# Patient Record
Sex: MALE | Race: WHITE | NOT HISPANIC OR LATINO | Employment: OTHER | URBAN - METROPOLITAN AREA
[De-identification: names, ages, dates, MRNs, and addresses within clinical notes are randomized per-mention and may not be internally consistent; named-entity substitution may affect disease eponyms.]

---

## 2017-02-08 ENCOUNTER — ALLSCRIPTS OFFICE VISIT (OUTPATIENT)
Dept: OTHER | Facility: OTHER | Age: 62
End: 2017-02-08

## 2017-03-24 ENCOUNTER — GENERIC CONVERSION - ENCOUNTER (OUTPATIENT)
Dept: OTHER | Facility: OTHER | Age: 62
End: 2017-03-24

## 2017-03-25 LAB — INTERPRETATION (HISTORICAL): NORMAL

## 2017-03-26 ENCOUNTER — GENERIC CONVERSION - ENCOUNTER (OUTPATIENT)
Dept: OTHER | Facility: OTHER | Age: 62
End: 2017-03-26

## 2017-03-31 ENCOUNTER — ALLSCRIPTS OFFICE VISIT (OUTPATIENT)
Dept: OTHER | Facility: OTHER | Age: 62
End: 2017-03-31

## 2017-06-02 ENCOUNTER — TRANSCRIBE ORDERS (OUTPATIENT)
Dept: ADMINISTRATIVE | Facility: HOSPITAL | Age: 62
End: 2017-06-02

## 2017-06-02 ENCOUNTER — ALLSCRIPTS OFFICE VISIT (OUTPATIENT)
Dept: OTHER | Facility: OTHER | Age: 62
End: 2017-06-02

## 2017-06-02 DIAGNOSIS — R94.31 NONSPECIFIC ABNORMAL ELECTROCARDIOGRAM (ECG) (EKG): Primary | ICD-10-CM

## 2017-06-02 DIAGNOSIS — R07.9 CHEST PAIN: ICD-10-CM

## 2017-06-02 DIAGNOSIS — R94.31 ABNORMAL ELECTROCARDIOGRAM: ICD-10-CM

## 2017-06-20 ENCOUNTER — HOSPITAL ENCOUNTER (OUTPATIENT)
Dept: NON INVASIVE DIAGNOSTICS | Facility: HOSPITAL | Age: 62
Discharge: HOME | End: 2017-06-20
Attending: INTERNAL MEDICINE
Payer: COMMERCIAL

## 2017-06-20 DIAGNOSIS — R07.9 CHEST PAIN: ICD-10-CM

## 2017-06-20 DIAGNOSIS — R94.31 ABNORMAL ELECTROCARDIOGRAM: ICD-10-CM

## 2017-06-20 PROCEDURE — 93306 TTE W/DOPPLER COMPLETE: CPT

## 2017-06-20 PROCEDURE — 93017 CV STRESS TEST TRACING ONLY: CPT

## 2017-06-21 LAB
MAX DIASTOLIC BP: 80 MMHG
MAX HEART RATE: 150 BPM
MAX PREDICTED HEART RATE: 159 BPM
MAX. SYSTOLIC BP: 198 MMHG
PROTOCOL NAME: NORMAL
TIME IN EXERCISE PHASE: 406 S

## 2017-07-10 ENCOUNTER — ALLSCRIPTS OFFICE VISIT (OUTPATIENT)
Dept: OTHER | Facility: OTHER | Age: 62
End: 2017-07-10

## 2017-07-10 LAB — S PYO AG THROAT QL: NEGATIVE

## 2017-07-19 ENCOUNTER — ALLSCRIPTS OFFICE VISIT (OUTPATIENT)
Dept: OTHER | Facility: OTHER | Age: 62
End: 2017-07-19

## 2017-07-19 DIAGNOSIS — J20.9 ACUTE BRONCHITIS: ICD-10-CM

## 2017-07-25 ENCOUNTER — ALLSCRIPTS OFFICE VISIT (OUTPATIENT)
Dept: OTHER | Facility: OTHER | Age: 62
End: 2017-07-25

## 2017-12-05 LAB
A/G RATIO (HISTORICAL): 1.7 (ref 1.2–2.2)
ALBUMIN SERPL BCP-MCNC: 4.4 G/DL (ref 3.6–4.8)
ALP SERPL-CCNC: 60 IU/L (ref 39–117)
ALT SERPL W P-5'-P-CCNC: 57 IU/L (ref 0–44)
AST SERPL W P-5'-P-CCNC: 46 IU/L (ref 0–40)
BILIRUB SERPL-MCNC: 0.6 MG/DL (ref 0–1.2)
BUN SERPL-MCNC: 18 MG/DL (ref 8–27)
BUN/CREA RATIO (HISTORICAL): 20 (ref 10–24)
CALCIUM SERPL-MCNC: 9.7 MG/DL (ref 8.6–10.2)
CHLORIDE SERPL-SCNC: 99 MMOL/L (ref 96–106)
CO2 SERPL-SCNC: 26 MMOL/L (ref 18–29)
CREAT SERPL-MCNC: 0.91 MG/DL (ref 0.76–1.27)
EGFR AFRICAN AMERICAN (HISTORICAL): 105 ML/MIN/1.73
EGFR-AMERICAN CALC (HISTORICAL): 91 ML/MIN/1.73
GLUCOSE SERPL-MCNC: 137 MG/DL (ref 65–99)
HBA1C MFR BLD HPLC: 7.1 % (ref 4.8–5.6)
POTASSIUM SERPL-SCNC: 4.5 MMOL/L (ref 3.5–5.2)
SODIUM SERPL-SCNC: 138 MMOL/L (ref 134–144)
TOT. GLOBULIN, SERUM (HISTORICAL): 2.6 G/DL (ref 1.5–4.5)
TOTAL PROTEIN (HISTORICAL): 7 G/DL (ref 6–8.5)

## 2017-12-06 ENCOUNTER — GENERIC CONVERSION - ENCOUNTER (OUTPATIENT)
Dept: OTHER | Facility: OTHER | Age: 62
End: 2017-12-06

## 2017-12-13 ENCOUNTER — ALLSCRIPTS OFFICE VISIT (OUTPATIENT)
Dept: OTHER | Facility: OTHER | Age: 62
End: 2017-12-13

## 2017-12-15 NOTE — PROGRESS NOTES
Assessment  1  Hypertension (401 9) (I10)   2  Hyperlipidemia (272 4) (E78 5)   3  Diabetes mellitus with diabetic neuropathy (250 60,357 2) (E11 40)   4  Influenza vaccine needed (V04 81) (Z23)    Plan  Diabetes mellitus with diabetic neuropathy    · MetFORMIN HCl - 500 MG Oral Tablet; Take 1 tablet daily   · (1) HEMOGLOBIN A1C; Status:Active; Requested for:38Zph9946;    · (1) MICROALBUMIN CREATININE RATIO, RANDOM URINE; Status:Active; Requestedfor:98Ctj3829;    · *VB - Foot Exam; Status:Complete;   Done: 71QFU6961 08:26AM   · Begin a limited exercise program ; Status:Complete;   Done: 74MVI8221   · We recommend that you bring your body mass index down to 26 ; Status:Complete;  Done: 63WJC4341   · Follow-up visit in 3 months Evaluation and Treatment  Follow-up  Status: Complete -Scheduling  Done: 85HOJ8588 08:48AM  Hyperlipidemia    · Atorvastatin Calcium 20 MG Oral Tablet; Take 1 tablet daily   · (1) COMPREHENSIVE METABOLIC PANEL; Status:Active; Requested for:71Gez2336;    · (1) LIPID PANEL FASTING W DIRECT LDL REFLEX; Status:Active; Requestedfor:72Cpm1411;   Hypertension    · Lisinopril-Hydrochlorothiazide 20-12 5 MG Oral Tablet; One daily  Influenza vaccine needed    · Fluzone Quadrivalent 0 5 ML Intramuscular Suspension    Discussion/Summary    Diabetes - A1c is up to 7 1; weight loss advised  Hypertension - not quite at goal, will work on getting back on track with diet  Hyperlipidemia - stable on Lipitor  Chief Complaint  BP and Diabetes check  Review labs [facility]   Patient is here today for follow up of chronic conditions described in HPI  History of Present Illness  He has been feeling well  He is not watching his diet  The patient states his hyperlipidemia has been under good control since the last visit  Symptoms: Associated symptoms include no memory loss  Medications: He denies medication side effects  The patient presents for follow-up of essential hypertension   The patient states he has been doing well with his blood pressure control since the last visit  Symptoms: Associated symptoms include no headache  Medications: the patient is adherent with his medication regimen  -- He denies medication side effects  Review of Systems   Constitutional: not feeling tired  Respiratory: no shortness of breath  Active Problems  1  Abnormal EKG (794 31) (R94 31)   2  Adenocarcinoma of prostate (185) (C61)   3  Arthropathy of multiple sites (716 99) (M12 9)   4  BMI 40 0-44 9, adult (V85 41) (Z68 41)   5  Chest pain (786 50) (R07 9)   6  Chest tightness (786 59) (R07 89)   7  Diabetes mellitus with diabetic neuropathy (250 60,357 2) (E11 40)   8  Diverticulitis of colon (562 11) (K57 32)   9  Diverticulosis of sigmoid colon (562 10) (K57 30)   10  Encounter for screening for malignant neoplasm of colon (V76 51) (Z12 11)   11  Esophageal reflux (530 81) (K21 9)   12  Fatty liver (571 8) (K76 0)   13  Generalized osteoarthritis of multiple sites (715 09) (M15 9)   14  Hernia (553 9) (K46 9)   15  Hyperlipidemia (272 4) (E78 5)   16  Hypertension (401 9) (I10)   17  Iliotibial band syndrome of left side (728 89) (M76 32)   18  Lumbago (724 2) (M54 5)   19  Lumbar radiculopathy (724 4) (M54 16)   20  Onychomycosis (110 1) (B35 1)   21  Sore throat (462) (J02 9)   22  Well adult on routine health check (V70 0) (Z00 00)    Past Medical History  1  Cough (786 2) (R05)   2  History of Dysplasia of toenail (703 8) (Q84 6)   3  History of Hearing problem (V41 2) (H91 90)   4  History of acute bronchitis (V12 69) (Z87 09)   5  History of acute bronchitis (V12 69) (Z87 09)   6  History of acute pharyngitis (V12 69) (Z87 09)   7  History of arthritis (V13 4) (Z87 39)   8  History of diabetes mellitus (V12 29) (Z86 39)   9  History of high cholesterol (V12 29) (Z86 39)   10  History of malignant neoplasm of prostate (V10 46) (Z85 46)   11  Hypertension (401 9) (I10)   12   Influenza vaccine needed (V04 81) (Z23)   13  History of Lateral epicondylitis, unspecified laterality (726 32) (M77 10)   14  History of Paronychia of toe (681 11) (L03 039)   15  History of Screening for prostate cancer (V76 44) (Z12 5)   16  History of Tinea pedis of both feet (110 4) (B35 3)   17  History of Trochanteric bursitis (726 5) (M70 60)    Surgical History  1  History of Complete Colonoscopy   2  History of Prostate Surgery   3  History of Surgery Prostate Prostatotomy    Family History  Mother    1  Family history of arthritis (V17 7) (Z82 61)  Father    2  Family history of Coronary artery disease   3  Family history of arthritis (V17 7) (Z82 61)   4  Family history of hypertension (V17 49) (Z82 49)  Family History    5  Denied: Family history of mental disorder    The family history was reviewed and updated today  Social History   · Drinks beer (V49 89) (Z78 9)   · Former smoker (V15 82) (Z45 656)   · Lack of exercise (V69 0) (Z72 3)   ·    · Pets/Animals: Cat   · Sleeps 8 - 10 hours a day    Current Meds   1  Aspirin Childrens 81 MG Oral Tablet Chewable; take one tablet daily with food; Therapy: 25QSI0685 to (Last Rx:02Jun2017)  Requested for: 02Jun2017 Ordered   2  Atorvastatin Calcium 20 MG Oral Tablet; Take 1 tablet daily; Therapy: 35NWW2458 to (Evaluate:67Cxe5206)  Requested for: 38Yqp1736; Last Rx:38Meu0173 Ordered   3  Estevan Contour Monitor w/Device Kit; Therapy: (Recorded:45Unp3228) to Recorded   4  [employer]; TEST twice a day as directed  Requested for: 11LGA9981; Last Rx:41Nqj5413 Ordered   5  Estevan Microlet Lancets Miscellaneous; TEST twice a day as directed  Requested for: 44KGO0583; Last Rx:91Hch1833 Ordered   6  Fish Oil 1000 MG Oral Capsule; 4000 mg daily; Therapy: 11ADG5901 to (Last Rx:02Jun2017)  Requested for: 02Jun2017 Ordered   7  Lisinopril-Hydrochlorothiazide 20-12 5 MG Oral Tablet; One daily;  Therapy: 91GLG8338 to (Evaluate:50Jen3120)  Requested for: 35MNT6022; Last Rx: 12MTJ6236 Ordered   8  MetFORMIN HCl - 500 MG Oral Tablet; Take 1 tablet daily; Therapy: 69ZLO2202 to (Last Rx:16Nov2017)  Requested for: 21XWW1384 Ordered   9  RABEprazole Sodium 20 MG Oral Tablet Delayed Release; TAKE 1 TABLET EVERY OTHER  DAY; Therapy: (Recorded:48Hbw8110) to Recorded    Allergies  1  Penicillins    Vitals  Vital Signs    Recorded: 23Gep6592 08:31AM Recorded: 45Tjj6739 08:15AM   Temperature  97 1 F   Heart Rate  72   Respiration  16   Systolic 598 007   Diastolic 82 90   Height  5 ft 11 in   Weight  288 lb    BMI Calculated  40 17   BSA Calculated  2 46     Physical Exam   Constitutional  General appearance: No acute distress, well appearing and well nourished  Ears, Nose, Mouth, and Throat  External inspection of ears and nose: Normal    Otoscopic examination: Tympanic membrance translucent with normal light reflex  Canals patent without erythema  Oropharynx: Normal with no erythema, edema, exudate or lesions  Pulmonary  Auscultation of lungs: Clear to auscultation, equal breath sounds bilaterally, no wheezes, no rales, no rhonci  no rales or crackles were heard bilaterally  no rhonchi  no friction rub  no wheezing  Cardiovascular  Auscultation of heart: Normal rate and rhythm, normal S1 and S2, without murmurs  Socks and shoes removed, Right Foot Findings: normal foot, no swelling, no erythema  The right toes were normal-- and-- had full range of motion  The sensory exam showed normal vibratory sensation at the level of the toes on the right  Normal tactile sensation with monofilament testing throughout the right foot  Socks and shoes removed, Left Foot Findings: normal foot, no swelling, no erythema  The left toes were normal-- and-- had full range of motion  The sensory exam showed normal vibratory sensation at the level of the toes on the left  Normal tactile sensation with monofilament testing throughout the left foot  Pulses:  2+ in the dorsalis pedis on the right  Pulses:  2+ in the dorsalis pedis on the left        Results/Data  *VB - Foot Exam [id number] 08:26AM Sushma Rader     Test Name Result Flag Reference   FOOT EXAM [id number]         Provider Comments    reminded to get eye exam      Future Appointments    Date/Time Provider Specialty Site   03/15/2018 09:00 AM Sushma Rader, [facility] Last Guide[address]     Signatures   Electronically signed by : Marilu Van DO; Dec 13 2017  2:29PM EST                       (Author)

## 2018-01-10 NOTE — RESULT NOTES
Message   Appointment 6/29/16   Please print and put in my folder   Dr Kecia Ponce     Verified Results  (1) HEMOGLOBIN A1C 16BNL4451 10:12AM Dinah Pearson     Test Name Result Flag Reference   Hemoglobin A1c 6 7 % H 4 8-5 6   Pre-diabetes: 5 7 - 6 4           Diabetes: >6 4           Glycemic control for adults with diabetes: <7 0

## 2018-01-10 NOTE — RESULT NOTES
Message   Appointment 3/31/17   Please print and put in my folder  Dr Eduarda Marie      Verified Results  (1) COMPREHENSIVE METABOLIC PANEL 40HMM7718 63:02VV Jory Rand     Test Name Result Flag Reference   Glucose, Serum 148 mg/dL H 65-99   BUN 18 mg/dL  8-27   Creatinine, Serum 0 98 mg/dL  0 76-1 27   eGFR If NonAfricn Am 83 mL/min/1 73  >59   eGFR If Africn Am 96 mL/min/1 73  >59   BUN/Creatinine Ratio 18  10-22   Sodium, Serum 139 mmol/L  134-144   Potassium, Serum 4 1 mmol/L  3 5-5 2   Chloride, Serum 99 mmol/L     Carbon Dioxide, Total 24 mmol/L  18-29   Calcium, Serum 9 3 mg/dL  8 6-10 2   Protein, Total, Serum 6 5 g/dL  6 0-8 5   Albumin, Serum 4 3 g/dL  3 6-4 8   Globulin, Total 2 2 g/dL  1 5-4 5   A/G Ratio 2 0  1 2-2 2   **Please note reference interval change**   Bilirubin, Total 0 8 mg/dL  0 0-1 2   Alkaline Phosphatase, S 64 IU/L     AST (SGOT) 62 IU/L H 0-40   ALT (SGPT) 75 IU/L H 0-44     (1) LIPID PANEL FASTING W DIRECT LDL REFLEX 03SVU9260 08:56AM Jory Austell     Test Name Result Flag Reference   Cholesterol, Total 193 mg/dL  100-199   Triglycerides 158 mg/dL H 0-149   HDL Cholesterol 52 mg/dL  >39   LDL Cholesterol Calc 109 mg/dL H 0-99     (1) HEMOGLOBIN A1C 81DTA7601 08:56AM Jory Austell     Test Name Result Flag Reference   Hemoglobin A1c 6 9 % H 4 8-5 6   Pre-diabetes: 5 7 - 6 4           Diabetes: >6 4           Glycemic control for adults with diabetes: <7 0     (1) MICROALBUMIN CREATININE RATIO, RANDOM URINE 24Mar2017 08:56AM Jory Austell     Test Name Result Flag Reference   Creatinine, Urine 193 9 mg/dL  Not Estab  Microalbumin, Urine 27 1 ug/mL  Not Estab  Microalb/Creat Ratio 14 0 mg/g creat  0 0-30 0     [facility]) Cardiovascular Risk Assessment 24Mar2017 08:56AM Jory Austell     Test Name Result Flag Reference   Interpretation Note     Supplement report is available  PDF Image

## 2018-01-10 NOTE — RESULT NOTES
Message   Appointment 12/30/16   Please print and put in my folder     Dr Ally Nicholson      Verified Results  (1) COMPREHENSIVE METABOLIC PANEL 32CZL5417 71:50BY Rossi Chavez     Test Name Result Flag Reference   Glucose, Serum 149 mg/dL H 65-99   BUN 19 mg/dL  8-27   Creatinine, Serum 0 91 mg/dL  0 76-1 27   eGFR If NonAfricn Am 91 mL/min/1 73  >59   Alkaline Phosphatase, S 62 IU/L     AST (SGOT) 43 IU/L H 0-40   ALT (SGPT) 64 IU/L H 0-44   Calcium, Serum 9 7 mg/dL  8 6-10 2   Protein, Total, Serum 6 8 g/dL  6 0-8 5   Albumin, Serum 4 5 g/dL  3 6-4 8   Globulin, Total 2 3 g/dL  1 5-4 5   A/G Ratio 2 0  1 1-2 5   Bilirubin, Total 0 4 mg/dL  0 0-1 2   eGFR If Africn Am 106 mL/min/1 73  >59   BUN/Creatinine Ratio 21  10-22   Sodium, Serum 142 mmol/L  134-144   **Please note reference interval change**   Potassium, Serum 4 6 mmol/L  3 5-5 2   Chloride, Serum 101 mmol/L     **Please note reference interval change**   Carbon Dioxide, Total 25 mmol/L  18-29     (1) HEMOGLOBIN A1C 90FVA5322 09:30AM Rossi Chavez     Test Name Result Flag Reference   Hemoglobin A1c 6 6 % H 4 8-5 6   Pre-diabetes: 5 7 - 6 4           Diabetes: >6 4           Glycemic control for adults with diabetes: <7 0

## 2018-01-12 VITALS
HEIGHT: 71 IN | WEIGHT: 279.31 LBS | OXYGEN SATURATION: 98 % | DIASTOLIC BLOOD PRESSURE: 90 MMHG | HEART RATE: 90 BPM | BODY MASS INDEX: 39.1 KG/M2 | SYSTOLIC BLOOD PRESSURE: 148 MMHG

## 2018-01-12 NOTE — RESULT NOTES
Message   Appointment 6/29/16   Please print and put in my folder   Dr Dianne Monzon     Verified Results  (1) PSA (SCREEN) (Dx V76 44 Screen for Prostate Cancer) 85CGE7203 10:12AM [facility]     Test Name Result Flag Reference   Prostate Specific Ag, Serum <0 1 ng/mL  0 0-4 0   Roche ECLIA methodology  According to the American Urological Association, Serum PSA should  decrease and remain at undetectable levels after radical  prostatectomy  The AUA defines biochemical recurrence as an initial  PSA value 0 2 ng/mL or greater followed by a subsequent confirmatory  PSA value 0 2 ng/mL or greater  Values obtained with different assay methods or kits cannot be used  interchangeably  Results cannot be interpreted as absolute evidence  of the presence or absence of malignant disease  (1) COMPREHENSIVE METABOLIC PANEL [id number] 63:09VB [facility]     Test Name Result Flag Reference   Glucose, Serum 128 mg/dL H 65-99   BUN 19 mg/dL  8-27   Creatinine, Serum 0 93 mg/dL  0 76-1 27   eGFR If NonAfricn Am 89 mL/min/1 73  >59   eGFR If Africn Am 103 mL/min/1 73  >59   BUN/Creatinine Ratio 20  10-22   Sodium, Serum 141 mmol/L  134-144   Potassium, Serum 4 5 mmol/L  3 5-5 2   Chloride, Serum 99 mmol/L     Carbon Dioxide, Total 22 mmol/L  18-29   Calcium, Serum 9 6 mg/dL  8 6-10 2   Protein, Total, Serum 6 9 g/dL  6 0-8 5   Albumin, Serum 4 6 g/dL  3 6-4 8   Globulin, Total 2 3 g/dL  1 5-4 5   A/G Ratio 2 0  1 1-2 5   Bilirubin, Total 0 7 mg/dL  0 0-1 2   Alkaline Phosphatase, S 63 IU/L     AST (SGOT) 47 IU/L H 0-40   ALT (SGPT) 57 IU/L H 0-44     (1) LIPID PANEL FASTING W DIRECT LDL REFLEX [id number] 10:12AM [facility]     Test Name Result Flag Reference   Cholesterol, Total 188 mg/dL  100-199   Triglycerides 122 mg/dL  0-149   HDL Cholesterol 54 mg/dL  >39   According to ATP-III Guidelines, HDL-C >59 mg/dL is considered a  negative risk factor for CHD     LDL Cholesterol Calc 110 mg/dL H 0-99     (1) MICROALBUMIN CREATININE RATIO, RANDOM URINE 65NVF2051 10:12AM Leonard Andrea     Test Name Result Flag Reference   Creatinine, Urine 164 7 mg/dL  Not Estab  Microalbumin, Urine 17 4 ug/mL  Not Estab     Microalb/Creat Ratio 10 6 mg/g creat  0 0-30 0

## 2018-01-12 NOTE — RESULT NOTES
Message   Appointment 9/30/16    Please print and put in my folder  Dr Edelmira Szymanski      Verified Results  (1) COMPREHENSIVE METABOLIC PANEL [id number] 76:96DD Shira Jazmín     Test Name Result Flag Reference   Calcium, Serum 9 3 mg/dL  8 6-10 2   Protein, Total, Serum 6 5 g/dL  6 0-8 5   Albumin, Serum 4 4 g/dL  3 6-4 8   Globulin, Total 2 1 g/dL  1 5-4 5   A/G Ratio 2 1  1 1-2 5   Bilirubin, Total 0 5 mg/dL  0 0-1 2   Alkaline Phosphatase, S 65 IU/L     AST (SGOT) 42 IU/L H 0-40   ALT (SGPT) 64 IU/L H 0-44   Carbon Dioxide, Total 20 mmol/L  18-29   Chloride, Serum 102 mmol/L     Potassium, Serum 4 5 mmol/L  3 5-5 2   Sodium, Serum 141 mmol/L  134-144   BUN/Creatinine Ratio 22  10-22   eGFR If Africn Am 111 mL/min/1 73  >59   eGFR If NonAfricn Am 96 mL/min/1 73  >59   Creatinine, Serum 0 83 mg/dL  0 76-1 27   BUN 18 mg/dL  8-27   Glucose, Serum 124 mg/dL H 65-99     (1) LIPID PANEL FASTING W DIRECT LDL REFLEX [id number] 11:47AM Shira Jazmín     Test Name Result Flag Reference   Cholesterol, Total 172 mg/dL  100-199   Triglycerides 99 mg/dL  0-149   HDL Cholesterol 57 mg/dL  >39   According to ATP-III Guidelines, HDL-C >59 mg/dL is considered a  negative risk factor for CHD  LDL Cholesterol Calc 95 mg/dL  0-99     (1) HEMOGLOBIN A1C [id number] 11:47AM [facility]     Test Name Result Flag Reference   Hemoglobin A1c 7 0 % H 4 8-5 6   Pre-diabetes: 5 7 - 6 4           Diabetes: >6 4           Glycemic control for adults with diabetes: <7 0     [facility]) Cardiovascular Risk Assessment [id number] 11:47AM Shira Jazmín     Test Name Result Flag Reference   Interpretation Note     Supplement report is available  PDF Image

## 2018-01-13 VITALS
WEIGHT: 278 LBS | RESPIRATION RATE: 16 BRPM | HEIGHT: 71 IN | BODY MASS INDEX: 38.92 KG/M2 | DIASTOLIC BLOOD PRESSURE: 86 MMHG | SYSTOLIC BLOOD PRESSURE: 130 MMHG | TEMPERATURE: 98.2 F | HEART RATE: 82 BPM

## 2018-01-13 VITALS
DIASTOLIC BLOOD PRESSURE: 90 MMHG | SYSTOLIC BLOOD PRESSURE: 140 MMHG | HEIGHT: 71 IN | BODY MASS INDEX: 40.6 KG/M2 | WEIGHT: 290 LBS | TEMPERATURE: 98 F | RESPIRATION RATE: 20 BRPM | HEART RATE: 78 BPM

## 2018-01-14 VITALS
DIASTOLIC BLOOD PRESSURE: 80 MMHG | HEIGHT: 71 IN | BODY MASS INDEX: 38.92 KG/M2 | WEIGHT: 278 LBS | HEART RATE: 87 BPM | OXYGEN SATURATION: 97 % | SYSTOLIC BLOOD PRESSURE: 135 MMHG

## 2018-01-14 VITALS — BODY MASS INDEX: 40.32 KG/M2 | RESPIRATION RATE: 17 BRPM | HEIGHT: 71 IN | WEIGHT: 288 LBS

## 2018-01-14 VITALS
DIASTOLIC BLOOD PRESSURE: 84 MMHG | SYSTOLIC BLOOD PRESSURE: 132 MMHG | HEIGHT: 71 IN | HEART RATE: 78 BPM | TEMPERATURE: 97.4 F | WEIGHT: 278 LBS | RESPIRATION RATE: 18 BRPM | BODY MASS INDEX: 38.92 KG/M2

## 2018-01-23 VITALS
TEMPERATURE: 97.1 F | WEIGHT: 288 LBS | BODY MASS INDEX: 40.32 KG/M2 | HEART RATE: 72 BPM | SYSTOLIC BLOOD PRESSURE: 144 MMHG | DIASTOLIC BLOOD PRESSURE: 82 MMHG | HEIGHT: 71 IN | RESPIRATION RATE: 16 BRPM

## 2018-01-23 NOTE — RESULT NOTES
Discussion/Summary   Appointment 12/13/17   Please print and put in my folder     Dr Umair Ibanez      Verified Results  (1) COMPREHENSIVE METABOLIC PANEL [id number] 50:33PD [facility]     Test Name Result Flag Reference   Glucose, Serum 137 mg/dL H 65-99   BUN 18 mg/dL  8-27   Creatinine, Serum 0 91 mg/dL  0 76-1 27   BUN/Creatinine Ratio 20  10-24   Sodium, Serum 138 mmol/L  134-144   Potassium, Serum 4 5 mmol/L  3 5-5 2   Chloride, Serum 99 mmol/L     Carbon Dioxide, Total 26 mmol/L  18-29   Calcium, Serum 9 7 mg/dL  8 6-10 2   Protein, Total, Serum 7 0 g/dL  6 0-8 5   Albumin, Serum 4 4 g/dL  3 6-4 8   Globulin, Total 2 6 g/dL  1 5-4 5   A/G Ratio 1 7  1 2-2 2   Bilirubin, Total 0 6 mg/dL  0 0-1 2   Alkaline Phosphatase, S 60 IU/L     AST (SGOT) 46 IU/L H 0-40   ALT (SGPT) 57 IU/L H 0-44   eGFR If NonAfricn Am 91 mL/min/1 73  >59   eGFR If Africn Am 105 mL/min/1 73  >59     (1) HEMOGLOBIN A1C [id number] 10:27AM [facility]     Test Name Result Flag Reference   Hemoglobin A1c 7 1 % H 4 8-5 6   Pre-diabetes: 5 7 - 6 4           Diabetes: >6 4           Glycemic control for adults with diabetes: <7 0

## 2018-01-24 PROBLEM — E11.40 DIABETES MELLITUS WITH DIABETIC NEUROPATHY (HCC): Status: ACTIVE | Noted: 2017-03-31

## 2018-01-24 PROBLEM — R94.31 ABNORMAL EKG: Status: ACTIVE | Noted: 2017-06-02

## 2018-01-24 RX ORDER — CHLORAL HYDRATE 500 MG
2000 CAPSULE ORAL 2 TIMES DAILY
COMMUNITY
Start: 2017-06-02 | End: 2021-10-21 | Stop reason: ALTCHOICE

## 2018-01-24 RX ORDER — ATORVASTATIN CALCIUM 20 MG/1
1 TABLET, FILM COATED ORAL DAILY
COMMUNITY
Start: 2012-01-30 | End: 2018-02-22 | Stop reason: SDUPTHER

## 2018-01-24 RX ORDER — ASPIRIN 81 MG/1
1 TABLET, CHEWABLE ORAL
COMMUNITY
Start: 2017-06-02 | End: 2022-03-16 | Stop reason: HOSPADM

## 2018-01-24 RX ORDER — RABEPRAZOLE SODIUM 20 MG/1
TABLET, DELAYED RELEASE ORAL EVERY OTHER DAY
Refills: 0 | COMMUNITY
Start: 2018-01-19 | End: 2021-04-14 | Stop reason: ALTCHOICE

## 2018-01-24 RX ORDER — LISINOPRIL AND HYDROCHLOROTHIAZIDE 20; 12.5 MG/1; MG/1
TABLET ORAL DAILY
COMMUNITY
Start: 2017-07-25 | End: 2018-02-22 | Stop reason: SDUPTHER

## 2018-01-26 ENCOUNTER — OFFICE VISIT (OUTPATIENT)
Dept: FAMILY MEDICINE CLINIC | Facility: CLINIC | Age: 63
End: 2018-01-26
Payer: COMMERCIAL

## 2018-01-26 VITALS
SYSTOLIC BLOOD PRESSURE: 150 MMHG | BODY MASS INDEX: 38.33 KG/M2 | WEIGHT: 283 LBS | HEIGHT: 72 IN | DIASTOLIC BLOOD PRESSURE: 68 MMHG

## 2018-01-26 DIAGNOSIS — M25.552 HIP PAIN, BILATERAL: Primary | ICD-10-CM

## 2018-01-26 DIAGNOSIS — M25.551 HIP PAIN, BILATERAL: Primary | ICD-10-CM

## 2018-01-26 PROCEDURE — 99213 OFFICE O/P EST LOW 20 MIN: CPT | Performed by: FAMILY MEDICINE

## 2018-01-26 NOTE — PROGRESS NOTES
Assessment/Plan:    No problem-specific Assessment & Plan notes found for this encounter  Diagnoses and all orders for this visit:    Hip pain, bilateral  Comments:  worsening hip pain, will get x-rays, labs and start physical therapy  Orders:  -     XR hip/pelv 2-3 vws right if performed; Future  -     XR hip/pelv 2-3 vws left if performed; Future  -     Ambulatory referral to Physical Therapy; Future  -     RF Screen w/ Reflex to Titer; Future  -     Uric acid; Future  -     Sedimentation rate, automated; Future  -     Uric acid  -     Sedimentation rate, automated          Patient Instructions   Increase exercise as tolerated      Return if symptoms worsen or fail to improve  Subjective:      Patient ID: Lis Anne is a 58 y o  male  Chief Complaint   Patient presents with    Personal Problem     refused visitals just wants to talk to doctor       He has been having progressive pain in his hips  The pain is inside hips  He is cautious on the way he steps  He has a hard time getting on his underwear  He used to be able to work hard all day and now he is done by noon           The following portions of the patient's history were reviewed and updated as appropriate: allergies, current medications, past family history, past medical history, past social history, past surgical history and problem list     Review of Systems   Musculoskeletal:        Morning stiffness   Psychiatric/Behavioral:        Denies depression         Current Outpatient Prescriptions   Medication Sig Dispense Refill    aspirin 81 mg chewable tablet Chew 1 tablet      atorvastatin (LIPITOR) 20 mg tablet Take 1 tablet by mouth daily      ESTEVAN MICROLET LANCETS lancets Estevan Microlet Lancets Miscellaneous  TEST twice a day as directed   Quantity: 100;  Refills: 0      Sunni Burton DO ; Active      glucose blood (ESTEVAN CONTOUR TEST) test strip by In Vitro route 2 (two) times a day      lisinopril-hydrochlorothiazide (PRINZIDE,ZESTORETIC) 20-12 5 MG per tablet Take by mouth daily      metFORMIN (GLUCOPHAGE) 500 mg tablet Take 1 tablet by mouth daily      Omega-3 Fatty Acids (FISH OIL) 1,000 mg Take by mouth      RABEprazole (ACIPHEX) 20 MG tablet   0     No current facility-administered medications for this visit  Objective:    /68   Ht 6' (1 829 m)   Wt 128 kg (283 lb)   BMI 38 38 kg/m²        Physical Exam   Constitutional: He appears well-developed and well-nourished  HENT:   Head: Normocephalic and atraumatic  Right Ear: External ear normal    Left Ear: External ear normal    Mouth/Throat: Oropharynx is clear and moist    Cardiovascular: Normal rate, regular rhythm and normal heart sounds  No murmur heard  Pulmonary/Chest: Effort normal and breath sounds normal  No respiratory distress  He has no wheezes  He has no rales  Musculoskeletal: He exhibits no edema or tenderness  Decreased hip adduction bilaterally   Nursing note and vitals reviewed               Jun Renee DO

## 2018-01-30 ENCOUNTER — TRANSCRIBE ORDERS (OUTPATIENT)
Dept: ADMINISTRATIVE | Facility: HOSPITAL | Age: 63
End: 2018-01-30

## 2018-01-30 ENCOUNTER — HOSPITAL ENCOUNTER (OUTPATIENT)
Dept: RADIOLOGY | Facility: HOSPITAL | Age: 63
Discharge: HOME | End: 2018-01-30
Payer: COMMERCIAL

## 2018-01-30 DIAGNOSIS — M25.551 HIP PAIN, BILATERAL: ICD-10-CM

## 2018-01-30 DIAGNOSIS — M25.552 HIP PAIN, BILATERAL: ICD-10-CM

## 2018-01-30 PROCEDURE — 73523 X-RAY EXAM HIPS BI 5/> VIEWS: CPT

## 2018-01-31 LAB
ERYTHROCYTE [SEDIMENTATION RATE] IN BLOOD BY WESTERGREN METHOD: 2 MM/HR (ref 0–30)
RHEUMATOID FACT SERPL-ACNC: <10 IU/ML (ref 0–13.9)
URATE SERPL-MCNC: 5.8 MG/DL (ref 3.7–8.6)

## 2018-02-22 ENCOUNTER — APPOINTMENT (OUTPATIENT)
Dept: RADIOLOGY | Facility: CLINIC | Age: 63
End: 2018-02-22
Payer: COMMERCIAL

## 2018-02-22 ENCOUNTER — OFFICE VISIT (OUTPATIENT)
Dept: CARDIOLOGY CLINIC | Facility: CLINIC | Age: 63
End: 2018-02-22
Payer: COMMERCIAL

## 2018-02-22 VITALS
DIASTOLIC BLOOD PRESSURE: 76 MMHG | WEIGHT: 280 LBS | HEART RATE: 76 BPM | BODY MASS INDEX: 37.93 KG/M2 | SYSTOLIC BLOOD PRESSURE: 126 MMHG | HEIGHT: 72 IN

## 2018-02-22 VITALS
WEIGHT: 290 LBS | BODY MASS INDEX: 39.28 KG/M2 | HEART RATE: 86 BPM | HEIGHT: 72 IN | OXYGEN SATURATION: 97 % | SYSTOLIC BLOOD PRESSURE: 140 MMHG | DIASTOLIC BLOOD PRESSURE: 88 MMHG

## 2018-02-22 DIAGNOSIS — K76.0 FATTY LIVER: ICD-10-CM

## 2018-02-22 DIAGNOSIS — E11.40 TYPE 2 DIABETES MELLITUS WITH DIABETIC NEUROPATHY, WITHOUT LONG-TERM CURRENT USE OF INSULIN (HCC): Primary | ICD-10-CM

## 2018-02-22 DIAGNOSIS — E11.40 TYPE 2 DIABETES MELLITUS WITH DIABETIC NEUROPATHY, WITHOUT LONG-TERM CURRENT USE OF INSULIN (HCC): ICD-10-CM

## 2018-02-22 DIAGNOSIS — I10 ESSENTIAL HYPERTENSION: ICD-10-CM

## 2018-02-22 DIAGNOSIS — M25.511 BILATERAL SHOULDER PAIN, UNSPECIFIED CHRONICITY: Primary | ICD-10-CM

## 2018-02-22 DIAGNOSIS — R07.89 CHEST TIGHTNESS: Primary | ICD-10-CM

## 2018-02-22 DIAGNOSIS — M25.512 BILATERAL SHOULDER PAIN, UNSPECIFIED CHRONICITY: Primary | ICD-10-CM

## 2018-02-22 DIAGNOSIS — E78.2 MIXED HYPERLIPIDEMIA: ICD-10-CM

## 2018-02-22 DIAGNOSIS — M75.50 SUBACROMIAL BURSITIS: ICD-10-CM

## 2018-02-22 PROCEDURE — 73030 X-RAY EXAM OF SHOULDER: CPT

## 2018-02-22 PROCEDURE — 20610 DRAIN/INJ JOINT/BURSA W/O US: CPT | Performed by: ORTHOPAEDIC SURGERY

## 2018-02-22 PROCEDURE — 93000 ELECTROCARDIOGRAM COMPLETE: CPT | Performed by: INTERNAL MEDICINE

## 2018-02-22 PROCEDURE — 99203 OFFICE O/P NEW LOW 30 MIN: CPT | Performed by: ORTHOPAEDIC SURGERY

## 2018-02-22 PROCEDURE — 99214 OFFICE O/P EST MOD 30 MIN: CPT | Performed by: INTERNAL MEDICINE

## 2018-02-22 RX ORDER — DEXAMETHASONE SODIUM PHOSPHATE 100 MG/10ML
40 INJECTION INTRAMUSCULAR; INTRAVENOUS
Status: COMPLETED | OUTPATIENT
Start: 2018-02-22 | End: 2018-02-22

## 2018-02-22 RX ORDER — LISINOPRIL AND HYDROCHLOROTHIAZIDE 20; 12.5 MG/1; MG/1
1 TABLET ORAL DAILY
Qty: 90 TABLET | Refills: 0 | Status: SHIPPED | OUTPATIENT
Start: 2018-02-22 | End: 2018-07-02 | Stop reason: SDUPTHER

## 2018-02-22 RX ORDER — LIDOCAINE HYDROCHLORIDE 10 MG/ML
4 INJECTION, SOLUTION INFILTRATION; PERINEURAL
Status: COMPLETED | OUTPATIENT
Start: 2018-02-22 | End: 2018-02-22

## 2018-02-22 RX ORDER — ATORVASTATIN CALCIUM 20 MG/1
20 TABLET, FILM COATED ORAL DAILY
Qty: 90 TABLET | Refills: 1 | Status: SHIPPED | OUTPATIENT
Start: 2018-02-22 | End: 2018-08-27 | Stop reason: SDUPTHER

## 2018-02-22 RX ORDER — LISINOPRIL AND HYDROCHLOROTHIAZIDE 20; 12.5 MG/1; MG/1
1 TABLET ORAL DAILY
Qty: 90 TABLET | Refills: 3 | Status: SHIPPED | OUTPATIENT
Start: 2018-02-22 | End: 2018-02-22 | Stop reason: SDUPTHER

## 2018-02-22 RX ADMIN — DEXAMETHASONE SODIUM PHOSPHATE 40 MG: 100 INJECTION INTRAMUSCULAR; INTRAVENOUS at 09:03

## 2018-02-22 RX ADMIN — LIDOCAINE HYDROCHLORIDE 4 ML: 10 INJECTION, SOLUTION INFILTRATION; PERINEURAL at 09:03

## 2018-02-22 NOTE — PROGRESS NOTES
Assessment/Plan:  1  Bilateral shoulder pain, unspecified chronicity  XR shoulder 2+ vw right    XR shoulder 2+ vw left     Tamy Roque has bilateral shoulder pain consistent with subacromial bursitis  The right is more significant than the left and he is starting to become decrease in range of motion in the right shoulder concerning for frozen shoulder  I would like for him to begin physical therapy  We also gave him a cortisone injection the right shoulder today which she tolerated well  I advised him to follow up in the office in 6 weeks for repeat evaluation  Hopefully he can combine his hip and shoulder physical therapy into 1 session  Subjective:   Kwan Shook is a 58 y o  male who presents for evaluation for bilateral shoulder pain  He has had progressive discomfort in his shoulders over the past few months  He denies any injury or trauma  The right shoulder is much worse than the left  He describes the pain as a constant aching throbbing sensation throughout the right shoulder it worsens with any lifting attempt or overhead movement  He did see an orthopedic surgeon 4 years ago where he had a cortisone shot and an MRI which did not show significant abnormality  He denies any recent evaluation for shoulder  He also has similar pain in the left shoulder but is not quite as severe  It does not limit his range of motion but does cause some occasional discomfort  He has tried taking anti-inflammatories over-the-counter and this only helps slightly  He is a diabetic and is on metformin daily  Review of Systems   Constitutional: Negative for chills, fever and unexpected weight change  HENT: Negative for hearing loss, nosebleeds and sore throat  Eyes: Negative for pain, redness and visual disturbance  Respiratory: Negative for cough, shortness of breath and wheezing  Cardiovascular: Negative for chest pain, palpitations and leg swelling     Gastrointestinal: Negative for abdominal pain, nausea and vomiting  Endocrine: Negative for polyphagia and polyuria  Genitourinary: Negative for dysuria and hematuria  Musculoskeletal:        See HPI   Skin: Negative for rash and wound  Neurological: Negative for dizziness, numbness and headaches  Psychiatric/Behavioral: Negative for decreased concentration and suicidal ideas  The patient is not nervous/anxious  Past Medical History:   Diagnosis Date    Arthritis     Chest tightness     Diabetes ([address] )     Diverticulosis of sigmoid colon     Esophageal reflux     Fatty liver     Hearing problem     High cholesterol     History of chest pain     Hyperlipemia     Hypertension     Lumbago     Malignant neoplasm of prostate (HCC)     Prostate CA (HCC)     Tinea pedis of both feet     Trochanteric bursitis        Past Surgical History:   Procedure Laterality Date    COLONOSCOPY  2016    PROSTATECTOMY N/A 2011       Family History   Problem Relation Age of Onset    Arthritis Mother     Parkinsonism Mother     Heart disease Father     Arthritis Father     Coronary artery disease Father     Hypertension Father     Parkinsonism Father     Mental illness Neg Hx        Social History     Occupational History    Not on file       Social History Main Topics    Smoking status: Former Smoker    Smokeless tobacco: Never Used    Alcohol use Yes      Comment: Weekends    Drug use: No    Sexual activity: Not on file         Current Outpatient Prescriptions:     aspirin 81 mg chewable tablet, Chew 1 tablet, Disp: , Rfl:     atorvastatin (LIPITOR) 20 mg tablet, Take 1 tablet by mouth daily, Disp: , Rfl:     ESTEVAN MICROLET LANCETS lancets, Estevan Microlet Lancets Miscellaneous TEST twice a day as directed  Quantity: 100;  Refills: 0   Sunni Burton DO ; Active, Disp: , Rfl:     glucose blood (ESTEVAN CONTOUR TEST) test strip, by In Vitro route 2 (two) times a day, Disp: , Rfl:     lisinopril-hydrochlorothiazide ([place]) 20-12 5 MG per tablet, Take by mouth daily, Disp: , Rfl:     metFORMIN (GLUCOPHAGE) 500 mg tablet, Take 1 tablet by mouth daily, Disp: , Rfl:     Omega-3 Fatty Acids (FISH OIL) 1,000 mg, Take by mouth, Disp: , Rfl:     RABEprazole (ACIPHEX) 20 MG tablet, , Disp: , Rfl: 0    Allergies   Allergen Reactions    Penicillins        Objective:  Vitals:    02/22/18 0819   BP: 126/76   Pulse: 76       Right Shoulder Exam     Tenderness   The patient is experiencing tenderness in the biceps tendon (Supraspinatus)  Range of Motion   Active Abduction:  100 abnormal   Passive Abduction:  160 normal   Extension: normal   Forward Flexion:  170 abnormal   External Rotation: normal   Internal Rotation 0 degrees:  Sacrum abnormal     Muscle Strength   The patient has normal right shoulder strength  Tests   Hawkin's test: positive  Impingement: positive    Other   Sensation: normal  Pulse: present      Left Shoulder Exam     Tenderness   The patient is experiencing no tenderness  Range of Motion   The patient has normal left shoulder ROM  Muscle Strength   The patient has normal left shoulder strength  Tests   Hawkin's test: positive    Other   Sensation: normal  Pulse: present             Physical Exam   Constitutional: He is oriented to person, place, and time  He appears well-developed  HENT:   Head: Normocephalic and atraumatic  Eyes: Conjunctivae are normal    Neck: Neck supple  Cardiovascular: Intact distal pulses  Pulmonary/Chest: Effort normal    Abdominal: Soft  Neurological: He is alert and oriented to person, place, and time  Skin: Skin is warm and dry  Psychiatric: He has a normal mood and affect  His behavior is normal    Vitals reviewed  I have personally reviewed pertinent films in PACS and my interpretation is as follows: Three-view x-rays of bilateral shoulders demonstrate no evidence of fracture or other abnormality      Large joint arthrocentesis  Date/Time: 2/22/2018 9:03 AM  Consent given by: patient  Site marked: site marked  Timeout: Immediately prior to procedure a time out was called to verify the correct patient, procedure, equipment, support staff and site/side marked as required   Supporting Documentation  Indications: pain   Procedure Details  Location: shoulder - R subacromial bursa  Preparation: Patient was prepped and draped in the usual sterile fashion  Needle size: 22 G  Ultrasound guidance: no  Approach: anterolateral  Medications administered: 4 mL lidocaine 1 %; 40 mg dexamethasone 100 mg/10 mL    Patient tolerance: patient tolerated the procedure well with no immediate complications  Dressing:  Sterile dressing applied

## 2018-02-22 NOTE — PROGRESS NOTES
Cardiology Follow Up  Maicol Magallanes  1955  [id number]  [facility] Steffen Romano  [address] [address] [address]    1  Chest tightness  POCT ECG    lisinopril-hydrochlorothiazide (PRINZIDE,ZESTORETIC) 20-12 5 MG per tablet    DISCONTINUED: lisinopril-hydrochlorothiazide (PRINZIDE,ZESTORETIC) 20-12 5 MG per tablet   2  Essential hypertension  lisinopril-hydrochlorothiazide (PRINZIDE,ZESTORETIC) 20-12 5 MG per tablet    DISCONTINUED: lisinopril-hydrochlorothiazide (PRINZIDE,ZESTORETIC) 20-12 5 MG per tablet      Discussion/Plan:  Chest pain- Has improved  No re-occurrence  Has not taken his blood pressure medication in the pas three days    Htn- tighter control  Restart lisinopril/hctz    Dm2- a1c 7 1    SAMPSON- discussed 20 pound wt loss  Omega 3 supplementation    Dm2/hld- atorvastatin + omega supplementation      Interval History:  79-year-old gentleman with a history of diabetes mellitus, non alcoholic steatohepatitis, obesity, hypertension presents for follow-up  He states his chest tightness has improved any feels it may have been secondary to anxiety  He reports that blood pressure has been better controlled  He still is having trouble losing weight  He is compliant with his medications  History blood sugars have been well controlled  He denies having dizziness or lightheadedness  He denies having any falls  He denies having bleeding or bruising      Patient Active Problem List   Diagnosis    Abnormal EKG    Adenocarcinoma of prostate ([address] )    Arthropathy of multiple sites    Type 2 diabetes mellitus with diabetic neuropathy, without long-term current use of insulin (HCC)    Diverticulosis of sigmoid colon    Esophageal reflux    Fatty liver    Abdominal hernia    Mixed hyperlipidemia    Hypertension    Lumbar radiculopathy     Past Medical History:   Diagnosis Date    Arthritis     Chest tightness     Diabetes [facility] [address] [address]     Diverticulosis of sigmoid colon     Esophageal reflux     Fatty liver     Hearing problem     High cholesterol     History of chest pain     Hyperlipemia     Hypertension     Lumbago     Malignant neoplasm of prostate (HCC)     Prostate CA (HCC)     Tinea pedis of both feet     Trochanteric bursitis      Social History     Social History    Marital status: /Civil Union     Spouse name: N/A    Number of children: N/A    Years of education: N/A     Occupational History    Not on file  Social History Main Topics    Smoking status: Former Smoker    Smokeless tobacco: Never Used    Alcohol use Yes      Comment: Weekends    Drug use: No    Sexual activity: Not on file     Other Topics Concern    Not on file     Social History Narrative    Lack of exercise  Pets/ animals: cat      Sleeps 8-10 hours /day      Family History   Problem Relation Age of Onset    Arthritis Mother     Parkinsonism Mother     Heart disease Father     Arthritis Father     Coronary artery disease Father     Hypertension Father     Parkinsonism Father     Mental illness Neg Hx      Past Surgical History:   Procedure Laterality Date    COLONOSCOPY  2016    PROSTATECTOMY N/A 2011       Current Outpatient Prescriptions:     aspirin 81 mg chewable tablet, Chew 1 tablet, Disp: , Rfl:     atorvastatin (LIPITOR) 20 mg tablet, Take 1 tablet (20 mg total) by mouth daily, Disp: 90 tablet, Rfl: 1    NEYDA MICROLET LANCETS lancets, Braclet Microlet Lancets Miscellaneous TEST twice a day as directed  Quantity: 100;  Refills: 0   Sunni Burton DO ; Active, Disp: , Rfl:     glucose blood (NEYDA CONTOUR TEST) test strip, by In Vitro route 2 (two) times a day, Disp: , Rfl:     lisinopril-hydrochlorothiazide (PRINZIDE,ZESTORETIC) 20-12 5 MG per tablet, Take 1 tablet by mouth daily, Disp: 90 tablet, Rfl: 0    metFORMIN (GLUCOPHAGE) 500 mg tablet, Take 1 tablet (500 mg total) by mouth daily, Disp: 90 tablet, Rfl: 1    Omega-3 Fatty Acids (FISH OIL) 1,000 mg, Take by mouth, Disp: , Rfl:     RABEprazole (ACIPHEX) 20 MG tablet, , Disp: , Rfl: 0  No current facility-administered medications for this visit  Allergies   Allergen Reactions    Penicillins        Review of Systems:  Review of Systems   Constitutional: Negative  HENT: Negative  Eyes: Negative  Respiratory: Negative  Cardiovascular: Negative  Gastrointestinal: Negative  Endocrine: Negative  Genitourinary: Negative  Musculoskeletal: Negative  Skin: Negative  Allergic/Immunologic: Negative  Neurological: Negative  Hematological: Negative  Psychiatric/Behavioral: Negative  Vitals:    02/22/18 1307   BP: 140/88   BP Location: Right arm   Patient Position: Sitting   Cuff Size: Large   Pulse: 86   SpO2: 97%   Weight: 132 kg (290 lb)   Height: 6' (1 829 m)     Physical Exam:  Physical Exam   Constitutional: He is oriented to person, place, and time  He appears well-developed and well-nourished  No distress  HENT:   Head: Normocephalic and atraumatic  Right Ear: External ear normal    Left Ear: External ear normal    Eyes: Conjunctivae are normal  Pupils are equal, round, and reactive to light  Right eye exhibits no discharge  Left eye exhibits no discharge  No scleral icterus  Neck: Normal range of motion  Neck supple  No JVD present  No tracheal deviation present  No thyromegaly present  Cardiovascular: Normal rate and regular rhythm  Exam reveals no gallop and no friction rub  No murmur heard  Pulmonary/Chest: Effort normal and breath sounds normal  No stridor  No respiratory distress  He has no wheezes  He has no rales  He exhibits no tenderness  Abdominal: Soft  Bowel sounds are normal  He exhibits no distension and no mass  There is no tenderness  There is no rebound and no guarding  Musculoskeletal: Normal range of motion  He exhibits no edema, tenderness or deformity  Neurological: He is alert and oriented to person, place, and time  He has normal reflexes  No cranial nerve deficit  He exhibits normal muscle tone  Coordination normal    Skin: Skin is warm and dry  No rash noted  He is not diaphoretic  No erythema  No pallor  Psychiatric: He has a normal mood and affect  His behavior is normal  Judgment and thought content normal    Nursing note and vitals reviewed  Labs:   Cholesterol 193 triglycerides 158 HDL 52   Imaging & Testing   I have personally reviewed pertinent reports  EKG: Personally reviewed  Normal sinus rhythm nonspecific T-wave changes    Cardiac testing:   Results for orders placed during the hospital encounter of 17   Echo complete with contrast if indicated    Narrative Ed 39  [address] [facility] [address]  [phone]    Transthoracic Echocardiogram  2D, M-mode, Doppler, and Color Doppler    Study date:  2017    Patient: Juan Rothman  MR number: [id number]  Account number: [de-identified]  : 1955  Age: 64 years  Gender: Male  Status: Routine  Location: Echo lab  Height: 61 in  Weight: 278 5 lb  BP: 141/ 77 mmHg    Indications: Chest pain    Diagnoses: R94 31 - Abnormal electrocardiogram [ECG] [EKG]    Sonographer:  DARIUS Junior  Primary Physician:  Aram Wong DO  Referring Physician:  Kwabena Garcia MD  Group:  Rossy Wallis  Interpreting Physician:  Geneva Llamas DO    SUMMARY    LEFT VENTRICLE:  Systolic function was normal by visual assessment  Ejection fraction was estimated in the range of 55 % to 60 %  There were no regional wall motion abnormalities  There was mild concentric hypertrophy  Doppler parameters were consistent with abnormal left ventricular relaxation (grade 1 diastolic dysfunction)  MITRAL VALVE:  There was mild regurgitation  AORTIC VALVE:  There was no evidence for stenosis  There was no regurgitation      TRICUSPID VALVE:  There was mild regurgitation  Pulmonary artery systolic pressure was within the normal range  HISTORY: PRIOR HISTORY: Hyperlipidemia, Diabetes, Lumbar radiculopathy    PROCEDURE: The procedure was performed in the echo lab  This was a routine study  The transthoracic approach was used  The study included complete 2D imaging, M-mode, complete spectral Doppler, and color Doppler  The heart rate was 80 bpm,  at the start of the study  Images were obtained from the parasternal, apical, subcostal, and suprasternal notch acoustic windows  Image quality was adequate  LEFT VENTRICLE: Size was normal  Systolic function was normal by visual assessment  Ejection fraction was estimated in the range of 55 % to 60 %  There were no regional wall motion abnormalities  There was mild concentric hypertrophy  DOPPLER: Doppler parameters were consistent with abnormal left ventricular relaxation (grade 1 diastolic dysfunction)  RIGHT VENTRICLE: The size was normal  Systolic function was normal  DOPPLER: Systolic pressure was within the normal range  LEFT ATRIUM: The atrium was mildly dilated  RIGHT ATRIUM: The atrium was mildly dilated  MITRAL VALVE: Valve structure was normal  There was normal leaflet separation  No echocardiographic evidence for prolapse  DOPPLER: The transmitral velocity was within the normal range  There was no evidence for stenosis  There was mild  regurgitation  AORTIC VALVE: The valve was trileaflet  Leaflets exhibited normal thickness, normal cuspal separation, and sclerosis  DOPPLER: Transaortic velocity was within the normal range  There was no evidence for stenosis  There was no  regurgitation  TRICUSPID VALVE: The valve structure was normal  There was normal leaflet separation  DOPPLER: The transtricuspid velocity was within the normal range  There was mild regurgitation  Pulmonary artery systolic pressure was within the normal  range  Estimated peak PA pressure was 27 mmHg      PULMONIC VALVE: Leaflets exhibited normal thickness, no calcification, and normal cuspal separation  DOPPLER: The transpulmonic velocity was within the normal range  There was trace regurgitation  PERICARDIUM: There was no thickening  There was no pericardial effusion  AORTA: The root exhibited upper limit of normal size  PULMONARY ARTERY: The size was normal  The morphology appeared normal     SYSTEM MEASUREMENT TABLES    2D mode  AoR Diam 2D: 3 7 cm  LA Diam (2D): 4 5 cm  LA/Ao (2D): 1 22  FS (2D Teich): 25 3 %  IVSd (2D): 1 51 cm  LVDEV: 68 3 cm³  LVESV: 33 9 cm³  LVIDd(2D): 3 96 cm  LVISd (2D): 2 96 cm  LVOT Area 2D: 3 46 cm squared  LVPWd (2D): 1 69 cm  SV (Teich): 34 4 cm³    Apical four chamber  LVEF A4C: 56 %    Unspecified Scan Mode  MICHAELA Cont Eq (Peak Chris): 2 64 cm squared  LVOT Diam : 2 1 cm  LVOT Vmax: 1220 mm/s  LVOT Vmax; Mean: 1220 mm/s  Peak Grad ; Mean: 6 mm[Hg]  MV Peak A Chris: 982 mm/s  MV Peak E Hcris  Mean: 785 mm/s  MVA (PHT): 3 19 cm squared  PHT: 69 ms  Max P mm[Hg]  V Max: 2170 mm/s  Vmax: 2090 mm/s  RA Area: 21 cm squared  RA Volume: 60 7 cm³  TAPSE: 2 3 cm    [facility]    Prepared and electronically signed by    Edgar Pierre DO  Signed 2017 08:20:02       No results found for this or any previous visit  Srikanth Tariq  Please call with any questions or suggestions    A description of the counseling:   Goals and Barriers:  Patient's ability to self care:  Medication side effect reviewed with patient in detail and all their questions answered  "This note has been constructed using a voice recognition system  Therefore there may be syntax, spelling, and/or grammatical errors   Please call if you have any questions  "

## 2018-02-22 NOTE — TELEPHONE ENCOUNTER
PT NEEDS REFILLS OF HIS ATORVASTATIN 20MG AND HIS METFORMIN 500MG, PLEASE CALL IN TO [facility]

## 2018-02-22 NOTE — PATIENT INSTRUCTIONS
Adhesive Capsulitis   WHAT YOU NEED TO KNOW:   What is adhesive capsulitis? Adhesive capsulitis happens when tissues in your shoulder tighten and swell  The condition is often called frozen shoulder because the swollen tissues cause pain and decrease your shoulder movement  What causes adhesive capsulitis? The cause of your adhesive capsulitis may not be known  The condition may be related to a previous injury or surgery  You may be more likely to develop adhesive capsulitis if:  · You are aged 36 or older  · You are female  · You are not able to do physical activity  · You have medical conditions, such as diabetes, thyroid disease, or heart or lung disease  What are the signs and symptoms of adhesive capsulitis? Adhesive capsulitis may last from several months to years before it gets better on its own  You can have adhesive capsulitis in one or both shoulders  The condition has 3 stages:  · Stage 1: This is called the freezing or painful stage and may last 2 to 6 months  You may have increasing pain and stiffness  You may have pain with movement and at rest  The pain is often worse at night  · Stage 2: This is called the adhesive stage and may last 4 to 6 months  You may have less pain  You may still have pain when you move your arm to reach  Your shoulder may still be stiff, and you may not be able to move your shoulder much  · Stage 3: This is the recovery stage and may last from 1 month to more than 3 years  Your shoulder movement may slowly start to get better  You may also begin to have less pain  How is adhesive capsulitis diagnosed? Your healthcare provider will do an exam  He will check your neck and shoulder  He will check how your shoulder moves and how strong it is  He may move your arm in different positions while you stand or lie down  You may also need the following:  · Plain x-ray: This test takes pictures of the bones and tissues inside your shoulder   An x-ray may show if your shoulder pain and stiffness is from another medical problem  · A joint x-ray  is a picture of the bones and tissues in your joints  You may be given contrast liquid to help the pictures show up better  Tell a healthcare provider if you have ever had an allergic reaction to contrast liquid  · Magnetic resonance imaging: This test is called an MRI  During the MRI, pictures are taken of the bones and tissues inside your shoulder  An MRI may show if your shoulder joint capsule has narrowed  You will need to lie still during this test  Never enter the MRI room with any metal objects  This can cause serious injury  How is adhesive capsulitis treated? The goal of treatment is to help you regain as much shoulder movement as possible  Treatment will depend on what stage you are in  Ask your healthcare provider about these and other treatments for adhesive capsulitis:  · Medicines:      ¨ Pain medicine: You may be given medicine to take away or decrease pain  Do not wait until the pain is severe before you take your medicine  ¨ NSAIDs:  This group of medicine includes aspirin and ibuprofen  It helps decrease pain  This medicine can be bought without a doctor's order  Always read the medicine label and follow the directions  NSAIDs can cause stomach bleeding or kidney problems if they are not taken correctly  ¨ Steroids: This medicine helps decrease pain and swelling  Healthcare providers may give this medicine as a shot into your shoulder  · Physical therapy:  A physical therapist teaches you exercises to help improve movement and strength, and to decrease pain  · Manipulation under anesthesia:  You are given medicine that makes you sleep  Your shoulder is then gently moved by your healthcare provider  Manipulation releases tightness in your shoulder and improves movement  This procedure may be done if other treatments do not help      · Surgery:  Tissues in your shoulder may be cut to release the tightness  During surgery, swollen or damaged tissue may also be removed  Surgery may be needed if other treatments do not help  How can I help manage my adhesive capsulitis? · Stretches: Your healthcare provider may suggest stretches to help improve your symptoms  Ask your healthcare provider or your physical therapist which stretches are best and how to do them  The following stretches may be suggested:    ¨ Doorway stretch:   a doorway with your painful arm bent at the elbow  Place your hand on the door frame and turn your body away from the door frame  Hold this position for 30 seconds  Relax and repeat  ¨ Forward stretch:  Lie on your back with your legs straight out  Use your healthy arm to push your painful arm up over your head until you feel a gentle stretch  Hold this position for 15 seconds  Slowly lower your arm to the starting position  Relax and repeat  ¨ Crossover stretch:  Use your healthy arm to gently pull your painful arm across your chest just below your chin  Pull until you feel a gentle stretch  Hold this position for 30 seconds  Relax and repeat  · Ice or heat: Ice or heat on your shoulder may help decrease your pain and improve shoulder movement  Apply ice to help ease pain after stretching  Put heat on your shoulder to help relax your muscles  Use ice or heat as directed  When should I contact my healthcare provider? Contact your healthcare provider if:  · You have worse pain and stiffness in your shoulder  · You have questions or concerns about your condition or care  When should I seek immediate care? Seek care immediately if:  · You have new or more trouble moving your arm  CARE AGREEMENT:   You have the right to help plan your care  Learn about your health condition and how it may be treated  Discuss treatment options with your caregivers to decide what care you want to receive  You always have the right to refuse treatment   The above information is an  only  It is not intended as medical advice for individual conditions or treatments  Talk to your doctor, nurse or pharmacist before following any medical regimen to see if it is safe and effective for you  © 2017 [employer] Information is for End User's use only and may not be sold, redistributed or otherwise used for commercial purposes  All illustrations and images included in CareNotes® are the copyrighted property of [employer]  or [employer]

## 2018-03-21 LAB
ALBUMIN SERPL-MCNC: 4.4 G/DL (ref 3.6–4.8)
ALBUMIN/CREAT UR: 15.9 MG/G CREAT (ref 0–30)
ALBUMIN/GLOB SERPL: 1.9 {RATIO} (ref 1.2–2.2)
ALP SERPL-CCNC: 64 IU/L (ref 39–117)
ALT SERPL-CCNC: 60 IU/L (ref 0–44)
AST SERPL-CCNC: 46 IU/L (ref 0–40)
BILIRUB SERPL-MCNC: 0.5 MG/DL (ref 0–1.2)
BUN SERPL-MCNC: 19 MG/DL (ref 8–27)
BUN/CREAT SERPL: 19 (ref 10–24)
CALCIUM SERPL-MCNC: 9.5 MG/DL (ref 8.6–10.2)
CHLORIDE SERPL-SCNC: 100 MMOL/L (ref 96–106)
CHOLEST SERPL-MCNC: 186 MG/DL (ref 100–199)
CO2 SERPL-SCNC: 27 MMOL/L (ref 18–29)
CREAT SERPL-MCNC: 1.02 MG/DL (ref 0.76–1.27)
CREAT UR-MCNC: 266.5 MG/DL
GLOBULIN SER-MCNC: 2.3 G/DL (ref 1.5–4.5)
GLUCOSE SERPL-MCNC: 154 MG/DL (ref 65–99)
HBA1C MFR BLD: 7.7 % (ref 4.8–5.6)
HDLC SERPL-MCNC: 54 MG/DL
LDLC SERPL CALC-MCNC: 98 MG/DL (ref 0–99)
MICROALBUMIN UR-MCNC: 42.5 UG/ML
MICRODELETION SYND BLD/T FISH: NORMAL
POTASSIUM SERPL-SCNC: 4.4 MMOL/L (ref 3.5–5.2)
PROT SERPL-MCNC: 6.7 G/DL (ref 6–8.5)
SL AMB EGFR AFRICAN AMERICAN: 91 ML/MIN/1.73
SL AMB EGFR NON AFRICAN AMERICAN: 78 ML/MIN/1.73
SODIUM SERPL-SCNC: 142 MMOL/L (ref 134–144)
TRIGL SERPL-MCNC: 168 MG/DL (ref 0–149)

## 2018-03-25 NOTE — ASSESSMENT & PLAN NOTE
Improved AST and ALT levels   AST is 46 and ALT is 60  Will continue to work on improving glycemic control

## 2018-03-27 ENCOUNTER — OFFICE VISIT (OUTPATIENT)
Dept: FAMILY MEDICINE CLINIC | Facility: CLINIC | Age: 63
End: 2018-03-27
Payer: COMMERCIAL

## 2018-03-27 VITALS
DIASTOLIC BLOOD PRESSURE: 84 MMHG | BODY MASS INDEX: 37.93 KG/M2 | SYSTOLIC BLOOD PRESSURE: 126 MMHG | WEIGHT: 280 LBS | TEMPERATURE: 97.2 F | RESPIRATION RATE: 16 BRPM | HEART RATE: 84 BPM | HEIGHT: 72 IN

## 2018-03-27 DIAGNOSIS — E11.40 TYPE 2 DIABETES MELLITUS WITH DIABETIC NEUROPATHY, WITHOUT LONG-TERM CURRENT USE OF INSULIN (HCC): Primary | ICD-10-CM

## 2018-03-27 DIAGNOSIS — Z11.59 NEED FOR HEPATITIS C SCREENING TEST: ICD-10-CM

## 2018-03-27 DIAGNOSIS — I10 ESSENTIAL HYPERTENSION: ICD-10-CM

## 2018-03-27 DIAGNOSIS — K76.0 FATTY LIVER: ICD-10-CM

## 2018-03-27 DIAGNOSIS — E78.2 MIXED HYPERLIPIDEMIA: ICD-10-CM

## 2018-03-27 PROCEDURE — 99214 OFFICE O/P EST MOD 30 MIN: CPT | Performed by: FAMILY MEDICINE

## 2018-03-27 PROCEDURE — 3725F SCREEN DEPRESSION PERFORMED: CPT | Performed by: FAMILY MEDICINE

## 2018-03-27 NOTE — PATIENT INSTRUCTIONS
Recent Results (from the past 336 hour(s))   Comprehensive metabolic panel    Collection Time: 03/20/18  7:45 AM   Result Value Ref Range    SL AMB GLUCOSE 154 (H) 65 - 99 mg/dL    BUN 19 8 - 27 mg/dL    Creatinine, Serum 1 02 0 76 - 1 27 mg/dL    eGFR Non African American 78 >59 mL/min/1 73    SL AMB EGFR AFRICAN AMERICAN 91 >59 mL/min/1 73    SL AMB BUN/CREATININE RATIO 19 10 - 24    SL AMB SODIUM 142 134 - 144 mmol/L    SL AMB POTASSIUM 4 4 3 5 - 5 2 mmol/L    SL AMB CHLORIDE 100 96 - 106 mmol/L    SL AMB CARBON DIOXIDE 27 18 - 29 mmol/L    CALCIUM 9 5 8 6 - 10 2 mg/dL    SL AMB PROTEIN, TOTAL 6 7 6 0 - 8 5 g/dL    Serum Albumin 4 4 3 6 - 4 8 g/dL    Globulin, Total 2 3 1 5 - 4 5 g/dL    SL AMB ALBUMIN/GLOBULIN RATIO 1 9 1 2 - 2 2    SL AMB BILIRUBIN, TOTAL 0 5 0 0 - 1 2 mg/dL    Alk Phos Isoenzymes 64 39 - 117 IU/L    SL AMB AST 46 (H) 0 - 40 IU/L    SL AMB ALT 60 (H) 0 - 44 IU/L   Lipid panel    Collection Time: 03/20/18  7:45 AM   Result Value Ref Range    Cholesterol, Total 186 100 - 199 mg/dL    Triglycerides 168 (H) 0 - 149 mg/dL    SL AMB HDL CHOLESTEROL 54 >39 mg/dL    SL AMB LDL-CHOLESTEROL 98 0 - 99 mg/dL   Microalbumin / creatinine urine ratio    Collection Time: 03/20/18  7:45 AM   Result Value Ref Range    Creatinine, Urine 266 5 Not Estab  mg/dL    Microalbum  ,U,Random 42 5 Not Estab  ug/mL    Microalb/Creat Ratio 15 9 0 0 - 30 0 mg/g creat   Hemoglobin A1c    Collection Time: 03/20/18  7:45 AM   Result Value Ref Range    Hemoglobin A1C 7 7 (H) 4 8 - 5 6 %   Cardiovascular Report    Collection Time: 03/20/18  7:45 AM   Result Value Ref Range    SL AMB INTERPRETATION Note

## 2018-03-27 NOTE — PROGRESS NOTES
Assessment/Plan:    Problem List Items Addressed This Visit     Type 2 diabetes mellitus with diabetic neuropathy, without long-term current use of insulin ([facility] [address] ) - Primary     Not controlled, A1c is up from 7 1 to 7 7  Metformin increased from once to twice a day         Relevant Medications    metFORMIN (GLUCOPHAGE) 500 mg tablet    Other Relevant Orders    HEMOGLOBIN A1C W/ EAG ESTIMATION    Fatty liver     Improved AST and ALT levels   AST is 46 and ALT is 60  Will continue to work on improving glycemic control         Mixed hyperlipidemia     Well controlled  Continue atorvastatin 20 mg a day         Relevant Orders    Comprehensive metabolic panel    Essential hypertension     Well controlled           Other Visit Diagnoses     Need for hepatitis C screening test        Relevant Orders    Hepatitis C antibody          Patient Instructions     Recent Results (from the past 336 hour(s))   Comprehensive metabolic panel    Collection Time: 03/20/18  7:45 AM   Result Value Ref Range    SL AMB GLUCOSE 154 (H) 65 - 99 mg/dL    BUN 19 8 - 27 mg/dL    Creatinine, Serum 1 02 0 76 - 1 27 mg/dL    eGFR Non African American 78 >59 mL/min/1 73    SL AMB EGFR AFRICAN AMERICAN 91 >59 mL/min/1 73    SL AMB BUN/CREATININE RATIO 19 10 - 24    SL AMB SODIUM 142 134 - 144 mmol/L    SL AMB POTASSIUM 4 4 3 5 - 5 2 mmol/L    SL AMB CHLORIDE 100 96 - 106 mmol/L    SL AMB CARBON DIOXIDE 27 18 - 29 mmol/L    CALCIUM 9 5 8 6 - 10 2 mg/dL    SL AMB PROTEIN, TOTAL 6 7 6 0 - 8 5 g/dL    Serum Albumin 4 4 3 6 - 4 8 g/dL    Globulin, Total 2 3 1 5 - 4 5 g/dL    SL AMB ALBUMIN/GLOBULIN RATIO 1 9 1 2 - 2 2    SL AMB BILIRUBIN, TOTAL 0 5 0 0 - 1 2 mg/dL    Alk Phos Isoenzymes 64 39 - 117 IU/L    SL AMB AST 46 (H) 0 - 40 IU/L    SL AMB ALT 60 (H) 0 - 44 IU/L   Lipid panel    Collection Time: 03/20/18  7:45 AM   Result Value Ref Range    Cholesterol, Total 186 100 - 199 mg/dL    Triglycerides 168 (H) 0 - 149 mg/dL    SL AMB HDL CHOLESTEROL 54 >39 mg/dL    SL AMB LDL-CHOLESTEROL 98 0 - 99 mg/dL   Microalbumin / creatinine urine ratio    Collection Time: 03/20/18  7:45 AM   Result Value Ref Range    Creatinine, Urine 266 5 Not Estab  mg/dL    Microalbum  ,U,Random 42 5 Not Estab  ug/mL    Microalb/Creat Ratio 15 9 0 0 - 30 0 mg/g creat   Hemoglobin A1c    Collection Time: 03/20/18  7:45 AM   Result Value Ref Range    Hemoglobin A1C 7 7 (H) 4 8 - 5 6 %   Cardiovascular Report    Collection Time: 03/20/18  7:45 AM   Result Value Ref Range    SL AMB INTERPRETATION Note            Return in about 3 months (around 6/27/2018) for Next scheduled follow up  Subjective:      Patient ID: Jaron Shell is a 58 y o  male  Chief Complaint   Patient presents with    Follow-up     review new labs --lj       Hypertension   This is a chronic problem  The current episode started more than 1 year ago  The problem is unchanged  The problem is controlled  Pertinent negatives include no blurred vision, chest pain, headaches or shortness of breath  The current treatment provides moderate improvement  Compliance problems include exercise  Hyperlipidemia   Pertinent negatives include no chest pain or shortness of breath  Diabetes   He presents for his follow-up diabetic visit  He has type 2 diabetes mellitus  His disease course has been worsening  Pertinent negatives for hypoglycemia include no headaches or mood changes  Pertinent negatives for diabetes include no blurred vision, no chest pain and no foot ulcerations  There are no hypoglycemic complications  Symptoms are stable  There are no diabetic complications  Current diabetic treatment includes oral agent (monotherapy)  He is compliant with treatment all of the time  His weight is increasing steadily  He is following a generally healthy diet  He rarely (He feels like he has been stuck inside all winter) participates in exercise         The following portions of the patient's history were reviewed and updated as appropriate:  past social history    Review of Systems   Eyes: Negative for blurred vision  Respiratory: Negative for shortness of breath  Cardiovascular: Negative for chest pain  Neurological: Negative for headaches  Current Outpatient Prescriptions   Medication Sig Dispense Refill    aspirin 81 mg chewable tablet Chew 1 tablet      atorvastatin (LIPITOR) 20 mg tablet Take 1 tablet (20 mg total) by mouth daily 90 tablet 1    ESTEVAN MICROLET LANCETS lancets Estevan Microlet Lancets Miscellaneous  TEST twice a day as directed   Quantity: 100;  Refills: 0      Sunni Burton DO ; Active      glucose blood (ESTEVAN CONTOUR TEST) test strip by In Vitro route 2 (two) times a day      lisinopril-hydrochlorothiazide (PRINZIDE,ZESTORETIC) 20-12 5 MG per tablet Take 1 tablet by mouth daily 90 tablet 0    metFORMIN (GLUCOPHAGE) 500 mg tablet Take 1 tablet (500 mg total) by mouth 2 (two) times a day with meals 180 tablet 1    Omega-3 Fatty Acids (FISH OIL) 1,000 mg Take by mouth      RABEprazole (ACIPHEX) 20 MG tablet   0     No current facility-administered medications for this visit  Objective:    /84   Pulse 84   Temp (!) 97 2 °F (36 2 °C)   Resp 16   Ht 6' (1 829 m)   Wt 127 kg (280 lb)   BMI 37 97 kg/m²        Physical Exam   Constitutional: He appears well-developed and well-nourished  HENT:   Head: Normocephalic and atraumatic  Right Ear: External ear normal    Left Ear: External ear normal    Mouth/Throat: Oropharynx is clear and moist    Cardiovascular: Normal rate, regular rhythm and normal heart sounds  No murmur heard  Pulmonary/Chest: Effort normal and breath sounds normal  No respiratory distress  He has no wheezes  He has no rales  Musculoskeletal: He exhibits no edema or tenderness  Nursing note and vitals reviewed               De Peterson DO

## 2018-06-20 LAB
ALBUMIN SERPL-MCNC: 4.7 G/DL (ref 3.6–4.8)
ALBUMIN/GLOB SERPL: 2.4 {RATIO} (ref 1.2–2.2)
ALP SERPL-CCNC: 62 IU/L (ref 39–117)
ALT SERPL-CCNC: 56 IU/L (ref 0–44)
AMBIG ABBREV DEFAULT: NORMAL
AST SERPL-CCNC: 43 IU/L (ref 0–40)
BILIRUB SERPL-MCNC: 0.7 MG/DL (ref 0–1.2)
BUN SERPL-MCNC: 22 MG/DL (ref 8–27)
BUN/CREAT SERPL: 22 (ref 10–24)
CALCIUM SERPL-MCNC: 9.5 MG/DL (ref 8.6–10.2)
CHLORIDE SERPL-SCNC: 100 MMOL/L (ref 96–106)
CO2 SERPL-SCNC: 23 MMOL/L (ref 20–29)
CREAT SERPL-MCNC: 1 MG/DL (ref 0.76–1.27)
EST. AVERAGE GLUCOSE BLD GHB EST-MCNC: 160 MG/DL
GLOBULIN SER-MCNC: 2 G/DL (ref 1.5–4.5)
GLUCOSE SERPL-MCNC: 145 MG/DL (ref 65–99)
HBA1C MFR BLD: 7.2 % (ref 4.8–5.6)
HCV AB S/CO SERPL IA: <0.1 S/CO RATIO (ref 0–0.9)
POTASSIUM SERPL-SCNC: 4.2 MMOL/L (ref 3.5–5.2)
PROT SERPL-MCNC: 6.7 G/DL (ref 6–8.5)
SL AMB EGFR AFRICAN AMERICAN: 93 ML/MIN/1.73
SL AMB EGFR NON AFRICAN AMERICAN: 80 ML/MIN/1.73
SODIUM SERPL-SCNC: 140 MMOL/L (ref 134–144)

## 2018-06-27 ENCOUNTER — OFFICE VISIT (OUTPATIENT)
Dept: FAMILY MEDICINE CLINIC | Facility: CLINIC | Age: 63
End: 2018-06-27
Payer: COMMERCIAL

## 2018-06-27 VITALS
SYSTOLIC BLOOD PRESSURE: 134 MMHG | DIASTOLIC BLOOD PRESSURE: 80 MMHG | BODY MASS INDEX: 37.57 KG/M2 | TEMPERATURE: 97.8 F | RESPIRATION RATE: 18 BRPM | WEIGHT: 277 LBS | HEART RATE: 84 BPM

## 2018-06-27 DIAGNOSIS — E11.40 TYPE 2 DIABETES MELLITUS WITH DIABETIC NEUROPATHY, WITHOUT LONG-TERM CURRENT USE OF INSULIN (HCC): ICD-10-CM

## 2018-06-27 DIAGNOSIS — M54.50 CHRONIC BILATERAL LOW BACK PAIN WITHOUT SCIATICA: ICD-10-CM

## 2018-06-27 DIAGNOSIS — G89.29 CHRONIC BILATERAL LOW BACK PAIN WITHOUT SCIATICA: ICD-10-CM

## 2018-06-27 DIAGNOSIS — E78.2 MIXED HYPERLIPIDEMIA: ICD-10-CM

## 2018-06-27 DIAGNOSIS — K76.0 FATTY LIVER: ICD-10-CM

## 2018-06-27 DIAGNOSIS — I10 ESSENTIAL HYPERTENSION: Primary | ICD-10-CM

## 2018-06-27 PROCEDURE — 3075F SYST BP GE 130 - 139MM HG: CPT | Performed by: FAMILY MEDICINE

## 2018-06-27 PROCEDURE — 99214 OFFICE O/P EST MOD 30 MIN: CPT | Performed by: FAMILY MEDICINE

## 2018-06-27 PROCEDURE — 3079F DIAST BP 80-89 MM HG: CPT | Performed by: FAMILY MEDICINE

## 2018-06-27 NOTE — ASSESSMENT & PLAN NOTE
Lab Results   Component Value Date    HGBA1C 7 2 (H) 06/19/2018       No results for input(s): POCGLU in the last 72 hours      Blood Sugar Average: Last 72 hrs:    Improved, A1c is down to 7 2

## 2018-06-27 NOTE — PATIENT INSTRUCTIONS
Recent Results (from the past 672 hour(s))   Comprehensive metabolic panel    Collection Time: 06/19/18  9:42 AM   Result Value Ref Range    SL AMB GLUCOSE 145 (H) 65 - 99 mg/dL    BUN 22 8 - 27 mg/dL    Creatinine, Serum 1 00 0 76 - 1 27 mg/dL    eGFR Non African American 80 >59 mL/min/1 73    SL AMB EGFR AFRICAN AMERICAN 93 >59 mL/min/1 73    SL AMB BUN/CREATININE RATIO 22 10 - 24    SL AMB SODIUM 140 134 - 144 mmol/L    SL AMB POTASSIUM 4 2 3 5 - 5 2 mmol/L    SL AMB CHLORIDE 100 96 - 106 mmol/L    SL AMB CARBON DIOXIDE 23 20 - 29 mmol/L    CALCIUM 9 5 8 6 - 10 2 mg/dL    SL AMB PROTEIN, TOTAL 6 7 6 0 - 8 5 g/dL    Serum Albumin 4 7 3 6 - 4 8 g/dL    Globulin, Total 2 0 1 5 - 4 5 g/dL    SL AMB ALBUMIN/GLOBULIN RATIO 2 4 (H) 1 2 - 2 2    SL AMB BILIRUBIN, TOTAL 0 7 0 0 - 1 2 mg/dL    Alk Phos Isoenzymes 62 39 - 117 IU/L    SL AMB AST 43 (H) 0 - 40 IU/L    SL AMB ALT 56 (H) 0 - 44 IU/L   Hemoglobin A1C    Collection Time: 06/19/18  9:42 AM   Result Value Ref Range    Hemoglobin A1C 7 2 (H) 4 8 - 5 6 %    Estimated Average Glucose 160 mg/dL   Hepatitis C antibody    Collection Time: 06/19/18  9:42 AM   Result Value Ref Range    Hep C Virus Ab <0 1 0 0 - 0 9 s/co ratio   Ambig Abbrev Default    Collection Time: 06/19/18  9:42 AM   Result Value Ref Range    AMBIG ABBREV DEFAULT Comment

## 2018-06-27 NOTE — PROGRESS NOTES
Assessment/Plan:    Problem List Items Addressed This Visit     Type 2 diabetes mellitus with diabetic neuropathy, without long-term current use of insulin ([facility] [address] )     Lab Results   Component Value Date    HGBA1C 7 2 (H) 06/19/2018       No results for input(s): POCGLU in the last 72 hours      Blood Sugar Average: Last 72 hrs:    Improved, A1c is down to 7 2            Relevant Orders    Hemoglobin A1C    Comprehensive metabolic panel    Fatty liver     Liver enzymes still slightly elevated, but they have improved         Mixed hyperlipidemia     Stable continue atorvastatin 20 mg daily         Relevant Orders    Comprehensive metabolic panel    Lipid Panel with Direct LDL reflex    Essential hypertension - Primary     Well controlled continue lisinopril-hydrochlorothiazide         Relevant Orders    Comprehensive metabolic panel    CBC    Chronic bilateral low back pain without sciatica     Worsening pain         Relevant Orders    Ambulatory referral to Physical Therapy          Patient Instructions     Recent Results (from the past 672 hour(s))   Comprehensive metabolic panel    Collection Time: 06/19/18  9:42 AM   Result Value Ref Range    SL AMB GLUCOSE 145 (H) 65 - 99 mg/dL    BUN 22 8 - 27 mg/dL    Creatinine, Serum 1 00 0 76 - 1 27 mg/dL    eGFR Non African American 80 >59 mL/min/1 73    SL AMB EGFR AFRICAN AMERICAN 93 >59 mL/min/1 73    SL AMB BUN/CREATININE RATIO 22 10 - 24    SL AMB SODIUM 140 134 - 144 mmol/L    SL AMB POTASSIUM 4 2 3 5 - 5 2 mmol/L    SL AMB CHLORIDE 100 96 - 106 mmol/L    SL AMB CARBON DIOXIDE 23 20 - 29 mmol/L    CALCIUM 9 5 8 6 - 10 2 mg/dL    SL AMB PROTEIN, TOTAL 6 7 6 0 - 8 5 g/dL    Serum Albumin 4 7 3 6 - 4 8 g/dL    Globulin, Total 2 0 1 5 - 4 5 g/dL    SL AMB ALBUMIN/GLOBULIN RATIO 2 4 (H) 1 2 - 2 2    SL AMB BILIRUBIN, TOTAL 0 7 0 0 - 1 2 mg/dL    Alk Phos Isoenzymes 62 39 - 117 IU/L    SL AMB AST 43 (H) 0 - 40 IU/L    SL AMB ALT 56 (H) 0 - 44 IU/L   Hemoglobin A1C Collection Time: 06/19/18  9:42 AM   Result Value Ref Range    Hemoglobin A1C 7 2 (H) 4 8 - 5 6 %    Estimated Average Glucose 160 mg/dL   Hepatitis C antibody    Collection Time: 06/19/18  9:42 AM   Result Value Ref Range    Hep C Virus Ab <0 1 0 0 - 0 9 s/co ratio   Nahomy Sultana Default    Collection Time: 06/19/18  9:42 AM   Result Value Ref Range    NAHOMY SULTANA DEFAULT Comment            Return in about 3 months (around 9/27/2018) for Next scheduled follow up  Subjective:      Patient ID: Varsha Santillan is a 58 y o  male  Chief Complaint   Patient presents with    Follow-up      3 month follow up on DM and blood work done on 6/11/18  af/rma        He is watching his diet more  He is trying to get his sugar down  He is getting low back pain for over a year and it usually gets better when he is doing more exercise  This year the pain is not getting better  He is not getting any numbness or tingling  He denies any radiation  He takes Aleve at night  The lead helps moderate pain  Hyperlipidemia-he has been taking his atorvastatin every day  He is tolerating well  He does not have any weird muscle aches that he does not understand  Hypertension   This is a chronic problem  The current episode started more than 1 year ago  The problem is controlled  Pertinent negatives include no chest pain  Risk factors for coronary artery disease include dyslipidemia and diabetes mellitus  Past treatments include ACE inhibitors and diuretics  The current treatment provides moderate improvement  There are no compliance problems  Hyperlipidemia   This is a chronic problem  The current episode started more than 1 year ago  The problem is controlled  Recent lipid tests were reviewed and are normal  Pertinent negatives include no chest pain or myalgias  Current antihyperlipidemic treatment includes statins  The current treatment provides moderate improvement of lipids  There are no compliance problems  The following portions of the patient's history were reviewed and updated as appropriate:  past social history    Review of Systems   Constitutional: Negative for fever  Respiratory: Negative  Cardiovascular: Negative for chest pain  Musculoskeletal: Negative for myalgias  Current Outpatient Prescriptions   Medication Sig Dispense Refill    aspirin 81 mg chewable tablet Chew 1 tablet      atorvastatin (LIPITOR) 20 mg tablet Take 1 tablet (20 mg total) by mouth daily 90 tablet 1    ESTEVAN MICROLET LANCETS lancets Estevan Microlet Lancets Miscellaneous  TEST twice a day as directed   Quantity: 100;  Refills: 0      Sunni Burton DO ; Active      glucose blood (ESTEVAN CONTOUR TEST) test strip by In Vitro route 2 (two) times a day      lisinopril-hydrochlorothiazide (PRINZIDE,ZESTORETIC) 20-12 5 MG per tablet Take 1 tablet by mouth daily 90 tablet 0    metFORMIN (GLUCOPHAGE) 500 mg tablet Take 1 tablet (500 mg total) by mouth 2 (two) times a day with meals 180 tablet 1    MILK THISTLE PO Take by mouth daily      Omega-3 Fatty Acids (FISH OIL) 1,000 mg Take 2,000 mg by mouth 2 (two) times a day        RABEprazole (ACIPHEX) 20 MG tablet every other day    0    VITAMIN E PO Take by mouth 2 (two) times a day       No current facility-administered medications for this visit  Objective:    /80   Pulse 84   Temp 97 8 °F (36 6 °C)   Resp 18   Wt 126 kg (277 lb)   BMI 37 57 kg/m²        Physical Exam   Constitutional: He appears well-developed and well-nourished  HENT:   Head: Normocephalic and atraumatic  Right Ear: External ear normal    Left Ear: External ear normal    Mouth/Throat: Oropharynx is clear and moist    Cardiovascular: Normal rate, regular rhythm and normal heart sounds  No murmur heard  Pulses:       Dorsalis pedis pulses are 2+ on the right side, and 2+ on the left side  Posterior tibial pulses are 2+ on the right side, and 2+ on the left side  Pulmonary/Chest: Effort normal and breath sounds normal  No respiratory distress  He has no wheezes  He has no rales  Musculoskeletal: He exhibits tenderness (right lumbar paraspinal muscle)  He exhibits no edema  Feet:   Right Foot:   Skin Integrity: Negative for ulcer, skin breakdown, erythema, warmth, callus or dry skin  Left Foot:   Skin Integrity: Negative for ulcer, skin breakdown, erythema, warmth, callus or dry skin  Nursing note and vitals reviewed  Patient's shoes and socks removed  Right Foot/Ankle   Right Foot Inspection  Skin Exam: skin normal skin not intact, no dry skin, no warmth, no callus, no erythema, no maceration, no abnormal color, no pre-ulcer, no ulcer and no callus                          Toe Exam: ROM and strength within normal limits  Sensory       Monofilament testing: intact  Vascular  Capillary refills: < 3 seconds  The right DP pulse is 2+  The right PT pulse is 2+  Left Foot/Ankle  Left Foot Inspection  Skin Exam: skin normalskin not intact, no dry skin, no warmth, no erythema, no maceration, normal color, no pre-ulcer, no ulcer and no callus                         Toe Exam: ROM and strength within normal limits                   Sensory       Monofilament: intact  Vascular  Capillary refills: < 3 seconds  The left DP pulse is 2+  The left PT pulse is 2+     Assign Risk Category:  No deformity present; ;        Risk: 0         Adrianna Jones,

## 2018-07-02 DIAGNOSIS — R07.89 CHEST TIGHTNESS: ICD-10-CM

## 2018-07-02 DIAGNOSIS — I10 ESSENTIAL HYPERTENSION: ICD-10-CM

## 2018-07-02 RX ORDER — LISINOPRIL AND HYDROCHLOROTHIAZIDE 20; 12.5 MG/1; MG/1
1 TABLET ORAL DAILY
Qty: 90 TABLET | Refills: 3 | Status: SHIPPED | OUTPATIENT
Start: 2018-07-02 | End: 2019-07-08 | Stop reason: SDUPTHER

## 2018-08-08 ENCOUNTER — OFFICE VISIT (OUTPATIENT)
Dept: FAMILY MEDICINE CLINIC | Facility: CLINIC | Age: 63
End: 2018-08-08
Payer: COMMERCIAL

## 2018-08-08 VITALS
DIASTOLIC BLOOD PRESSURE: 70 MMHG | WEIGHT: 270 LBS | BODY MASS INDEX: 36.57 KG/M2 | SYSTOLIC BLOOD PRESSURE: 120 MMHG | HEIGHT: 72 IN | HEART RATE: 88 BPM | TEMPERATURE: 99.1 F | RESPIRATION RATE: 16 BRPM

## 2018-08-08 DIAGNOSIS — E11.40 TYPE 2 DIABETES MELLITUS WITH DIABETIC NEUROPATHY, WITHOUT LONG-TERM CURRENT USE OF INSULIN (HCC): ICD-10-CM

## 2018-08-08 DIAGNOSIS — S90.452A FOREIGN BODY OF SKIN OF GREAT TOE, LEFT, INITIAL ENCOUNTER: Primary | ICD-10-CM

## 2018-08-08 PROCEDURE — 99213 OFFICE O/P EST LOW 20 MIN: CPT | Performed by: NURSE PRACTITIONER

## 2018-08-08 PROCEDURE — 3008F BODY MASS INDEX DOCD: CPT | Performed by: NURSE PRACTITIONER

## 2018-08-08 RX ORDER — SULFAMETHOXAZOLE AND TRIMETHOPRIM 800; 160 MG/1; MG/1
1 TABLET ORAL EVERY 12 HOURS SCHEDULED
Qty: 14 TABLET | Refills: 0 | Status: SHIPPED | OUTPATIENT
Start: 2018-08-08 | End: 2018-08-15

## 2018-08-08 NOTE — PROGRESS NOTES
Assessment/Plan:    Advised on epsom salt soaks  Keep toe clean and dry  Take antibiotics until finished  Tetanus booster given in office today  Problem List Items Addressed This Visit        Endocrine    Type 2 diabetes mellitus with diabetic neuropathy, without long-term current use of insulin ([facility] [address] )      Other Visit Diagnoses     Foreign body of skin of great toe, left, initial encounter    -  Primary    Relevant Medications    sulfamethoxazole-trimethoprim (BACTRIM DS) 800-160 mg per tablet    Other Relevant Orders    TDAP VACCINE GREATER THAN OR EQUAL TO 8YO IM    Body fluid culture and Gram stain        3mm splinter removed from toe using tweezers  When it was removed, a small amount of purulent drainage was expressed and cultured  Wound dressed with Bacitracin and a bandage  Pt tolerated well  There are no Patient Instructions on file for this visit  Return if symptoms worsen or fail to improve  Subjective:      Patient ID: Sofia Vo is a 58 y o  male  Chief Complaint   Patient presents with    Foot Pain     left foot for 3 days Pt states is diabetic and has a black spot on bottom of foot prcma       For the past couple of days, he has had pain in the bottom of his left great toe  It hurts to walk on it  No known injury  His wife looked at it and thought she saw a black spot there  He is concerned because he is diabetic  No drainage or redness that he is aware of        The following portions of the patient's history were reviewed and updated as appropriate: allergies, current medications, past family history, past medical history, past social history, past surgical history and problem list     Review of Systems   Constitutional: Negative  Skin:        See HPI   All other systems reviewed and are negative          Current Outpatient Prescriptions   Medication Sig Dispense Refill    aspirin 81 mg chewable tablet Chew 1 tablet      atorvastatin (LIPITOR) 20 mg tablet Take 1 tablet (20 mg total) by mouth daily 90 tablet 1    ESTEVAN MICROLET LANCETS lancets Estevan Microlet Lancets Miscellaneous  TEST twice a day as directed   Quantity: 100;  Refills: 0      Sunni Burton DO ; Active      glucose blood (ESTEVAN CONTOUR TEST) test strip by In Vitro route 2 (two) times a day      lisinopril-hydrochlorothiazide (PRINZIDE,ZESTORETIC) 20-12 5 MG per tablet Take 1 tablet by mouth daily 90 tablet 3    metFORMIN (GLUCOPHAGE) 500 mg tablet Take 1 tablet (500 mg total) by mouth 2 (two) times a day with meals 180 tablet 1    MILK THISTLE PO Take by mouth daily      Omega-3 Fatty Acids (FISH OIL) 1,000 mg Take 2,000 mg by mouth 2 (two) times a day        RABEprazole (ACIPHEX) 20 MG tablet every other day    0    VITAMIN E PO Take by mouth 2 (two) times a day      sulfamethoxazole-trimethoprim (BACTRIM DS) 800-160 mg per tablet Take 1 tablet by mouth every 12 (twelve) hours for 7 days 14 tablet 0     No current facility-administered medications for this visit  Objective:    /70   Pulse 88   Temp 99 1 °F (37 3 °C)   Resp 16   Ht 6' (1 829 m)   Wt 122 kg (270 lb)   BMI 36 62 kg/m²        Physical Exam   Constitutional: He appears well-developed and well-nourished  Cardiovascular: Normal rate, regular rhythm, normal heart sounds and intact distal pulses  No murmur heard  Pulmonary/Chest: Effort normal and breath sounds normal    Musculoskeletal:        Feet:    Neurological: He is alert  Skin: Skin is warm and dry  Psychiatric: He has a normal mood and affect  Nursing note and vitals reviewed               Diego Lemus

## 2018-08-09 PROCEDURE — 90715 TDAP VACCINE 7 YRS/> IM: CPT

## 2018-08-09 PROCEDURE — 90471 IMMUNIZATION ADMIN: CPT

## 2018-08-14 LAB
BACTERIA SPEC AEROBE CULT: NORMAL
GRAM STN SPEC: NORMAL
Lab: NORMAL

## 2018-08-27 DIAGNOSIS — E78.2 MIXED HYPERLIPIDEMIA: ICD-10-CM

## 2018-08-27 RX ORDER — ATORVASTATIN CALCIUM 20 MG/1
20 TABLET, FILM COATED ORAL DAILY
Qty: 90 TABLET | Refills: 1 | Status: SHIPPED | OUTPATIENT
Start: 2018-08-27 | End: 2019-02-28 | Stop reason: SDUPTHER

## 2018-10-23 ENCOUNTER — APPOINTMENT (OUTPATIENT)
Dept: RADIOLOGY | Facility: CLINIC | Age: 63
End: 2018-10-23
Payer: COMMERCIAL

## 2018-10-23 ENCOUNTER — OFFICE VISIT (OUTPATIENT)
Dept: OBGYN CLINIC | Facility: CLINIC | Age: 63
End: 2018-10-23
Payer: COMMERCIAL

## 2018-10-23 VITALS
SYSTOLIC BLOOD PRESSURE: 137 MMHG | WEIGHT: 278 LBS | HEART RATE: 94 BPM | HEIGHT: 70 IN | BODY MASS INDEX: 39.8 KG/M2 | DIASTOLIC BLOOD PRESSURE: 69 MMHG

## 2018-10-23 DIAGNOSIS — M17.11 PRIMARY OSTEOARTHRITIS OF RIGHT KNEE: Primary | ICD-10-CM

## 2018-10-23 DIAGNOSIS — S83.411A SPRAIN OF MEDIAL COLLATERAL LIGAMENT OF RIGHT KNEE, INITIAL ENCOUNTER: ICD-10-CM

## 2018-10-23 DIAGNOSIS — M25.561 RIGHT KNEE PAIN, UNSPECIFIED CHRONICITY: ICD-10-CM

## 2018-10-23 PROCEDURE — 99214 OFFICE O/P EST MOD 30 MIN: CPT | Performed by: ORTHOPAEDIC SURGERY

## 2018-10-23 PROCEDURE — 73562 X-RAY EXAM OF KNEE 3: CPT

## 2018-10-23 RX ORDER — MELOXICAM 7.5 MG/1
7.5 TABLET ORAL DAILY
Qty: 30 TABLET | Refills: 1 | Status: SHIPPED | OUTPATIENT
Start: 2018-10-23 | End: 2019-01-24 | Stop reason: ALTCHOICE

## 2018-10-23 NOTE — PROGRESS NOTES
Assessment/Plan:  1  Primary osteoarthritis of right knee  meloxicam (MOBIC) 7 5 mg tablet   2  Sprain of medial collateral ligament of right knee, initial encounter  meloxicam (MOBIC) 7 5 mg tablet   3  Right knee pain, unspecified chronicity  XR knee 3 vw right non injury    meloxicam (MOBIC) 7 5 mg tablet     Nel Emanuel is a pleasant 58year old gentleman presenting for atraumatic right knee pain consistent with a flare of his mild underlying osteoarthritis and MCL sprain  He does not have evidence of instability  We discussed these findings with him here today  We agreed to begin conservatively with a daily anti-inflammatory and a hinged knee brace  Mobic was sent to his pharmacy on file, he understands reasons to discontinue and potential side effects  He can wear the hinged knee brace while active, but does not need to wear at night  He can return in 2 months or sooner if he has a worsening of his symptoms  All of his questions were addressed today  Subjective: Right knee pain    Patient ID: Nupur Henriquez is a 58 y o  male  Nel Emanuel is a very pleasant 58year old gentleman presenting for evaluation of atraumatic right knee pain present for the past 10 days  He denies any significant injury, surgery, locking, buckling, or giving way  He works at [employer] and does significant heavy lifting six days per week  The day before he developed pain, he was helping his nephew move  The pain began on the medial aspect of the knee insidiously afterwards  He has not tried any specific interventions at this point  He is able to work, but has pain at night and at the end of the day after working  He also has stiffness in the morning  He denies any parasthesias in the right lower extremity  Ice and tylenol have provided minimal relief  Review of Systems   Constitutional: Negative  HENT: Negative  Eyes: Negative  Respiratory: Negative  Cardiovascular: Negative  Gastrointestinal: Negative  Endocrine: Negative  Genitourinary: Negative  Musculoskeletal: Positive for arthralgias  Skin: Negative  Allergic/Immunologic: Negative  Neurological: Negative  Hematological: Negative  Psychiatric/Behavioral: Negative  Past Medical History:   Diagnosis Date    Arthritis     Chest tightness     Diabetes ([address] )     Diverticulosis of sigmoid colon     Esophageal reflux     Fatty liver     Hearing problem     High cholesterol     History of chest pain     Hyperlipemia     Hypertension     Lumbago     Malignant neoplasm of prostate (HCC)     Prostate CA (HCC)     Tinea pedis of both feet     Trochanteric bursitis        Past Surgical History:   Procedure Laterality Date    COLONOSCOPY  2016    PROSTATECTOMY N/A 2011       Family History   Problem Relation Age of Onset    Arthritis Mother     Parkinsonism Mother     Heart disease Father     Arthritis Father     Coronary artery disease Father     Hypertension Father     Parkinsonism Father     Mental illness Neg Hx        Social History     Occupational History    Not on file       Social History Main Topics    Smoking status: Former Smoker     Quit date: 6/27/2012    Smokeless tobacco: Never Used    Alcohol use Yes      Comment: Weekends    Drug use: No    Sexual activity: Not on file         Current Outpatient Prescriptions:     aspirin 81 mg chewable tablet, Chew 1 tablet, Disp: , Rfl:     atorvastatin (LIPITOR) 20 mg tablet, Take 1 tablet (20 mg total) by mouth daily, Disp: 90 tablet, Rfl: 1    ESTEVAN MICROLET LANCETS lancets, Estevan Microlet Lancets Miscellaneous TEST twice a day as directed  Quantity: 100;  Refills: 0   Sunni Burton DO ; Active, Disp: , Rfl:     glucose blood (ESTEVAN CONTOUR TEST) test strip, by In Vitro route 2 (two) times a day, Disp: , Rfl:     lisinopril-hydrochlorothiazide (PRINZIDE,ZESTORETIC) 20-12 5 MG per tablet, Take 1 tablet by mouth daily, Disp: 90 tablet, Rfl: 3   metFORMIN (GLUCOPHAGE) 500 mg tablet, Take 1 tablet (500 mg total) by mouth 2 (two) times a day with meals, Disp: 180 tablet, Rfl: 1    MILK THISTLE PO, Take by mouth daily, Disp: , Rfl:     Omega-3 Fatty Acids (FISH OIL) 1,000 mg, Take 2,000 mg by mouth 2 (two) times a day  , Disp: , Rfl:     RABEprazole (ACIPHEX) 20 MG tablet, every other day  , Disp: , Rfl: 0    VITAMIN E PO, Take by mouth 2 (two) times a day, Disp: , Rfl:     meloxicam (MOBIC) 7 5 mg tablet, Take 1 tablet (7 5 mg total) by mouth daily, Disp: 30 tablet, Rfl: 1    Allergies   Allergen Reactions    Penicillins        Objective:  Vitals:    10/23/18 1346   BP: 137/69   Pulse: 94       Body mass index is 39 89 kg/m²  Right Knee Exam     Tenderness   The patient is experiencing tenderness in the MCL and medial joint line  Range of Motion   Extension:  0 normal   Flexion:  120 normal     Muscle Strength     The patient has normal right knee strength  Tests   Zenaida:  Medial - positive (equivocal) Lateral - negative  Lachman:  Anterior - negative      Drawer:       Anterior - negative      Varus: negative  Valgus: negative  Patellar Apprehension: negative    Other   Erythema: absent  Scars: absent  Sensation: normal  Pulse: present  Swelling: none  Other tests: no effusion present    Comments:  Pain with valgus stressing but no laxity  Equivocal Zenaida/Apley medially  Collateral ligaments stable at 0, 30, and 90  Ambulates with a slightly antalgic gait on the right  - PFG/crepitus          Observations     Right Knee   Negative for effusion  Physical Exam   Constitutional: He is oriented to person, place, and time  He appears well-developed and well-nourished  Body mass index is 39 89 kg/m²  HENT:   Head: Normocephalic and atraumatic  Eyes: EOM are normal    Neck: Normal range of motion  Cardiovascular: Intact distal pulses  Pulmonary/Chest: Effort normal    Musculoskeletal:        Right knee: He exhibits no effusion  See ortho exam   Neurological: He is alert and oriented to person, place, and time  Skin: Skin is warm and dry  Psychiatric: He has a normal mood and affect  His behavior is normal  Judgment and thought content normal        I have personally reviewed pertinent films in PACS of his weight bearing right knee which demonstrate very mild degenerative changes with minimal medial and patellofemoral joints space narrowing  There is no fracture, dislocation, or evidence of degenerative changes

## 2018-12-28 ENCOUNTER — OFFICE VISIT (OUTPATIENT)
Dept: OBGYN CLINIC | Facility: CLINIC | Age: 63
End: 2018-12-28
Payer: COMMERCIAL

## 2018-12-28 VITALS
WEIGHT: 281 LBS | SYSTOLIC BLOOD PRESSURE: 126 MMHG | BODY MASS INDEX: 38.06 KG/M2 | HEIGHT: 72 IN | HEART RATE: 76 BPM | DIASTOLIC BLOOD PRESSURE: 75 MMHG

## 2018-12-28 DIAGNOSIS — M25.461 EFFUSION OF RIGHT KNEE: ICD-10-CM

## 2018-12-28 DIAGNOSIS — M17.11 PRIMARY OSTEOARTHRITIS OF RIGHT KNEE: Primary | ICD-10-CM

## 2018-12-28 DIAGNOSIS — M25.561 CHRONIC PAIN OF RIGHT KNEE: ICD-10-CM

## 2018-12-28 DIAGNOSIS — G89.29 CHRONIC PAIN OF RIGHT KNEE: ICD-10-CM

## 2018-12-28 PROCEDURE — 99213 OFFICE O/P EST LOW 20 MIN: CPT | Performed by: ORTHOPAEDIC SURGERY

## 2018-12-28 NOTE — PROGRESS NOTES
Assessment/Plan:  1  Primary osteoarthritis of right knee     2  Effusion of right knee     3  Chronic pain of right knee       Nguynễ Dorsey is a very pleasant 70-year-old gentleman presenting today for follow-up of his activity related right knee pain attributable to his mild to moderate underlying osteoarthritis  Unfortunately, he has failed conservative treatment with meloxicam and a hinged knee brace  He continues to have discomfort worsening throughout the day with activities that is most problematic at night  Therefore, we have agreed to escalate conservative treatment in the form of a cortisone injection  He would like to delay this 1 week as he is planning to remodel his daughter's kitchen floors this weekend  Therefore, he will return next week to my independent clinic for a cortisone injection and aspiration of his right knee if appropriate  He can continue with over-the-counter NSAIDs, Tylenol, and a knee brace at his discretion  All of his questions were addressed today  Subjective: Right knee follow up    Patient ID: Rex Amezcua is a 61 y o  male  Nguyễn Dorsey is a very pleasant 54-year-old gentleman presenting 2 months since his initial evaluation for activity related right knee pain  He has been using a hinged knee brace and took meloxicam for 1 month, but unfortunately has noticed a worsening of his activity related discomfort  He continues to deny any mechanical symptoms of locking, buckling, or giving way  He does have pain 1st thing in the morning that worsens throughout the day  He is able to perform activities of daily living, but has noted pain up to 8 or 9/10 at night with throbbing in the anterior knee  He denies any new specific injuries  Review of Systems   Constitutional: Negative  HENT: Negative  Eyes: Negative  Respiratory: Negative  Cardiovascular: Negative  Gastrointestinal: Negative      Endocrine: Negative  Genitourinary: Negative  Musculoskeletal: Positive for arthralgias and joint swelling  Skin: Negative  Allergic/Immunologic: Negative  Neurological: Negative  Hematological: Negative  Psychiatric/Behavioral: Negative  Past Medical History:   Diagnosis Date    Arthritis     Chest tightness     Diabetes ([address] )     Diverticulosis of sigmoid colon     Esophageal reflux     Fatty liver     Hearing problem     High cholesterol     History of chest pain     Hyperlipemia     Hypertension     Lumbago     Malignant neoplasm of prostate (HCC)     Prostate CA (HCC)     Tinea pedis of both feet     Trochanteric bursitis        Past Surgical History:   Procedure Laterality Date    COLONOSCOPY  2016    PROSTATECTOMY N/A 2011       Family History   Problem Relation Age of Onset    Arthritis Mother     Parkinsonism Mother     Heart disease Father     Arthritis Father     Coronary artery disease Father     Hypertension Father     Parkinsonism Father     Mental illness Neg Hx        Social History     Occupational History    Not on file       Social History Main Topics    Smoking status: Former Smoker     Quit date: 6/27/2012    Smokeless tobacco: Never Used    Alcohol use Yes      Comment: Weekends    Drug use: No    Sexual activity: Not on file         Current Outpatient Prescriptions:     aspirin 81 mg chewable tablet, Chew 1 tablet, Disp: , Rfl:     atorvastatin (LIPITOR) 20 mg tablet, Take 1 tablet (20 mg total) by mouth daily, Disp: 90 tablet, Rfl: 1    ESTEVAN MICROLET LANCETS lancets, Estevan Microlet Lancets Miscellaneous TEST twice a day as directed  Quantity: 100;  Refills: 0   Sunni Burton DO ; Active, Disp: , Rfl:     glucose blood (ESTEVAN CONTOUR TEST) test strip, by In Vitro route 2 (two) times a day, Disp: , Rfl:     lisinopril-hydrochlorothiazide (PRINZIDE,ZESTORETIC) 20-12 5 MG per tablet, Take 1 tablet by mouth daily, Disp: 90 tablet, Rfl: 3   meloxicam (MOBIC) 7 5 mg tablet, Take 1 tablet (7 5 mg total) by mouth daily, Disp: 30 tablet, Rfl: 1    metFORMIN (GLUCOPHAGE) 500 mg tablet, Take 1 tablet (500 mg total) by mouth 2 (two) times a day with meals, Disp: 180 tablet, Rfl: 1    MILK THISTLE PO, Take by mouth daily, Disp: , Rfl:     Omega-3 Fatty Acids (FISH OIL) 1,000 mg, Take 2,000 mg by mouth 2 (two) times a day  , Disp: , Rfl:     RABEprazole (ACIPHEX) 20 MG tablet, every other day  , Disp: , Rfl: 0    VITAMIN E PO, Take by mouth 2 (two) times a day, Disp: , Rfl:     Allergies   Allergen Reactions    Penicillins        Objective:  Vitals:    12/28/18 0756   BP: 126/75   Pulse: 76       Body mass index is 38 11 kg/m²  Right Knee Exam     Tenderness   The patient is experiencing tenderness in the MCL, medial joint line and lateral joint line  Range of Motion   Extension:  0 normal   Flexion:  120 normal     Muscle Strength     The patient has normal right knee strength  Tests   Zenaida:  Medial - positive (equivocal) Lateral - negative  Lachman:  Anterior - negative      Drawer:       Anterior - negative      Varus: negative  Valgus: negative  Patellar Apprehension: negative    Other   Erythema: absent  Scars: absent  Sensation: normal  Pulse: present  Swelling: none  Other tests: effusion (Trace) present    Comments:  Pain with valgus stressing but no laxity  Equivocal Zenaida/Apley medially  Collateral ligaments stable at 0, 30, and 90  Ambulates with a slightly antalgic gait on the right  - PFG/crepitus          Observations     Right Knee   Positive for effusion (Trace)  Physical Exam   Constitutional: He is oriented to person, place, and time  He appears well-developed and well-nourished  Body mass index is 38 11 kg/m²  HENT:   Head: Normocephalic and atraumatic  Eyes: EOM are normal    Neck: Normal range of motion  Cardiovascular: Intact distal pulses      Pulmonary/Chest: Effort normal    Musculoskeletal: Right knee: He exhibits effusion (Trace)  See ortho exam   Neurological: He is alert and oriented to person, place, and time  Skin: Skin is warm and dry  Psychiatric: He has a normal mood and affect   His behavior is normal  Judgment and thought content normal

## 2019-01-04 ENCOUNTER — OFFICE VISIT (OUTPATIENT)
Dept: OBGYN CLINIC | Facility: CLINIC | Age: 64
End: 2019-01-04
Payer: COMMERCIAL

## 2019-01-04 VITALS
SYSTOLIC BLOOD PRESSURE: 120 MMHG | BODY MASS INDEX: 38.9 KG/M2 | DIASTOLIC BLOOD PRESSURE: 75 MMHG | HEART RATE: 93 BPM | WEIGHT: 287.2 LBS | HEIGHT: 72 IN

## 2019-01-04 DIAGNOSIS — M25.561 CHRONIC PAIN OF RIGHT KNEE: ICD-10-CM

## 2019-01-04 DIAGNOSIS — M17.11 PRIMARY OSTEOARTHRITIS OF RIGHT KNEE: Primary | ICD-10-CM

## 2019-01-04 DIAGNOSIS — G89.29 CHRONIC PAIN OF RIGHT KNEE: ICD-10-CM

## 2019-01-04 PROCEDURE — 99213 OFFICE O/P EST LOW 20 MIN: CPT | Performed by: PHYSICIAN ASSISTANT

## 2019-01-04 PROCEDURE — 20610 DRAIN/INJ JOINT/BURSA W/O US: CPT | Performed by: PHYSICIAN ASSISTANT

## 2019-01-04 RX ORDER — TRIAMCINOLONE ACETONIDE 40 MG/ML
40 INJECTION, SUSPENSION INTRA-ARTICULAR; INTRAMUSCULAR
Status: COMPLETED | OUTPATIENT
Start: 2019-01-04 | End: 2019-01-04

## 2019-01-04 RX ORDER — BUPIVACAINE HYDROCHLORIDE 5 MG/ML
6 INJECTION, SOLUTION EPIDURAL; INTRACAUDAL
Status: COMPLETED | OUTPATIENT
Start: 2019-01-04 | End: 2019-01-04

## 2019-01-04 RX ADMIN — BUPIVACAINE HYDROCHLORIDE 6 ML: 5 INJECTION, SOLUTION EPIDURAL; INTRACAUDAL at 15:07

## 2019-01-04 RX ADMIN — TRIAMCINOLONE ACETONIDE 40 MG: 40 INJECTION, SUSPENSION INTRA-ARTICULAR; INTRAMUSCULAR at 15:07

## 2019-01-04 NOTE — PROGRESS NOTES
Assessment/Plan:  1  Primary osteoarthritis of right knee  Large joint arthrocentesis   2  Chronic pain of right knee  Large joint arthrocentesis     Miguelangel Wilson is a pleasant 61year old gentleman with activity related right knee pain attributable to his underlying osteoarthritis  He is here today to escalate care with a cortisone injection  He consented to and underwent the injection as detailed below without difficulty or complication  There was no effusion present on the exam today, and therefore no aspiration was needed  Post injection instructions were provided  He is aware that it may cause a transient increase in his blood sugars  We will plan to see him back in 3-4 months pending the efficacy of today's injection  All of his questions were addressed today  Large joint arthrocentesis  Date/Time: 1/4/2019 3:07 PM  Consent given by: patient  Site marked: site marked  Timeout: Immediately prior to procedure a time out was called to verify the correct patient, procedure, equipment, support staff and site/side marked as required   Supporting Documentation  Indications: pain   Procedure Details  Location: knee - R knee  Preparation: Patient was prepped and draped in the usual sterile fashion  Needle size: 20 G  Ultrasound guidance: no  Approach: anterolateral  Medications administered: 6 mL bupivacaine (PF) 0 5 %; 40 mg triamcinolone acetonide 40 mg/mL    Patient tolerance: patient tolerated the procedure well with no immediate complications  Dressing:  Sterile dressing applied     After the risks and benefits of the right knee injection were explained, and verbal consent was obtained for the injection  The right knee was prepped using aseptic technique using isopropyl alcohol and betadine solution  The right knee was successfully injected with 6cc of 0 5% marcaine without epinephrine and 2cc of Kenalog 40 suspension using a 20 gauge needle    After the injection, hemostasis was achieved, and a dressing was applied  Post-injection icing and activity modification instructions were provided  The patient tolerated the procedure well  Subjective: Right knee cortisone injection    Patient ID: Marcia Richter is a 61 y o  male  Henrik Mendieta is a very pleasant 72-year-old gentleman presenting for evaluation of his activity related right knee pain due to his underlying osteoarthritis  He is here today for a cortisone injection that was deferred last week due to the fact that he had to work lying floors this week  He did not see any significant increase in his pain over the course of the week  He denies any new injuries or symptoms  He continues to have difficulty primarily with stiffness 1st thing in the morning or after prolonged sitting and at night  Review of Systems   Constitutional: Negative  HENT: Negative  Eyes: Negative  Respiratory: Negative  Cardiovascular: Negative  Gastrointestinal: Negative  Endocrine: Negative  Genitourinary: Negative  Musculoskeletal: Positive for arthralgias  Skin: Negative  Allergic/Immunologic: Negative  Neurological: Negative  Hematological: Negative  Psychiatric/Behavioral: Negative            Past Medical History:   Diagnosis Date    Arthritis     Chest tightness     Diabetes ([address] )     Diverticulosis of sigmoid colon     Esophageal reflux     Fatty liver     Hearing problem     High cholesterol     History of chest pain     Hyperlipemia     Hypertension     Lumbago     Malignant neoplasm of prostate (HCC)     Prostate CA (HCC)     Tinea pedis of both feet     Trochanteric bursitis        Past Surgical History:   Procedure Laterality Date    COLONOSCOPY  2016    PROSTATECTOMY N/A 2011       Family History   Problem Relation Age of Onset    Arthritis Mother     Parkinsonism Mother     Heart disease Father     Arthritis Father     Coronary artery disease Father     Hypertension Father     Parkinsonism Father    Mesfin Pert Mental illness Neg Hx        Social History     Occupational History    Not on file  Social History Main Topics    Smoking status: Former Smoker     Quit date: 6/27/2012    Smokeless tobacco: Never Used    Alcohol use Yes      Comment: Weekends    Drug use: No    Sexual activity: Not on file         Current Outpatient Prescriptions:     aspirin 81 mg chewable tablet, Chew 1 tablet, Disp: , Rfl:     atorvastatin (LIPITOR) 20 mg tablet, Take 1 tablet (20 mg total) by mouth daily, Disp: 90 tablet, Rfl: 1    ESTEVAN MICROLET LANCETS lancets, Estevan Microlet Lancets Miscellaneous TEST twice a day as directed  Quantity: 100;  Refills: 0   Sunni Burton DO ; Active, Disp: , Rfl:     glucose blood (ESTEVAN CONTOUR TEST) test strip, by In Vitro route 2 (two) times a day, Disp: , Rfl:     lisinopril-hydrochlorothiazide (PRINZIDE,ZESTORETIC) 20-12 5 MG per tablet, Take 1 tablet by mouth daily, Disp: 90 tablet, Rfl: 3    meloxicam (MOBIC) 7 5 mg tablet, Take 1 tablet (7 5 mg total) by mouth daily, Disp: 30 tablet, Rfl: 1    metFORMIN (GLUCOPHAGE) 500 mg tablet, Take 1 tablet (500 mg total) by mouth 2 (two) times a day with meals, Disp: 180 tablet, Rfl: 1    MILK THISTLE PO, Take by mouth daily, Disp: , Rfl:     Omega-3 Fatty Acids (FISH OIL) 1,000 mg, Take 2,000 mg by mouth 2 (two) times a day  , Disp: , Rfl:     RABEprazole (ACIPHEX) 20 MG tablet, every other day  , Disp: , Rfl: 0    VITAMIN E PO, Take by mouth 2 (two) times a day, Disp: , Rfl:     Allergies   Allergen Reactions    Penicillins        Objective:  Vitals:    01/04/19 1504   BP: 120/75   Pulse: 93       Body mass index is 38 95 kg/m²  Right Knee Exam     Tenderness   The patient is experiencing tenderness in the MCL, medial joint line and lateral joint line  Range of Motion   Extension:  0 normal   Flexion:  120 normal     Muscle Strength     The patient has normal right knee strength      Tests   Zenaida:  Medial - positive (equivocal) Lateral - negative  Lachman:  Anterior - negative      Drawer:       Anterior - negative      Varus: negative  Valgus: negative  Patellar Apprehension: negative    Other   Erythema: absent  Scars: absent  Sensation: normal  Pulse: present  Swelling: none  Other tests: no effusion present    Comments:  Pain with valgus stressing but no laxity  Equivocal Zenaida/Apley medially  Collateral ligaments stable at 0, 30, and 90  Ambulates with a slightly antalgic gait on the right  - PFG/crepitus          Observations     Right Knee   Negative for effusion  Physical Exam   Constitutional: He is oriented to person, place, and time  He appears well-developed and well-nourished  Body mass index is 38 95 kg/m²  HENT:   Head: Normocephalic and atraumatic  Eyes: EOM are normal    Neck: Normal range of motion  Cardiovascular: Intact distal pulses  Pulmonary/Chest: Effort normal    Musculoskeletal:        Right knee: He exhibits no effusion  See ortho exam   Neurological: He is alert and oriented to person, place, and time  Skin: Skin is warm and dry  Psychiatric: He has a normal mood and affect   His behavior is normal  Judgment and thought content normal

## 2019-01-11 DIAGNOSIS — E11.40 TYPE 2 DIABETES MELLITUS WITH DIABETIC NEUROPATHY, WITHOUT LONG-TERM CURRENT USE OF INSULIN (HCC): ICD-10-CM

## 2019-01-11 LAB
ALBUMIN SERPL-MCNC: 4.8 G/DL (ref 3.6–4.8)
ALBUMIN/GLOB SERPL: 1.9 {RATIO} (ref 1.2–2.2)
ALP SERPL-CCNC: 66 IU/L (ref 39–117)
ALT SERPL-CCNC: 53 IU/L (ref 0–44)
AST SERPL-CCNC: 37 IU/L (ref 0–40)
BILIRUB SERPL-MCNC: 0.7 MG/DL (ref 0–1.2)
BUN SERPL-MCNC: 21 MG/DL (ref 8–27)
BUN/CREAT SERPL: 19 (ref 10–24)
CALCIUM SERPL-MCNC: 10 MG/DL (ref 8.6–10.2)
CHLORIDE SERPL-SCNC: 97 MMOL/L (ref 96–106)
CHOLEST SERPL-MCNC: 198 MG/DL (ref 100–199)
CO2 SERPL-SCNC: 26 MMOL/L (ref 20–29)
CREAT SERPL-MCNC: 1.13 MG/DL (ref 0.76–1.27)
ERYTHROCYTE [DISTWIDTH] IN BLOOD BY AUTOMATED COUNT: 13.1 % (ref 12.3–15.4)
EST. AVERAGE GLUCOSE BLD GHB EST-MCNC: 174 MG/DL
GLOBULIN SER-MCNC: 2.5 G/DL (ref 1.5–4.5)
GLUCOSE SERPL-MCNC: 143 MG/DL (ref 65–99)
HBA1C MFR BLD: 7.7 % (ref 4.8–5.6)
HCT VFR BLD AUTO: 45 % (ref 37.5–51)
HDLC SERPL-MCNC: 77 MG/DL
HGB BLD-MCNC: 15.3 G/DL (ref 13–17.7)
LDLC SERPL CALC-MCNC: 96 MG/DL (ref 0–99)
LDLC/HDLC SERPL: 1.2 RATIO (ref 0–3.6)
MCH RBC QN AUTO: 31.4 PG (ref 26.6–33)
MCHC RBC AUTO-ENTMCNC: 34 G/DL (ref 31.5–35.7)
MCV RBC AUTO: 92 FL (ref 79–97)
MICRODELETION SYND BLD/T FISH: NORMAL
PLATELET # BLD AUTO: 254 X10E3/UL (ref 150–379)
POTASSIUM SERPL-SCNC: 4.7 MMOL/L (ref 3.5–5.2)
PROT SERPL-MCNC: 7.3 G/DL (ref 6–8.5)
RBC # BLD AUTO: 4.87 X10E6/UL (ref 4.14–5.8)
SL AMB EGFR AFRICAN AMERICAN: 80 ML/MIN/1.73
SL AMB EGFR NON AFRICAN AMERICAN: 69 ML/MIN/1.73
SL AMB VLDL CHOLESTEROL CALC: 25 MG/DL (ref 5–40)
SODIUM SERPL-SCNC: 138 MMOL/L (ref 134–144)
TRIGL SERPL-MCNC: 124 MG/DL (ref 0–149)
WBC # BLD AUTO: 9.1 X10E3/UL (ref 3.4–10.8)

## 2019-01-24 ENCOUNTER — OFFICE VISIT (OUTPATIENT)
Dept: FAMILY MEDICINE CLINIC | Facility: CLINIC | Age: 64
End: 2019-01-24
Payer: COMMERCIAL

## 2019-01-24 VITALS
DIASTOLIC BLOOD PRESSURE: 80 MMHG | RESPIRATION RATE: 18 BRPM | WEIGHT: 283 LBS | TEMPERATURE: 97.9 F | HEART RATE: 80 BPM | HEIGHT: 72 IN | SYSTOLIC BLOOD PRESSURE: 134 MMHG | BODY MASS INDEX: 38.33 KG/M2

## 2019-01-24 DIAGNOSIS — E66.01 SEVERE OBESITY (BMI 35.0-35.9 WITH COMORBIDITY) (HCC): ICD-10-CM

## 2019-01-24 DIAGNOSIS — K76.0 FATTY LIVER: ICD-10-CM

## 2019-01-24 DIAGNOSIS — E78.2 MIXED HYPERLIPIDEMIA: ICD-10-CM

## 2019-01-24 DIAGNOSIS — I10 ESSENTIAL HYPERTENSION: ICD-10-CM

## 2019-01-24 DIAGNOSIS — E11.40 TYPE 2 DIABETES MELLITUS WITH DIABETIC NEUROPATHY, WITHOUT LONG-TERM CURRENT USE OF INSULIN (HCC): Primary | ICD-10-CM

## 2019-01-24 DIAGNOSIS — Z23 NEED FOR VACCINATION: ICD-10-CM

## 2019-01-24 PROCEDURE — 99214 OFFICE O/P EST MOD 30 MIN: CPT | Performed by: FAMILY MEDICINE

## 2019-01-24 PROCEDURE — 90682 RIV4 VACC RECOMBINANT DNA IM: CPT

## 2019-01-24 PROCEDURE — 90471 IMMUNIZATION ADMIN: CPT

## 2019-01-24 RX ORDER — METFORMIN HYDROCHLORIDE 1000 MG/1
1000 TABLET, FILM COATED, EXTENDED RELEASE ORAL
Qty: 90 TABLET | Refills: 1 | Status: SHIPPED | OUTPATIENT
Start: 2019-01-24 | End: 2019-07-19 | Stop reason: SDUPTHER

## 2019-01-24 NOTE — ASSESSMENT & PLAN NOTE
Lab Results   Component Value Date    HGBA1C 7 7 (H) 01/11/2019       No results for input(s): POCGLU in the last 72 hours  Blood Sugar Average: Last 72 hrs:    Due to having problems with remembering medication   Metformin changed to extended release version  Will take 1000 mg every morning       Reminded to get eye exam done

## 2019-01-24 NOTE — PATIENT INSTRUCTIONS
Recent Results (from the past 672 hour(s))   Hemoglobin A1C    Collection Time: 01/11/19  9:58 AM   Result Value Ref Range    Hemoglobin A1C 7 7 (H) 4 8 - 5 6 %    Estimated Average Glucose 174 mg/dL   Comprehensive metabolic panel    Collection Time: 01/11/19  9:58 AM   Result Value Ref Range    Glucose, Random 143 (H) 65 - 99 mg/dL    BUN 21 8 - 27 mg/dL    Creatinine 1 13 0 76 - 1 27 mg/dL    eGFR Non  69 >59 mL/min/1 73    SL AMB EGFR AFRICAN AMERICAN 80 >59 mL/min/1 73    SL AMB BUN/CREATININE RATIO 19 10 - 24    Sodium 138 134 - 144 mmol/L    Potassium 4 7 3 5 - 5 2 mmol/L    Chloride 97 96 - 106 mmol/L    CO2 26 20 - 29 mmol/L    CALCIUM 10 0 8 6 - 10 2 mg/dL    SL AMB PROTEIN, TOTAL 7 3 6 0 - 8 5 g/dL    Albumin 4 8 3 6 - 4 8 g/dL    Globulin, Total 2 5 1 5 - 4 5 g/dL    Albumin/Globulin Ratio 1 9 1 2 - 2 2    TOTAL BILIRUBIN 0 7 0 0 - 1 2 mg/dL    Alk Phos Isoenzymes 66 39 - 117 IU/L    SL AMB AST 37 0 - 40 IU/L    SL AMB ALT 53 (H) 0 - 44 IU/L   Lipid Panel with Direct LDL reflex    Collection Time: 01/11/19  9:58 AM   Result Value Ref Range    Cholesterol, Total 198 100 - 199 mg/dL    Triglycerides 124 0 - 149 mg/dL    HDL 77 >39 mg/dL    VLDL Cholesterol Calculated 25 5 - 40 mg/dL    LDL Direct 96 0 - 99 mg/dL    LDl/HDL Ratio 1 2 0 0 - 3 6 ratio   CBC    Collection Time: 01/11/19  9:58 AM   Result Value Ref Range    White Blood Cell Count 9 1 3 4 - 10 8 x10E3/uL    Red Blood Cell Count 4 87 4 14 - 5 80 x10E6/uL    Hemoglobin 15 3 13 0 - 17 7 g/dL    HCT 45 0 37 5 - 51 0 %    MCV 92 79 - 97 fL    MCH 31 4 26 6 - 33 0 pg    MCHC 34 0 31 5 - 35 7 g/dL    RDW 13 1 12 3 - 15 4 %    Platelet Count 332 890 - 379 x10E3/uL   Cardiovascular Report    Collection Time: 01/11/19  9:58 AM   Result Value Ref Range    Interpretation Note

## 2019-01-24 NOTE — PROGRESS NOTES
Assessment/Plan:    Problem List Items Addressed This Visit     Essential hypertension     stable         Fatty liver     Liver enzymes are down         Mixed hyperlipidemia     Stable          Severe obesity (BMI 35 0-35 9 with comorbidity) ([facility] [address] )     Not controlled  Weight discussed         Type 2 diabetes mellitus with diabetic neuropathy, without long-term current use of insulin ([facility] ) - Primary     Lab Results   Component Value Date    HGBA1C 7 7 (H) 01/11/2019       No results for input(s): POCGLU in the last 72 hours  Blood Sugar Average: Last 72 hrs:    Due to having problems with remembering medication   Metformin changed to extended release version  Will take 1000 mg every morning  Reminded to get eye exam done           Relevant Medications    metFORMIN (GLUMETZA) 1000 MG (MOD) 24 hr tablet    Other Relevant Orders    Hemoglobin A1C    Comprehensive metabolic panel      Other Visit Diagnoses     Need for vaccination        Relevant Orders    influenza vaccine, 8616-2503, quadrivalent, recombinant, PF, 0 5 mL, for patients 18 yr+ (FLUBLOK) (Completed)        BMI Counseling: Body mass index is 38 38 kg/m²  Discussed with patient's BMI with him  The BMI is above average  BMI counseling and education was provided to the patient  Nutrition recommendations include moderation in carbohydrate intake         Patient Instructions     Recent Results (from the past 672 hour(s))   Hemoglobin A1C    Collection Time: 01/11/19  9:58 AM   Result Value Ref Range    Hemoglobin A1C 7 7 (H) 4 8 - 5 6 %    Estimated Average Glucose 174 mg/dL   Comprehensive metabolic panel    Collection Time: 01/11/19  9:58 AM   Result Value Ref Range    Glucose, Random 143 (H) 65 - 99 mg/dL    BUN 21 8 - 27 mg/dL    Creatinine 1 13 0 76 - 1 27 mg/dL    eGFR Non  69 >59 mL/min/1 73    SL AMB EGFR AFRICAN AMERICAN 80 >59 mL/min/1 73    SL AMB BUN/CREATININE RATIO 19 10 - 24    Sodium 138 134 - 144 mmol/L    Potassium 4 7 3 5 - 5 2 mmol/L    Chloride 97 96 - 106 mmol/L    CO2 26 20 - 29 mmol/L    CALCIUM 10 0 8 6 - 10 2 mg/dL    SL AMB PROTEIN, TOTAL 7 3 6 0 - 8 5 g/dL    Albumin 4 8 3 6 - 4 8 g/dL    Globulin, Total 2 5 1 5 - 4 5 g/dL    Albumin/Globulin Ratio 1 9 1 2 - 2 2    TOTAL BILIRUBIN 0 7 0 0 - 1 2 mg/dL    Alk Phos Isoenzymes 66 39 - 117 IU/L    SL AMB AST 37 0 - 40 IU/L    SL AMB ALT 53 (H) 0 - 44 IU/L   Lipid Panel with Direct LDL reflex    Collection Time: 01/11/19  9:58 AM   Result Value Ref Range    Cholesterol, Total 198 100 - 199 mg/dL    Triglycerides 124 0 - 149 mg/dL    HDL 77 >39 mg/dL    VLDL Cholesterol Calculated 25 5 - 40 mg/dL    LDL Direct 96 0 - 99 mg/dL    LDl/HDL Ratio 1 2 0 0 - 3 6 ratio   CBC    Collection Time: 01/11/19  9:58 AM   Result Value Ref Range    White Blood Cell Count 9 1 3 4 - 10 8 x10E3/uL    Red Blood Cell Count 4 87 4 14 - 5 80 x10E6/uL    Hemoglobin 15 3 13 0 - 17 7 g/dL    HCT 45 0 37 5 - 51 0 %    MCV 92 79 - 97 fL    MCH 31 4 26 6 - 33 0 pg    MCHC 34 0 31 5 - 35 7 g/dL    RDW 13 1 12 3 - 15 4 %    Platelet Count 981 901 - 379 x10E3/uL   Cardiovascular Report    Collection Time: 01/11/19  9:58 AM   Result Value Ref Range    Interpretation Note            Return in about 3 months (around 4/24/2019) for Next scheduled follow up  Subjective:      Patient ID: Metro Kussmaul is a 61 y o  male  Chief Complaint   Patient presents with    Follow-up     pt present to follow up on blood work done on 1/11/19       He is forgetting his evening dose of his metformin  He is working a lot of hours  The following portions of the patient's history were reviewed and updated as appropriate:  past social history    Review of Systems   Respiratory: Negative  Cardiovascular: Negative            Current Outpatient Prescriptions   Medication Sig Dispense Refill    aspirin 81 mg chewable tablet Chew 1 tablet      atorvastatin (LIPITOR) 20 mg tablet Take 1 tablet (20 mg total) by mouth daily 90 tablet 1    ESTEVAN MICROLET LANCETS lancets Estevan Microlet Lancets Miscellaneous  TEST twice a day as directed   Quantity: 100;  Refills: 0      Sunni Burton DO ; Active      glucose blood (ESTEVAN CONTOUR TEST) test strip by In Vitro route 2 (two) times a day      lisinopril-hydrochlorothiazide (PRINZIDE,ZESTORETIC) 20-12 5 MG per tablet Take 1 tablet by mouth daily 90 tablet 3    MILK THISTLE PO Take by mouth daily      Omega-3 Fatty Acids (FISH OIL) 1,000 mg Take 2,000 mg by mouth 2 (two) times a day        RABEprazole (ACIPHEX) 20 MG tablet every other day    0    VITAMIN E PO Take by mouth 2 (two) times a day      metFORMIN (GLUMETZA) 1000 MG (MOD) 24 hr tablet Take 1 tablet (1,000 mg total) by mouth daily with breakfast 90 tablet 1     No current facility-administered medications for this visit  Objective:    /80   Pulse 80   Temp 97 9 °F (36 6 °C)   Resp 18   Ht 6' (1 829 m)   Wt 128 kg (283 lb)   BMI 38 38 kg/m²        Physical Exam   Constitutional: He appears well-developed and well-nourished  HENT:   Head: Normocephalic and atraumatic  Right Ear: External ear normal    Left Ear: External ear normal    Mouth/Throat: Oropharynx is clear and moist    Cardiovascular: Normal rate, regular rhythm and normal heart sounds  No murmur heard  Pulmonary/Chest: Effort normal and breath sounds normal  No respiratory distress  He has no wheezes  He has no rales  Abdominal: He exhibits mass (soft tissue in abdomen in skin, smaller than a pea)  Musculoskeletal: He exhibits no edema or tenderness  Psychiatric: He has a normal mood and affect  His behavior is normal  Judgment and thought content normal    Nursing note and vitals reviewed               Rk Oro DO

## 2019-02-27 ENCOUNTER — OFFICE VISIT (OUTPATIENT)
Dept: FAMILY MEDICINE CLINIC | Facility: CLINIC | Age: 64
End: 2019-02-27
Payer: COMMERCIAL

## 2019-02-27 ENCOUNTER — HOSPITAL ENCOUNTER (OUTPATIENT)
Dept: RADIOLOGY | Facility: HOSPITAL | Age: 64
Discharge: HOME | End: 2019-02-27
Payer: COMMERCIAL

## 2019-02-27 ENCOUNTER — TELEPHONE (OUTPATIENT)
Dept: FAMILY MEDICINE CLINIC | Facility: CLINIC | Age: 64
End: 2019-02-27

## 2019-02-27 VITALS
SYSTOLIC BLOOD PRESSURE: 122 MMHG | TEMPERATURE: 98.3 F | HEIGHT: 72 IN | DIASTOLIC BLOOD PRESSURE: 82 MMHG | WEIGHT: 280 LBS | BODY MASS INDEX: 37.93 KG/M2 | HEART RATE: 100 BPM | RESPIRATION RATE: 18 BRPM

## 2019-02-27 DIAGNOSIS — M79.605 PAIN OF LEFT LOWER EXTREMITY: ICD-10-CM

## 2019-02-27 DIAGNOSIS — I10 ESSENTIAL HYPERTENSION: ICD-10-CM

## 2019-02-27 DIAGNOSIS — E78.2 MIXED HYPERLIPIDEMIA: ICD-10-CM

## 2019-02-27 DIAGNOSIS — R60.0 LEG EDEMA, LEFT: ICD-10-CM

## 2019-02-27 DIAGNOSIS — S80.12XA CONTUSION OF LEFT LOWER EXTREMITY, INITIAL ENCOUNTER: ICD-10-CM

## 2019-02-27 DIAGNOSIS — S80.12XA CONTUSION OF LEFT LOWER EXTREMITY, INITIAL ENCOUNTER: Primary | ICD-10-CM

## 2019-02-27 PROCEDURE — 3074F SYST BP LT 130 MM HG: CPT | Performed by: NURSE PRACTITIONER

## 2019-02-27 PROCEDURE — 76882 US LMTD JT/FCL EVL NVASC XTR: CPT

## 2019-02-27 PROCEDURE — 3079F DIAST BP 80-89 MM HG: CPT | Performed by: NURSE PRACTITIONER

## 2019-02-27 PROCEDURE — 1036F TOBACCO NON-USER: CPT | Performed by: NURSE PRACTITIONER

## 2019-02-27 PROCEDURE — 93971 EXTREMITY STUDY: CPT

## 2019-02-27 PROCEDURE — 3008F BODY MASS INDEX DOCD: CPT | Performed by: NURSE PRACTITIONER

## 2019-02-27 PROCEDURE — 93971 EXTREMITY STUDY: CPT | Performed by: SURGERY

## 2019-02-27 PROCEDURE — 99214 OFFICE O/P EST MOD 30 MIN: CPT | Performed by: NURSE PRACTITIONER

## 2019-02-27 RX ORDER — SULFAMETHOXAZOLE AND TRIMETHOPRIM 800; 160 MG/1; MG/1
1 TABLET ORAL EVERY 12 HOURS SCHEDULED
Qty: 20 TABLET | Refills: 0 | Status: SHIPPED | OUTPATIENT
Start: 2019-02-27 | End: 2019-03-09

## 2019-02-27 NOTE — PROGRESS NOTES
Assessment/Plan:    Will do stat venous doppler and will call patient with results  Aware that if negative for blood clot in leg, will start antibiotic and warm compresses at the leg as cellulitis in differential    If will have any DVT, will call with new medication orders  Supportive care discussed and advised  Follow up for no improvement and worsening of conditions  Patient advised and educated when to see immediate medical care  Diagnoses and all orders for this visit:    Contusion of left lower extremity, initial encounter  -     VAS lower limb venous duplex study, unilateral/limited; Future  -     US extremity soft tissue; Future  -     sulfamethoxazole-trimethoprim (BACTRIM DS) 800-160 mg per tablet; Take 1 tablet by mouth every 12 (twelve) hours for 10 days    Leg edema, left  -     VAS lower limb venous duplex study, unilateral/limited; Future  -     sulfamethoxazole-trimethoprim (BACTRIM DS) 800-160 mg per tablet; Take 1 tablet by mouth every 12 (twelve) hours for 10 days    Pain of left lower extremity  -     VAS lower limb venous duplex study, unilateral/limited; Future  -     sulfamethoxazole-trimethoprim (BACTRIM DS) 800-160 mg per tablet; Take 1 tablet by mouth every 12 (twelve) hours for 10 days    Essential hypertension  Comments:  stable    Mixed hyperlipidemia  Comments:  on statin and tolerating it well          BMI Counseling: Body mass index is 37 97 kg/m²  Discussed the patient's BMI with him  The BMI is above average  BMI counseling and education was provided to the patient  Nutrition recommendations include reducing portion sizes, decreasing overall calorie intake, 3-5 servings of fruits/vegetables daily and reducing fast food intake  Patient Instructions: Will do stat venous doppler and will call patient with results  Aware that if negative for blood clot in leg, will start antibiotic and warm compresses at the leg    If will have any DVT, will call with new medication orders  Supportive care discussed and advised  Follow up for no improvement and worsening of conditions  Patient advised and educated when to see immediate medical care  Return if symptoms worsen or fail to improve  Subjective:      Patient ID: Debbi Hardy is a 61 y o  male  Chief Complaint   Patient presents with   Celina Vicki     last thursday, fell against table, states Left thigh is bruised and theres a bump on thigh as well  now states lower left leg is starting to hurt akrma        HPI  Patient fell at home on  against table and hurted his left thigh and got bruised and big lump on the thigh  Stated that been using warm compresses and lump is slightly smaller  Yesterday started with lower leg edema and pain  Left lower leg is slightly red at shin area  Denies sob, chest pain and dizziness  Complaint with medications  Vitals:  /82   Pulse 100   Temp 98 3 °F (36 8 °C)   Resp 18   Ht 6' (1 829 m)   Wt 127 kg (280 lb)   BMI 37 97 kg/m²     The following portions of the patient's history were reviewed and updated as appropriate: allergies, current medications, past family history, past medical history, past social history, past surgical history and problem list       Review of Systems   Constitutional: Negative  Respiratory: Negative  Cardiovascular: Negative  Genitourinary: Negative  Musculoskeletal:        As noted in HPI     Skin:        As noted in HPI     Neurological: Negative  Psychiatric/Behavioral: Negative  Objective:    Social History     Tobacco Use   Smoking Status Former Smoker    Last attempt to quit: 2012    Years since quittin 6   Smokeless Tobacco Never Used       Allergies:    Allergies   Allergen Reactions    Penicillins          Current Outpatient Medications   Medication Sig Dispense Refill    aspirin 81 mg chewable tablet Chew 1 tablet      atorvastatin (LIPITOR) 20 mg tablet Take 1 tablet (20 mg total) by mouth daily 90 tablet 1    NEYDA MICROLET LANCETS lancets Neyda Microlet Lancets Miscellaneous  TEST twice a day as directed   Quantity: 100;  Refills: 0      Josephine HOWARD, Sunni CASEY ; Active      glucose blood (NEYDA CONTOUR TEST) test strip by In Vitro route 2 (two) times a day      lisinopril-hydrochlorothiazide (PRINZIDE,ZESTORETIC) 20-12 5 MG per tablet Take 1 tablet by mouth daily 90 tablet 3    metFORMIN (GLUMETZA) 1000 MG (MOD) 24 hr tablet Take 1 tablet (1,000 mg total) by mouth daily with breakfast 90 tablet 1    MILK THISTLE PO Take by mouth daily      Omega-3 Fatty Acids (FISH OIL) 1,000 mg Take 2,000 mg by mouth 2 (two) times a day        RABEprazole (ACIPHEX) 20 MG tablet every other day    0    VITAMIN E PO Take by mouth 2 (two) times a day      sulfamethoxazole-trimethoprim (BACTRIM DS) 800-160 mg per tablet Take 1 tablet by mouth every 12 (twelve) hours for 10 days 20 tablet 0     No current facility-administered medications for this visit  Physical Exam   Constitutional: He appears well-developed and well-nourished  Cardiovascular: Normal rate and regular rhythm  Pulmonary/Chest: Effort normal and breath sounds normal    Musculoskeletal: He exhibits edema and tenderness          Legs:  Skin:                        ORALIA Ash

## 2019-02-27 NOTE — TELEPHONE ENCOUNTER
Patient called and informed that verbal received from vascular,negative for DVT and will start antibiotic and will do warm compresses at the leg and will elevate the leg

## 2019-02-27 NOTE — PATIENT INSTRUCTIONS
Will do stat venous doppler and will call patient with results  Aware that if negative for blood clot in leg, will start antibiotic and warm compresses at the leg  If will have any DVT, will call with new medication orders  Supportive care discussed and advised  Follow up for no improvement and worsening of conditions  Patient advised and educated when to see immediate medical care

## 2019-02-28 DIAGNOSIS — E78.2 MIXED HYPERLIPIDEMIA: ICD-10-CM

## 2019-02-28 RX ORDER — ATORVASTATIN CALCIUM 20 MG/1
20 TABLET, FILM COATED ORAL DAILY
Qty: 90 TABLET | Refills: 1 | Status: SHIPPED | OUTPATIENT
Start: 2019-02-28 | End: 2019-07-19 | Stop reason: SDUPTHER

## 2019-03-04 DIAGNOSIS — T14.8XXA HEMATOMA: Primary | ICD-10-CM

## 2019-03-06 ENCOUNTER — TELEPHONE (OUTPATIENT)
Dept: FAMILY MEDICINE CLINIC | Facility: CLINIC | Age: 64
End: 2019-03-06

## 2019-03-06 NOTE — TELEPHONE ENCOUNTER
3/6/2019 9:55 AM Returned call to Twyla Stoll  He still has half an egg sized hematoma left in his leg from his fall  Advised him to apply warm compresses and rub the bruise to help it break apart  If it doesn't improve in 2 weeks will call for further instructions      Message complete  Lachelle Harrell,

## 2019-03-06 NOTE — TELEPHONE ENCOUNTER
Patient states its still sore  Went for vascular ultrasound 2/27 no blood clots were found  Patient stated that Kamron Bailey said that it might be a hematoma  Patient prefers to talk to Dr Arianna Haskins  Patient wants to know if it is necessary to make appt with surgeon  Please call patient at work [phone]  Please advise  Kristin Dan MA

## 2019-04-16 ENCOUNTER — APPOINTMENT (OUTPATIENT)
Dept: LAB | Facility: HOSPITAL | Age: 64
End: 2019-04-16
Payer: COMMERCIAL

## 2019-04-16 ENCOUNTER — TRANSCRIBE ORDERS (OUTPATIENT)
Dept: ADMINISTRATIVE | Facility: HOSPITAL | Age: 64
End: 2019-04-16

## 2019-04-16 DIAGNOSIS — E11.40 TYPE 2 DIABETES MELLITUS WITH DIABETIC NEUROPATHY, WITHOUT LONG-TERM CURRENT USE OF INSULIN (HCC): ICD-10-CM

## 2019-04-16 LAB
ALBUMIN SERPL BCP-MCNC: 3.9 G/DL (ref 3.5–5)
ALP SERPL-CCNC: 69 U/L (ref 46–116)
ALT SERPL W P-5'-P-CCNC: 54 U/L (ref 12–78)
ANION GAP SERPL CALCULATED.3IONS-SCNC: 8 MMOL/L (ref 4–13)
AST SERPL W P-5'-P-CCNC: 32 U/L (ref 5–45)
BILIRUB SERPL-MCNC: 0.7 MG/DL (ref 0.2–1)
BUN SERPL-MCNC: 22 MG/DL (ref 5–25)
CALCIUM SERPL-MCNC: 9.6 MG/DL (ref 8.3–10.1)
CHLORIDE SERPL-SCNC: 100 MMOL/L (ref 100–108)
CO2 SERPL-SCNC: 29 MMOL/L (ref 21–32)
CREAT SERPL-MCNC: 1.09 MG/DL (ref 0.6–1.3)
EST. AVERAGE GLUCOSE BLD GHB EST-MCNC: 174 MG/DL
GFR SERPL CREATININE-BSD FRML MDRD: 72 ML/MIN/1.73SQ M
GLUCOSE P FAST SERPL-MCNC: 143 MG/DL (ref 65–99)
HBA1C MFR BLD: 7.7 % (ref 4.2–6.3)
POTASSIUM SERPL-SCNC: 4 MMOL/L (ref 3.5–5.3)
PROT SERPL-MCNC: 7.6 G/DL (ref 6.4–8.2)
SODIUM SERPL-SCNC: 137 MMOL/L (ref 136–145)

## 2019-04-16 PROCEDURE — 83036 HEMOGLOBIN GLYCOSYLATED A1C: CPT

## 2019-04-16 PROCEDURE — 80053 COMPREHEN METABOLIC PANEL: CPT

## 2019-04-16 PROCEDURE — 36415 COLL VENOUS BLD VENIPUNCTURE: CPT

## 2019-04-24 ENCOUNTER — OFFICE VISIT (OUTPATIENT)
Dept: FAMILY MEDICINE CLINIC | Facility: CLINIC | Age: 64
End: 2019-04-24
Payer: COMMERCIAL

## 2019-04-24 VITALS
WEIGHT: 289 LBS | BODY MASS INDEX: 39.14 KG/M2 | TEMPERATURE: 97.3 F | RESPIRATION RATE: 16 BRPM | HEIGHT: 72 IN | SYSTOLIC BLOOD PRESSURE: 142 MMHG | HEART RATE: 80 BPM | DIASTOLIC BLOOD PRESSURE: 72 MMHG

## 2019-04-24 DIAGNOSIS — E78.2 MIXED HYPERLIPIDEMIA: ICD-10-CM

## 2019-04-24 DIAGNOSIS — E66.01 SEVERE OBESITY (BMI 35.0-35.9 WITH COMORBIDITY) (HCC): ICD-10-CM

## 2019-04-24 DIAGNOSIS — E11.40 TYPE 2 DIABETES MELLITUS WITH DIABETIC NEUROPATHY, WITHOUT LONG-TERM CURRENT USE OF INSULIN (HCC): Primary | ICD-10-CM

## 2019-04-24 DIAGNOSIS — I10 ESSENTIAL HYPERTENSION: ICD-10-CM

## 2019-04-24 PROCEDURE — 99214 OFFICE O/P EST MOD 30 MIN: CPT | Performed by: FAMILY MEDICINE

## 2019-05-09 ENCOUNTER — OFFICE VISIT (OUTPATIENT)
Dept: FAMILY MEDICINE CLINIC | Facility: CLINIC | Age: 64
End: 2019-05-09
Payer: COMMERCIAL

## 2019-05-09 VITALS
HEIGHT: 72 IN | DIASTOLIC BLOOD PRESSURE: 84 MMHG | RESPIRATION RATE: 22 BRPM | SYSTOLIC BLOOD PRESSURE: 132 MMHG | TEMPERATURE: 98.6 F | WEIGHT: 284.4 LBS | HEART RATE: 102 BPM | BODY MASS INDEX: 38.52 KG/M2

## 2019-05-09 DIAGNOSIS — J06.9 ACUTE UPPER RESPIRATORY INFECTION: Primary | ICD-10-CM

## 2019-05-09 PROCEDURE — 1036F TOBACCO NON-USER: CPT | Performed by: NURSE PRACTITIONER

## 2019-05-09 PROCEDURE — 99213 OFFICE O/P EST LOW 20 MIN: CPT | Performed by: NURSE PRACTITIONER

## 2019-05-09 PROCEDURE — 3008F BODY MASS INDEX DOCD: CPT | Performed by: NURSE PRACTITIONER

## 2019-05-09 RX ORDER — PROMETHAZINE HYDROCHLORIDE AND CODEINE PHOSPHATE 6.25; 1 MG/5ML; MG/5ML
5 SYRUP ORAL EVERY 6 HOURS PRN
Qty: 120 ML | Refills: 0 | Status: SHIPPED | OUTPATIENT
Start: 2019-05-09 | End: 2019-10-18 | Stop reason: ALTCHOICE

## 2019-07-08 ENCOUNTER — TELEPHONE (OUTPATIENT)
Dept: CARDIOLOGY CLINIC | Facility: CLINIC | Age: 64
End: 2019-07-08

## 2019-07-08 DIAGNOSIS — R07.89 CHEST TIGHTNESS: ICD-10-CM

## 2019-07-08 DIAGNOSIS — I10 ESSENTIAL HYPERTENSION: ICD-10-CM

## 2019-07-08 RX ORDER — LISINOPRIL AND HYDROCHLOROTHIAZIDE 20; 12.5 MG/1; MG/1
1 TABLET ORAL DAILY
Qty: 30 TABLET | Refills: 3 | Status: SHIPPED | OUTPATIENT
Start: 2019-07-08 | End: 2019-07-31 | Stop reason: SDUPTHER

## 2019-07-08 NOTE — TELEPHONE ENCOUNTER
Patient needs refill for lisinopril /hctz 20/12 5 takes 1 tablet daily #30 to [facility] in [place] took last one today has appt July 31 with dr Pranav Daniels

## 2019-07-09 ENCOUNTER — APPOINTMENT (OUTPATIENT)
Dept: LAB | Facility: HOSPITAL | Age: 64
End: 2019-07-09
Payer: COMMERCIAL

## 2019-07-09 ENCOUNTER — TRANSCRIBE ORDERS (OUTPATIENT)
Dept: ADMINISTRATIVE | Facility: HOSPITAL | Age: 64
End: 2019-07-09

## 2019-07-09 DIAGNOSIS — I10 ESSENTIAL HYPERTENSION: ICD-10-CM

## 2019-07-09 DIAGNOSIS — E78.2 MIXED HYPERLIPIDEMIA: ICD-10-CM

## 2019-07-09 DIAGNOSIS — E11.40 TYPE 2 DIABETES MELLITUS WITH DIABETIC NEUROPATHY, WITHOUT LONG-TERM CURRENT USE OF INSULIN (HCC): ICD-10-CM

## 2019-07-09 LAB
ALBUMIN SERPL BCP-MCNC: 3.6 G/DL (ref 3.5–5)
ALP SERPL-CCNC: 60 U/L (ref 46–116)
ALT SERPL W P-5'-P-CCNC: 73 U/L (ref 12–78)
ANION GAP SERPL CALCULATED.3IONS-SCNC: 6 MMOL/L (ref 4–13)
AST SERPL W P-5'-P-CCNC: 48 U/L (ref 5–45)
BILIRUB SERPL-MCNC: 0.7 MG/DL (ref 0.2–1)
BUN SERPL-MCNC: 21 MG/DL (ref 5–25)
CALCIUM SERPL-MCNC: 8.8 MG/DL (ref 8.3–10.1)
CHLORIDE SERPL-SCNC: 102 MMOL/L (ref 100–108)
CHOLEST SERPL-MCNC: 169 MG/DL (ref 50–200)
CO2 SERPL-SCNC: 28 MMOL/L (ref 21–32)
CREAT SERPL-MCNC: 0.92 MG/DL (ref 0.6–1.3)
CREAT UR-MCNC: 246 MG/DL
ERYTHROCYTE [DISTWIDTH] IN BLOOD BY AUTOMATED COUNT: 12.3 % (ref 11.6–15.1)
EST. AVERAGE GLUCOSE BLD GHB EST-MCNC: 174 MG/DL
GFR SERPL CREATININE-BSD FRML MDRD: 88 ML/MIN/1.73SQ M
GLUCOSE P FAST SERPL-MCNC: 155 MG/DL (ref 65–99)
HBA1C MFR BLD: 7.7 % (ref 4.2–6.3)
HCT VFR BLD AUTO: 42.6 % (ref 36.5–49.3)
HDLC SERPL-MCNC: 48 MG/DL (ref 40–60)
HGB BLD-MCNC: 13.9 G/DL (ref 12–17)
LDLC SERPL CALC-MCNC: 90 MG/DL (ref 0–100)
MCH RBC QN AUTO: 31.1 PG (ref 26.8–34.3)
MCHC RBC AUTO-ENTMCNC: 32.6 G/DL (ref 31.4–37.4)
MCV RBC AUTO: 95 FL (ref 82–98)
MICROALBUMIN UR-MCNC: 51.6 MG/L (ref 0–20)
MICROALBUMIN/CREAT 24H UR: 21 MG/G CREATININE (ref 0–30)
PLATELET # BLD AUTO: 203 THOUSANDS/UL (ref 149–390)
PMV BLD AUTO: 10.7 FL (ref 8.9–12.7)
POTASSIUM SERPL-SCNC: 4.2 MMOL/L (ref 3.5–5.3)
PROT SERPL-MCNC: 7 G/DL (ref 6.4–8.2)
RBC # BLD AUTO: 4.47 MILLION/UL (ref 3.88–5.62)
SODIUM SERPL-SCNC: 136 MMOL/L (ref 136–145)
TRIGL SERPL-MCNC: 155 MG/DL
WBC # BLD AUTO: 6.14 THOUSAND/UL (ref 4.31–10.16)

## 2019-07-09 PROCEDURE — 82570 ASSAY OF URINE CREATININE: CPT

## 2019-07-09 PROCEDURE — 83036 HEMOGLOBIN GLYCOSYLATED A1C: CPT

## 2019-07-09 PROCEDURE — 80061 LIPID PANEL: CPT

## 2019-07-09 PROCEDURE — 36415 COLL VENOUS BLD VENIPUNCTURE: CPT

## 2019-07-09 PROCEDURE — 85027 COMPLETE CBC AUTOMATED: CPT

## 2019-07-09 PROCEDURE — 82043 UR ALBUMIN QUANTITATIVE: CPT

## 2019-07-09 PROCEDURE — 80053 COMPREHEN METABOLIC PANEL: CPT

## 2019-07-09 PROCEDURE — 3061F NEG MICROALBUMINURIA REV: CPT | Performed by: FAMILY MEDICINE

## 2019-07-11 ENCOUNTER — APPOINTMENT (OUTPATIENT)
Dept: RADIOLOGY | Facility: CLINIC | Age: 64
End: 2019-07-11
Payer: COMMERCIAL

## 2019-07-11 ENCOUNTER — OFFICE VISIT (OUTPATIENT)
Dept: OBGYN CLINIC | Facility: CLINIC | Age: 64
End: 2019-07-11
Payer: COMMERCIAL

## 2019-07-11 VITALS
HEIGHT: 72 IN | DIASTOLIC BLOOD PRESSURE: 73 MMHG | BODY MASS INDEX: 37.79 KG/M2 | HEART RATE: 92 BPM | SYSTOLIC BLOOD PRESSURE: 125 MMHG | WEIGHT: 279 LBS

## 2019-07-11 DIAGNOSIS — M17.12 PRIMARY OSTEOARTHRITIS OF LEFT KNEE: ICD-10-CM

## 2019-07-11 DIAGNOSIS — M25.562 LEFT KNEE PAIN, UNSPECIFIED CHRONICITY: ICD-10-CM

## 2019-07-11 DIAGNOSIS — M76.32 ILIOTIBIAL BAND SYNDROME OF LEFT SIDE: Primary | ICD-10-CM

## 2019-07-11 DIAGNOSIS — M25.562 ACUTE PAIN OF LEFT KNEE: ICD-10-CM

## 2019-07-11 PROCEDURE — 73562 X-RAY EXAM OF KNEE 3: CPT

## 2019-07-11 PROCEDURE — 99213 OFFICE O/P EST LOW 20 MIN: CPT | Performed by: ORTHOPAEDIC SURGERY

## 2019-07-11 NOTE — PROGRESS NOTES
Assessment/Plan:  1  Iliotibial band syndrome of left side  Ambulatory referral to Physical Therapy    diclofenac sodium (VOLTAREN) 1 %   2  Primary osteoarthritis of left knee  Ambulatory referral to Physical Therapy   3  Acute pain of left knee  XR knee 3 vw left non injury    Ambulatory referral to Physical Therapy    diclofenac sodium (VOLTAREN) 1 %     Epifanio Herrera is a pleasant 66-year-old gentleman presenting today for evaluation of 1 week of atraumatic left knee pain  After reviewing his images, history, and physical exam, although he does have mild to moderate underlying osteoarthritis of the left knee, he seems to be more symptomatic of distal IT band syndrome  We had a discussion with him today about IT band syndrome and the treatment options  We have prescribed him Voltaren gel to use up to 4 times a day on the affected area  We would like to avoid oral NSAIDs due to his history of diabetes to avoid stressing the kidneys  Additionally, we have referred him to formal physical therapy for stretching of the IT band and strengthening of the lower extremities  He can return in 2-3 months for a clinical re-evaluation or sooner as needed  All of his questions were addressed today  Subjective:  Left knee pain    Patient ID: Brandon Rueda is a 61 y o  male  Epifanio Herrera is a pleasant 66-year-old gentleman known to our office for his right knee complaints presenting for a new complaint of 1 week of atraumatic left knee pain  He denies any specific injuries, but believes that he may have been favoring his left knee while working around the house and doing yd work  He has had discomfort primarily in the lateral aspect of the knee and activity related pain that radiate up towards the hip  He does not have any feelings of instability of the left knee  He has taken Tylenol without significant relief    He does have difficulty laying on the left side at night and lying on his right side with his left leg draped over  Review of Systems   Constitutional: Negative  HENT: Negative  Eyes: Negative  Respiratory: Negative  Cardiovascular: Negative  Gastrointestinal: Negative  Endocrine: Negative  Genitourinary: Negative  Musculoskeletal: Positive for arthralgias  Skin: Negative  Allergic/Immunologic: Negative  Neurological: Negative  Hematological: Negative  Psychiatric/Behavioral: Negative            Past Medical History:   Diagnosis Date    Arthritis     Chest tightness     Diabetes ([address] )     Diverticulosis of sigmoid colon     Esophageal reflux     Fatty liver     Hearing problem     High cholesterol     History of chest pain     Hyperlipemia     Hypertension     Lumbago     Malignant neoplasm of prostate (HCC)     Prostate CA (HCC)     Tinea pedis of both feet     Trochanteric bursitis        Past Surgical History:   Procedure Laterality Date    COLONOSCOPY  2016    PROSTATECTOMY N/A        Family History   Problem Relation Age of Onset    Arthritis Mother     Parkinsonism Mother     Heart disease Father     Arthritis Father     Coronary artery disease Father     Hypertension Father     Parkinsonism Father     Mental illness Neg Hx        Social History     Occupational History    Not on file   Tobacco Use    Smoking status: Former Smoker     Last attempt to quit: 2012     Years since quittin 0    Smokeless tobacco: Never Used   Substance and Sexual Activity    Alcohol use: Yes     Comment: Weekends    Drug use: No    Sexual activity: Not on file         Current Outpatient Medications:     aspirin 81 mg chewable tablet, Chew 1 tablet, Disp: , Rfl:     atorvastatin (LIPITOR) 20 mg tablet, Take 1 tablet (20 mg total) by mouth daily, Disp: 90 tablet, Rfl: 1    ESTEVAN MICROLET LANCETS lancets, Estevan Microlet Lancets Miscellaneous TEST twice a day as directed  Quantity: 100;  Refills: 0   Josephine HOWARD, Rosalio Veras ; Active, Disp: , Rfl:    diclofenac sodium (VOLTAREN) 1 %, Apply 2 g topically 4 (four) times a day, Disp: 1 Tube, Rfl: 1    glucose blood (NEYDA CONTOUR TEST) test strip, by In Vitro route 2 (two) times a day, Disp: , Rfl:     lisinopril-hydrochlorothiazide (PRINZIDE,ZESTORETIC) 20-12 5 MG per tablet, Take 1 tablet by mouth daily, Disp: 30 tablet, Rfl: 3    metFORMIN (GLUMETZA) 1000 MG (MOD) 24 hr tablet, Take 1 tablet (1,000 mg total) by mouth daily with breakfast, Disp: 90 tablet, Rfl: 1    MILK THISTLE PO, Take by mouth daily, Disp: , Rfl:     Omega-3 Fatty Acids (FISH OIL) 1,000 mg, Take 2,000 mg by mouth 2 (two) times a day  , Disp: , Rfl:     promethazine-codeine (PHENERGAN WITH CODEINE) 6 25-10 mg/5 mL syrup, Take 5 mL by mouth every 6 (six) hours as needed for cough, Disp: 120 mL, Rfl: 0    RABEprazole (ACIPHEX) 20 MG tablet, every other day  , Disp: , Rfl: 0    VITAMIN E PO, Take by mouth 2 (two) times a day, Disp: , Rfl:     Allergies   Allergen Reactions    Penicillins        Objective:  Vitals:    07/11/19 1605   BP: 125/73   Pulse: 92       Body mass index is 37 84 kg/m²  Left Knee Exam     Muscle Strength   The patient has normal left knee strength  Tenderness   The patient is experiencing tenderness in the lateral retinaculum (Distal IT band)      Range of Motion   Extension:  0 normal   Flexion:  120 (Pain lateral IT band at end range of flexion) normal     Tests   Zenaida:  Medial - negative Lateral - negative  Varus: negative Valgus: negative  Lachman:  Anterior - negative      Drawer:  Anterior - negative     Posterior - negative  Patellar apprehension: negative    Other   Erythema: absent  Scars: absent  Sensation: normal  Pulse: present  Swelling: none  Effusion: no effusion present    Comments:  Patellofemoral click, but negative patellofemoral grind  Collateral ligaments stable at 0, 30, 90°  Positive Karly's test  Negative Zenaida and Apley grind test  Thigh and calf soft and nontender  Ambulates with a slightly antalgic gait on the left  Significant discomfort with figure 4          Observations   Left Knee   Negative for effusion  Physical Exam   Constitutional: He is oriented to person, place, and time  He appears well-developed and well-nourished  Body mass index is 37 84 kg/m²  HENT:   Head: Normocephalic and atraumatic  Eyes: EOM are normal    Neck: Normal range of motion  Cardiovascular: Intact distal pulses  Pulmonary/Chest: Effort normal    Musculoskeletal:        Left knee: He exhibits no effusion  See ortho exam   Neurological: He is alert and oriented to person, place, and time  Skin: Skin is warm and dry  Psychiatric: He has a normal mood and affect  His behavior is normal  Judgment and thought content normal    Nursing note and vitals reviewed  I have personally reviewed pertinent films in PACS of the weight-bearing x-rays taken today of his left knee which demonstrate mild to moderate tricompartmental degenerative changes with joint space narrowing, tricompartmental osteophytosis, and early sclerosis  There is no evidence of fracture, dislocation, loose body, or lytic or blastic lesion

## 2019-07-19 ENCOUNTER — OFFICE VISIT (OUTPATIENT)
Dept: FAMILY MEDICINE CLINIC | Facility: CLINIC | Age: 64
End: 2019-07-19
Payer: COMMERCIAL

## 2019-07-19 VITALS
WEIGHT: 282 LBS | DIASTOLIC BLOOD PRESSURE: 76 MMHG | TEMPERATURE: 98.1 F | HEART RATE: 84 BPM | BODY MASS INDEX: 38.19 KG/M2 | RESPIRATION RATE: 16 BRPM | HEIGHT: 72 IN | SYSTOLIC BLOOD PRESSURE: 122 MMHG

## 2019-07-19 DIAGNOSIS — I10 ESSENTIAL HYPERTENSION: ICD-10-CM

## 2019-07-19 DIAGNOSIS — K76.0 FATTY LIVER: Primary | ICD-10-CM

## 2019-07-19 DIAGNOSIS — E11.40 TYPE 2 DIABETES MELLITUS WITH DIABETIC NEUROPATHY, WITHOUT LONG-TERM CURRENT USE OF INSULIN (HCC): ICD-10-CM

## 2019-07-19 DIAGNOSIS — E66.01 SEVERE OBESITY (BMI 35.0-35.9 WITH COMORBIDITY) (HCC): ICD-10-CM

## 2019-07-19 DIAGNOSIS — E78.2 MIXED HYPERLIPIDEMIA: ICD-10-CM

## 2019-07-19 LAB
LEFT EYE DIABETIC RETINOPATHY: NORMAL
LEFT EYE IMAGE QUALITY: NORMAL
LEFT EYE MACULAR EDEMA: NORMAL
LEFT EYE OTHER RETINOPATHY: NORMAL
RIGHT EYE DIABETIC RETINOPATHY: NORMAL
RIGHT EYE IMAGE QUALITY: NORMAL
RIGHT EYE MACULAR EDEMA: NORMAL
RIGHT EYE OTHER RETINOPATHY: NORMAL
SEVERITY (EYE EXAM): NORMAL

## 2019-07-19 PROCEDURE — 3072F LOW RISK FOR RETINOPATHY: CPT | Performed by: FAMILY MEDICINE

## 2019-07-19 PROCEDURE — 92250 FUNDUS PHOTOGRAPHY W/I&R: CPT | Performed by: FAMILY MEDICINE

## 2019-07-19 PROCEDURE — 99214 OFFICE O/P EST MOD 30 MIN: CPT | Performed by: FAMILY MEDICINE

## 2019-07-19 RX ORDER — ATORVASTATIN CALCIUM 20 MG/1
20 TABLET, FILM COATED ORAL DAILY
Qty: 90 TABLET | Refills: 1 | Status: SHIPPED | OUTPATIENT
Start: 2019-07-19 | End: 2019-07-31

## 2019-07-19 RX ORDER — METFORMIN HYDROCHLORIDE 1000 MG/1
1000 TABLET, FILM COATED, EXTENDED RELEASE ORAL
Qty: 90 TABLET | Refills: 1 | Status: SHIPPED | OUTPATIENT
Start: 2019-07-19 | End: 2020-01-17 | Stop reason: SDUPTHER

## 2019-07-19 NOTE — PATIENT INSTRUCTIONS
Recent Results (from the past 672 hour(s))   Microalbumin / creatinine urine ratio    Collection Time: 07/09/19  9:49 AM   Result Value Ref Range    Creatinine, Ur 246 0 mg/dL    Microalbum  ,U,Random 51 6 (H) 0 0 - 20 0 mg/L    Microalb Creat Ratio 21 0 - 30 mg/g creatinine   CBC    Collection Time: 07/09/19  9:49 AM   Result Value Ref Range    WBC 6 14 4 31 - 10 16 Thousand/uL    RBC 4 47 3 88 - 5 62 Million/uL    Hemoglobin 13 9 12 0 - 17 0 g/dL    Hematocrit 42 6 36 5 - 49 3 %    MCV 95 82 - 98 fL    MCH 31 1 26 8 - 34 3 pg    MCHC 32 6 31 4 - 37 4 g/dL    RDW 12 3 11 6 - 15 1 %    Platelets 509 345 - 545 Thousands/uL    MPV 10 7 8 9 - 12 7 fL   Comprehensive metabolic panel    Collection Time: 07/09/19  9:49 AM   Result Value Ref Range    Sodium 136 136 - 145 mmol/L    Potassium 4 2 3 5 - 5 3 mmol/L    Chloride 102 100 - 108 mmol/L    CO2 28 21 - 32 mmol/L    ANION GAP 6 4 - 13 mmol/L    BUN 21 5 - 25 mg/dL    Creatinine 0 92 0 60 - 1 30 mg/dL    Glucose, Fasting 155 (H) 65 - 99 mg/dL    Calcium 8 8 8 3 - 10 1 mg/dL    AST 48 (H) 5 - 45 U/L    ALT 73 12 - 78 U/L    Alkaline Phosphatase 60 46 - 116 U/L    Total Protein 7 0 6 4 - 8 2 g/dL    Albumin 3 6 3 5 - 5 0 g/dL    Total Bilirubin 0 70 0 20 - 1 00 mg/dL    eGFR 88 ml/min/1 73sq m   Hemoglobin A1C    Collection Time: 07/09/19  9:49 AM   Result Value Ref Range    Hemoglobin A1C 7 7 (H) 4 2 - 6 3 %     mg/dl   Lipid Panel with Direct LDL reflex    Collection Time: 07/09/19  9:49 AM   Result Value Ref Range    Cholesterol 169 50 - 200 mg/dL    Triglycerides 155 (H) <=150 mg/dL    HDL, Direct 48 40 - 60 mg/dL    LDL Calculated 90 0 - 100 mg/dL

## 2019-07-19 NOTE — PROGRESS NOTES
Assessment/Plan:    Problem List Items Addressed This Visit     Essential hypertension     Stable          Relevant Orders    CBC    Comprehensive metabolic panel    TSH, 3rd generation    Fatty liver - Primary     ALT is elevated  Weight loss advised          Mixed hyperlipidemia    Relevant Medications    atorvastatin (LIPITOR) 20 mg tablet    Other Relevant Orders    Lipid Panel with Direct LDL reflex    Severe obesity (BMI 35 0-35 9 with comorbidity) (HCC)     Unchanged  Diet discussed         Type 2 diabetes mellitus with diabetic neuropathy, without long-term current use of insulin (HCC)     Lab Results   Component Value Date    HGBA1C 7 7 (H) 07/09/2019       No results for input(s): POCGLU in the last 72 hours  Blood Sugar Average: Last 72 hrs:    Diabetes control unchanged  Diet and weight loss discussed             Relevant Medications    metFORMIN (GLUMETZA) 1000 MG (MOD) 24 hr tablet    Other Relevant Orders    IRIS Diabetic eye exam    Hemoglobin A1C          BMI Counseling: Body mass index is 38 25 kg/m²  Discussed the patient's BMI with him  The BMI is above average  BMI counseling and education was provided to the patient  Nutrition recommendations include moderation in carbohydrate intake  Patient Instructions     Recent Results (from the past 672 hour(s))   Microalbumin / creatinine urine ratio    Collection Time: 07/09/19  9:49 AM   Result Value Ref Range    Creatinine, Ur 246 0 mg/dL    Microalbum  ,U,Random 51 6 (H) 0 0 - 20 0 mg/L    Microalb Creat Ratio 21 0 - 30 mg/g creatinine   CBC    Collection Time: 07/09/19  9:49 AM   Result Value Ref Range    WBC 6 14 4 31 - 10 16 Thousand/uL    RBC 4 47 3 88 - 5 62 Million/uL    Hemoglobin 13 9 12 0 - 17 0 g/dL    Hematocrit 42 6 36 5 - 49 3 %    MCV 95 82 - 98 fL    MCH 31 1 26 8 - 34 3 pg    MCHC 32 6 31 4 - 37 4 g/dL    RDW 12 3 11 6 - 15 1 %    Platelets 872 414 - 555 Thousands/uL    MPV 10 7 8 9 - 12 7 fL   Comprehensive metabolic panel Collection Time: 07/09/19  9:49 AM   Result Value Ref Range    Sodium 136 136 - 145 mmol/L    Potassium 4 2 3 5 - 5 3 mmol/L    Chloride 102 100 - 108 mmol/L    CO2 28 21 - 32 mmol/L    ANION GAP 6 4 - 13 mmol/L    BUN 21 5 - 25 mg/dL    Creatinine 0 92 0 60 - 1 30 mg/dL    Glucose, Fasting 155 (H) 65 - 99 mg/dL    Calcium 8 8 8 3 - 10 1 mg/dL    AST 48 (H) 5 - 45 U/L    ALT 73 12 - 78 U/L    Alkaline Phosphatase 60 46 - 116 U/L    Total Protein 7 0 6 4 - 8 2 g/dL    Albumin 3 6 3 5 - 5 0 g/dL    Total Bilirubin 0 70 0 20 - 1 00 mg/dL    eGFR 88 ml/min/1 73sq m   Hemoglobin A1C    Collection Time: 07/09/19  9:49 AM   Result Value Ref Range    Hemoglobin A1C 7 7 (H) 4 2 - 6 3 %     mg/dl   Lipid Panel with Direct LDL reflex    Collection Time: 07/09/19  9:49 AM   Result Value Ref Range    Cholesterol 169 50 - 200 mg/dL    Triglycerides 155 (H) <=150 mg/dL    HDL, Direct 48 40 - 60 mg/dL    LDL Calculated 90 0 - 100 mg/dL           Return in about 3 months (around 10/19/2019) for Next scheduled follow up  Subjective:      Patient ID: Junaid Orta is a 61 y o  male  Chief Complaint   Patient presents with    Follow-up     new labs--lj       He is enjoying his [facility]PC  Hypertension   This is a chronic problem  The current episode started more than 1 year ago  The problem is controlled  Past treatments include ACE inhibitors and diuretics  The current treatment provides moderate improvement  Compliance problems include diet  The following portions of the patient's history were reviewed and updated as appropriate:  past social history    Review of Systems   Respiratory: Negative  Cardiovascular: Negative            Current Outpatient Medications   Medication Sig Dispense Refill    aspirin 81 mg chewable tablet Chew 1 tablet      atorvastatin (LIPITOR) 20 mg tablet Take 1 tablet (20 mg total) by mouth daily 90 tablet 1    ESTEVAN MICROLET LANCETS lancets Estevan Microlet Lancets Miscellaneous  TEST twice a day as directed   Quantity: 100;  Refills: 0      Sunni Burton DO ; Active      diclofenac sodium (VOLTAREN) 1 % Apply 2 g topically 4 (four) times a day 1 Tube 1    glucose blood (NEYDA CONTOUR TEST) test strip by In Vitro route 2 (two) times a day      lisinopril-hydrochlorothiazide (PRINZIDE,ZESTORETIC) 20-12 5 MG per tablet Take 1 tablet by mouth daily 30 tablet 3    metFORMIN (GLUMETZA) 1000 MG (MOD) 24 hr tablet Take 1 tablet (1,000 mg total) by mouth daily with breakfast 90 tablet 1    MILK THISTLE PO Take by mouth daily      Omega-3 Fatty Acids (FISH OIL) 1,000 mg Take 2,000 mg by mouth 2 (two) times a day        RABEprazole (ACIPHEX) 20 MG tablet every other day    0    promethazine-codeine (PHENERGAN WITH CODEINE) 6 25-10 mg/5 mL syrup Take 5 mL by mouth every 6 (six) hours as needed for cough (Patient not taking: Reported on 7/19/2019) 120 mL 0    VITAMIN E PO Take by mouth 2 (two) times a day       No current facility-administered medications for this visit  Objective:    /76   Pulse 84   Temp 98 1 °F (36 7 °C)   Resp 16   Ht 6' (1 829 m)   Wt 128 kg (282 lb)   BMI 38 25 kg/m²        Physical Exam   Constitutional: He appears well-developed and well-nourished  HENT:   Head: Normocephalic and atraumatic  Right Ear: External ear normal    Left Ear: External ear normal    Mouth/Throat: Oropharynx is clear and moist    Cardiovascular: Normal rate, regular rhythm and normal heart sounds  No murmur heard  Pulmonary/Chest: Effort normal and breath sounds normal  No respiratory distress  He has no wheezes  He has no rales  Musculoskeletal: He exhibits no edema or tenderness  Nursing note and vitals reviewed               Karis Rosenberg DO

## 2019-07-19 NOTE — ASSESSMENT & PLAN NOTE
Lab Results   Component Value Date    HGBA1C 7 7 (H) 07/09/2019       No results for input(s): POCGLU in the last 72 hours      Blood Sugar Average: Last 72 hrs:    Diabetes control unchanged  Diet and weight loss discussed

## 2019-07-31 ENCOUNTER — OFFICE VISIT (OUTPATIENT)
Dept: CARDIOLOGY CLINIC | Facility: CLINIC | Age: 64
End: 2019-07-31
Payer: COMMERCIAL

## 2019-07-31 VITALS
DIASTOLIC BLOOD PRESSURE: 80 MMHG | BODY MASS INDEX: 38.19 KG/M2 | SYSTOLIC BLOOD PRESSURE: 128 MMHG | OXYGEN SATURATION: 96 % | WEIGHT: 282 LBS | HEART RATE: 72 BPM | HEIGHT: 72 IN

## 2019-07-31 DIAGNOSIS — I10 ESSENTIAL HYPERTENSION: Primary | ICD-10-CM

## 2019-07-31 DIAGNOSIS — K76.0 FATTY LIVER: ICD-10-CM

## 2019-07-31 DIAGNOSIS — R06.00 EXERTIONAL DYSPNEA: ICD-10-CM

## 2019-07-31 DIAGNOSIS — E78.2 MIXED HYPERLIPIDEMIA: ICD-10-CM

## 2019-07-31 DIAGNOSIS — R07.89 CHEST TIGHTNESS: ICD-10-CM

## 2019-07-31 PROCEDURE — 93000 ELECTROCARDIOGRAM COMPLETE: CPT | Performed by: INTERNAL MEDICINE

## 2019-07-31 PROCEDURE — 3074F SYST BP LT 130 MM HG: CPT | Performed by: INTERNAL MEDICINE

## 2019-07-31 PROCEDURE — 99214 OFFICE O/P EST MOD 30 MIN: CPT | Performed by: INTERNAL MEDICINE

## 2019-07-31 RX ORDER — LISINOPRIL AND HYDROCHLOROTHIAZIDE 20; 12.5 MG/1; MG/1
1 TABLET ORAL DAILY
Qty: 90 TABLET | Refills: 1 | Status: SHIPPED | OUTPATIENT
Start: 2019-07-31 | End: 2020-01-12

## 2019-07-31 RX ORDER — ROSUVASTATIN CALCIUM 20 MG/1
20 TABLET, COATED ORAL DAILY
Qty: 90 TABLET | Refills: 3 | Status: SHIPPED | OUTPATIENT
Start: 2019-07-31 | End: 2019-08-02 | Stop reason: SDUPTHER

## 2019-07-31 RX ORDER — LISINOPRIL AND HYDROCHLOROTHIAZIDE 20; 12.5 MG/1; MG/1
1 TABLET ORAL DAILY
Qty: 90 TABLET | Refills: 1 | Status: SHIPPED | OUTPATIENT
Start: 2019-07-31 | End: 2019-07-31 | Stop reason: SDUPTHER

## 2019-07-31 NOTE — PROGRESS NOTES
Cardiology Follow Up  Venkat Berkowitz  1955  [id number]  [email or web address]  [id number] [place] [id number]    1  Essential hypertension  POCT ECG    lisinopril-hydrochlorothiazide (PRINZIDE,ZESTORETIC) 20-12 5 MG per tablet   2  Chest tightness  lisinopril-hydrochlorothiazide (PRINZIDE,ZESTORETIC) 20-12 5 MG per tablet      Discussion/Plan:  Exertional dyspnea- exercise treadmill stress  Possible deconditioning  If negative build up his exercise stamina  Htn- Continue lisinopril/hctz    Dm2- a1c 7 1    SAMPSON- discussed 20 pound wt loss  Omega 3 supplementation 4000mg    Dm2/hld- switch over to rosuvastatin 20mg when atorvastatin when completed + omega supplementation      Interval History:  78-year-old gentleman with a history of diabetes mellitus, non alcoholic steatohepatitis, obesity, hypertension presents for follow-up  He states his chest tightness has improved any feels it may have been secondary to anxiety  He reports that blood pressure has been better controlled  He still is having trouble losing weight  He is compliant with his medications  History blood sugars have been well controlled  He denies having dizziness or lightheadedness  He denies having any falls  He denies having bleeding or bruising  07/31/2019:  He has noted some exertional shortness of breath when mowing his lawn  He is currently trying to lose weight  He reports that blood pressures been controlled  He denies having any chest discomfort  We reviewed through his last lab work  He is taking Omega 3 supplementation      Patient Active Problem List   Diagnosis    Abnormal EKG    Adenocarcinoma of prostate ([address] )    Arthropathy of multiple sites    Type 2 diabetes mellitus with diabetic neuropathy, without long-term current use of insulin (HCC)    Diverticulosis of sigmoid colon    Esophageal reflux    Fatty liver    Abdominal hernia    Mixed hyperlipidemia    Essential hypertension    Chronic bilateral low back pain without sciatica    Severe obesity (BMI 35 0-35 9 with comorbidity) (HCC)     Past Medical History:   Diagnosis Date    Arthritis     Chest tightness     Diabetes ([address] )     Diverticulosis of sigmoid colon     Esophageal reflux     Fatty liver     Hearing problem     High cholesterol     History of chest pain     Hyperlipemia     Hypertension     Lumbago     Malignant neoplasm of prostate (HCC)     Prostate CA (HCC)     Tinea pedis of both feet     Trochanteric bursitis      Social History     Socioeconomic History    Marital status: /Civil Union     Spouse name: Not on file    Number of children: Not on file    Years of education: Not on file    Highest education level: Not on file   Occupational History    Not on file   Social Needs    Financial resource strain: Not on file    Food insecurity:     Worry: Not on file     Inability: Not on file    Transportation needs:     Medical: Not on file     Non-medical: Not on file   Tobacco Use    Smoking status: Former Smoker     Last attempt to quit: 2012     Years since quittin 0    Smokeless tobacco: Never Used   Substance and Sexual Activity    Alcohol use: Yes     Comment: Weekends    Drug use: No    Sexual activity: Not on file   Lifestyle    Physical activity:     Days per week: Not on file     Minutes per session: Not on file    Stress: Not on file   Relationships    Social connections:     Talks on phone: Not on file     Gets together: Not on file     Attends [employer] service: Not on file     Active member of club or organization: Not on file     Attends meetings of clubs or organizations: Not on file     Relationship status: Not on file    Intimate partner violence:     Fear of current or ex partner: Not on file     Emotionally abused: Not on file     Physically abused: Not on file     Forced sexual activity: Not on file   Other Topics Concern    Not on file   Social History Narrative    Lack of exercise  Pets/ animals: cat      Sleeps 8-10 hours /day      Family History   Problem Relation Age of Onset    Arthritis Mother     Parkinsonism Mother     Heart disease Father     Arthritis Father     Coronary artery disease Father     Hypertension Father     Parkinsonism Father     Mental illness Neg Hx      Past Surgical History:   Procedure Laterality Date    COLONOSCOPY  2016    PROSTATECTOMY N/A 2011       Current Outpatient Medications:     aspirin 81 mg chewable tablet, Chew 1 tablet, Disp: , Rfl:     atorvastatin (LIPITOR) 20 mg tablet, Take 1 tablet (20 mg total) by mouth daily, Disp: 90 tablet, Rfl: 1    ESTEVAN MICROLET LANCETS lancets, Estevan Microlet Lancets Miscellaneous TEST twice a day as directed  Quantity: 100;  Refills: 0   Sunni Burton DO ; Active, Disp: , Rfl:     diclofenac sodium (VOLTAREN) 1 %, Apply 2 g topically 4 (four) times a day, Disp: 1 Tube, Rfl: 1    glucose blood (ESTEVAN CONTOUR TEST) test strip, by In Vitro route 2 (two) times a day, Disp: , Rfl:     lisinopril-hydrochlorothiazide (PRINZIDE,ZESTORETIC) 20-12 5 MG per tablet, Take 1 tablet by mouth daily, Disp: 90 tablet, Rfl: 1    metFORMIN (GLUMETZA) 1000 MG (MOD) 24 hr tablet, Take 1 tablet (1,000 mg total) by mouth daily with breakfast, Disp: 90 tablet, Rfl: 1    MILK THISTLE PO, Take by mouth daily, Disp: , Rfl:     Omega-3 Fatty Acids (FISH OIL) 1,000 mg, Take 2,000 mg by mouth 2 (two) times a day  , Disp: , Rfl:     RABEprazole (ACIPHEX) 20 MG tablet, every other day  , Disp: , Rfl: 0    promethazine-codeine (PHENERGAN WITH CODEINE) 6 25-10 mg/5 mL syrup, Take 5 mL by mouth every 6 (six) hours as needed for cough (Patient not taking: Reported on 7/19/2019), Disp: 120 mL, Rfl: 0    VITAMIN E PO, Take by mouth 2 (two) times a day, Disp: , Rfl:   Allergies   Allergen Reactions    Penicillins Review of Systems:  Review of Systems   Constitutional: Negative  HENT: Negative  Eyes: Negative  Respiratory: Negative  Cardiovascular: Negative  Gastrointestinal: Negative  Endocrine: Negative  Genitourinary: Negative  Musculoskeletal: Negative  Skin: Negative  Allergic/Immunologic: Negative  Neurological: Negative  Hematological: Negative  Psychiatric/Behavioral: Negative  Vitals:    07/31/19 0949   BP: 128/80   BP Location: Right arm   Patient Position: Sitting   Cuff Size: Large   Pulse: 72   SpO2: 96%   Weight: 128 kg (282 lb)   Height: 6' (1 829 m)     Physical Exam:  Physical Exam   Constitutional: He is oriented to person, place, and time  He appears well-developed and well-nourished  No distress  HENT:   Head: Normocephalic and atraumatic  Right Ear: External ear normal    Left Ear: External ear normal    Eyes: Pupils are equal, round, and reactive to light  Conjunctivae are normal  Right eye exhibits no discharge  Left eye exhibits no discharge  No scleral icterus  Neck: Normal range of motion  Neck supple  No JVD present  No tracheal deviation present  No thyromegaly present  Cardiovascular: Normal rate and regular rhythm  Exam reveals no gallop and no friction rub  No murmur heard  Pulmonary/Chest: Effort normal and breath sounds normal  No stridor  No respiratory distress  He has no wheezes  He has no rales  He exhibits no tenderness  Abdominal: Soft  Bowel sounds are normal  He exhibits no distension and no mass  There is no tenderness  There is no rebound and no guarding  Musculoskeletal: Normal range of motion  He exhibits no edema, tenderness or deformity  Neurological: He is alert and oriented to person, place, and time  He has normal reflexes  No cranial nerve deficit  He exhibits normal muscle tone  Coordination normal    Skin: Skin is warm and dry  No rash noted  He is not diaphoretic  No erythema  No pallor     Psychiatric: He has a normal mood and affect  His behavior is normal  Judgment and thought content normal    Nursing note and vitals reviewed  Labs:   Cholesterol 193 triglycerides 158 HDL 52   Imaging & Testing   I have personally reviewed pertinent reports  EKG: Personally reviewed  Normal sinus rhythm nonspecific T-wave changes  Normal sinus rhythm no acute st/t wave changes     Cardiac testing:   Results for orders placed during the hospital encounter of 17   Echo complete with contrast if indicated    Narrative Ed 39  [address] [facility]  [place]   [phone]    Transthoracic Echocardiogram  2D, M-mode, Doppler, and Color Doppler    Study date:  2017    Patient: Turner Arias  MR number: [id number]  Account number: [de-identified]  : 1955  Age: 64 years  Gender: Male  Status: Routine  Location: Echo lab  Height: 61 in  Weight: 278 5 lb  BP: 141/ 77 mmHg    Indications: Chest pain    Diagnoses: R94 31 - Abnormal electrocardiogram [ECG] [EKG]    Sonographer:  DARIUS Reed  Primary Physician:  Ten Zhu DO  Referring Physician:  Phill Perez MD  Group:  [facility]  Interpreting Physician:  Vargas Luna DO    SUMMARY    LEFT VENTRICLE:  Systolic function was normal by visual assessment  Ejection fraction was estimated in the range of 55 % to 60 %  There were no regional wall motion abnormalities  There was mild concentric hypertrophy  Doppler parameters were consistent with abnormal left ventricular relaxation (grade 1 diastolic dysfunction)  MITRAL VALVE:  There was mild regurgitation  AORTIC VALVE:  There was no evidence for stenosis  There was no regurgitation  TRICUSPID VALVE:  There was mild regurgitation  Pulmonary artery systolic pressure was within the normal range  HISTORY: PRIOR HISTORY: Hyperlipidemia, Diabetes, Lumbar radiculopathy    PROCEDURE: The procedure was performed in the echo lab   This was a routine study  The transthoracic approach was used  The study included complete 2D imaging, M-mode, complete spectral Doppler, and color Doppler  The heart rate was 80 bpm,  at the start of the study  Images were obtained from the parasternal, apical, subcostal, and suprasternal notch acoustic windows  Image quality was adequate  LEFT VENTRICLE: Size was normal  Systolic function was normal by visual assessment  Ejection fraction was estimated in the range of 55 % to 60 %  There were no regional wall motion abnormalities  There was mild concentric hypertrophy  DOPPLER: Doppler parameters were consistent with abnormal left ventricular relaxation (grade 1 diastolic dysfunction)  RIGHT VENTRICLE: The size was normal  Systolic function was normal  DOPPLER: Systolic pressure was within the normal range  LEFT ATRIUM: The atrium was mildly dilated  RIGHT ATRIUM: The atrium was mildly dilated  MITRAL VALVE: Valve structure was normal  There was normal leaflet separation  No echocardiographic evidence for prolapse  DOPPLER: The transmitral velocity was within the normal range  There was no evidence for stenosis  There was mild  regurgitation  AORTIC VALVE: The valve was trileaflet  Leaflets exhibited normal thickness, normal cuspal separation, and sclerosis  DOPPLER: Transaortic velocity was within the normal range  There was no evidence for stenosis  There was no  regurgitation  TRICUSPID VALVE: The valve structure was normal  There was normal leaflet separation  DOPPLER: The transtricuspid velocity was within the normal range  There was mild regurgitation  Pulmonary artery systolic pressure was within the normal  range  Estimated peak PA pressure was 27 mmHg  PULMONIC VALVE: Leaflets exhibited normal thickness, no calcification, and normal cuspal separation  DOPPLER: The transpulmonic velocity was within the normal range  There was trace regurgitation  PERICARDIUM: There was no thickening   There was no pericardial effusion  AORTA: The root exhibited upper limit of normal size  PULMONARY ARTERY: The size was normal  The morphology appeared normal     SYSTEM MEASUREMENT TABLES    2D mode  AoR Diam 2D: 3 7 cm  LA Diam (2D): 4 5 cm  LA/Ao (2D): 1 22  FS (2D Teich): 25 3 %  IVSd (2D): 1 51 cm  LVDEV: 68 3 cm³  LVESV: 33 9 cm³  LVIDd(2D): 3 96 cm  LVISd (2D): 2 96 cm  LVOT Area 2D: 3 46 cm squared  LVPWd (2D): 1 69 cm  SV (Teich): 34 4 cm³    Apical four chamber  LVEF A4C: 56 %    Unspecified Scan Mode  MICHAELA Cont Eq (Peak Chris): 2 64 cm squared  LVOT Diam : 2 1 cm  LVOT Vmax: 1220 mm/s  LVOT Vmax; Mean: 1220 mm/s  Peak Grad ; Mean: 6 mm[Hg]  MV Peak A Chris: 982 mm/s  MV Peak E Chris  Mean: 785 mm/s  MVA (PHT): 3 19 cm squared  PHT: 69 ms  Max P mm[Hg]  V Max: 2170 mm/s  Vmax: 2090 mm/s  RA Area: 21 cm squared  RA Volume: 60 7 cm³  TAPSE: 2 3 cm    [facility] Accredited Echocardiography Laboratory    Prepared and electronically signed by    Delia Arellano DO  Signed 2017 08:20:02       No results found for this or any previous visit  Chioma Tariq  Please call with any questions or suggestions    A description of the counseling:   Goals and Barriers:  Patient's ability to self care:  Medication side effect reviewed with patient in detail and all their questions answered  "This note has been constructed using a voice recognition system  Therefore there may be syntax, spelling, and/or grammatical errors   Please call if you have any questions  "

## 2019-08-02 DIAGNOSIS — E78.2 MIXED HYPERLIPIDEMIA: ICD-10-CM

## 2019-08-02 DIAGNOSIS — K76.0 FATTY LIVER: ICD-10-CM

## 2019-08-02 RX ORDER — ROSUVASTATIN CALCIUM 20 MG/1
20 TABLET, COATED ORAL DAILY
Qty: 90 TABLET | Refills: 3 | Status: SHIPPED | OUTPATIENT
Start: 2019-08-02 | End: 2020-08-10

## 2019-10-04 ENCOUNTER — APPOINTMENT (OUTPATIENT)
Dept: LAB | Facility: HOSPITAL | Age: 64
End: 2019-10-04
Payer: COMMERCIAL

## 2019-10-04 ENCOUNTER — TRANSCRIBE ORDERS (OUTPATIENT)
Dept: ADMINISTRATIVE | Facility: HOSPITAL | Age: 64
End: 2019-10-04

## 2019-10-04 DIAGNOSIS — E11.40 TYPE 2 DIABETES MELLITUS WITH DIABETIC NEUROPATHY, WITHOUT LONG-TERM CURRENT USE OF INSULIN (HCC): ICD-10-CM

## 2019-10-04 DIAGNOSIS — E78.2 MIXED HYPERLIPIDEMIA: ICD-10-CM

## 2019-10-04 DIAGNOSIS — I10 ESSENTIAL HYPERTENSION: ICD-10-CM

## 2019-10-04 LAB
ALBUMIN SERPL BCP-MCNC: 3.8 G/DL (ref 3.5–5)
ALP SERPL-CCNC: 64 U/L (ref 46–116)
ALT SERPL W P-5'-P-CCNC: 67 U/L (ref 12–78)
ANION GAP SERPL CALCULATED.3IONS-SCNC: 8 MMOL/L (ref 4–13)
AST SERPL W P-5'-P-CCNC: 45 U/L (ref 5–45)
BILIRUB SERPL-MCNC: 0.5 MG/DL (ref 0.2–1)
BUN SERPL-MCNC: 20 MG/DL (ref 5–25)
CALCIUM SERPL-MCNC: 8.8 MG/DL (ref 8.3–10.1)
CHLORIDE SERPL-SCNC: 100 MMOL/L (ref 100–108)
CHOLEST SERPL-MCNC: 154 MG/DL (ref 50–200)
CO2 SERPL-SCNC: 29 MMOL/L (ref 21–32)
CREAT SERPL-MCNC: 1 MG/DL (ref 0.6–1.3)
ERYTHROCYTE [DISTWIDTH] IN BLOOD BY AUTOMATED COUNT: 11.7 % (ref 11.6–15.1)
EST. AVERAGE GLUCOSE BLD GHB EST-MCNC: 180 MG/DL
GFR SERPL CREATININE-BSD FRML MDRD: 80 ML/MIN/1.73SQ M
GLUCOSE P FAST SERPL-MCNC: 165 MG/DL (ref 65–99)
HBA1C MFR BLD: 7.9 % (ref 4.2–6.3)
HCT VFR BLD AUTO: 42.7 % (ref 36.5–49.3)
HDLC SERPL-MCNC: 46 MG/DL (ref 40–60)
HGB BLD-MCNC: 14.2 G/DL (ref 12–17)
LDLC SERPL CALC-MCNC: 77 MG/DL (ref 0–100)
MCH RBC QN AUTO: 31.6 PG (ref 26.8–34.3)
MCHC RBC AUTO-ENTMCNC: 33.3 G/DL (ref 31.4–37.4)
MCV RBC AUTO: 95 FL (ref 82–98)
PLATELET # BLD AUTO: 169 THOUSANDS/UL (ref 149–390)
PMV BLD AUTO: 10.8 FL (ref 8.9–12.7)
POTASSIUM SERPL-SCNC: 3.9 MMOL/L (ref 3.5–5.3)
PROT SERPL-MCNC: 7.2 G/DL (ref 6.4–8.2)
RBC # BLD AUTO: 4.49 MILLION/UL (ref 3.88–5.62)
SODIUM SERPL-SCNC: 137 MMOL/L (ref 136–145)
TRIGL SERPL-MCNC: 154 MG/DL
TSH SERPL DL<=0.05 MIU/L-ACNC: 2.54 UIU/ML (ref 0.36–3.74)
WBC # BLD AUTO: 4.55 THOUSAND/UL (ref 4.31–10.16)

## 2019-10-04 PROCEDURE — 80061 LIPID PANEL: CPT

## 2019-10-04 PROCEDURE — 80053 COMPREHEN METABOLIC PANEL: CPT

## 2019-10-04 PROCEDURE — 84443 ASSAY THYROID STIM HORMONE: CPT

## 2019-10-04 PROCEDURE — 85027 COMPLETE CBC AUTOMATED: CPT

## 2019-10-04 PROCEDURE — 36415 COLL VENOUS BLD VENIPUNCTURE: CPT

## 2019-10-04 PROCEDURE — 83036 HEMOGLOBIN GLYCOSYLATED A1C: CPT

## 2019-10-18 ENCOUNTER — OFFICE VISIT (OUTPATIENT)
Dept: FAMILY MEDICINE CLINIC | Facility: CLINIC | Age: 64
End: 2019-10-18
Payer: COMMERCIAL

## 2019-10-18 VITALS
TEMPERATURE: 98.8 F | BODY MASS INDEX: 38.19 KG/M2 | DIASTOLIC BLOOD PRESSURE: 78 MMHG | WEIGHT: 282 LBS | HEART RATE: 92 BPM | RESPIRATION RATE: 20 BRPM | OXYGEN SATURATION: 98 % | SYSTOLIC BLOOD PRESSURE: 132 MMHG | HEIGHT: 72 IN

## 2019-10-18 DIAGNOSIS — Z23 NEED FOR VACCINATION: ICD-10-CM

## 2019-10-18 DIAGNOSIS — E11.40 TYPE 2 DIABETES MELLITUS WITH DIABETIC NEUROPATHY, WITHOUT LONG-TERM CURRENT USE OF INSULIN (HCC): Primary | ICD-10-CM

## 2019-10-18 DIAGNOSIS — E78.2 MIXED HYPERLIPIDEMIA: ICD-10-CM

## 2019-10-18 DIAGNOSIS — I10 ESSENTIAL HYPERTENSION: ICD-10-CM

## 2019-10-18 PROCEDURE — 99214 OFFICE O/P EST MOD 30 MIN: CPT | Performed by: FAMILY MEDICINE

## 2019-10-18 PROCEDURE — 3008F BODY MASS INDEX DOCD: CPT | Performed by: FAMILY MEDICINE

## 2019-10-18 PROCEDURE — 90471 IMMUNIZATION ADMIN: CPT

## 2019-10-18 PROCEDURE — 90682 RIV4 VACC RECOMBINANT DNA IM: CPT

## 2019-10-18 PROCEDURE — 3078F DIAST BP <80 MM HG: CPT | Performed by: FAMILY MEDICINE

## 2019-10-18 PROCEDURE — 3075F SYST BP GE 130 - 139MM HG: CPT | Performed by: FAMILY MEDICINE

## 2019-10-18 NOTE — ASSESSMENT & PLAN NOTE
Lab Results   Component Value Date    HGBA1C 7 9 (H) 10/04/2019       A1c is up to 7 9  We discussed that he has not been at goal since 2017    He is going to join the [facility] and going to diabetic education

## 2019-10-18 NOTE — PROGRESS NOTES
Assessment/Plan:    1  Type 2 diabetes mellitus with diabetic neuropathy, without long-term current use of insulin [facility])  Assessment & Plan:    Lab Results   Component Value Date    HGBA1C 7 9 (H) 10/04/2019       A1c is up to 7 9  We discussed that he has not been at goal since 2017    He is going to join the [facility] and going to diabetic education  Orders:  -     Ambulatory referral to Diabetic Education; Future  -     Comprehensive metabolic panel; Future; Expected date: 01/01/2020  -     Hemoglobin A1C; Future; Expected date: 01/01/2020    2  Essential hypertension  Assessment & Plan:  Well controlled       3  Mixed hyperlipidemia  Assessment & Plan:  Stable  Continue crestor 20 mg       4  Need for vaccination  -     influenza vaccine, quadrivalent, recombinant, PF, 0 5 mL        BMI Counseling: Body mass index is 38 25 kg/m²  Discussed the patient's BMI with him  The BMI is above normal  Exercise recommendations include exercising 3-5 times per week and joining a gym             Patient Instructions     Recent Results (from the past 672 hour(s))   CBC    Collection Time: 10/04/19 10:27 AM   Result Value Ref Range    WBC 4 55 4 31 - 10 16 Thousand/uL    RBC 4 49 3 88 - 5 62 Million/uL    Hemoglobin 14 2 12 0 - 17 0 g/dL    Hematocrit 42 7 36 5 - 49 3 %    MCV 95 82 - 98 fL    MCH 31 6 26 8 - 34 3 pg    MCHC 33 3 31 4 - 37 4 g/dL    RDW 11 7 11 6 - 15 1 %    Platelets 450 480 - 527 Thousands/uL    MPV 10 8 8 9 - 12 7 fL   Comprehensive metabolic panel    Collection Time: 10/04/19 10:27 AM   Result Value Ref Range    Sodium 137 136 - 145 mmol/L    Potassium 3 9 3 5 - 5 3 mmol/L    Chloride 100 100 - 108 mmol/L    CO2 29 21 - 32 mmol/L    ANION GAP 8 4 - 13 mmol/L    BUN 20 5 - 25 mg/dL    Creatinine 1 00 0 60 - 1 30 mg/dL    Glucose, Fasting 165 (H) 65 - 99 mg/dL    Calcium 8 8 8 3 - 10 1 mg/dL    AST 45 5 - 45 U/L    ALT 67 12 - 78 U/L    Alkaline Phosphatase 64 46 - 116 U/L    Total Protein 7 2 6 4 - 8 2 g/dL    Albumin 3 8 3 5 - 5 0 g/dL    Total Bilirubin 0 50 0 20 - 1 00 mg/dL    eGFR 80 ml/min/1 73sq m   Hemoglobin A1C    Collection Time: 10/04/19 10:27 AM   Result Value Ref Range    Hemoglobin A1C 7 9 (H) 4 2 - 6 3 %     mg/dl   Lipid Panel with Direct LDL reflex    Collection Time: 10/04/19 10:27 AM   Result Value Ref Range    Cholesterol 154 50 - 200 mg/dL    Triglycerides 154 (H) <=150 mg/dL    HDL, Direct 46 40 - 60 mg/dL    LDL Calculated 77 0 - 100 mg/dL   TSH, 3rd generation    Collection Time: 10/04/19 10:27 AM   Result Value Ref Range    TSH 3RD GENERATON 2 539 0 358 - 3 740 uIU/mL           Return in about 3 months (around 1/18/2020) for Next scheduled follow up  Subjective:      Patient ID: Gregory Leonard is a 61 y o  male  Chief Complaint   Patient presents with    Follow-up     lab work review and blood pressure check  [facility]       He is finally settling in to skilled nursing  He is worried about weight and diet  He knows that he is not following his diet  Hypertension-patient is tolerating his blood pressure medicine well  He is not having problems with leg swelling  Hyperlipidemia-patient is taking his simvastatin daily  He is not having muscle pain from it  The following portions of the patient's history were reviewed and updated as appropriate:  past social history    Review of Systems   Respiratory: Negative  Cardiovascular: Negative            Current Outpatient Medications   Medication Sig Dispense Refill    aspirin 81 mg chewable tablet Chew 1 tablet      ESTEVAN MICROLET LANCETS lancets Estevan Microlet Lancets Miscellaneous  TEST twice a day as directed   Quantity: 100;  Refills: 0      Sunni Burton DO ; Active      glucose blood (ESTEVAN CONTOUR TEST) test strip by In Vitro route 2 (two) times a day      lisinopril-hydrochlorothiazide (PRINZIDE,ZESTORETIC) 20-12 5 MG per tablet Take 1 tablet by mouth daily 90 tablet 1    metFORMIN (GLUMETZA) 1000 MG (MOD) 24 hr tablet Take 1 tablet (1,000 mg total) by mouth daily with breakfast 90 tablet 1    MILK THISTLE PO Take by mouth daily      Omega-3 Fatty Acids (FISH OIL) 1,000 mg Take 2,000 mg by mouth 2 (two) times a day        rosuvastatin (CRESTOR) 20 MG tablet Take 1 tablet (20 mg total) by mouth daily 90 tablet 3    VITAMIN E PO Take by mouth 2 (two) times a day      RABEprazole (ACIPHEX) 20 MG tablet every other day    0     No current facility-administered medications for this visit  Objective:    /78   Pulse 92   Temp 98 8 °F (37 1 °C)   Resp 20   Ht 6' (1 829 m)   Wt 128 kg (282 lb)   SpO2 98%   BMI 38 25 kg/m²        Physical Exam   Constitutional: He appears well-developed and well-nourished  HENT:   Head: Normocephalic and atraumatic  Right Ear: External ear normal    Left Ear: External ear normal    Mouth/Throat: Oropharynx is clear and moist    Cardiovascular: Normal rate, regular rhythm and normal heart sounds  No murmur heard  Pulmonary/Chest: Effort normal and breath sounds normal  No respiratory distress  He has no wheezes  He has no rales  Musculoskeletal: He exhibits no edema or tenderness  Nursing note and vitals reviewed               Jeanine Steel DO

## 2019-10-18 NOTE — PATIENT INSTRUCTIONS
Recent Results (from the past 672 hour(s))   CBC    Collection Time: 10/04/19 10:27 AM   Result Value Ref Range    WBC 4 55 4 31 - 10 16 Thousand/uL    RBC 4 49 3 88 - 5 62 Million/uL    Hemoglobin 14 2 12 0 - 17 0 g/dL    Hematocrit 42 7 36 5 - 49 3 %    MCV 95 82 - 98 fL    MCH 31 6 26 8 - 34 3 pg    MCHC 33 3 31 4 - 37 4 g/dL    RDW 11 7 11 6 - 15 1 %    Platelets 548 913 - 457 Thousands/uL    MPV 10 8 8 9 - 12 7 fL   Comprehensive metabolic panel    Collection Time: 10/04/19 10:27 AM   Result Value Ref Range    Sodium 137 136 - 145 mmol/L    Potassium 3 9 3 5 - 5 3 mmol/L    Chloride 100 100 - 108 mmol/L    CO2 29 21 - 32 mmol/L    ANION GAP 8 4 - 13 mmol/L    BUN 20 5 - 25 mg/dL    Creatinine 1 00 0 60 - 1 30 mg/dL    Glucose, Fasting 165 (H) 65 - 99 mg/dL    Calcium 8 8 8 3 - 10 1 mg/dL    AST 45 5 - 45 U/L    ALT 67 12 - 78 U/L    Alkaline Phosphatase 64 46 - 116 U/L    Total Protein 7 2 6 4 - 8 2 g/dL    Albumin 3 8 3 5 - 5 0 g/dL    Total Bilirubin 0 50 0 20 - 1 00 mg/dL    eGFR 80 ml/min/1 73sq m   Hemoglobin A1C    Collection Time: 10/04/19 10:27 AM   Result Value Ref Range    Hemoglobin A1C 7 9 (H) 4 2 - 6 3 %     mg/dl   Lipid Panel with Direct LDL reflex    Collection Time: 10/04/19 10:27 AM   Result Value Ref Range    Cholesterol 154 50 - 200 mg/dL    Triglycerides 154 (H) <=150 mg/dL    HDL, Direct 46 40 - 60 mg/dL    LDL Calculated 77 0 - 100 mg/dL   TSH, 3rd generation    Collection Time: 10/04/19 10:27 AM   Result Value Ref Range    TSH 3RD GENERATON 2 539 0 358 - 3 740 uIU/mL

## 2019-11-18 ENCOUNTER — TELEPHONE (OUTPATIENT)
Dept: FAMILY MEDICINE CLINIC | Facility: CLINIC | Age: 64
End: 2019-11-18

## 2019-11-18 DIAGNOSIS — E11.40 TYPE 2 DIABETES MELLITUS WITH DIABETIC NEUROPATHY, WITHOUT LONG-TERM CURRENT USE OF INSULIN (HCC): Primary | ICD-10-CM

## 2019-11-18 RX ORDER — LANCETS
EACH MISCELLANEOUS DAILY
Qty: 100 EACH | Refills: 1 | Status: SHIPPED | OUTPATIENT
Start: 2019-11-18

## 2019-11-18 RX ORDER — LANCETS
EACH MISCELLANEOUS DAILY
Qty: 100 EACH | Refills: 1 | Status: SHIPPED | OUTPATIENT
Start: 2019-11-18 | End: 2019-11-18 | Stop reason: SDUPTHER

## 2019-11-18 NOTE — TELEPHONE ENCOUNTER
Pt has the new Accu Chek Guid Me and the Accu Chek Fast clix, he needs the needles and test strip for this , please send to 90 day supply to [facility] and a short term supply to the [facility] in [place]

## 2019-11-25 ENCOUNTER — TELEPHONE (OUTPATIENT)
Dept: FAMILY MEDICINE CLINIC | Facility: CLINIC | Age: 64
End: 2019-11-25

## 2019-11-25 NOTE — TELEPHONE ENCOUNTER
Accu check is not covered at all under pt's insurance  One Touch is fully covered    [facility] given a verbal for testing blood sugar once daily  Pt will be receiving a new machine, strips and lancets Pt made aware    rmklpn

## 2019-12-02 NOTE — TELEPHONE ENCOUNTER
Pt has received the One Touch Verio  stripes and lancets but no machine  Pt was promised this was free of charge but also received a bill of $52 93 Calling [facility] now  Invoice number [phone]  Letter was signed by JOE Albarran Ph on 11/25/2019  [facility]

## 2019-12-03 ENCOUNTER — OFFICE VISIT (OUTPATIENT)
Dept: DIABETES SERVICES | Facility: CLINIC | Age: 64
End: 2019-12-03
Payer: COMMERCIAL

## 2019-12-03 DIAGNOSIS — E11.40 TYPE 2 DIABETES MELLITUS WITH DIABETIC NEUROPATHY, WITHOUT LONG-TERM CURRENT USE OF INSULIN (HCC): Primary | ICD-10-CM

## 2019-12-03 PROCEDURE — 98962 EDU&TRN PT SLF-MGMT NQHP 5-8: CPT | Performed by: DIETITIAN, REGISTERED

## 2019-12-03 NOTE — PATIENT INSTRUCTIONS
1  Test blood sugar before and 2 hours after meals  Put on log sheet given and bring to class in 2 weeks  2  Take all medications as prescribed  3   Think of behavior change you would like to work on

## 2019-12-03 NOTE — TELEPHONE ENCOUNTER
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

## 2019-12-03 NOTE — PROGRESS NOTES
Living Well with Diabetes Group Class #1    Shabanamichael Morris attended the Living Well with Diabetes Group Class #1  Topics Covered in class today include: What is diabetes; Types of Diabetes; How Diabetes is diagnosed; Management skills; the role of exercise in blood sugar managements, Home glucose monitoring, and target ranges  Cruzitocar Laurent participated in group activities    The patient's history was reviewed and updated as appropriate: allergies, current medications  Lab Results   Component Value Date    HGBA1C 7 9 (H) 10/04/2019       Diabetes Education Record  Shakir Laurent was provided Living Well with Diabetes Class #1 book      Patient response to instruction    Comprehensiongood  Motivationgood  Expected Compliancegood      Thank you for referring your patient to [facility], it was a pleasure working with them today  Please feel free to call with any questions or concerns      Renay Aguilar RD CDE  [address] [address]  [phone]

## 2019-12-10 ENCOUNTER — OFFICE VISIT (OUTPATIENT)
Dept: DIABETES SERVICES | Facility: CLINIC | Age: 64
End: 2019-12-10
Payer: COMMERCIAL

## 2019-12-10 DIAGNOSIS — E11.40 TYPE 2 DIABETES MELLITUS WITH DIABETIC NEUROPATHY, WITHOUT LONG-TERM CURRENT USE OF INSULIN (HCC): Primary | ICD-10-CM

## 2019-12-10 PROCEDURE — G0109 DIAB MANAGE TRN IND/GROUP: HCPCS | Performed by: DIETITIAN, REGISTERED

## 2019-12-10 NOTE — PROGRESS NOTES
Living Well with Diabetes Group Class #3    Abbe De La Torre attended the Living Well with Diabetes Group Class #3  During class, Robertoisaac Mónica was instructed on the following topics: Oral and injectable medications, short term complications of diabetes, long term complications of diabetes, prevention of complications, foot care, sick day management, stress management, and traveling with diabetes  Luis Albertoamrit Sales participated in group activities  Gualberto Sales will follow up with class #4  Will call with any questions or concerns prior to next session  The patient's history was reviewed and updated as appropriate: allergies, current medications  Lab Results   Component Value Date    HGBA1C 7 9 (H) 10/04/2019       Diabetes Education Record  Gualberto Sales was provided Living Well with Diabetes Class #3 book      Patient response to instruction    Comprehensiongood  Motivationgood  Expected Compliancegood    Thank you for referring your patient to [facility], it was a pleasure working with them today  Please feel free to call with any questions or concerns      Meera Corado RD CDE  [address] [address] [phone]

## 2019-12-10 NOTE — PATIENT INSTRUCTIONS
1  Take medications as prescribed  2  If your blood sugar remains above goal after changes to your diet/lifestyle, speak to your doctor about medication changes  3  If you have high blood pressure, check frequently  If above goal speak with your doctor about changes to your medication  4  Check your feet daily for cracks and sores  See your doctor or a podiatrist if there are any issues  5   When sick, follow sick day guidelines

## 2019-12-12 ENCOUNTER — OFFICE VISIT (OUTPATIENT)
Dept: DIABETES SERVICES | Facility: CLINIC | Age: 64
End: 2019-12-12
Payer: COMMERCIAL

## 2019-12-12 DIAGNOSIS — E11.40 TYPE 2 DIABETES MELLITUS WITH DIABETIC NEUROPATHY, WITHOUT LONG-TERM CURRENT USE OF INSULIN (HCC): Primary | ICD-10-CM

## 2019-12-12 PROCEDURE — G0109 DIAB MANAGE TRN IND/GROUP: HCPCS | Performed by: DIETITIAN, REGISTERED

## 2019-12-12 NOTE — PROGRESS NOTES
Living Well with Diabetes Group Class #4    Stephan Nelson attended the Living Well with Diabetes Group Class #4  During class, Tejinder Wood was instructed on the following topics: Types of cholesterol, dietary sources of cholesterol, types of fat, types of fiber, reading food labels- in depth, healthy choices when dining out  Tejinder Wood participated in group activities of reading labels together and completed the dining out activity  Tejinder Wood will follow up with class #5 or additional DSMT/MNT as desired  Will call with any questions or concerns prior to next session  The patient's history was reviewed and updated as appropriate: allergies, current medications  Lab Results   Component Value Date    HGBA1C 7 9 (H) 10/04/2019       Diabetes Education Record  Tejinder Wood was provided Living Well with Diabetes Class #4 book      Patient response to instruction    Comprehensiongood  Motivationgood  Expected Compliancegood      Thank you for referring your patient to [facility], it was a pleasure working with them today  Please feel free to call with any questions or concerns      Eva Reyna RD CDE  [address] [address] [phone]

## 2019-12-12 NOTE — PATIENT INSTRUCTIONS
1  Check food labels and try to choose foods with the least amount of saturated fat and sodium  2   When eating out, check nutrition analysis on the computer or phone viviana so that you can make the best choices to stay within your guidelines for carb, sodium and saturated fat

## 2019-12-17 ENCOUNTER — OFFICE VISIT (OUTPATIENT)
Dept: DIABETES SERVICES | Facility: CLINIC | Age: 64
End: 2019-12-17
Payer: COMMERCIAL

## 2019-12-17 VITALS — BODY MASS INDEX: 37.51 KG/M2 | WEIGHT: 276.6 LBS

## 2019-12-17 DIAGNOSIS — E11.40 TYPE 2 DIABETES MELLITUS WITH DIABETIC NEUROPATHY, WITHOUT LONG-TERM CURRENT USE OF INSULIN (HCC): Primary | ICD-10-CM

## 2019-12-17 PROCEDURE — 97802 MEDICAL NUTRITION INDIV IN: CPT | Performed by: DIETITIAN, REGISTERED

## 2019-12-17 NOTE — PROGRESS NOTES
Medical Nutrition Therapy     Assessment    Visit Type: Initial visit  Chief complaint:  T2DM    HPI:  Patient is a 62 y/o male with T2DM, HTN, HLD, Obesity, Neuropathy  He has lost another 6 lbs in the past month reducing portion sizes and carb intake  He is taking MetforminXL as prescribed  He has been using paired BG testing to find patterns of hyperglycemia  Fasting readings are elevated (166-200)  Elevation not due to food intake at bedtime since readings are well below fasting readings  He was advised to speak with Dr Cuong Hawk regarding treatment changes  His food history shows patient is follow a very low carb meal plan and typically skips lunch  He was educated on carb counting/meal planning and given a 1758 calorie low carb (20%) personal meal plan  He was advised to eat lunch and include at least 15 grams carb at each meal  Patient was starting at the gym today and he was encouraged to include strength training in routine to reduce insulin resistance and promote increasing muscle mass  Ht Readings from Last 1 Encounters:   10/18/19 6' (1 829 m)     Wt Readings from Last 3 Encounters:   12/17/19 125 kg (276 lb 9 6 oz)   11/12/19 128 kg (282 lb 9 6 oz)   10/18/19 128 kg (282 lb)        Body mass index is 37 51 kg/m²       Lab Results   Component Value Date    HGBA1C 7 9 (H) 10/04/2019    HGBA1C 7 7 (H) 07/09/2019    HGBA1C 7 7 (H) 04/16/2019       Lab Results   Component Value Date    CHOL 193 03/24/2017    CHOL 172 09/12/2016    CHOL 188 06/23/2016     Lab Results   Component Value Date    HDL 46 10/04/2019    HDL 48 07/09/2019    HDL 77 01/11/2019     Lab Results   Component Value Date    LDLCALC 77 10/04/2019    LDLCALC 90 07/09/2019    LDLCALC 109 (H) 03/24/2017     Lab Results   Component Value Date    TRIG 154 (H) 10/04/2019    TRIG 155 (H) 07/09/2019    TRIG 124 01/11/2019     No results found for: CHOLHDL        Weight Change: Yes -6 lbs  Barriers to Learning: no barriers    Do you follow any special diet presently?: No  Who shops: patient  Who cooks: patient    Food Log: Completed via the method of food recall    Breakfast:1-2 eggs, coffee with coffeemate and splenda  Morning Snack:  Lunch:Skips or  salad  Afternoon Snack:   Dinner:Meat, med baked potato, broccoli or green beans, water or diet ice tea  Evening Snack:peanuts, diet ice tea  Beverages:   Eating out/Take out:1 x week  Exercise Starting gym today      Nutrition Diagnosis:  Food and nutrition related knowledge deficit  related to Lack of prior exposure to accurate nutrition related information as evidenced by Conditions associated with diagnosis or treatment    Intervention: behavior modification strategies, carbohydrate counting, meal planning and individualized meal plan     Treatment Goals: Patient will count carbohydrates    Monitoring and evaluation:    Term code indicator   1 6 3 Carbohydrate Intake Criteria: 15-30 grams breakfast, 30 grams lunch and dinner, 15 gram bedtime snack if needed    Education Material Given  Gracie Davidson was provided the following education material: [employer], Soluble Fiber, Whole Grains, 1758 calorie 20% carb personal meal plan     Patients Response to Instruction  Comprehensiongood  Motivationgood  Expected Compliancegood    Thank you for coming to the [facility] for education today  Please feel free to call with any questions or concerns      Silviano Barney RD CDE  [address] [address] [phone]

## 2019-12-30 ENCOUNTER — TRANSCRIBE ORDERS (OUTPATIENT)
Dept: ADMINISTRATIVE | Facility: HOSPITAL | Age: 64
End: 2019-12-30

## 2019-12-30 ENCOUNTER — APPOINTMENT (OUTPATIENT)
Dept: LAB | Facility: HOSPITAL | Age: 64
End: 2019-12-30
Payer: COMMERCIAL

## 2019-12-30 DIAGNOSIS — E11.40 TYPE 2 DIABETES MELLITUS WITH DIABETIC NEUROPATHY, WITHOUT LONG-TERM CURRENT USE OF INSULIN (HCC): ICD-10-CM

## 2019-12-30 LAB
ALBUMIN SERPL BCP-MCNC: 3.7 G/DL (ref 3.5–5)
ALP SERPL-CCNC: 53 U/L (ref 46–116)
ALT SERPL W P-5'-P-CCNC: 52 U/L (ref 12–78)
ANION GAP SERPL CALCULATED.3IONS-SCNC: 6 MMOL/L (ref 4–13)
AST SERPL W P-5'-P-CCNC: 44 U/L (ref 5–45)
BILIRUB SERPL-MCNC: 0.6 MG/DL (ref 0.2–1)
BUN SERPL-MCNC: 15 MG/DL (ref 5–25)
CALCIUM SERPL-MCNC: 8.8 MG/DL (ref 8.3–10.1)
CHLORIDE SERPL-SCNC: 102 MMOL/L (ref 100–108)
CO2 SERPL-SCNC: 30 MMOL/L (ref 21–32)
CREAT SERPL-MCNC: 1.07 MG/DL (ref 0.6–1.3)
EST. AVERAGE GLUCOSE BLD GHB EST-MCNC: 180 MG/DL
GFR SERPL CREATININE-BSD FRML MDRD: 73 ML/MIN/1.73SQ M
GLUCOSE P FAST SERPL-MCNC: 147 MG/DL (ref 65–99)
HBA1C MFR BLD: 7.9 % (ref 4.2–6.3)
POTASSIUM SERPL-SCNC: 3.7 MMOL/L (ref 3.5–5.3)
PROT SERPL-MCNC: 7.1 G/DL (ref 6.4–8.2)
SODIUM SERPL-SCNC: 138 MMOL/L (ref 136–145)

## 2019-12-30 PROCEDURE — 80053 COMPREHEN METABOLIC PANEL: CPT

## 2019-12-30 PROCEDURE — 83036 HEMOGLOBIN GLYCOSYLATED A1C: CPT

## 2019-12-30 PROCEDURE — 36415 COLL VENOUS BLD VENIPUNCTURE: CPT

## 2020-01-03 ENCOUNTER — OFFICE VISIT (OUTPATIENT)
Dept: OBGYN CLINIC | Facility: CLINIC | Age: 65
End: 2020-01-03
Payer: COMMERCIAL

## 2020-01-03 VITALS
HEIGHT: 72 IN | SYSTOLIC BLOOD PRESSURE: 121 MMHG | WEIGHT: 273.4 LBS | HEART RATE: 70 BPM | DIASTOLIC BLOOD PRESSURE: 76 MMHG | BODY MASS INDEX: 37.03 KG/M2

## 2020-01-03 DIAGNOSIS — M25.562 CHRONIC PAIN OF LEFT KNEE: ICD-10-CM

## 2020-01-03 DIAGNOSIS — M17.12 PRIMARY OSTEOARTHRITIS OF LEFT KNEE: Primary | ICD-10-CM

## 2020-01-03 DIAGNOSIS — E11.40 TYPE 2 DIABETES MELLITUS WITH DIABETIC NEUROPATHY, WITHOUT LONG-TERM CURRENT USE OF INSULIN (HCC): ICD-10-CM

## 2020-01-03 DIAGNOSIS — G89.29 CHRONIC PAIN OF LEFT KNEE: ICD-10-CM

## 2020-01-03 PROCEDURE — 99213 OFFICE O/P EST LOW 20 MIN: CPT | Performed by: ORTHOPAEDIC SURGERY

## 2020-01-03 PROCEDURE — 20610 DRAIN/INJ JOINT/BURSA W/O US: CPT | Performed by: ORTHOPAEDIC SURGERY

## 2020-01-03 RX ORDER — TRIAMCINOLONE ACETONIDE 40 MG/ML
80 INJECTION, SUSPENSION INTRA-ARTICULAR; INTRAMUSCULAR
Status: COMPLETED | OUTPATIENT
Start: 2020-01-03 | End: 2020-01-03

## 2020-01-03 RX ORDER — BUPIVACAINE HYDROCHLORIDE 5 MG/ML
6 INJECTION, SOLUTION EPIDURAL; INTRACAUDAL
Status: COMPLETED | OUTPATIENT
Start: 2020-01-03 | End: 2020-01-03

## 2020-01-03 RX ADMIN — TRIAMCINOLONE ACETONIDE 80 MG: 40 INJECTION, SUSPENSION INTRA-ARTICULAR; INTRAMUSCULAR at 08:20

## 2020-01-03 RX ADMIN — BUPIVACAINE HYDROCHLORIDE 6 ML: 5 INJECTION, SOLUTION EPIDURAL; INTRACAUDAL at 08:20

## 2020-01-03 NOTE — PROGRESS NOTES
Assessment/Plan:  1  Primary osteoarthritis of left knee  Large joint arthrocentesis   2  Chronic pain of left knee  Large joint arthrocentesis   3  Type 2 diabetes mellitus with diabetic neuropathy, without long-term current use of insulin ([facility] [address] )  Large joint arthrocentesis     Silvia Vazquez is a very pleasant 79-year-old gentleman well known to our practice for his activity related left knee pain  His IT band syndrome has resolved, and he is now symptomatic of his moderate underlying osteoarthritis, most likely aggravated by his recent return to the gym and use incline treadmill  We discussed activities that are appropriate at the gym to avoid exacerbating his arthritis  We have applauded his efforts for weight loss and better control of his underlying diabetes  After discussing risks and benefits, he consented to underwent a left knee cortisone injection as detailed below  He tolerated this well without difficulty or complication and post injection instructions were provided  He is aware that it may cause a transient increase in his blood sugars  We will plan to see him back on an as-needed basis if he has recurrence of either right or left knee pain  He expressed understanding all of his questions were addressed today        Large joint arthrocentesis: L knee  Date/Time: 1/3/2020 8:20 AM  Consent given by: patient  Site marked: site marked  Timeout: Immediately prior to procedure a time out was called to verify the correct patient, procedure, equipment, support staff and site/side marked as required   Supporting Documentation  Indications: pain   Procedure Details  Location: knee - L knee  Preparation: Patient was prepped and draped in the usual sterile fashion  Needle size: 20 G  Ultrasound guidance: no  Approach: anterolateral  Medications administered: 6 mL bupivacaine (PF) 0 5 %; 80 mg triamcinolone acetonide 40 mg/mL    Patient tolerance: patient tolerated the procedure well with no immediate complications  Dressing:  Sterile dressing applied            Subjective: Left knee follow up    Patient ID: uJdy Waggoner is a 59 y o  male  Gracie Davidson is a very pleasant 59year old gentleman following up today for activity related left knee pain  He was last seen in July and given Voltaren gel and physical therapy, which helped resolve his IT band pain  He recently has joined a gym in an effort to lose weight, and has lost about 8-9 pounds  He has been speed walking on a treadmill on incline  He has noted worsening soreness and achiness in the left knee that have lingered after his workouts  He denies new injuries or mechanical symptoms  Review of Systems   Constitutional: Negative  HENT: Negative  Eyes: Negative  Respiratory: Negative  Cardiovascular: Negative  Gastrointestinal: Negative  Endocrine: Negative  Genitourinary: Negative  Musculoskeletal: Positive for arthralgias and myalgias  Skin: Negative  Allergic/Immunologic: Negative  Neurological: Negative  Hematological: Negative  Psychiatric/Behavioral: Negative            Past Medical History:   Diagnosis Date    Arthritis     Chest tightness     Diabetes ([address] )     Diverticulosis of sigmoid colon     Esophageal reflux     Fatty liver     Hearing problem     High cholesterol     History of chest pain     Hyperlipemia     Hypertension     Lumbago     Malignant neoplasm of prostate (HCC)     Prostate CA (HCC)     Tinea pedis of both feet     Trochanteric bursitis        Past Surgical History:   Procedure Laterality Date    COLONOSCOPY  2016    PROSTATECTOMY N/A 2011       Family History   Problem Relation Age of Onset    Arthritis Mother     Parkinsonism Mother     Heart disease Father     Arthritis Father     Coronary artery disease Father     Hypertension Father     Parkinsonism Father     Mental illness Neg Hx        Social History     Occupational History    Not on file   Tobacco Use  Smoking status: Former Smoker     Last attempt to quit: 2012     Years since quittin 5    Smokeless tobacco: Never Used   Substance and Sexual Activity    Alcohol use: Yes     Frequency: 2-3 times a week     Drinks per session: 3 or 4     Binge frequency: Never     Comment: Weekends    Drug use: No    Sexual activity: Not on file         Current Outpatient Medications:     ACCU-CHEK FASTCLIX LANCETS MISC, by Does not apply route daily Dx: E11 40, Disp: 100 each, Rfl: 1    aspirin 81 mg chewable tablet, Chew 1 tablet, Disp: , Rfl:     glucose blood (ACCU-CHEK GUIDE) test strip, 1 each by Other route daily DX: e11 40, Disp: 100 each, Rfl: 1    lisinopril-hydrochlorothiazide (PRINZIDE,ZESTORETIC) 20-12 5 MG per tablet, Take 1 tablet by mouth daily, Disp: 90 tablet, Rfl: 1    metFORMIN (GLUMETZA) 1000 MG (MOD) 24 hr tablet, Take 1 tablet (1,000 mg total) by mouth daily with breakfast, Disp: 90 tablet, Rfl: 1    MILK THISTLE PO, Take by mouth daily, Disp: , Rfl:     Omega-3 Fatty Acids (FISH OIL) 1,000 mg, Take 2,000 mg by mouth 2 (two) times a day  , Disp: , Rfl:     RABEprazole (ACIPHEX) 20 MG tablet, every other day  , Disp: , Rfl: 0    rosuvastatin (CRESTOR) 20 MG tablet, Take 1 tablet (20 mg total) by mouth daily, Disp: 90 tablet, Rfl: 3    VITAMIN E PO, Take by mouth 2 (two) times a day, Disp: , Rfl:     Allergies   Allergen Reactions    Penicillins        Objective:  Vitals:    20 0754   BP: 121/76   Pulse: 70       Body mass index is 37 08 kg/m²  Left Knee Exam     Muscle Strength   The patient has normal left knee strength  Tenderness   The patient is experiencing tenderness in the lateral joint line, LCL and patella      Range of Motion   Extension: 0   Flexion:  120 (pain at end range) abnormal     Tests   Zenaida:  Medial - negative Lateral - negative  Varus: negative Valgus: negative  Drawer:  Anterior - negative     Posterior - negative  Patellar apprehension: negative    Other   Erythema: absent  Scars: absent  Sensation: normal  Pulse: present  Swelling: none  Effusion: no effusion present    Comments:  Mild PF crepitus and negative PFG  Collateral ligaments stable at 0, 30, and 90  Thigh and calf soft and non tender  Ambulates with slightly antalgic gait on the left  No tenderness IT band  Grossly NVI          Observations   Left Knee   Negative for effusion  Physical Exam   Constitutional: He is oriented to person, place, and time  He appears well-developed and well-nourished  Body mass index is 37 08 kg/m²  HENT:   Head: Normocephalic and atraumatic  Eyes: EOM are normal    Neck: Normal range of motion  Cardiovascular: Intact distal pulses  Pulmonary/Chest: Effort normal    Musculoskeletal:        Left knee: He exhibits no effusion  See ortho exam   Neurological: He is alert and oriented to person, place, and time  Skin: Skin is warm and dry  Psychiatric: He has a normal mood and affect  His behavior is normal  Judgment and thought content normal    Nursing note and vitals reviewed

## 2020-01-12 DIAGNOSIS — R07.89 CHEST TIGHTNESS: ICD-10-CM

## 2020-01-12 DIAGNOSIS — I10 ESSENTIAL HYPERTENSION: ICD-10-CM

## 2020-01-12 RX ORDER — LISINOPRIL AND HYDROCHLOROTHIAZIDE 20; 12.5 MG/1; MG/1
TABLET ORAL
Qty: 90 TABLET | Refills: 0 | Status: SHIPPED | OUTPATIENT
Start: 2020-01-12 | End: 2020-04-13

## 2020-01-17 ENCOUNTER — OFFICE VISIT (OUTPATIENT)
Dept: FAMILY MEDICINE CLINIC | Facility: CLINIC | Age: 65
End: 2020-01-17
Payer: COMMERCIAL

## 2020-01-17 VITALS
RESPIRATION RATE: 16 BRPM | WEIGHT: 268 LBS | HEART RATE: 60 BPM | TEMPERATURE: 96.8 F | HEIGHT: 72 IN | BODY MASS INDEX: 36.3 KG/M2 | SYSTOLIC BLOOD PRESSURE: 138 MMHG | DIASTOLIC BLOOD PRESSURE: 78 MMHG

## 2020-01-17 DIAGNOSIS — I10 ESSENTIAL HYPERTENSION: ICD-10-CM

## 2020-01-17 DIAGNOSIS — E11.40 TYPE 2 DIABETES MELLITUS WITH DIABETIC NEUROPATHY, WITHOUT LONG-TERM CURRENT USE OF INSULIN (HCC): Primary | ICD-10-CM

## 2020-01-17 DIAGNOSIS — E66.01 SEVERE OBESITY (BMI 35.0-39.9) WITH COMORBIDITY (HCC): ICD-10-CM

## 2020-01-17 DIAGNOSIS — E78.2 MIXED HYPERLIPIDEMIA: ICD-10-CM

## 2020-01-17 PROCEDURE — 1036F TOBACCO NON-USER: CPT | Performed by: FAMILY MEDICINE

## 2020-01-17 PROCEDURE — 3078F DIAST BP <80 MM HG: CPT | Performed by: FAMILY MEDICINE

## 2020-01-17 PROCEDURE — 99214 OFFICE O/P EST MOD 30 MIN: CPT | Performed by: FAMILY MEDICINE

## 2020-01-17 PROCEDURE — 3075F SYST BP GE 130 - 139MM HG: CPT | Performed by: FAMILY MEDICINE

## 2020-01-17 RX ORDER — METFORMIN HYDROCHLORIDE 1000 MG/1
2000 TABLET, FILM COATED, EXTENDED RELEASE ORAL
Qty: 180 TABLET | Refills: 1 | Status: SHIPPED | OUTPATIENT
Start: 2020-01-17 | End: 2020-01-23 | Stop reason: SDUPTHER

## 2020-01-17 NOTE — LETTER
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

## 2020-01-17 NOTE — ASSESSMENT & PLAN NOTE
Lab Results   Component Value Date    HGBA1C 7 9 (H) 12/30/2019       Not controlled  Dose of metformin increased from 1000 to 2000 a day  He has also gone to diabetic education and has joined the gym

## 2020-01-17 NOTE — PROGRESS NOTES
Assessment/Plan:    1  Type 2 diabetes mellitus with diabetic neuropathy, without long-term current use of insulin ([facility])  Assessment & Plan:    Lab Results   Component Value Date    HGBA1C 7 9 (H) 12/30/2019       Not controlled  Dose of metformin increased from 1000 to 2000 a day  He has also gone to diabetic education and has joined the gym  Orders:  -     metFORMIN (GLUMETZA) 1000 MG (MOD) 24 hr tablet; Take 2 tablets (2,000 mg total) by mouth daily with breakfast  -     Hemoglobin A1C; Future; Expected date: 04/01/2020  -     Microalbumin / creatinine urine ratio; Future; Expected date: 04/01/2020    2  Mixed hyperlipidemia  Assessment & Plan:  Stable  Continue statin      3  Essential hypertension  Assessment & Plan:  Stable     Orders:  -     CBC; Future; Expected date: 04/01/2020  -     Comprehensive metabolic panel; Future; Expected date: 04/01/2020  -     Lipid Panel with Direct LDL reflex; Future; Expected date: 04/01/2020  -     TSH, 3rd generation; Future; Expected date: 04/01/2020    4  Severe obesity (BMI 35 0-39  9) with comorbidity [facility])  Assessment & Plan:  Improved  He is down a few pounds  He will keep working on diet           Patient Instructions     Recent Results (from the past 672 hour(s))   Comprehensive metabolic panel    Collection Time: 12/30/19  9:08 AM   Result Value Ref Range    Sodium 138 136 - 145 mmol/L    Potassium 3 7 3 5 - 5 3 mmol/L    Chloride 102 100 - 108 mmol/L    CO2 30 21 - 32 mmol/L    ANION GAP 6 4 - 13 mmol/L    BUN 15 5 - 25 mg/dL    Creatinine 1 07 0 60 - 1 30 mg/dL    Glucose, Fasting 147 (H) 65 - 99 mg/dL    Calcium 8 8 8 3 - 10 1 mg/dL    AST 44 5 - 45 U/L    ALT 52 12 - 78 U/L    Alkaline Phosphatase 53 46 - 116 U/L    Total Protein 7 1 6 4 - 8 2 g/dL    Albumin 3 7 3 5 - 5 0 g/dL    Total Bilirubin 0 60 0 20 - 1 00 mg/dL    eGFR 73 ml/min/1 73sq m   Hemoglobin A1C    Collection Time: 12/30/19  9:08 AM   Result Value Ref Range    Hemoglobin A1C 7 9 (H) 4 2 - 6 3 %     mg/dl           Return in about 3 months (around 4/17/2020) for Annual physical     Subjective:      Patient ID: Marianne Law is a 59 y o  male  Chief Complaint   Patient presents with    Follow-up     prcma    Diabetes       He has been feeling well  He met with the diabetic educator and has gone to multiple classes  He is working on diet  He is not exercising  Hyperlipidemia   This is a chronic problem  The current episode started more than 1 year ago  The problem is controlled  Recent lipid tests were reviewed and are normal  Pertinent negatives include no myalgias or shortness of breath  Current antihyperlipidemic treatment includes statins  The current treatment provides moderate improvement of lipids  Compliance problems include adherence to exercise and adherence to diet  The following portions of the patient's history were reviewed and updated as appropriate:  past social history    Review of Systems   Respiratory: Negative for shortness of breath  Cardiovascular: Negative for leg swelling  Musculoskeletal: Negative for myalgias           Current Outpatient Medications   Medication Sig Dispense Refill    ACCU-CHEK FASTCLIX LANCETS MISC by Does not apply route daily Dx: E11 40 100 each 1    aspirin 81 mg chewable tablet Chew 1 tablet      glucose blood (ACCU-CHEK GUIDE) test strip 1 each by Other route daily DX: e11 40 100 each 1    lisinopril-hydrochlorothiazide (PRINZIDE,ZESTORETIC) 20-12 5 MG per tablet TAKE 1 TABLET DAILY 90 tablet 0    metFORMIN (GLUMETZA) 1000 MG (MOD) 24 hr tablet Take 2 tablets (2,000 mg total) by mouth daily with breakfast 180 tablet 1    MILK THISTLE PO Take by mouth daily      Omega-3 Fatty Acids (FISH OIL) 1,000 mg Take 2,000 mg by mouth 2 (two) times a day        rosuvastatin (CRESTOR) 20 MG tablet Take 1 tablet (20 mg total) by mouth daily 90 tablet 3    VITAMIN E PO Take by mouth 2 (two) times a day      RABEprazole (ACIPHEX) 20 MG tablet every other day    0     No current facility-administered medications for this visit  Objective:    /78   Pulse 60   Temp (!) 96 8 °F (36 °C)   Resp 16   Ht 6' (1 829 m)   Wt 122 kg (268 lb)   BMI 36 35 kg/m²      Physical Exam   Constitutional: He appears well-developed and well-nourished  HENT:   Head: Normocephalic and atraumatic  Right Ear: External ear normal    Left Ear: External ear normal    Mouth/Throat: Oropharynx is clear and moist    Cardiovascular: Normal rate, regular rhythm and normal heart sounds  No murmur heard  Pulmonary/Chest: Effort normal and breath sounds normal  No respiratory distress  He has no wheezes  He has no rales  Musculoskeletal: He exhibits no edema or tenderness  Nursing note and vitals reviewed            Rachel Pierre DO

## 2020-01-17 NOTE — PATIENT INSTRUCTIONS
Recent Results (from the past 672 hour(s))   Comprehensive metabolic panel    Collection Time: 12/30/19  9:08 AM   Result Value Ref Range    Sodium 138 136 - 145 mmol/L    Potassium 3 7 3 5 - 5 3 mmol/L    Chloride 102 100 - 108 mmol/L    CO2 30 21 - 32 mmol/L    ANION GAP 6 4 - 13 mmol/L    BUN 15 5 - 25 mg/dL    Creatinine 1 07 0 60 - 1 30 mg/dL    Glucose, Fasting 147 (H) 65 - 99 mg/dL    Calcium 8 8 8 3 - 10 1 mg/dL    AST 44 5 - 45 U/L    ALT 52 12 - 78 U/L    Alkaline Phosphatase 53 46 - 116 U/L    Total Protein 7 1 6 4 - 8 2 g/dL    Albumin 3 7 3 5 - 5 0 g/dL    Total Bilirubin 0 60 0 20 - 1 00 mg/dL    eGFR 73 ml/min/1 73sq m   Hemoglobin A1C    Collection Time: 12/30/19  9:08 AM   Result Value Ref Range    Hemoglobin A1C 7 9 (H) 4 2 - 6 3 %     mg/dl

## 2020-01-23 ENCOUNTER — TELEPHONE (OUTPATIENT)
Dept: FAMILY MEDICINE CLINIC | Facility: CLINIC | Age: 65
End: 2020-01-23

## 2020-01-23 DIAGNOSIS — E11.40 TYPE 2 DIABETES MELLITUS WITH DIABETIC NEUROPATHY, WITHOUT LONG-TERM CURRENT USE OF INSULIN (HCC): ICD-10-CM

## 2020-01-23 RX ORDER — METFORMIN HYDROCHLORIDE 1000 MG/1
2000 TABLET, FILM COATED, EXTENDED RELEASE ORAL
Qty: 60 TABLET | Refills: 0 | Status: SHIPPED | OUTPATIENT
Start: 2020-01-23 | End: 2020-04-16

## 2020-01-23 NOTE — TELEPHONE ENCOUNTER
DR JAMIL    Please send a 30 day supply of metformin to Local pharmacy  [facility] [place]  Patients mail order has not come in yet

## 2020-01-30 ENCOUNTER — TELEPHONE (OUTPATIENT)
Dept: DIABETES SERVICES | Facility: CLINIC | Age: 65
End: 2020-01-30

## 2020-03-05 ENCOUNTER — APPOINTMENT (OUTPATIENT)
Dept: LAB | Facility: HOSPITAL | Age: 65
End: 2020-03-05

## 2020-03-05 ENCOUNTER — TRANSCRIBE ORDERS (OUTPATIENT)
Dept: ADMINISTRATIVE | Facility: HOSPITAL | Age: 65
End: 2020-03-05

## 2020-03-05 DIAGNOSIS — Z01.84 IMMUNITY STATUS TESTING: ICD-10-CM

## 2020-03-05 DIAGNOSIS — Z11.1 SCREENING EXAMINATION FOR PULMONARY TUBERCULOSIS: ICD-10-CM

## 2020-03-05 DIAGNOSIS — Z01.84 IMMUNITY STATUS TESTING: Primary | ICD-10-CM

## 2020-03-05 LAB — RUBV IGG SERPL IA-ACNC: >175 IU/ML

## 2020-03-05 PROCEDURE — 36415 COLL VENOUS BLD VENIPUNCTURE: CPT

## 2020-03-05 PROCEDURE — 86765 RUBEOLA ANTIBODY: CPT

## 2020-03-05 PROCEDURE — 86787 VARICELLA-ZOSTER ANTIBODY: CPT

## 2020-03-05 PROCEDURE — 86762 RUBELLA ANTIBODY: CPT

## 2020-03-05 PROCEDURE — 86480 TB TEST CELL IMMUN MEASURE: CPT

## 2020-03-05 PROCEDURE — 86735 MUMPS ANTIBODY: CPT

## 2020-03-06 LAB
GAMMA INTERFERON BACKGROUND BLD IA-ACNC: 0.04 IU/ML
M TB IFN-G BLD-IMP: NEGATIVE
M TB IFN-G CD4+ BCKGRND COR BLD-ACNC: 0 IU/ML
M TB IFN-G CD4+ BCKGRND COR BLD-ACNC: 0 IU/ML
MITOGEN IGNF BCKGRD COR BLD-ACNC: >10 IU/ML

## 2020-03-09 ENCOUNTER — TELEPHONE (OUTPATIENT)
Dept: FAMILY MEDICINE CLINIC | Facility: CLINIC | Age: 65
End: 2020-03-09

## 2020-03-09 NOTE — TELEPHONE ENCOUNTER
Patient dropped off [employer] form to volunteer at the hospital     Please review and sign    Call patient when the form is ready for   [phone]    Form placed in Dr Jay Earing folder

## 2020-03-10 LAB
LEFT EYE DIABETIC RETINOPATHY: NORMAL
MEV IGG SER QL: NORMAL
MUV IGG SER QL: NORMAL
RIGHT EYE DIABETIC RETINOPATHY: NORMAL
VZV IGG SER IA-ACNC: NORMAL

## 2020-04-12 DIAGNOSIS — R07.89 CHEST TIGHTNESS: ICD-10-CM

## 2020-04-12 DIAGNOSIS — I10 ESSENTIAL HYPERTENSION: ICD-10-CM

## 2020-04-13 RX ORDER — LISINOPRIL AND HYDROCHLOROTHIAZIDE 20; 12.5 MG/1; MG/1
TABLET ORAL
Qty: 90 TABLET | Refills: 3 | Status: SHIPPED | OUTPATIENT
Start: 2020-04-13 | End: 2021-05-12 | Stop reason: SDUPTHER

## 2020-04-16 ENCOUNTER — TELEPHONE (OUTPATIENT)
Dept: FAMILY MEDICINE CLINIC | Facility: CLINIC | Age: 65
End: 2020-04-16

## 2020-04-16 DIAGNOSIS — E11.40 TYPE 2 DIABETES MELLITUS WITH DIABETIC NEUROPATHY, WITHOUT LONG-TERM CURRENT USE OF INSULIN (HCC): ICD-10-CM

## 2020-04-16 RX ORDER — METFORMIN HYDROCHLORIDE 1000 MG/1
TABLET, FILM COATED, EXTENDED RELEASE ORAL
Qty: 90 TABLET | Refills: 3 | Status: SHIPPED | OUTPATIENT
Start: 2020-04-16 | End: 2020-04-16 | Stop reason: SDUPTHER

## 2020-04-16 RX ORDER — METFORMIN HYDROCHLORIDE 1000 MG/1
2000 TABLET, FILM COATED, EXTENDED RELEASE ORAL
Qty: 180 TABLET | Refills: 1 | Status: SHIPPED | OUTPATIENT
Start: 2020-04-16 | End: 2020-07-16 | Stop reason: SDUPTHER

## 2020-07-01 ENCOUNTER — OFFICE VISIT (OUTPATIENT)
Dept: OBGYN CLINIC | Facility: CLINIC | Age: 65
End: 2020-07-01
Payer: COMMERCIAL

## 2020-07-01 VITALS
WEIGHT: 275.2 LBS | SYSTOLIC BLOOD PRESSURE: 137 MMHG | HEART RATE: 80 BPM | HEIGHT: 72 IN | DIASTOLIC BLOOD PRESSURE: 85 MMHG | BODY MASS INDEX: 37.27 KG/M2

## 2020-07-01 DIAGNOSIS — M17.11 PRIMARY OSTEOARTHRITIS OF RIGHT KNEE: Primary | ICD-10-CM

## 2020-07-01 DIAGNOSIS — G89.29 CHRONIC PAIN OF RIGHT KNEE: ICD-10-CM

## 2020-07-01 DIAGNOSIS — M25.561 CHRONIC PAIN OF RIGHT KNEE: ICD-10-CM

## 2020-07-01 PROCEDURE — 3075F SYST BP GE 130 - 139MM HG: CPT | Performed by: ORTHOPAEDIC SURGERY

## 2020-07-01 PROCEDURE — 1036F TOBACCO NON-USER: CPT | Performed by: ORTHOPAEDIC SURGERY

## 2020-07-01 PROCEDURE — 20610 DRAIN/INJ JOINT/BURSA W/O US: CPT | Performed by: ORTHOPAEDIC SURGERY

## 2020-07-01 PROCEDURE — 99214 OFFICE O/P EST MOD 30 MIN: CPT | Performed by: ORTHOPAEDIC SURGERY

## 2020-07-01 PROCEDURE — 3079F DIAST BP 80-89 MM HG: CPT | Performed by: ORTHOPAEDIC SURGERY

## 2020-07-01 PROCEDURE — 2022F DILAT RTA XM EVC RTNOPTHY: CPT | Performed by: ORTHOPAEDIC SURGERY

## 2020-07-01 PROCEDURE — 3008F BODY MASS INDEX DOCD: CPT | Performed by: ORTHOPAEDIC SURGERY

## 2020-07-01 RX ORDER — TRIAMCINOLONE ACETONIDE 40 MG/ML
80 INJECTION, SUSPENSION INTRA-ARTICULAR; INTRAMUSCULAR
Status: COMPLETED | OUTPATIENT
Start: 2020-07-01 | End: 2020-07-01

## 2020-07-01 RX ORDER — BUPIVACAINE HYDROCHLORIDE 5 MG/ML
6 INJECTION, SOLUTION EPIDURAL; INTRACAUDAL
Status: COMPLETED | OUTPATIENT
Start: 2020-07-01 | End: 2020-07-01

## 2020-07-01 RX ADMIN — TRIAMCINOLONE ACETONIDE 80 MG: 40 INJECTION, SUSPENSION INTRA-ARTICULAR; INTRAMUSCULAR at 11:38

## 2020-07-01 RX ADMIN — BUPIVACAINE HYDROCHLORIDE 6 ML: 5 INJECTION, SOLUTION EPIDURAL; INTRACAUDAL at 11:38

## 2020-07-01 NOTE — PROGRESS NOTES
Assessment/Plan:  1  Primary osteoarthritis of right knee  Large joint arthrocentesis: R knee   2  Chronic pain of right knee       Scribe Attestation    I,:   Perla Wetzel MA am acting as a scribe while in the presence of the attending physician :        I,:   Bernarda Lozano DO personally performed the services described in this documentation    as scribed in my presence :            Haleigh Guadarrama is a 80-year-old male who presents to the office today for follow up evaluation activity related right knee pain secondary to his underlying osteoarthritis  Patient underwent a steroid injection in January of 2019 which provided him with good relief until recently  A repeat steroid injection was discussed  He was agreeable to this  He consented and underwent a right knee steroid injection in the office today without any complications  Post injection instructions were provided  He may follow up in 3-4 months pending the efficiency of today's steroid injection  All of his questions were addressed in the office today and he may call the office at any time with any questions or concerns  Large joint arthrocentesis: R knee  Date/Time: 7/1/2020 11:38 AM  Consent given by: patient  Site marked: site marked  Timeout: Immediately prior to procedure a time out was called to verify the correct patient, procedure, equipment, support staff and site/side marked as required   Supporting Documentation  Indications: pain   Procedure Details  Location: knee - R knee  Preparation: Patient was prepped and draped in the usual sterile fashion  Needle size: 20 G  Ultrasound guidance: no  Approach: anterolateral  Medications administered: 6 mL bupivacaine (PF) 0 5 %; 80 mg triamcinolone acetonide 40 mg/mL    Patient tolerance: patient tolerated the procedure well with no immediate complications  Dressing:  Sterile dressing applied          Subjective: right knee pain     Patient ID: Maggy Montalvo is a 59 y o  male      HPI  Haleigh Guadarrama is a 55-year-old male who presents to the office today for follow up evaluation of his activity related right knee pain secondary to his underlying osteoarthritis  Patient underwent a steroid injection in January of 2019 which he states provided him with good relief until February of this year  Patient notes pain globally about his knee which is increased with steps and activities  He states his pain is a 7 out of 10 on the pain scale  He does take Advil and Tylenol OTC as needed for pain with mild relief  Patient also returned to the gym which he states does help with his knee pain  Review of Systems   Constitutional: Negative for chills and fever  HENT: Negative for drooling and sneezing  Eyes: Negative for redness  Respiratory: Negative for cough and wheezing  Gastrointestinal: Negative for nausea and vomiting  Musculoskeletal: Positive for arthralgias  Negative for joint swelling and myalgias  Neurological: Negative for weakness and numbness  Psychiatric/Behavioral: Negative for behavioral problems  The patient is not nervous/anxious            Past Medical History:   Diagnosis Date    Arthritis     Chest tightness     Diabetes ([address] )     Diverticulosis of sigmoid colon     Esophageal reflux     Fatty liver     Hearing problem     High cholesterol     History of chest pain     Hyperlipemia     Hypertension     Lumbago     Malignant neoplasm of prostate (HCC)     Prostate CA (HCC)     Tinea pedis of both feet     Trochanteric bursitis        Past Surgical History:   Procedure Laterality Date    COLONOSCOPY  2016    PROSTATECTOMY N/A 2011       Family History   Problem Relation Age of Onset    Arthritis Mother     Parkinsonism Mother     Heart disease Father     Arthritis Father     Coronary artery disease Father     Hypertension Father     Parkinsonism Father     Mental illness Neg Hx        Social History     Occupational History    Not on file   Tobacco Use    Smoking status: Former Smoker     Last attempt to quit: 2012     Years since quittin 0    Smokeless tobacco: Never Used   Substance and Sexual Activity    Alcohol use: Yes     Frequency: 2-3 times a week     Drinks per session: 3 or 4     Binge frequency: Never     Comment: Weekends    Drug use: No    Sexual activity: Not on file         Current Outpatient Medications:     ACCU-CHEK FASTCLIX LANCETS MISC, by Does not apply route daily Dx: E11 40, Disp: 100 each, Rfl: 1    aspirin 81 mg chewable tablet, Chew 1 tablet, Disp: , Rfl:     glucose blood (ACCU-CHEK GUIDE) test strip, 1 each by Other route daily DX: e11 40, Disp: 100 each, Rfl: 1    lisinopril-hydrochlorothiazide (PRINZIDE,ZESTORETIC) 20-12 5 MG per tablet, TAKE 1 TABLET DAILY, Disp: 90 tablet, Rfl: 3    metFORMIN (GLUMETZA) 1000 MG (MOD) 24 hr tablet, Take 2 tablets (2,000 mg total) by mouth daily with breakfast, Disp: 180 tablet, Rfl: 1    MILK THISTLE PO, Take by mouth daily, Disp: , Rfl:     Omega-3 Fatty Acids (FISH OIL) 1,000 mg, Take 2,000 mg by mouth 2 (two) times a day  , Disp: , Rfl:     RABEprazole (ACIPHEX) 20 MG tablet, every other day  , Disp: , Rfl: 0    rosuvastatin (CRESTOR) 20 MG tablet, Take 1 tablet (20 mg total) by mouth daily, Disp: 90 tablet, Rfl: 3    VITAMIN E PO, Take by mouth 2 (two) times a day, Disp: , Rfl:     Allergies   Allergen Reactions    Penicillins        Objective:  Vitals:    20 1100   BP: 137/85   Pulse: 80       Body mass index is 37 32 kg/m²  Right Knee Exam     Tenderness   The patient is experiencing tenderness in the lateral joint line      Range of Motion   Extension: 0   Flexion: 130     Tests   Zenaida:  Medial - negative Lateral - negative  Varus: negative Valgus: negative  Drawer:  Anterior - negative    Posterior - negative    Other   Erythema: absent  Sensation: normal  Pulse: present  Swelling: none  Effusion: no effusion present    Comments:  No erythema  No warmth  - patellofemoral grind  + peripatellar crepitus  Knee is stable at 0, 30, and 90           Observations     Right Knee   Negative for effusion  Physical Exam   Constitutional: He is oriented to person, place, and time  He appears well-developed and well-nourished  HENT:   Head: Normocephalic and atraumatic  Eyes: Conjunctivae are normal  Right eye exhibits no discharge  Left eye exhibits no discharge  Neck: Normal range of motion  Neck supple  Cardiovascular: Normal rate and intact distal pulses  Pulmonary/Chest: Effort normal  No respiratory distress  Musculoskeletal:        Right knee: He exhibits no effusion  As noted in HPI   Neurological: He is alert and oriented to person, place, and time  Skin: Skin is warm and dry  Psychiatric: He has a normal mood and affect  His behavior is normal  Judgment and thought content normal    Nursing note and vitals reviewed

## 2020-07-16 DIAGNOSIS — E11.40 TYPE 2 DIABETES MELLITUS WITH DIABETIC NEUROPATHY, WITHOUT LONG-TERM CURRENT USE OF INSULIN (HCC): ICD-10-CM

## 2020-07-17 RX ORDER — METFORMIN HYDROCHLORIDE 1000 MG/1
2000 TABLET, FILM COATED, EXTENDED RELEASE ORAL
Qty: 180 TABLET | Refills: 1 | Status: SHIPPED | OUTPATIENT
Start: 2020-07-17 | End: 2020-09-03 | Stop reason: ALTCHOICE

## 2020-07-21 ENCOUNTER — TELEPHONE (OUTPATIENT)
Dept: FAMILY MEDICINE CLINIC | Facility: CLINIC | Age: 65
End: 2020-07-21

## 2020-07-21 DIAGNOSIS — E11.40 TYPE 2 DIABETES MELLITUS WITH DIABETIC NEUROPATHY, WITHOUT LONG-TERM CURRENT USE OF INSULIN (HCC): Primary | ICD-10-CM

## 2020-07-21 RX ORDER — METFORMIN HYDROCHLORIDE 500 MG/1
2000 TABLET, EXTENDED RELEASE ORAL
Qty: 360 TABLET | Refills: 0 | Status: SHIPPED | OUTPATIENT
Start: 2020-07-21 | End: 2020-09-03 | Stop reason: SDUPTHER

## 2020-07-21 NOTE — TELEPHONE ENCOUNTER
Metformin 1000mg and 500mg are unavailable to due  recall  A possible alternative is generic Glucophage XR 500mg  Please send new rx if this is an appropriate substitute    Rebbeca Nageotte, MA

## 2020-08-10 DIAGNOSIS — E78.2 MIXED HYPERLIPIDEMIA: ICD-10-CM

## 2020-08-10 DIAGNOSIS — K76.0 FATTY LIVER: ICD-10-CM

## 2020-08-10 RX ORDER — ROSUVASTATIN CALCIUM 20 MG/1
TABLET, COATED ORAL
Qty: 90 TABLET | Refills: 3 | Status: SHIPPED | OUTPATIENT
Start: 2020-08-10 | End: 2021-08-05

## 2020-08-13 ENCOUNTER — TRANSCRIBE ORDERS (OUTPATIENT)
Dept: ADMINISTRATIVE | Facility: HOSPITAL | Age: 65
End: 2020-08-13

## 2020-08-13 ENCOUNTER — APPOINTMENT (OUTPATIENT)
Dept: LAB | Facility: HOSPITAL | Age: 65
End: 2020-08-13
Payer: COMMERCIAL

## 2020-08-13 DIAGNOSIS — E11.40 TYPE 2 DIABETES MELLITUS WITH DIABETIC NEUROPATHY, WITHOUT LONG-TERM CURRENT USE OF INSULIN (HCC): ICD-10-CM

## 2020-08-13 DIAGNOSIS — I10 ESSENTIAL HYPERTENSION: ICD-10-CM

## 2020-08-13 LAB
ALBUMIN SERPL BCP-MCNC: 3.9 G/DL (ref 3.5–5)
ALP SERPL-CCNC: 63 U/L (ref 46–116)
ALT SERPL W P-5'-P-CCNC: 77 U/L (ref 12–78)
ANION GAP SERPL CALCULATED.3IONS-SCNC: 8 MMOL/L (ref 4–13)
AST SERPL W P-5'-P-CCNC: 52 U/L (ref 5–45)
BILIRUB SERPL-MCNC: 0.6 MG/DL (ref 0.2–1)
BUN SERPL-MCNC: 15 MG/DL (ref 5–25)
CALCIUM SERPL-MCNC: 9 MG/DL (ref 8.3–10.1)
CHLORIDE SERPL-SCNC: 101 MMOL/L (ref 100–108)
CHOLEST SERPL-MCNC: 173 MG/DL (ref 50–200)
CO2 SERPL-SCNC: 28 MMOL/L (ref 21–32)
CREAT SERPL-MCNC: 1.06 MG/DL (ref 0.6–1.3)
CREAT UR-MCNC: 43.8 MG/DL
ERYTHROCYTE [DISTWIDTH] IN BLOOD BY AUTOMATED COUNT: 12.6 % (ref 11.6–15.1)
EST. AVERAGE GLUCOSE BLD GHB EST-MCNC: 171 MG/DL
GFR SERPL CREATININE-BSD FRML MDRD: 74 ML/MIN/1.73SQ M
GLUCOSE P FAST SERPL-MCNC: 132 MG/DL (ref 65–99)
HBA1C MFR BLD: 7.6 %
HCT VFR BLD AUTO: 43.8 % (ref 36.5–49.3)
HDLC SERPL-MCNC: 58 MG/DL
HGB BLD-MCNC: 14.5 G/DL (ref 12–17)
LDLC SERPL CALC-MCNC: 99 MG/DL (ref 0–100)
MCH RBC QN AUTO: 31.5 PG (ref 26.8–34.3)
MCHC RBC AUTO-ENTMCNC: 33.1 G/DL (ref 31.4–37.4)
MCV RBC AUTO: 95 FL (ref 82–98)
MICROALBUMIN UR-MCNC: 14.7 MG/L (ref 0–20)
MICROALBUMIN/CREAT 24H UR: 34 MG/G CREATININE (ref 0–30)
PLATELET # BLD AUTO: 188 THOUSANDS/UL (ref 149–390)
PMV BLD AUTO: 10.7 FL (ref 8.9–12.7)
POTASSIUM SERPL-SCNC: 4.1 MMOL/L (ref 3.5–5.3)
PROT SERPL-MCNC: 7.4 G/DL (ref 6.4–8.2)
RBC # BLD AUTO: 4.6 MILLION/UL (ref 3.88–5.62)
SODIUM SERPL-SCNC: 137 MMOL/L (ref 136–145)
TRIGL SERPL-MCNC: 81 MG/DL
TSH SERPL DL<=0.05 MIU/L-ACNC: 2.31 UIU/ML (ref 0.36–3.74)
WBC # BLD AUTO: 7.07 THOUSAND/UL (ref 4.31–10.16)

## 2020-08-13 PROCEDURE — 80053 COMPREHEN METABOLIC PANEL: CPT

## 2020-08-13 PROCEDURE — 36415 COLL VENOUS BLD VENIPUNCTURE: CPT

## 2020-08-13 PROCEDURE — 82043 UR ALBUMIN QUANTITATIVE: CPT

## 2020-08-13 PROCEDURE — 82570 ASSAY OF URINE CREATININE: CPT

## 2020-08-13 PROCEDURE — 83036 HEMOGLOBIN GLYCOSYLATED A1C: CPT

## 2020-08-13 PROCEDURE — 3051F HG A1C>EQUAL 7.0%<8.0%: CPT | Performed by: ORTHOPAEDIC SURGERY

## 2020-08-13 PROCEDURE — 3061F NEG MICROALBUMINURIA REV: CPT | Performed by: ORTHOPAEDIC SURGERY

## 2020-08-13 PROCEDURE — 80061 LIPID PANEL: CPT

## 2020-08-13 PROCEDURE — 85027 COMPLETE CBC AUTOMATED: CPT

## 2020-08-13 PROCEDURE — 84443 ASSAY THYROID STIM HORMONE: CPT

## 2020-08-27 ENCOUNTER — OFFICE VISIT (OUTPATIENT)
Dept: DERMATOLOGY | Facility: CLINIC | Age: 65
End: 2020-08-27
Payer: COMMERCIAL

## 2020-08-27 VITALS — TEMPERATURE: 98.1 F | BODY MASS INDEX: 37.3 KG/M2 | WEIGHT: 275 LBS

## 2020-08-27 DIAGNOSIS — D48.9 NEOPLASM OF UNCERTAIN BEHAVIOR: Primary | ICD-10-CM

## 2020-08-27 DIAGNOSIS — L82.1 SEBORRHEIC KERATOSES: ICD-10-CM

## 2020-08-27 DIAGNOSIS — D18.01 CHERRY ANGIOMA: ICD-10-CM

## 2020-08-27 DIAGNOSIS — D22.9 MULTIPLE MELANOCYTIC NEVI: ICD-10-CM

## 2020-08-27 DIAGNOSIS — L57.0 ACTINIC KERATOSES: ICD-10-CM

## 2020-08-27 PROCEDURE — 99204 OFFICE O/P NEW MOD 45 MIN: CPT | Performed by: STUDENT IN AN ORGANIZED HEALTH CARE EDUCATION/TRAINING PROGRAM

## 2020-08-27 PROCEDURE — 17000 DESTRUCT PREMALG LESION: CPT | Performed by: STUDENT IN AN ORGANIZED HEALTH CARE EDUCATION/TRAINING PROGRAM

## 2020-08-27 PROCEDURE — 17003 DESTRUCT PREMALG LES 2-14: CPT | Performed by: STUDENT IN AN ORGANIZED HEALTH CARE EDUCATION/TRAINING PROGRAM

## 2020-08-27 PROCEDURE — 88305 TISSUE EXAM BY PATHOLOGIST: CPT | Performed by: STUDENT IN AN ORGANIZED HEALTH CARE EDUCATION/TRAINING PROGRAM

## 2020-08-27 PROCEDURE — 11102 TANGNTL BX SKIN SINGLE LES: CPT | Performed by: STUDENT IN AN ORGANIZED HEALTH CARE EDUCATION/TRAINING PROGRAM

## 2020-08-27 NOTE — PATIENT INSTRUCTIONS
Assessment and Plan:  Based on a thorough discussion of this condition and the management approach to it (including a comprehensive discussion of the known risks, side effects and potential benefits of treatment), the patient (family) agrees to implement the following specific plan:     We will contact you with Biopsy results      Cryotherapy preformed today on forehead and scalp   When outside we recommend using a wide brim hat, sunglasses, long sleeve and pants, sunscreen with SPF 46+ with reapplication every 2 hours, or SPF specific clothing    Routine yearly skin exam

## 2020-08-27 NOTE — PROGRESS NOTES
[address] [facility] Note     Patient Name: Alexandre Cooper  Encounter Date: 08/27/2020     Have you been cared for by a [facility] Dermatologist in the last 3 years and, if so, which one? No    · Have you traveled outside of the [address] [place] [address] in the past 3 months or outside of the [facility] area in the last 2 weeks? No     May we call your Preferred Phone number to discuss your specific medical information? Yes     May we leave a detailed message that includes your specific medical information? Yes      Today's Chief Concerns:   Concern #1:  Routine Skin Exam    Past Medical History:  Have you personally ever had or currently have any of the following? · Skin cancer (such as Melanoma, Basal Cell Carcinoma, Squamous Cell Carcinoma? (If Yes, please provide more detail)- YES, Basal Cell Carcinoma( left eye and back )   · Eczema: No  · Psoriasis: No  · HIV/AIDS: No  · Hepatitis B or C: No  · Tuberculosis: No  · Systemic Immunosuppression such as Diabetes, Biologic or Immunotherapy, Chemotherapy, Organ Transplantation, Bone Marrow Transplantation (If YES, please provide more detail): YES, Pre Diabetes   · Radiation Treatment (If YES, please provide more detail): No  · Any other major medical conditions/concerns? (If Yes, which types)- YES, Hypertension     Social History:     What is/was your primary occupation? Retired     What are your hobbies/past-times? Woodworking, outdoor work    Family History:  Have any of your "first degree relatives" (parent, brother, sister, or child) had any of the following       · Skin cancer such as Melanoma or Merkel Cell Carcinoma or Pancreatic Cancer? No  · Eczema, Asthma, Hay Fever or Seasonal Allergies: No  · Psoriasis or Psoriatic Arthritis: No  · Do any other medical conditions seem to run in your family? If Yes, what condition and which relatives?   YES, Mother and Father- Parkinson's     Current Medications:         Current Outpatient Medications:     ACCU-CHEK FASTCLIX LANCETS MISC, by Does not apply route daily Dx: E11 40, Disp: 100 each, Rfl: 1    aspirin 81 mg chewable tablet, Chew 1 tablet, Disp: , Rfl:     glucose blood (ACCU-CHEK GUIDE) test strip, 1 each by Other route daily DX: e11 40, Disp: 100 each, Rfl: 1    lisinopril-hydrochlorothiazide (PRINZIDE,ZESTORETIC) 20-12 5 MG per tablet, TAKE 1 TABLET DAILY, Disp: 90 tablet, Rfl: 3    metFORMIN (GLUCOPHAGE-XR) 500 mg 24 hr tablet, Take 4 tablets (2,000 mg total) by mouth daily with breakfast, Disp: 360 tablet, Rfl: 0    metFORMIN (GLUMETZA) 1000 MG (MOD) 24 hr tablet, Take 2 tablets (2,000 mg total) by mouth daily with breakfast, Disp: 180 tablet, Rfl: 1    MILK THISTLE PO, Take by mouth daily, Disp: , Rfl:     Omega-3 Fatty Acids (FISH OIL) 1,000 mg, Take 2,000 mg by mouth 2 (two) times a day  , Disp: , Rfl:     RABEprazole (ACIPHEX) 20 MG tablet, every other day  , Disp: , Rfl: 0    rosuvastatin (CRESTOR) 20 MG tablet, TAKE 1 TABLET DAILY, Disp: 90 tablet, Rfl: 3    VITAMIN E PO, Take by mouth 2 (two) times a day, Disp: , Rfl:       Review of Systems:  Have you recently had or currently have any of the following? If YES, what are you doing for the problem? · Fever, chills or unintended weight loss: No  · Sudden loss or change in your vision: No  · Nausea, vomiting or blood in your stool: No  · Painful or swollen joints: YES, Joint pain bilateral knees- treated by ortho   · Wheezing or cough: No  · Changing mole or non-healing wound: YES, Right arm  · Nosebleeds: No  · Excessive sweating: No  · Easy or prolonged bleeding? No  · Over the last 2 weeks, how often have you been bothered by the following problems? · Taking little interest or pleasure in doing things: 1 - Not at All  · Feeling down, depressed, or hopeless: 1 - Not at All  · Rapid heartbeat with epinephrine:  No    · FEMALES ONLY:    · Are you pregnant or planning to become pregnant?  N/A  · Are you currently or planning to be nursing or breast feeding? N/A    · Any known allergies? Allergies   Allergen Reactions    Penicillins          Physical Exam:     Was a chaperone (Derm Clinical Assistant) present throughout the entire Physical Exam? Yes     Did the Dermatology Team specifically  the patient on the importance of a Full Skin Exam to be sure that nothing is missed clinically?  Yes}  o Did the patient ultimately request or accept a Full Skin Exam?  Yes  o Did the patient specifically refuse to have the areas "under-the-bra" examined by the Dermatologist? No  o Did the patient specifically refuse to have the areas "under-the-underwear" examined by the Dermatologist? No    CONSTITUTIONAL:   Vitals:    08/27/20 1418   Temp: 98 1 °F (36 7 °C)   Weight: 125 kg (275 lb)       PSYCH: Normal mood and affect  EYES: Normal conjunctiva  ENT: Normal lips and oral mucosa  CARDIOVASCULAR: No edema  RESPIRATORY: Normal respirations    SKIN:  FULL ORGAN SYSTEM EXAM   Hair, Scalp, Ears, Face Normal except as noted below in Assessment   Neck Normal except as noted below in Assessment   Right Arm/Hand/Fingers Normal except as noted below in Assessment   Left Arm/Hand/Fingers Normal except as noted below in Assessment   Chest/Breasts/Axillae Viewed areas Normal except as noted below in Assessment   Abdomen, Umbilicus Normal except as noted below in Assessment   Back/Spine Normal except as noted below in Assessment   Groin/Genitalia/Buttocks Normal except as noted below in Assessment   Right Leg, Foot, Toes Normal except as noted below in Assessment   Left Leg, Foot, Toes Normal except as noted below in Assessment        Assessment and Plan by Diagnosis:    History of Present Condition:     Duration:  How long has this been an issue for you?    o  1 year   Location Affected:  Where on the body is this affecting you?    o  Right arm   Quality:  Is there any bleeding, pain, itch, burning/irritation, or redness associated with the skin lesion?    o  Painful to the touch    Severity:  Describe any bleeding, pain, itch, burning/irritation, or redness on a scale of 1 to 10 (with 10 being the worst)  o     Timing:  Does this condition seem to be there pretty constantly or do you notice it more at specific times throughout the day?    o  Constantly there   Context:  Have you ever noticed that this condition seems to be associated with specific activities you do?    o  Denies   Modifying Factors:    o Anything that seems to make the condition worse?    -  Denies  o What have you tried to do to make the condition better? -  Neosporin    Associated Signs and Symptoms:  Does this skin lesion seem to be associated with any of the following:  o  Denies     NEOPLASM OF UNCERTAIN BEHAVIOR OF SKIN    Physical Exam:   (Anatomic Location); (Size and Morphological Description); (Differential Diagnosis):  o Right dorsal forearm; 1 1 cm by 7 mm, scaly pink thin papule   Pertinent Positives:   Pertinent Negatives: Additional History of Present Condition:  Patient is reports spot of concern on right arm that developed a year ago has been applying neosporin  Not healing  Has never bled but is occassionally tender  Pt previously followed with other dermatologist and notes hx of NMSC in past   Has also hx of AKs, treated with LN2  Assessment and Plan:   I have discussed with the patient that a sample of skin via a "skin biopsy would be potentially helpful to further make a specific diagnosis under the microscope   Based on a thorough discussion of this condition and the management approach to it (including a comprehensive discussion of the known risks, side effects and potential benefits of treatment), the patient (family) agrees to implement the following specific plan:    o Procedure:  Skin Biopsy    After a thorough discussion of treatment options and risk/benefits/alternatives (including but not limited to local pain, scarring, dyspigmentation, blistering, possible superinfection, and inability to confirm a diagnosis via histopathology), verbal and written consent were obtained and portion of the rash was biopsied for tissue sample  See below for consent that was obtained from patient and subsequent Procedure Note  PROCEDURE SHAVE BIOPSY NOTE:     Performing Physician: Malissa Leo Anatomic Location; Clinical Description with size (cm); Pre-Op Diagnosis:   o  Right dorsal forearm; 58 yo male with 1 1 by 7 mm, scaly pink thin papule: Diff Dx: SCC versus hyper keratotic actinic keratosis     Post-op diagnosis: Same      Local anesthesia: 1% xylocaine with epi       Topical anesthesia: None     Hemostasis: Aluminum chloride       After obtaining informed consent  at which time there was a discussion about the purpose of biopsy  and low risks of infection and bleeding  The area was prepped and draped in the usual fashion  Anesthesia was obtained with 1% lidocaine with epinephrine  A shave biopsy to an appropriate sampling depth was obtained with a sterile blade (such as a 15-blade or DermaBlade)  The resulting wound was covered with surgical ointment and bandaged appropriately  The patient tolerated the procedure well without complications and was without signs of functional compromise  Specimen has been sent for review by Dermatopathology  Standard post-procedure care has been explained and has been included in written form within the patient's copy of Informed Consent  INFORMED CONSENT DISCUSSION AND POST-OPERATIVE INSTRUCTIONS FOR PATIENT    I   RATIONALE FOR PROCEDURE  I understand that a skin biopsy allows the Dermatologist to test a lesion or rash under the microscope to obtain a diagnosis  It usually involves numbing the area with numbing medication and removing a small piece of skin; sometimes the area will be closed with sutures  In this specific procedure, sutures are not usually needed    If any sutures are placed, then they are usually need to be removed in 2 weeks or less  I understand that my Dermatologist recommends that a skin "shave" biopsy be performed today  A local anesthetic, similar to the kind that a dentist uses when filling a cavity, will be injected with a very small needle into the skin area to be sampled  The injected skin and tissue underneath "will go to sleep and become numb so no pain should be felt afterwards  An instrument shaped like a tiny "razor blade" (shave biopsy instrument) will be used to cut a small piece of tissue and skin from the area so that a sample of tissue can be taken and examined more closely under the microscope  A slight amount of bleeding will occur, but it will be stopped with direct pressure and a pressure bandage and any other appropriate methods  I understands that a scar will form where the wound was created  Surgical ointment will be applied to help protect the wound  Sutures are not usually needed  II   RISKS AND POTENTIAL COMPLICATIONS   I understand the risks and potential complications of a skin biopsy include but are not limited to the following:   Bleeding   Infection   Pain   Scar/keloid   Skin discoloration   Incomplete Removal   Recurrence   Nerve Damage/Numbness/Loss of Function   Allergic Reaction to Anesthesia   Biopsies are diagnostic procedures and based on findings additional treatment or evaluation may be required   Loss or destruction of specimen resulting in no additional findings    My Dermatologist has explained to me the nature of the condition, the nature of the procedure, and the benefits to be reasonably expected compared with alternative approaches  My Dermatologist has discussed the likelihood of major risks or complications of this procedure including the specific risks listed above, such as bleeding, infection, and scarring/keloid  I understand that a scar is expected after this procedure    I understand that my physician cannot predict if the scar will form a "keloid," which extends beyond the borders of the wound that is created  A keloid is a thick, painful, and bumpy scar  A keloid can be difficult to treat, as it does not always respond well to therapy, which includes injecting cortisone directly into the keloid every few weeks  While this usually reduces the pain and size of the scar, it does not eliminate it  I understand that photographs may be taken before and after the procedure  These will be maintained as part of the medical providers confidential records and may not be made available to me  I further authorize the medical provider to use the photographs for teaching purposes or to illustrate scientific papers, books, or lectures if in his/her judgment, medical research, education, or science may benefit from its use  I have had an opportunity to fully inquire about the risks and benefits of this procedure and its alternatives  I have been given ample time and opportunity to ask questions and to seek a second opinion if I wished to do so  I acknowledge that there have specifically been no guarantees as to the cosmetic results from the procedure  I am aware that with any procedure there is always the possibility of an unexpected complication  III  POST-PROCEDURAL CARE (WHAT YOU WILL NEED TO DO "AFTER THE BIOPSY" TO OPTIMIZE HEALING)     Keep the area clean and dry  Try NOT to remove the bandage or get it wet for the first 24 hours   Gently clean the area and apply surgical ointment (such as Vaseline petrolatum ointment, which is available "over the counter" and not a prescription) to the biopsy site for up to 2 weeks straight  This acts to protect the wound from the outside world   Sutures are not usually placed in this procedure  If any sutures were placed, return for suture removal as instructed (generally 1 week for the face, 2 weeks for the body)   Take Acetaminophen (Tylenol) for discomfort, if no contraindications  Ibuprofen or aspirin could make bleeding worse   Call our office immediately for signs of infection: fever, chills, increased redness, warmth, tenderness, discomfort/pain, or pus or foul smell coming from the wound  WHAT TO DO IF THERE IS ANY BLEEDING? If a small amount of bleeding is noticed, place a clean cloth over the area and apply firm pressure for ten minutes  Check the wound after 10 minutes of direct pressure  If bleeding persists, try one more time for an additional 10 minutes of direct pressure on the area  If the bleeding becomes heavier or does not stop after the second attempt, or if you have any other questions about this procedure, then please call your [facility] Dermatologist by calling [phone] (SKIN)  I hereby acknowledge that I have reviewed and verified the site with my Dermatologist and have requested and authorized my Dermatologist to proceed with the procedure  CHERRY ANGIOMAS    Physical Exam:   Anatomic Location Affected:  Left upper cheek   Morphological Description:  Scattered cherry red, 1-4 mm papules   Pertinent Positives:   Pertinent Negatives: Additional History of Present Condition:  Patient is present for routine skin exam      Assessment and Plan:  Based on a thorough discussion of this condition and the management approach to it (including a comprehensive discussion of the known risks, side effects and potential benefits of treatment), the patient (family) agrees to implement the following specific plan:   Follow up routine skin exam      Assessment and Plan:    Cherry angioma, also known as Kyara Harrow spots, are benign vascular skin lesions  A "cherry angioma" is a firm red, blue or purple papule, 0 1-1 cm in diameter  When thrombosed, they can appear black in colour until evaluated with a dermatoscope when the red or purple colour is more easily seen  Cherry angioma may develop on any part of the body but most often appear on the scalp, face, lips and trunk  An angioma is due to proliferating endothelial cells; these are the cells that line the inside of a blood vessel  Angiomas can arise in early life or later in life; the most common type of angioma is a cherry angioma  Cherry angiomas are very common in males and females of any age or race  They are more noticeable in white skin than in skin of colour  They markedly increase in number from about the age of 36  There may be a family history of similar lesions  Eruptive cherry angiomas have been rarely reported to be associated with internal malignancy  The cause of angiomas is unknown  Genetic analysis of cherry angiomas has shown that they frequently carry specific somatic missense mutations in the GNAQ and GNA11 (Q209H) genes, which are involved in other vascular and melanocytic proliferations  Cherry angioma is usually diagnosed clinically and no investigations are necessary for the majority of lesions  It has a characteristic red-clod or lobular pattern on dermatoscopy (called lacunar pattern using conventional pattern analysis)  When there is uncertainty about the diagnosis, a biopsy may be performed  The angioma is composed of venules in a thickened papillary dermis  Collagen bundles may be prominent between the lobules  Cherry angiomas are harmless, so they do not usually have to be treated  Occasionally, they are removed to exclude a malignant skin lesion such as a nodular melanoma or because they are irritated or bleeding (and a subsequent risk for infection)  To decrease friction over the lesions, we recommend Neutrogena Daily Defense SPF 50+ at least 3 times a day      ACTINIC KERATOSIS    Physical Exam:   Anatomic Location Affected:  Forehead and scalp   Morphological Description:  Pink scaly papules    Additional History of Present Condition:  Patient is present for routine skin exam  Assessment and Plan:  Based on a thorough discussion of this condition and the management approach to it (including a comprehensive discussion of the known risks, side effects and potential benefits of treatment), the patient (family) agrees to implement the following specific plan:     Cryotherapy preformed today on forehead and scalp   When outside we recommend using a wide brim hat, sunglasses, long sleeve and pants, sunscreen with SPF 91+ with reapplication every 2 hours, or SPF specific clothing    Routine yearly skin exam    Actinic keratoses are very common on sites repeatedly exposed to the sun, especially the backs of the hands and the face, most often affecting the ears, nose, cheeks, upper lip, vermilion of the lower lip, temples, forehead and balding scalp  In severely chronically sun-damaged individuals, they may also be found on the upper trunk, upper and lower limbs, and dorsum of feet  We discussed the theoretical premalignant (pre-cancerous) nature and etiology of these growths  We discussed the prevailing notion that actinic keratoses are a reflection of abnormal skin cell development due to DNA damage by short wavelength UVB  They are more likely to appear if the immune function is poor, due to aging, recent sun exposure, predisposing disease or certain drugs  We discussed that the main concern is that actinic keratoses may predispose to squamous cell carcinoma  It is rare for a solitary actinic keratosis to evolve to squamous cell carcinoma (SCC), but the risk of SCC occurring at some stage in a patient with more than 10 actinic keratoses is thought to be about 10 to 15%  A tender, thickened, ulcerated or enlarging actinic keratosis is suspicious of SCC  Actinic keratoses may be prevented by strict sun protection  If already present, keratoses may improve with a very high sun protection factor (50+) broad-spectrum sunscreen applied at least daily to affected areas, year-round  We recommend that UPF-rated clothing and hats and sunglasses be worn whenever possible and that a sunscreen-moisturizer combination product such as Neutrogena Daily Defense be applied at least three times a day  We performed a thorough discussion of treatment options and specific risk/benefits/alternatives including but not limited to medical field treatment with medications such as the following:     Topical field area medications such as 5-fluorouracil or Aldara (specifically, the trouble with long-term compliance, blistering and local skin reaction versus the convenience of at-home therapy and that field therapy gets what is not yet seen)   Cryotherapy (specifically, local pain, scarring, dyspigmentation, blistering, possible superinfection, and treats only what we see versus directed treatment today)   Photodynamic therapy (specifically, local pain, scarring, dyspigmentation, blistering, possible superinfection, need to schedule for a later date, and time spent in the office versus field therapy that gets what is not yet seen)  PROCEDURE:  DESTRUCTION OF PRE-MALIGNANT LESIONS  After a thorough discussion of treatment options and risk/benefits/alternatives (including but not limited to local pain, scarring, dyspigmentation, blistering, and possible superinfection), verbal and written consent were obtained and the aforementioned lesions were treated on with cryotherapy using liquid nitrogen x 1 cycle for 5-10 seconds   TOTAL NUMBER of 4 pre-malignant lesions were treated today on the ANATOMIC LOCATION: 2 on scalp 2 on forehead  The patient tolerated the procedure well, and after-care instructions were provided      SEBORRHEIC KERATOSIS; NON-INFLAMED    Physical Exam:   Anatomic Location Affected:  Trunk and extremities   Morphological Description:  Flat and raised, waxy, smooth to warty textured, yellow to brownish-grey to dark brown to blackish, discrete, "stuck-on" appearing papules   Pertinent Positives:   Pertinent Negatives: Additional History of Present Condition:      Assessment and Plan:  Based on a thorough discussion of this condition and the management approach to it (including a comprehensive discussion of the known risks, side effects and potential benefits of treatment), the patient (family) agrees to implement the following specific plan:   Routine yearly skin exam    Seborrheic Keratosis  A seborrheic keratosis is a harmless warty spot that appears during adult life as a common sign of skin aging  Seborrheic keratoses can arise on any area of skin, covered or uncovered, with the usual exception of the palms and soles  They do not arise from mucous membranes  Seborrheic keratoses can have highly variable appearance  Seborrheic keratoses are extremely common  It has been estimated that over 90% of adults over the age of 61 years have one or more of them  They occur in males and females of all races, typically beginning to erupt in the 35s or 45s  They are uncommon under the age of 21 years  The precise cause of seborrhoeic keratoses is not known  Seborrhoeic keratoses are considered degenerative in nature  As time goes by, seborrheic keratoses tend to become more numerous  Some people inherit a tendency to develop a very large number of them; some people may have hundreds of them  The name "seborrheic keratosis" is misleading, because these lesions are not limited to a seborrhoeic distribution (scalp, mid-face, chest, upper back), nor are they formed from sebaceous glands, nor are they associated with sebum -- which is greasy    Seborrheic keratosis may also be called "SK," "Seb K," "basal cell papilloma," "senile wart," or "barnacle "      Researchers have noted:   Eruptive seborrhoeic keratoses can follow sunburn or dermatitis   Skin friction may be the reason they appear in body folds   Viral cause (e g , human papillomavirus) seems unlikely   Stable and clonal mutations or activation of FRFR3, PIK3CA, MARV, AKT1 and EGFR genes are found in seborrhoeic keratoses   Seborrhoeic keratosis can arise from solar lentigo   FRFR3 mutations also arise in solar lentigines  These mutations are associated with increased age and location on the head and neck, suggesting a role of ultraviolet radiation in these lesions   Seborrheic keratoses do not harbour tumour suppressor gene mutations   Epidermal growth factor receptor inhibitors, which are used to treat some cancers, often result in an increase in verrucal (warty) keratoses  There is no easy way to remove multiple lesions on a single occasion  Unless a specific lesion is "inflamed" and is causing pain or stinging/burning or is bleeding, most insurance companies do not authorize treatment  MELANOCYTIC NEVI ("Moles")    Physical Exam:   Anatomic Location Affected:   Mostly on sun-exposed areas of the Trunk and extremities    Morphological Description:  Scattered, 1-4mm round to ovoid, symmetrical-appearing, even bordered, skin colored to dark brown macules/papules, mostly in sun-exposed areas   Pertinent Positives:   Pertinent Negatives: Additional History of Present Condition:  Patient is present for routine skin exam, denies changing, growing lesions  Assessment and Plan:  Based on a thorough discussion of this condition and the management approach to it (including a comprehensive discussion of the known risks, side effects and potential benefits of treatment), the patient (family) agrees to implement the following specific plan:   Routine yearly skin exam      Melanocytic Nevi  Melanocytic nevi ("moles") are tan or brown, raised or flat areas of the skin which have an increased number of melanocytes  Melanocytes are the cells in our body which make pigment and account for skin color      Some moles are present at birth (I e , "congenital nevi"), while others come up later in life (i e , "acquired nevi")  The sun can stimulate the body to make more moles  Sunburns are not the only thing that triggers more moles  Chronic sun exposure can do it too  Clinically distinguishing a healthy mole from melanoma may be difficult, even for experienced dermatologists  The "ABCDE's" of moles have been suggested as a means of helping to alert a person to a suspicious mole and the possible increased risk of melanoma  The suggestions for raising alert are as follows:    Asymmetry: Healthy moles tend to be symmetric, while melanomas are often asymmetric  Asymmetry means if you draw a line through the mole, the two halves do not match in color, size, shape, or surface texture  Asymmetry can be a result of rapid enlargement of a mole, the development of a raised area on a previously flat lesion, scaling, ulceration, bleeding or scabbing within the mole  Any mole that starts to demonstrate "asymmetry" should be examined promptly by a board certified dermatologist      Border: Healthy moles tend to have discrete, even borders  The border of a melanoma often blends into the normal skin and does not sharply delineate the mole from normal skin  Any mole that starts to demonstrate "uneven borders" should be examined promptly by a board certified dermatologist      Color: Healthy moles tend to be one color throughout  Melanomas tend to be made up of different colors ranging from dark black, blue, white, or red  Any mole that demonstrates a color change should be examined promptly by a board certified dermatologist      Diameter: Healthy moles tend to be smaller than 0 6 cm in size; an exception are "congenital nevi" that can be larger  Melanomas tend to grow and can often be greater than 0 6 cm (1/4 of an inch, or the size of a pencil eraser)  This is only a guideline, and many normal moles may be larger than 0 6 cm without being unhealthy    Any mole that starts to change in size (small to bigger or bigger to smaller) should be examined promptly by a board certified dermatologist      Evolving: Healthy moles tend to "stay the same "  Melanomas may often show signs of change or evolution such as a change in size, shape, color, or elevation  Any mole that starts to itch, bleed, crust, burn, hurt, or ulcerate or demonstrate a change or evolution should be examined promptly by a board certified dermatologist       Dysplastic Nevi  Dysplastic moles are moles that fit the ABCDE rules of melanoma but are not identified as melanomas when examined under the microscope  They may indicate an increased risk of melanoma in that person  If there is a family history of melanoma, most experts agree that the person may be at an increased risk for developing a melanoma  Experts still do not agree on what dysplastic moles mean in patients without a personal or family history of melanoma  Dysplastic moles are usually larger than common moles and have different colors within it with irregular borders  The appearance can be very similar to a melanoma  Biopsies of dysplastic moles may show abnormalities which are different from a regular mole  Melanoma  Malignant melanoma is a type of skin cancer that can be deadly if it spreads throughout the body  The incidence of melanoma in the [place] is growing faster than any other cancer  Melanoma usually grows near the surface of the skin for a period of time, and then begins to grow deeper into the skin  Once it grows deeper into the skin, the risk of spread to other organs greatly increases  Therefore, early detection and removal of a malignant melanoma may result in a better chance at a complete cure; removal after the tumor has spread may not be as effective, leading to worse clinical outcomes such as death  The true rate of nevus transformation into a melanoma is unknown   It has been estimated that the lifetime risk for any acquired melanocytic nevus on any 21year-old individual transforming into melanoma by age [de-identified] is 0 03% (1 in 3,164) for men and 0 009% (1 in 10,800) for women  The appearance of a "new mole" remains one of the most reliable methods for identifying a malignant melanoma  Occasionally, melanomas appear as rapidly growing, blue-black, dome-shaped bumps within a previous mole or previous area of normal skin  Other times, melanomas are suspected when a mole suddenly appears or changes  Itching, burning, or pain in a pigmented lesion should increase suspicion, but most patients with early melanoma have no skin discomfort whatsoever  Melanoma can occur anywhere on the skin, including areas that are difficult for self-examination  Many melanomas are first noticed by other family members  Suspicious-looking moles may be removed for microscopic examination  You may be able to prevent death from melanoma by doing two simple things:    1  Try to avoid unnecessary sun exposure and protect your skin when it is exposed to the sun  People who live near the equator, people who have intermittent exposures to large amounts of sun, and people who have had sunburns in childhood or adolescence have an increased risk for melanoma  Sun sense and vigilant sun protection may be keys to helping to prevent melanoma  We recommend wearing UPF-rated sun protective clothing and sunglasses whenever possible and applying a moisturizer-sunscreen combination product (SPF 50+) such as Neutrogena Daily Defense to sun exposed areas of skin at least three times a day  2  Have your moles regularly examined by a board certified dermatologist AND by yourself or a family member/friend at home  We recommend that you have your moles examined at least once a year by a board certified dermatologist   Use your birthday as an annual reminder to have your "Birthday Suit" (I e , your skin) examined; it is a nice birthday gift to yourself to know that your skin is healthy appearing!   Additionally, at-home self examinations may be helpful for detecting a possible melanoma  Use the ABCDEs we discussed and check your moles once a month at home  Scribe Attestation    I,:   Clementina Fierro MA am acting as a scribe while in the presence of the attending physician :        I,:   Jeimy Ganch, MD personally performed the services described in this documentation    as scribed in my presence :          Cisco Neville personally performed the services described in this documentation as scribed in my presence : on 08/27/2020

## 2020-08-29 NOTE — PROGRESS NOTES
Assessment/Plan:    1  Type 2 diabetes mellitus with diabetic neuropathy, without long-term current use of insulin [facility])  Assessment & Plan:    Lab Results   Component Value Date    HGBA1C 7 6 (H) 08/13/2020     Diabetes not at goal but has improved  Patient has started back at the gym this week    Orders:  -     Hemoglobin A1C; Future; Expected date: 11/17/2020  -     metFORMIN (GLUCOPHAGE-XR) 500 mg 24 hr tablet; Take 4 tablets (2,000 mg total) by mouth daily with breakfast    2  Need for vaccination  -     FLUBLOK: influenza vaccine, quadrivalent, recombinant, PF, 0 5 mL    3  Essential hypertension  Assessment & Plan:  Blood pressure is well controlled  Continue lisinopril-hydrochlorothiazide 12-12 5 mg daily    Orders:  -     CBC; Future; Expected date: 11/17/2020  -     Comprehensive metabolic panel; Future; Expected date: 11/17/2020  -     Lipid Panel with Direct LDL reflex; Future; Expected date: 11/17/2020    4  Mixed hyperlipidemia  Assessment & Plan:  Stable  Continue rosuvastatin 20 mg daily    Orders:  -     Comprehensive metabolic panel; Future; Expected date: 11/17/2020  -     Lipid Panel with Direct LDL reflex; Future; Expected date: 11/17/2020    5  History of prostate cancer  -     PSA, Total Screen; Future    BMI Counseling: Body mass index is 37 03 kg/m²  The BMI is above normal  Exercise recommendations include exercising 3-5 times per week              Patient Instructions     Recent Results (from the past 672 hour(s))   CBC    Collection Time: 08/13/20 10:35 AM   Result Value Ref Range    WBC 7 07 4 31 - 10 16 Thousand/uL    RBC 4 60 3 88 - 5 62 Million/uL    Hemoglobin 14 5 12 0 - 17 0 g/dL    Hematocrit 43 8 36 5 - 49 3 %    MCV 95 82 - 98 fL    MCH 31 5 26 8 - 34 3 pg    MCHC 33 1 31 4 - 37 4 g/dL    RDW 12 6 11 6 - 15 1 %    Platelets 983 069 - 447 Thousands/uL    MPV 10 7 8 9 - 12 7 fL   Comprehensive metabolic panel    Collection Time: 08/13/20 10:35 AM   Result Value Ref Range    Sodium 137 136 - 145 mmol/L    Potassium 4 1 3 5 - 5 3 mmol/L    Chloride 101 100 - 108 mmol/L    CO2 28 21 - 32 mmol/L    ANION GAP 8 4 - 13 mmol/L    BUN 15 5 - 25 mg/dL    Creatinine 1 06 0 60 - 1 30 mg/dL    Glucose, Fasting 132 (H) 65 - 99 mg/dL    Calcium 9 0 8 3 - 10 1 mg/dL    AST 52 (H) 5 - 45 U/L    ALT 77 12 - 78 U/L    Alkaline Phosphatase 63 46 - 116 U/L    Total Protein 7 4 6 4 - 8 2 g/dL    Albumin 3 9 3 5 - 5 0 g/dL    Total Bilirubin 0 60 0 20 - 1 00 mg/dL    eGFR 74 ml/min/1 73sq m   Hemoglobin A1C    Collection Time: 08/13/20 10:35 AM   Result Value Ref Range    Hemoglobin A1C 7 6 (H) Normal 3 8-5 6%; PreDiabetic 5 7-6 4%; Diabetic >=6 5%; Glycemic control for adults with diabetes <7 0% %     mg/dl   Lipid Panel with Direct LDL reflex    Collection Time: 08/13/20 10:35 AM   Result Value Ref Range    Cholesterol 173 50 - 200 mg/dL    Triglycerides 81 <=150 mg/dL    HDL, Direct 58 >=40 mg/dL    LDL Calculated 99 0 - 100 mg/dL   TSH, 3rd generation    Collection Time: 08/13/20 10:35 AM   Result Value Ref Range    TSH 3RD GENERATON 2 313 0 358 - 3 740 uIU/mL   Microalbumin / creatinine urine ratio    Collection Time: 08/13/20 10:35 AM   Result Value Ref Range    Creatinine, Ur 43 8 mg/dL    Microalbum  ,U,Random 14 7 0 0 - 20 0 mg/L    Microalb Creat Ratio 34 (H) 0 - 30 mg/g creatinine   Tissue Exam    Collection Time: 08/27/20  3:02 PM   Result Value Ref Range    Case Report       Surgical Pathology Report                         Case: [id number]                                   Authorizing Provider:  Moriah Duncan MD          Collected:           08/27/2020 3782              Ordering Location:     [facility]      Received:            08/27/2020 [address] [facility]                                                                       Pathologist:           Mele Castellanos MD                                                           Specimen:    Skin, Other, Specimen A Right dorsal forearm                                               Final Diagnosis       A  Skin, right dorsal forearm, shave biopsy:    SQUAMOUS CELL CARCINOMA IN SITU; extending to the tissue edges  Additional Information       All reported additional testing was performed with appropriately reactive controls  These tests were developed and their performance characteristics determined by [facility] or appropriate performing facility, though some tests may be performed on tissues which have not been validated for performance characteristics (such as staining performed on alcohol exposed cell blocks and decalcified tissues)  Results should be interpreted with caution and in the context of the patients clinical condition  These tests may not be cleared or approved by the [place]  Food and Drug Administration, though the FDA has determined that such clearance or approval is not necessary  These tests are used for clinical purposes and they should not be regarded as investigational or for research  This laboratory has been approved by CLIA 88, designated as a high-complexity laboratory and is qualified to perform these tests         Clinical Information       Specimen A: Skin; Shaved biopsy; Right dorsal forearm; 60 yo male with 1 1 by 7 mm, scaly pink thin papule: Diff Dx:SCC vs SCCis vs hyperkeratotic actinic keratosis    Gross Description       A  The specimen is received in formalin, labeled with the patient's name and hospital number, and is designated "right dorsal forearm  The specimen consists of a 0 7 x 0 9 x 0 1 cm shave biopsy of tan white hair-bearing skin  The epithelial surface appears keratotic and is inked red and the margin of resection is inked green  The specimen is trisected revealing grossly unremarkable cut surfaces  The specimen is entirely submitted between sponges, 1 cassette      Note: The estimated total formalin fixation time based upon information provided by the submitting clinician and the standard processing schedule is under 72 hours  Janie Sheets                Return in about 3 months (around 12/3/2020) for Next scheduled follow up  Subjective:      Patient ID: Patrizia Das is a 59 y o  male  Chief Complaint   Patient presents with    Follow-up     medication ac/cma     Results    Diabetes     foot exam due       Patient reports that he is feeling well  He has started volunteering at the hospital   He is really enjoying helping people  Diabetes-patient reports that he is feeling well with sugars  He is starting back at the gym this week  Previously the having close due to [place]  Hypertension - patient has been taking his blood pressure medication regularly  Associated symptoms:  Swelling:  no  Leg cramps: no    Hyperlipidemia-  he  has been taking their cholesterol medication regularly  Associated symptoms:  Myalgias: no      The following portions of the patient's history were reviewed and updated as appropriate: allergies, current medications, past family history, past medical history, past social history, past surgical history and problem list     Review of Systems   Respiratory: Negative  Cardiovascular: Negative            Current Outpatient Medications   Medication Sig Dispense Refill    ACCU-CHEK FASTCLIX LANCETS MISC by Does not apply route daily Dx: E11 40 100 each 1    aspirin 81 mg chewable tablet Chew 1 tablet      glucose blood (ACCU-CHEK GUIDE) test strip 1 each by Other route daily DX: e11 40 100 each 1    lisinopril-hydrochlorothiazide (PRINZIDE,ZESTORETIC) 20-12 5 MG per tablet TAKE 1 TABLET DAILY 90 tablet 3    metFORMIN (GLUCOPHAGE-XR) 500 mg 24 hr tablet Take 4 tablets (2,000 mg total) by mouth daily with breakfast 360 tablet 0    MILK THISTLE PO Take by mouth daily      RABEprazole (ACIPHEX) 20 MG tablet every other day    0    rosuvastatin (CRESTOR) 20 MG tablet TAKE 1 TABLET DAILY 90 tablet 3  VITAMIN E PO Take by mouth 2 (two) times a day      Omega-3 Fatty Acids (FISH OIL) 1,000 mg Take 2,000 mg by mouth 2 (two) times a day         No current facility-administered medications for this visit  Objective:    /74   Pulse 82   Temp 98 2 °F (36 8 °C)   Resp 16   Ht 6' (1 829 m)   Wt 124 kg (273 lb)   BMI 37 03 kg/m²        Physical Exam  Vitals signs and nursing note reviewed  Constitutional:       Appearance: He is well-developed  HENT:      Head: Normocephalic and atraumatic  Right Ear: External ear normal       Left Ear: External ear normal    Cardiovascular:      Rate and Rhythm: Normal rate and regular rhythm  Pulses:           Dorsalis pedis pulses are 2+ on the right side and 2+ on the left side  Posterior tibial pulses are 2+ on the right side and 2+ on the left side  Heart sounds: Normal heart sounds  No murmur  Pulmonary:      Effort: Pulmonary effort is normal  No respiratory distress  Breath sounds: Normal breath sounds  No wheezing or rales  Musculoskeletal:         General: No tenderness  Feet:      Right foot:      Skin integrity: No ulcer, skin breakdown, erythema, warmth, callus or dry skin  Left foot:      Skin integrity: No ulcer, skin breakdown, erythema, warmth, callus or dry skin  Patient's shoes and socks removed  Right Foot/Ankle   Right Foot Inspection  Skin Exam: skin normal skin not intact, no dry skin, no warmth, no callus, no erythema, no maceration, no abnormal color, no pre-ulcer, no ulcer and no callus                          Toe Exam: ROM and strength within normal limits  Sensory       Monofilament testing: intact  Vascular  Capillary refills: < 3 seconds  The right DP pulse is 2+  The right PT pulse is 2+       Left Foot/Ankle  Left Foot Inspection  Skin Exam: skin normalskin not intact, no dry skin, no warmth, no erythema, no maceration, normal color, no pre-ulcer, no ulcer and no callus Toe Exam: ROM and strength within normal limits                   Sensory       Monofilament: intact  Vascular  Capillary refills: < 3 seconds  The left DP pulse is 2+  The left PT pulse is 2+     Assign Risk Category:  No deformity present; ;        Risk: 0        Wilma Carr DO

## 2020-09-02 NOTE — RESULT ENCOUNTER NOTE
Called patient to discuss pathology results showing SCC in situ  Given location of  SCCis, recommend destruction with ED&C  Pt agreeable  Will  [facility] staff to schedule in a procedure slot

## 2020-09-03 ENCOUNTER — OFFICE VISIT (OUTPATIENT)
Dept: FAMILY MEDICINE CLINIC | Facility: CLINIC | Age: 65
End: 2020-09-03
Payer: COMMERCIAL

## 2020-09-03 VITALS
HEART RATE: 82 BPM | BODY MASS INDEX: 36.98 KG/M2 | SYSTOLIC BLOOD PRESSURE: 136 MMHG | DIASTOLIC BLOOD PRESSURE: 74 MMHG | WEIGHT: 273 LBS | HEIGHT: 72 IN | TEMPERATURE: 98.2 F | RESPIRATION RATE: 16 BRPM

## 2020-09-03 DIAGNOSIS — Z23 NEED FOR VACCINATION: ICD-10-CM

## 2020-09-03 DIAGNOSIS — E11.40 TYPE 2 DIABETES MELLITUS WITH DIABETIC NEUROPATHY, WITHOUT LONG-TERM CURRENT USE OF INSULIN (HCC): Primary | ICD-10-CM

## 2020-09-03 DIAGNOSIS — I10 ESSENTIAL HYPERTENSION: ICD-10-CM

## 2020-09-03 DIAGNOSIS — Z85.46 HISTORY OF PROSTATE CANCER: ICD-10-CM

## 2020-09-03 DIAGNOSIS — E78.2 MIXED HYPERLIPIDEMIA: ICD-10-CM

## 2020-09-03 PROCEDURE — 90682 RIV4 VACC RECOMBINANT DNA IM: CPT

## 2020-09-03 PROCEDURE — 99214 OFFICE O/P EST MOD 30 MIN: CPT | Performed by: FAMILY MEDICINE

## 2020-09-03 PROCEDURE — 90471 IMMUNIZATION ADMIN: CPT

## 2020-09-03 RX ORDER — METFORMIN HYDROCHLORIDE 500 MG/1
2000 TABLET, EXTENDED RELEASE ORAL
Qty: 360 TABLET | Refills: 0 | Status: SHIPPED | OUTPATIENT
Start: 2020-09-03 | End: 2020-10-21 | Stop reason: SDUPTHER

## 2020-09-03 NOTE — PATIENT INSTRUCTIONS
Recent Results (from the past 672 hour(s))   CBC    Collection Time: 08/13/20 10:35 AM   Result Value Ref Range    WBC 7 07 4 31 - 10 16 Thousand/uL    RBC 4 60 3 88 - 5 62 Million/uL    Hemoglobin 14 5 12 0 - 17 0 g/dL    Hematocrit 43 8 36 5 - 49 3 %    MCV 95 82 - 98 fL    MCH 31 5 26 8 - 34 3 pg    MCHC 33 1 31 4 - 37 4 g/dL    RDW 12 6 11 6 - 15 1 %    Platelets 800 093 - 403 Thousands/uL    MPV 10 7 8 9 - 12 7 fL   Comprehensive metabolic panel    Collection Time: 08/13/20 10:35 AM   Result Value Ref Range    Sodium 137 136 - 145 mmol/L    Potassium 4 1 3 5 - 5 3 mmol/L    Chloride 101 100 - 108 mmol/L    CO2 28 21 - 32 mmol/L    ANION GAP 8 4 - 13 mmol/L    BUN 15 5 - 25 mg/dL    Creatinine 1 06 0 60 - 1 30 mg/dL    Glucose, Fasting 132 (H) 65 - 99 mg/dL    Calcium 9 0 8 3 - 10 1 mg/dL    AST 52 (H) 5 - 45 U/L    ALT 77 12 - 78 U/L    Alkaline Phosphatase 63 46 - 116 U/L    Total Protein 7 4 6 4 - 8 2 g/dL    Albumin 3 9 3 5 - 5 0 g/dL    Total Bilirubin 0 60 0 20 - 1 00 mg/dL    eGFR 74 ml/min/1 73sq m   Hemoglobin A1C    Collection Time: 08/13/20 10:35 AM   Result Value Ref Range    Hemoglobin A1C 7 6 (H) Normal 3 8-5 6%; PreDiabetic 5 7-6 4%; Diabetic >=6 5%; Glycemic control for adults with diabetes <7 0% %     mg/dl   Lipid Panel with Direct LDL reflex    Collection Time: 08/13/20 10:35 AM   Result Value Ref Range    Cholesterol 173 50 - 200 mg/dL    Triglycerides 81 <=150 mg/dL    HDL, Direct 58 >=40 mg/dL    LDL Calculated 99 0 - 100 mg/dL   TSH, 3rd generation    Collection Time: 08/13/20 10:35 AM   Result Value Ref Range    TSH 3RD GENERATON 2 313 0 358 - 3 740 uIU/mL   Microalbumin / creatinine urine ratio    Collection Time: 08/13/20 10:35 AM   Result Value Ref Range    Creatinine, Ur 43 8 mg/dL    Microalbum  ,U,Random 14 7 0 0 - 20 0 mg/L    Microalb Creat Ratio 34 (H) 0 - 30 mg/g creatinine   Tissue Exam    Collection Time: 08/27/20  3:02 PM   Result Value Ref Range    Case Report Surgical Pathology Report                         Case: [id number]                                   Authorizing Provider:  Sal Callahan MD          Collected:           08/27/2020 0102              Ordering Location:     [facility]      Received:            08/27/2020 [address] [place] [address]                                                                       Pathologist:           Citlali Kyle MD                                                           Specimen:    Skin, Other, Specimen A Right dorsal forearm                                               Final Diagnosis       A  Skin, right dorsal forearm, shave biopsy:    SQUAMOUS CELL CARCINOMA IN SITU; extending to the tissue edges  Additional Information       All reported additional testing was performed with appropriately reactive controls  These tests were developed and their performance characteristics determined by [facility] or appropriate performing facility, though some tests may be performed on tissues which have not been validated for performance characteristics (such as staining performed on alcohol exposed cell blocks and decalcified tissues)  Results should be interpreted with caution and in the context of the patients clinical condition  These tests may not be cleared or approved by the [place]  Food and Drug Administration, though the FDA has determined that such clearance or approval is not necessary  These tests are used for clinical purposes and they should not be regarded as investigational or for research  This laboratory has been approved by CLIA 88, designated as a high-complexity laboratory and is qualified to perform these tests         Clinical Information       Specimen A: Skin; Shaved biopsy; Right dorsal forearm; 60 yo male with 1 1 by 7 mm, scaly pink thin papule: Diff Dx:SCC vs SCCis vs hyperkeratotic actinic keratosis    Gross Description       A   The specimen is received in formalin, labeled with the patient's name and hospital number, and is designated "right dorsal forearm  The specimen consists of a 0 7 x 0 9 x 0 1 cm shave biopsy of tan white hair-bearing skin  The epithelial surface appears keratotic and is inked red and the margin of resection is inked green  The specimen is trisected revealing grossly unremarkable cut surfaces  The specimen is entirely submitted between sponges, 1 cassette  Note: The estimated total formalin fixation time based upon information provided by the submitting clinician and the standard processing schedule is under 72 hours      Radha Mathis

## 2020-09-03 NOTE — ASSESSMENT & PLAN NOTE
Lab Results   Component Value Date    HGBA1C 7 6 (H) 08/13/2020     Diabetes not at goal but has improved    Patient has started back at the gym this week

## 2020-09-04 ENCOUNTER — TELEPHONE (OUTPATIENT)
Dept: DERMATOLOGY | Facility: CLINIC | Age: 65
End: 2020-09-04

## 2020-09-04 NOTE — TELEPHONE ENCOUNTER
Patient walked in stating Dr Pamela Cross told him he needed surgery  He came in to set up an appointment  Is this for Mohs? Please advice

## 2020-09-04 NOTE — RESULT ENCOUNTER NOTE
Spoke with patient wife she states he work here at the "[employer] he was thinking of stopping by today  Advised wife if he doesn't atop today to please have him call our office

## 2020-09-08 NOTE — TELEPHONE ENCOUNTER
Patient volunteers at [facility], he walked in to get an update as to when he would be getting a call to schedule his Mohs surgery  He was told it is being worked on, and that a message would be sent again  Thanks!

## 2020-09-09 ENCOUNTER — TELEPHONE (OUTPATIENT)
Dept: DERMATOLOGY | Facility: CLINIC | Age: 65
End: 2020-09-09

## 2020-09-09 NOTE — TELEPHONE ENCOUNTER
Called patient back at preferred number and able to speak with both patient and his wife regarding below bx resulted on 8/27/2020:     Skin, right dorsal forearm, shave biopsy:     SQUAMOUS CELL CARCINOMA IN SITU; extending to the tissue edges  Clarified that we are in fact doing a destruction [facility]) procedure for treatment (in which we serially do a few scrape and burns in a row), not Mohs  He and wife voiced understanding  We are okay to call and schedule him in my clinic for a procedure slot for this destruction  [address] [facility] staff to assist with scheduling  We are okay to call preferred number or mobile number and speak with wife for scheduling

## 2020-09-24 ENCOUNTER — PROCEDURE VISIT (OUTPATIENT)
Dept: DERMATOLOGY | Facility: CLINIC | Age: 65
End: 2020-09-24
Payer: COMMERCIAL

## 2020-09-24 VITALS — HEIGHT: 71 IN | TEMPERATURE: 98.3 F | BODY MASS INDEX: 38.64 KG/M2 | WEIGHT: 276 LBS

## 2020-09-24 DIAGNOSIS — D04.61 SQUAMOUS CELL CARCINOMA IN SITU OF SKIN OF FOREARM, RIGHT: Primary | ICD-10-CM

## 2020-09-24 PROCEDURE — 17261 DSTRJ MAL LES T/A/L .6-1.0CM: CPT | Performed by: STUDENT IN AN ORGANIZED HEALTH CARE EDUCATION/TRAINING PROGRAM

## 2020-09-24 NOTE — PATIENT INSTRUCTIONS
PROCEDURE:  DESICCATION AND CURETTAGE OF BENIGN LESIONS      Assessment and Plan:  Based on a thorough discussion of this condition and the management approach to it (including a comprehensive discussion of the known risks, side effects and potential benefits of treatment), the patient (family) agrees to destruction of the above described lesions by desiccation and curettage  Procedure:   The area was cleanly prepped   Anesthesia:1% xylocaine with epi  or None   Lesions were remove by sterile curettage   Hemostasis:Electrocautery    Tissue submitted to pathology: No   Postop instructions were discussed and reviewed            DISCUSSION OF TREATMENT AND POSTOP CARE FOR PATIENT    What is curettage and desiccation? Curettage and desiccation is a type of electrosurgery in which a skin lesion is scraped off and heat is applied to the skin surface  What is involved in curettage and cautery? Your doctor will explain to you why your skin lesion needs treatment and the procedure involved  You may have to sign a consent form to indicate that you consent to the surgical procedure  Tell your doctor if you are taking any medication, or if you have any allergies or medical conditions  The doctor will inject some local anaesthetic into the area surrounding the lesion to be treated  This will make the skin go numb so no pain should be felt during the procedure  You may feel a pushing sensation but this should not be painful  The skin lesion is scraped off with a curette, which is like a small spoon with very sharp edges  The lesion(s)  usually are sent to a pathology laboratory for analysis  The wound surface is then cauterised with a hot wire beaded tip or electrosurgical unit (diathermy)  This stops bleeding and helps destroys any remaining skin tumour cells  A dressing may be applied and instructions should be given on how to care for your wound      What types of skin lesions can be treated by curettage? Curettage is suitable to treat lesions where the material being scraped off is softer than the surrounding skin or when there is a natural cleavage plane between the lesion and the surrounding normal tissue  The following are sometimes  treated by curettage:   Seborrheic keratoses    Viral warts    Small epidermal inclusion cysts (milia)    Will I have a scar? Curettage often results in some sort of scar especially if accompanied by cautery  The scars from curettage are usually flat and round  They are a similar size to that of the original skin lesion  Some people have an abnormal response to skin healing and these people may get larger scars than usual (keloids and hypertrophic scarring)  How do I look after the wound following skin curettage? The wound may be tender when the local anaesthetic wears off   Leave the dressing in place till the nest day  Avoid strenuous exertion and stretching of the area   If there is any bleeding, press on the wound firmly with a folded towel without looking at it for 30 minutes  If it is still bleeding after this time, seek medical attention  Follow instructions a written in our consent ,  The wound from curettage will take approximately 2-3 weeks to heal over  The scar will initially be red and raised but usually reduces in colour and size over several months

## 2020-10-07 ENCOUNTER — APPOINTMENT (OUTPATIENT)
Dept: RADIOLOGY | Facility: CLINIC | Age: 65
End: 2020-10-07
Payer: COMMERCIAL

## 2020-10-07 ENCOUNTER — OFFICE VISIT (OUTPATIENT)
Dept: OBGYN CLINIC | Facility: CLINIC | Age: 65
End: 2020-10-07
Payer: COMMERCIAL

## 2020-10-07 VITALS
WEIGHT: 277.6 LBS | BODY MASS INDEX: 38.86 KG/M2 | HEIGHT: 71 IN | SYSTOLIC BLOOD PRESSURE: 140 MMHG | DIASTOLIC BLOOD PRESSURE: 80 MMHG | HEART RATE: 74 BPM

## 2020-10-07 DIAGNOSIS — M17.11 PRIMARY OSTEOARTHRITIS OF RIGHT KNEE: ICD-10-CM

## 2020-10-07 DIAGNOSIS — M17.11 PRIMARY OSTEOARTHRITIS OF RIGHT KNEE: Primary | ICD-10-CM

## 2020-10-07 DIAGNOSIS — M25.561 CHRONIC PAIN OF RIGHT KNEE: ICD-10-CM

## 2020-10-07 DIAGNOSIS — G89.29 CHRONIC PAIN OF RIGHT KNEE: ICD-10-CM

## 2020-10-07 PROCEDURE — 3079F DIAST BP 80-89 MM HG: CPT | Performed by: ORTHOPAEDIC SURGERY

## 2020-10-07 PROCEDURE — 99214 OFFICE O/P EST MOD 30 MIN: CPT | Performed by: ORTHOPAEDIC SURGERY

## 2020-10-07 PROCEDURE — 73562 X-RAY EXAM OF KNEE 3: CPT

## 2020-10-07 PROCEDURE — 3077F SYST BP >= 140 MM HG: CPT | Performed by: ORTHOPAEDIC SURGERY

## 2020-10-07 PROCEDURE — 1036F TOBACCO NON-USER: CPT | Performed by: ORTHOPAEDIC SURGERY

## 2020-10-21 DIAGNOSIS — E11.40 TYPE 2 DIABETES MELLITUS WITH DIABETIC NEUROPATHY, WITHOUT LONG-TERM CURRENT USE OF INSULIN (HCC): ICD-10-CM

## 2020-10-21 RX ORDER — METFORMIN HYDROCHLORIDE 500 MG/1
2000 TABLET, EXTENDED RELEASE ORAL
Qty: 360 TABLET | Refills: 0 | Status: SHIPPED | OUTPATIENT
Start: 2020-10-21 | End: 2021-01-22 | Stop reason: SDUPTHER

## 2020-11-09 ENCOUNTER — TELEPHONE (OUTPATIENT)
Dept: OBGYN CLINIC | Facility: CLINIC | Age: 65
End: 2020-11-09

## 2020-11-20 ENCOUNTER — TRANSCRIBE ORDERS (OUTPATIENT)
Dept: LAB | Facility: CLINIC | Age: 65
End: 2020-11-20

## 2020-11-20 ENCOUNTER — LAB (OUTPATIENT)
Dept: LAB | Facility: CLINIC | Age: 65
End: 2020-11-20
Payer: COMMERCIAL

## 2020-11-20 DIAGNOSIS — E11.40 TYPE 2 DIABETES MELLITUS WITH DIABETIC NEUROPATHY, WITHOUT LONG-TERM CURRENT USE OF INSULIN (HCC): ICD-10-CM

## 2020-11-20 DIAGNOSIS — I10 ESSENTIAL HYPERTENSION: ICD-10-CM

## 2020-11-20 DIAGNOSIS — Z85.46 HISTORY OF PROSTATE CANCER: ICD-10-CM

## 2020-11-20 DIAGNOSIS — E78.2 MIXED HYPERLIPIDEMIA: ICD-10-CM

## 2020-11-20 LAB
ALBUMIN SERPL BCP-MCNC: 3.9 G/DL (ref 3.5–5)
ALP SERPL-CCNC: 60 U/L (ref 46–116)
ALT SERPL W P-5'-P-CCNC: 71 U/L (ref 12–78)
ANION GAP SERPL CALCULATED.3IONS-SCNC: 10 MMOL/L (ref 4–13)
AST SERPL W P-5'-P-CCNC: 43 U/L (ref 5–45)
BILIRUB SERPL-MCNC: 0.69 MG/DL (ref 0.2–1)
BUN SERPL-MCNC: 22 MG/DL (ref 5–25)
CALCIUM SERPL-MCNC: 9 MG/DL (ref 8.3–10.1)
CHLORIDE SERPL-SCNC: 102 MMOL/L (ref 100–108)
CHOLEST SERPL-MCNC: 175 MG/DL (ref 50–200)
CO2 SERPL-SCNC: 26 MMOL/L (ref 21–32)
CREAT SERPL-MCNC: 0.96 MG/DL (ref 0.6–1.3)
ERYTHROCYTE [DISTWIDTH] IN BLOOD BY AUTOMATED COUNT: 12.1 % (ref 11.6–15.1)
EST. AVERAGE GLUCOSE BLD GHB EST-MCNC: 174 MG/DL
GFR SERPL CREATININE-BSD FRML MDRD: 83 ML/MIN/1.73SQ M
GLUCOSE P FAST SERPL-MCNC: 183 MG/DL (ref 65–99)
HBA1C MFR BLD: 7.7 %
HCT VFR BLD AUTO: 44 % (ref 36.5–49.3)
HDLC SERPL-MCNC: 61 MG/DL
HGB BLD-MCNC: 14.6 G/DL (ref 12–17)
LDLC SERPL CALC-MCNC: 87 MG/DL (ref 0–100)
MCH RBC QN AUTO: 32.1 PG (ref 26.8–34.3)
MCHC RBC AUTO-ENTMCNC: 33.2 G/DL (ref 31.4–37.4)
MCV RBC AUTO: 97 FL (ref 82–98)
PLATELET # BLD AUTO: 178 THOUSANDS/UL (ref 149–390)
PMV BLD AUTO: 10.5 FL (ref 8.9–12.7)
POTASSIUM SERPL-SCNC: 4.2 MMOL/L (ref 3.5–5.3)
PROT SERPL-MCNC: 7.1 G/DL (ref 6.4–8.2)
PSA SERPL-MCNC: <0.1 NG/ML (ref 0–4)
RBC # BLD AUTO: 4.55 MILLION/UL (ref 3.88–5.62)
SODIUM SERPL-SCNC: 138 MMOL/L (ref 136–145)
TRIGL SERPL-MCNC: 136 MG/DL
WBC # BLD AUTO: 5.8 THOUSAND/UL (ref 4.31–10.16)

## 2020-11-20 PROCEDURE — G0103 PSA SCREENING: HCPCS

## 2020-11-20 PROCEDURE — 3051F HG A1C>EQUAL 7.0%<8.0%: CPT | Performed by: ORTHOPAEDIC SURGERY

## 2020-11-20 PROCEDURE — 83036 HEMOGLOBIN GLYCOSYLATED A1C: CPT

## 2020-11-20 PROCEDURE — 36415 COLL VENOUS BLD VENIPUNCTURE: CPT

## 2020-11-20 PROCEDURE — 85027 COMPLETE CBC AUTOMATED: CPT

## 2020-11-20 PROCEDURE — 80061 LIPID PANEL: CPT

## 2020-11-20 PROCEDURE — 80053 COMPREHEN METABOLIC PANEL: CPT

## 2020-12-02 ENCOUNTER — OFFICE VISIT (OUTPATIENT)
Dept: OBGYN CLINIC | Facility: CLINIC | Age: 65
End: 2020-12-02
Payer: COMMERCIAL

## 2020-12-02 VITALS
HEART RATE: 88 BPM | SYSTOLIC BLOOD PRESSURE: 135 MMHG | HEIGHT: 71 IN | DIASTOLIC BLOOD PRESSURE: 83 MMHG | BODY MASS INDEX: 39.34 KG/M2 | WEIGHT: 281 LBS

## 2020-12-02 DIAGNOSIS — M17.11 PRIMARY OSTEOARTHRITIS OF RIGHT KNEE: Primary | ICD-10-CM

## 2020-12-02 DIAGNOSIS — M25.561 CHRONIC PAIN OF RIGHT KNEE: ICD-10-CM

## 2020-12-02 DIAGNOSIS — G89.29 CHRONIC PAIN OF RIGHT KNEE: ICD-10-CM

## 2020-12-02 PROCEDURE — 3008F BODY MASS INDEX DOCD: CPT | Performed by: ORTHOPAEDIC SURGERY

## 2020-12-02 PROCEDURE — 20610 DRAIN/INJ JOINT/BURSA W/O US: CPT | Performed by: ORTHOPAEDIC SURGERY

## 2020-12-09 ENCOUNTER — OFFICE VISIT (OUTPATIENT)
Dept: OBGYN CLINIC | Facility: CLINIC | Age: 65
End: 2020-12-09
Payer: COMMERCIAL

## 2020-12-09 DIAGNOSIS — M25.561 CHRONIC PAIN OF RIGHT KNEE: ICD-10-CM

## 2020-12-09 DIAGNOSIS — G89.29 CHRONIC PAIN OF RIGHT KNEE: ICD-10-CM

## 2020-12-09 DIAGNOSIS — M17.11 PRIMARY OSTEOARTHRITIS OF RIGHT KNEE: Primary | ICD-10-CM

## 2020-12-09 PROCEDURE — 20610 DRAIN/INJ JOINT/BURSA W/O US: CPT | Performed by: ORTHOPAEDIC SURGERY

## 2020-12-16 ENCOUNTER — OFFICE VISIT (OUTPATIENT)
Dept: OBGYN CLINIC | Facility: CLINIC | Age: 65
End: 2020-12-16
Payer: COMMERCIAL

## 2020-12-16 DIAGNOSIS — M17.11 PRIMARY OSTEOARTHRITIS OF RIGHT KNEE: Primary | ICD-10-CM

## 2020-12-16 DIAGNOSIS — M25.561 CHRONIC PAIN OF RIGHT KNEE: ICD-10-CM

## 2020-12-16 DIAGNOSIS — G89.29 CHRONIC PAIN OF RIGHT KNEE: ICD-10-CM

## 2020-12-16 PROCEDURE — 20610 DRAIN/INJ JOINT/BURSA W/O US: CPT | Performed by: ORTHOPAEDIC SURGERY

## 2021-01-08 ENCOUNTER — IMMUNIZATIONS (OUTPATIENT)
Dept: FAMILY MEDICINE CLINIC | Facility: HOSPITAL | Age: 66
End: 2021-01-08

## 2021-01-08 DIAGNOSIS — Z23 ENCOUNTER FOR IMMUNIZATION: ICD-10-CM

## 2021-01-08 PROCEDURE — 91300 SARS-COV-2 / COVID-19 MRNA VACCINE (PFIZER-BIONTECH) 30 MCG: CPT

## 2021-01-08 PROCEDURE — 0001A SARS-COV-2 / COVID-19 MRNA VACCINE (PFIZER-BIONTECH) 30 MCG: CPT

## 2021-01-14 ENCOUNTER — OFFICE VISIT (OUTPATIENT)
Dept: OBGYN CLINIC | Facility: CLINIC | Age: 66
End: 2021-01-14
Payer: COMMERCIAL

## 2021-01-14 VITALS
HEIGHT: 71 IN | HEART RATE: 90 BPM | BODY MASS INDEX: 38.84 KG/M2 | SYSTOLIC BLOOD PRESSURE: 132 MMHG | WEIGHT: 277.4 LBS | DIASTOLIC BLOOD PRESSURE: 80 MMHG

## 2021-01-14 DIAGNOSIS — G89.29 CHRONIC PAIN OF LEFT KNEE: ICD-10-CM

## 2021-01-14 DIAGNOSIS — E11.40 TYPE 2 DIABETES MELLITUS WITH DIABETIC NEUROPATHY, WITHOUT LONG-TERM CURRENT USE OF INSULIN (HCC): ICD-10-CM

## 2021-01-14 DIAGNOSIS — M17.12 PRIMARY OSTEOARTHRITIS OF LEFT KNEE: Primary | ICD-10-CM

## 2021-01-14 DIAGNOSIS — M25.562 CHRONIC PAIN OF LEFT KNEE: ICD-10-CM

## 2021-01-14 PROCEDURE — 99213 OFFICE O/P EST LOW 20 MIN: CPT | Performed by: ORTHOPAEDIC SURGERY

## 2021-01-14 PROCEDURE — 3008F BODY MASS INDEX DOCD: CPT | Performed by: ORTHOPAEDIC SURGERY

## 2021-01-14 PROCEDURE — 3075F SYST BP GE 130 - 139MM HG: CPT | Performed by: ORTHOPAEDIC SURGERY

## 2021-01-14 PROCEDURE — 1160F RVW MEDS BY RX/DR IN RCRD: CPT | Performed by: ORTHOPAEDIC SURGERY

## 2021-01-14 PROCEDURE — 1036F TOBACCO NON-USER: CPT | Performed by: ORTHOPAEDIC SURGERY

## 2021-01-14 PROCEDURE — 3079F DIAST BP 80-89 MM HG: CPT | Performed by: ORTHOPAEDIC SURGERY

## 2021-01-14 PROCEDURE — 20610 DRAIN/INJ JOINT/BURSA W/O US: CPT | Performed by: ORTHOPAEDIC SURGERY

## 2021-01-14 RX ORDER — TRIAMCINOLONE ACETONIDE 40 MG/ML
80 INJECTION, SUSPENSION INTRA-ARTICULAR; INTRAMUSCULAR
Status: COMPLETED | OUTPATIENT
Start: 2021-01-14 | End: 2021-01-14

## 2021-01-14 RX ORDER — HYALURONATE SODIUM 30 MG/2 ML
SYRINGE (ML) INTRAARTICULAR
COMMUNITY
Start: 2020-11-12 | End: 2021-10-21 | Stop reason: ALTCHOICE

## 2021-01-14 RX ORDER — BUPIVACAINE HYDROCHLORIDE 5 MG/ML
6 INJECTION, SOLUTION EPIDURAL; INTRACAUDAL
Status: COMPLETED | OUTPATIENT
Start: 2021-01-14 | End: 2021-01-14

## 2021-01-14 RX ADMIN — BUPIVACAINE HYDROCHLORIDE 6 ML: 5 INJECTION, SOLUTION EPIDURAL; INTRACAUDAL at 10:01

## 2021-01-14 RX ADMIN — TRIAMCINOLONE ACETONIDE 80 MG: 40 INJECTION, SUSPENSION INTRA-ARTICULAR; INTRAMUSCULAR at 10:01

## 2021-01-14 NOTE — PROGRESS NOTES
Assessment/Plan:  1  Primary osteoarthritis of left knee  Large joint arthrocentesis   2  Chronic pain of left knee  Large joint arthrocentesis   3  Type 2 diabetes mellitus with diabetic neuropathy, without long-term current use of insulin ([address] )  Large joint arthrocentesis     Mortimer Salk is a very pleasant 58-year-old gentleman well known to our practice presenting today follow-up his activity related left knee pain due to his underlying osteoarthritis  His right knee has responded well to the recently administered Orthovisc injections  Since it has been over a year since his last left knee cortisone injection, consented to and underwent the injection as detailed below, which he tolerated well without difficulty or complication  Post injection instructions were provided  He is aware that it may cause transient increases in his blood sugars  We will plan to see him back in 3-4 months for his left knee as needed or as needed for his right knee  He expressed understanding all of his questions were addressed    Large joint arthrocentesis: L knee  Universal Protocol:  Consent: Verbal consent obtained  Risks and benefits: risks, benefits and alternatives were discussed  Consent given by: patient  Time out: Immediately prior to procedure a "time out" was called to verify the correct patient, procedure, equipment, support staff and site/side marked as required    Timeout called at: 1/14/2021 9:57 AM   Site marked: the operative site was marked  Patient identity confirmed: verbally with patient    Supporting Documentation  Indications: pain   Procedure Details  Location: knee - L knee  Preparation: Patient was prepped and draped in the usual sterile fashion  Needle size: 20 G  Ultrasound guidance: no  Approach: anterolateral  Medications administered: 6 mL bupivacaine (PF) 0 5 %; 80 mg triamcinolone acetonide 40 mg/mL    Patient tolerance: patient tolerated the procedure well with no immediate complications  Dressing: Sterile dressing applied        Subjective: Left knee follow up    Patient ID: Renzo Friday is a 72 y o  male  Marcio Mccann is a very pleasant 20-year-old gentleman well known to our practice for his activity related bilateral knee pain due to his underlying osteoarthritis  His right knee is responding well to the Orthovisc injections that were completed last month  Because of this, his left knee has become more troublesome  He is interested in a repeat cortisone injection, as the when he had a year ago did provide significant relief  He denies any new injuries or symptoms    Review of Systems   Constitutional: Positive for activity change  HENT: Negative  Eyes: Negative  Respiratory: Negative  Cardiovascular: Negative  Gastrointestinal: Negative  Endocrine: Negative  Genitourinary: Negative  Musculoskeletal: Positive for arthralgias, gait problem, joint swelling and myalgias  Skin: Negative  Allergic/Immunologic: Negative  Hematological: Negative  Psychiatric/Behavioral: Negative          Past Medical History:   Diagnosis Date    Arthritis     Chest tightness     Diabetes ([facility] [address] [facility]     Diverticulosis of sigmoid colon     Esophageal reflux     Fatty liver     Hearing problem     High cholesterol     History of chest pain     Hyperlipemia     Hypertension     Lumbago     Malignant neoplasm of prostate (HCC)     Prostate CA ([address] )     Tinea pedis of both feet     Trochanteric bursitis        Past Surgical History:   Procedure Laterality Date    COLONOSCOPY  2016    PROSTATECTOMY N/A 2011       Family History   Problem Relation Age of Onset    Arthritis Mother     Parkinsonism Mother     Heart disease Father     Arthritis Father     Coronary artery disease Father     Hypertension Father     Parkinsonism Father     Mental illness Neg Hx        Social History     Occupational History    Not on file   Tobacco Use    Smoking status: Former Smoker     Quit date: 2012     Years since quittin 5    Smokeless tobacco: Never Used   Substance and Sexual Activity    Alcohol use: Yes     Frequency: 2-3 times a week     Drinks per session: 3 or 4     Binge frequency: Never     Comment: Weekends    Drug use: No    Sexual activity: Not on file         Current Outpatient Medications:     ACCU-CHEK FASTCLIX LANCETS MISC, by Does not apply route daily Dx: E11 40, Disp: 100 each, Rfl: 1    aspirin 81 mg chewable tablet, Chew 1 tablet, Disp: , Rfl:     Diclofenac Sodium (VOLTAREN) 1 %, diclofenac 1 % topical gel, Disp: , Rfl:     glucose blood (ACCU-CHEK GUIDE) test strip, 1 each by Other route daily DX: e11 40, Disp: 100 each, Rfl: 1    lisinopril-hydrochlorothiazide (PRINZIDE,ZESTORETIC) 20-12 5 MG per tablet, TAKE 1 TABLET DAILY, Disp: 90 tablet, Rfl: 3    metFORMIN (GLUCOPHAGE-XR) 500 mg 24 hr tablet, Take 4 tablets (2,000 mg total) by mouth daily with breakfast, Disp: 360 tablet, Rfl: 0    MILK THISTLE PO, Take by mouth daily, Disp: , Rfl:     Omega-3 Fatty Acids (FISH OIL) 1,000 mg, Take 2,000 mg by mouth 2 (two) times a day  , Disp: , Rfl:     OrthoVisc 30 MG/2ML SOSY injection, , Disp: , Rfl:     RABEprazole (ACIPHEX) 20 MG tablet, every other day  , Disp: , Rfl: 0    rosuvastatin (CRESTOR) 20 MG tablet, TAKE 1 TABLET DAILY, Disp: 90 tablet, Rfl: 3    VITAMIN E PO, Take by mouth 2 (two) times a day, Disp: , Rfl:     Allergies   Allergen Reactions    Penicillins        Objective:  Vitals:    21 0947   BP: 132/80   Pulse: 90       Body mass index is 38 69 kg/m²  Left Knee Exam     Muscle Strength   The patient has normal left knee strength  Tenderness   The patient is experiencing tenderness in the lateral joint line, LCL and patella      Range of Motion   Extension:  0 normal   Flexion:  130 (pain at end range) normal     Tests   Zenaida:  Medial - negative Lateral - negative  Varus: negative Valgus: negative  Drawer:  Anterior - negative Posterior - negative  Patellar apprehension: negative    Other   Erythema: absent  Scars: absent  Sensation: normal  Pulse: present  Swelling: none  Effusion: no effusion present    Comments:  Mild PF crepitus and negative PFG  Collateral ligaments stable at 0, 30, and 90  Thigh and calf soft and non tender  Ambulates with slightly antalgic gait on the left  No tenderness IT band  Grossly NVI          Observations   Left Knee   Negative for effusion  Physical Exam  Vitals signs and nursing note reviewed  Constitutional:       Appearance: He is well-developed  Comments: Body mass index is 38 69 kg/m²  HENT:      Head: Normocephalic and atraumatic  Right Ear: External ear normal       Left Ear: External ear normal    Eyes:      Extraocular Movements: Extraocular movements intact  Neck:      Musculoskeletal: Normal range of motion  Cardiovascular:      Rate and Rhythm: Normal rate  Pulmonary:      Effort: Pulmonary effort is normal    Abdominal:      Palpations: Abdomen is soft  Musculoskeletal:      Left knee: He exhibits no effusion  Comments: See ortho exam   Skin:     General: Skin is warm and dry  Neurological:      Mental Status: He is alert and oriented to person, place, and time  Psychiatric:         Mood and Affect: Mood normal          Behavior: Behavior normal          Thought Content:  Thought content normal          Judgment: Judgment normal

## 2021-01-22 DIAGNOSIS — E11.40 TYPE 2 DIABETES MELLITUS WITH DIABETIC NEUROPATHY, WITHOUT LONG-TERM CURRENT USE OF INSULIN (HCC): ICD-10-CM

## 2021-01-22 RX ORDER — METFORMIN HYDROCHLORIDE 500 MG/1
2000 TABLET, EXTENDED RELEASE ORAL
Qty: 360 TABLET | Refills: 0 | Status: SHIPPED | OUTPATIENT
Start: 2021-01-22 | End: 2021-04-23 | Stop reason: SDUPTHER

## 2021-01-29 ENCOUNTER — IMMUNIZATIONS (OUTPATIENT)
Dept: FAMILY MEDICINE CLINIC | Facility: HOSPITAL | Age: 66
End: 2021-01-29

## 2021-01-29 DIAGNOSIS — Z23 ENCOUNTER FOR IMMUNIZATION: Primary | ICD-10-CM

## 2021-01-29 PROCEDURE — 0002A SARS-COV-2 / COVID-19 MRNA VACCINE (PFIZER-BIONTECH) 30 MCG: CPT

## 2021-01-29 PROCEDURE — 91300 SARS-COV-2 / COVID-19 MRNA VACCINE (PFIZER-BIONTECH) 30 MCG: CPT

## 2021-03-29 ENCOUNTER — TELEPHONE (OUTPATIENT)
Dept: FAMILY MEDICINE CLINIC | Facility: CLINIC | Age: 66
End: 2021-03-29

## 2021-03-29 DIAGNOSIS — I10 ESSENTIAL HYPERTENSION: ICD-10-CM

## 2021-03-29 DIAGNOSIS — E78.2 MIXED HYPERLIPIDEMIA: ICD-10-CM

## 2021-03-29 DIAGNOSIS — E11.40 TYPE 2 DIABETES MELLITUS WITH DIABETIC NEUROPATHY, WITHOUT LONG-TERM CURRENT USE OF INSULIN (HCC): Primary | ICD-10-CM

## 2021-04-02 ENCOUNTER — TRANSCRIBE ORDERS (OUTPATIENT)
Dept: LAB | Facility: CLINIC | Age: 66
End: 2021-04-02

## 2021-04-06 ENCOUNTER — APPOINTMENT (OUTPATIENT)
Dept: LAB | Facility: CLINIC | Age: 66
End: 2021-04-06
Payer: COMMERCIAL

## 2021-04-06 DIAGNOSIS — E11.40 TYPE 2 DIABETES MELLITUS WITH DIABETIC NEUROPATHY, WITHOUT LONG-TERM CURRENT USE OF INSULIN (HCC): ICD-10-CM

## 2021-04-06 DIAGNOSIS — I10 ESSENTIAL HYPERTENSION: ICD-10-CM

## 2021-04-06 DIAGNOSIS — E78.2 MIXED HYPERLIPIDEMIA: ICD-10-CM

## 2021-04-06 LAB
ALBUMIN SERPL BCP-MCNC: 3.8 G/DL (ref 3.5–5)
ALP SERPL-CCNC: 64 U/L (ref 46–116)
ALT SERPL W P-5'-P-CCNC: 72 U/L (ref 12–78)
ANION GAP SERPL CALCULATED.3IONS-SCNC: 9 MMOL/L (ref 4–13)
AST SERPL W P-5'-P-CCNC: 62 U/L (ref 5–45)
BILIRUB SERPL-MCNC: 0.72 MG/DL (ref 0.2–1)
BUN SERPL-MCNC: 19 MG/DL (ref 5–25)
CALCIUM SERPL-MCNC: 9.1 MG/DL (ref 8.3–10.1)
CHLORIDE SERPL-SCNC: 104 MMOL/L (ref 100–108)
CHOLEST SERPL-MCNC: 166 MG/DL (ref 50–200)
CO2 SERPL-SCNC: 27 MMOL/L (ref 21–32)
CREAT SERPL-MCNC: 1.02 MG/DL (ref 0.6–1.3)
ERYTHROCYTE [DISTWIDTH] IN BLOOD BY AUTOMATED COUNT: 12.2 % (ref 11.6–15.1)
EST. AVERAGE GLUCOSE BLD GHB EST-MCNC: 209 MG/DL
GFR SERPL CREATININE-BSD FRML MDRD: 77 ML/MIN/1.73SQ M
GLUCOSE P FAST SERPL-MCNC: 197 MG/DL (ref 65–99)
HBA1C MFR BLD: 8.9 %
HCT VFR BLD AUTO: 42 % (ref 36.5–49.3)
HDLC SERPL-MCNC: 59 MG/DL
HGB BLD-MCNC: 14.2 G/DL (ref 12–17)
LDLC SERPL CALC-MCNC: 77 MG/DL (ref 0–100)
MCH RBC QN AUTO: 31.5 PG (ref 26.8–34.3)
MCHC RBC AUTO-ENTMCNC: 33.8 G/DL (ref 31.4–37.4)
MCV RBC AUTO: 93 FL (ref 82–98)
PLATELET # BLD AUTO: 163 THOUSANDS/UL (ref 149–390)
PMV BLD AUTO: 10.8 FL (ref 8.9–12.7)
POTASSIUM SERPL-SCNC: 4.1 MMOL/L (ref 3.5–5.3)
PROT SERPL-MCNC: 7.1 G/DL (ref 6.4–8.2)
RBC # BLD AUTO: 4.51 MILLION/UL (ref 3.88–5.62)
SODIUM SERPL-SCNC: 140 MMOL/L (ref 136–145)
TRIGL SERPL-MCNC: 149 MG/DL
WBC # BLD AUTO: 5.5 THOUSAND/UL (ref 4.31–10.16)

## 2021-04-06 PROCEDURE — 3052F HG A1C>EQUAL 8.0%<EQUAL 9.0%: CPT | Performed by: FAMILY MEDICINE

## 2021-04-06 PROCEDURE — 80061 LIPID PANEL: CPT

## 2021-04-06 PROCEDURE — 36415 COLL VENOUS BLD VENIPUNCTURE: CPT

## 2021-04-06 PROCEDURE — 80053 COMPREHEN METABOLIC PANEL: CPT

## 2021-04-06 PROCEDURE — 85027 COMPLETE CBC AUTOMATED: CPT

## 2021-04-06 PROCEDURE — 83036 HEMOGLOBIN GLYCOSYLATED A1C: CPT

## 2021-04-14 ENCOUNTER — OFFICE VISIT (OUTPATIENT)
Dept: FAMILY MEDICINE CLINIC | Facility: CLINIC | Age: 66
End: 2021-04-14
Payer: COMMERCIAL

## 2021-04-14 VITALS
HEART RATE: 82 BPM | SYSTOLIC BLOOD PRESSURE: 136 MMHG | DIASTOLIC BLOOD PRESSURE: 72 MMHG | BODY MASS INDEX: 38.5 KG/M2 | WEIGHT: 275 LBS | RESPIRATION RATE: 16 BRPM | HEIGHT: 71 IN | TEMPERATURE: 99.4 F

## 2021-04-14 DIAGNOSIS — E78.2 MIXED HYPERLIPIDEMIA: ICD-10-CM

## 2021-04-14 DIAGNOSIS — E66.01 SEVERE OBESITY (BMI 35.0-39.9) WITH COMORBIDITY (HCC): ICD-10-CM

## 2021-04-14 DIAGNOSIS — I10 ESSENTIAL HYPERTENSION: ICD-10-CM

## 2021-04-14 DIAGNOSIS — E11.40 TYPE 2 DIABETES MELLITUS WITH DIABETIC NEUROPATHY, WITHOUT LONG-TERM CURRENT USE OF INSULIN (HCC): Primary | ICD-10-CM

## 2021-04-14 PROCEDURE — 3075F SYST BP GE 130 - 139MM HG: CPT | Performed by: FAMILY MEDICINE

## 2021-04-14 PROCEDURE — 1036F TOBACCO NON-USER: CPT | Performed by: FAMILY MEDICINE

## 2021-04-14 PROCEDURE — 3078F DIAST BP <80 MM HG: CPT | Performed by: FAMILY MEDICINE

## 2021-04-14 PROCEDURE — 99214 OFFICE O/P EST MOD 30 MIN: CPT | Performed by: FAMILY MEDICINE

## 2021-04-14 PROCEDURE — 3008F BODY MASS INDEX DOCD: CPT | Performed by: FAMILY MEDICINE

## 2021-04-14 PROCEDURE — 3725F SCREEN DEPRESSION PERFORMED: CPT | Performed by: FAMILY MEDICINE

## 2021-04-14 PROCEDURE — 3288F FALL RISK ASSESSMENT DOCD: CPT | Performed by: FAMILY MEDICINE

## 2021-04-14 PROCEDURE — 1160F RVW MEDS BY RX/DR IN RCRD: CPT | Performed by: FAMILY MEDICINE

## 2021-04-14 PROCEDURE — 1101F PT FALLS ASSESS-DOCD LE1/YR: CPT | Performed by: FAMILY MEDICINE

## 2021-04-14 NOTE — ASSESSMENT & PLAN NOTE
Patient has lost weight but I suspect it is because of his uncontrolled diabetes   Diet discussed at length

## 2021-04-14 NOTE — PROGRESS NOTES
Assessment/Plan:    1  Type 2 diabetes mellitus with diabetic neuropathy, without long-term current use of insulin ([facility])  Assessment & Plan:    Lab Results   Component Value Date    HGBA1C 8 9 (H) 04/06/2021     Not controlled   A1c has gone up significantly    We discussed the poor control and he is going to work on diet and exercise  He would prefer to avoid more medication  Orders:  -     Ambulatory referral to Diabetic Education; Future  -     Hemoglobin A1C; Future; Expected date: 06/28/2021  -     Microalbumin / creatinine urine ratio; Future; Expected date: 06/28/2021    2  Severe obesity (BMI 35 0-39  9) with comorbidity ([address] )  Assessment & Plan:   Patient has lost weight but I suspect it is because of his uncontrolled diabetes   Diet discussed at length      3  Essential hypertension  Assessment & Plan:  Well controlled   Continue lisinopril -hydrochlorothiazide 20-12 5 mg daily    Orders:  -     CBC; Future; Expected date: 06/28/2021  -     Comprehensive metabolic panel; Future; Expected date: 06/28/2021  -     Lipid Panel with Direct LDL reflex; Future; Expected date: 06/28/2021    4  Mixed hyperlipidemia  Assessment & Plan:   Well controlled   Continue rosuvastatin 20 mg daily    Orders:  -     Comprehensive metabolic panel; Future; Expected date: 06/28/2021  -     Lipid Panel with Direct LDL reflex; Future; Expected date: 06/28/2021    BMI Counseling: Body mass index is 38 35 kg/m²  The BMI is above normal  Nutrition recommendations include moderation in carbohydrate intake  There are no Patient Instructions on file for this visit  Return in about 3 months (around 7/14/2021) for Next scheduled follow up  Subjective:      Patient ID: Jaron Shell is a 72 y o  male  Chief Complaint   Patient presents with    Follow-up     medication ac/cma     Results    Diabetes    Blood Pressure Check        Patient seen today for follow-up of his diabetes    He reports that he has not been eating very healthy over the last 3 months  He really sports over the holidays and has been stress eating as well  His 80-year-old niece  of cervical cancer and it was very difficult for him  Hypertension - patient is taking his blood pressure medicine daily  He is not having problems with leg cramps or swelling  Hyperlipidemia -patient is taking his rosuvastatin daily  Denies any associated myalgias  The following portions of the patient's history were reviewed and updated as appropriate: allergies, current medications, past family history, past medical history, past social history, past surgical history and problem list     Review of Systems   Respiratory: Negative  Cardiovascular: Negative  Current Outpatient Medications   Medication Sig Dispense Refill    ACCU-CHEK FASTCLIX LANCETS MISC by Does not apply route daily Dx: E11 40 100 each 1    aspirin 81 mg chewable tablet Chew 1 tablet      Diclofenac Sodium (VOLTAREN) 1 % diclofenac 1 % topical gel      glucose blood (ACCU-CHEK GUIDE) test strip 1 each by Other route daily DX: e11 40 100 each 1    lisinopril-hydrochlorothiazide (PRINZIDE,ZESTORETIC) 20-12 5 MG per tablet TAKE 1 TABLET DAILY 90 tablet 3    metFORMIN (GLUCOPHAGE-XR) 500 mg 24 hr tablet Take 4 tablets (2,000 mg total) by mouth daily with breakfast 360 tablet 0    rosuvastatin (CRESTOR) 20 MG tablet TAKE 1 TABLET DAILY 90 tablet 3    VITAMIN E PO Take by mouth daily       Omega-3 Fatty Acids (FISH OIL) 1,000 mg Take 2,000 mg by mouth 2 (two) times a day        OrthoVisc 30 MG/2ML SOSY injection        No current facility-administered medications for this visit  Objective:    /72   Pulse 82   Temp 99 4 °F (37 4 °C)   Resp 16   Ht 5' 11" (1 803 m)   Wt 125 kg (275 lb)   BMI 38 35 kg/m²        Physical Exam  Vitals signs and nursing note reviewed  Constitutional:       Appearance: He is well-developed     HENT:      Head: Normocephalic and atraumatic  Right Ear: Tympanic membrane and external ear normal       Left Ear: Tympanic membrane and external ear normal    Cardiovascular:      Rate and Rhythm: Normal rate and regular rhythm  Heart sounds: Normal heart sounds  No murmur  Pulmonary:      Effort: Pulmonary effort is normal  No respiratory distress  Breath sounds: Normal breath sounds  No wheezing or rales  Musculoskeletal:      Right lower leg: No edema  Left lower leg: No edema                  Carlo Allen,

## 2021-04-14 NOTE — ASSESSMENT & PLAN NOTE
Lab Results   Component Value Date    HGBA1C 8 9 (H) 04/06/2021     Not controlled   A1c has gone up significantly    We discussed the poor control and he is going to work on diet and exercise  He would prefer to avoid more medication

## 2021-04-23 DIAGNOSIS — E11.40 TYPE 2 DIABETES MELLITUS WITH DIABETIC NEUROPATHY, WITHOUT LONG-TERM CURRENT USE OF INSULIN (HCC): ICD-10-CM

## 2021-04-23 RX ORDER — METFORMIN HYDROCHLORIDE 500 MG/1
2000 TABLET, EXTENDED RELEASE ORAL
Qty: 360 TABLET | Refills: 1 | Status: SHIPPED | OUTPATIENT
Start: 2021-04-23 | End: 2021-10-21 | Stop reason: SDUPTHER

## 2021-05-12 DIAGNOSIS — R07.89 CHEST TIGHTNESS: ICD-10-CM

## 2021-05-12 DIAGNOSIS — I10 ESSENTIAL HYPERTENSION: ICD-10-CM

## 2021-05-12 RX ORDER — LISINOPRIL AND HYDROCHLOROTHIAZIDE 20; 12.5 MG/1; MG/1
1 TABLET ORAL DAILY
Qty: 30 TABLET | Refills: 0 | Status: SHIPPED | OUTPATIENT
Start: 2021-05-12 | End: 2021-07-15 | Stop reason: SDUPTHER

## 2021-05-12 RX ORDER — LISINOPRIL AND HYDROCHLOROTHIAZIDE 20; 12.5 MG/1; MG/1
1 TABLET ORAL DAILY
Qty: 90 TABLET | Refills: 3 | Status: SHIPPED | OUTPATIENT
Start: 2021-05-12 | End: 2022-04-19

## 2021-05-12 NOTE — TELEPHONE ENCOUNTER
Patient came into office and stated he needs a refill of his [place]     Patient is completely out of this medication and will need a short fill to [address] [place] [address]      Please send rest of refill to [facility]

## 2021-05-19 ENCOUNTER — APPOINTMENT (OUTPATIENT)
Dept: RADIOLOGY | Facility: CLINIC | Age: 66
End: 2021-05-19
Payer: COMMERCIAL

## 2021-05-19 ENCOUNTER — OFFICE VISIT (OUTPATIENT)
Dept: OBGYN CLINIC | Facility: CLINIC | Age: 66
End: 2021-05-19
Payer: COMMERCIAL

## 2021-05-19 VITALS
SYSTOLIC BLOOD PRESSURE: 126 MMHG | HEIGHT: 71 IN | DIASTOLIC BLOOD PRESSURE: 76 MMHG | HEART RATE: 80 BPM | WEIGHT: 274 LBS | BODY MASS INDEX: 38.36 KG/M2

## 2021-05-19 DIAGNOSIS — G89.29 CHRONIC LEFT SHOULDER PAIN: ICD-10-CM

## 2021-05-19 DIAGNOSIS — M25.512 CHRONIC LEFT SHOULDER PAIN: ICD-10-CM

## 2021-05-19 DIAGNOSIS — M75.42 IMPINGEMENT SYNDROME OF LEFT SHOULDER: Primary | ICD-10-CM

## 2021-05-19 DIAGNOSIS — M25.512 LEFT SHOULDER PAIN, UNSPECIFIED CHRONICITY: ICD-10-CM

## 2021-05-19 PROCEDURE — 3008F BODY MASS INDEX DOCD: CPT | Performed by: ORTHOPAEDIC SURGERY

## 2021-05-19 PROCEDURE — 3078F DIAST BP <80 MM HG: CPT | Performed by: ORTHOPAEDIC SURGERY

## 2021-05-19 PROCEDURE — 73030 X-RAY EXAM OF SHOULDER: CPT

## 2021-05-19 PROCEDURE — 1036F TOBACCO NON-USER: CPT | Performed by: ORTHOPAEDIC SURGERY

## 2021-05-19 PROCEDURE — 1160F RVW MEDS BY RX/DR IN RCRD: CPT | Performed by: ORTHOPAEDIC SURGERY

## 2021-05-19 PROCEDURE — 20610 DRAIN/INJ JOINT/BURSA W/O US: CPT | Performed by: ORTHOPAEDIC SURGERY

## 2021-05-19 PROCEDURE — 3074F SYST BP LT 130 MM HG: CPT | Performed by: ORTHOPAEDIC SURGERY

## 2021-05-19 PROCEDURE — 99214 OFFICE O/P EST MOD 30 MIN: CPT | Performed by: ORTHOPAEDIC SURGERY

## 2021-05-19 RX ORDER — DEXAMETHASONE SODIUM PHOSPHATE 100 MG/10ML
40 INJECTION INTRAMUSCULAR; INTRAVENOUS
Status: COMPLETED | OUTPATIENT
Start: 2021-05-19 | End: 2021-05-19

## 2021-05-19 RX ORDER — LIDOCAINE HYDROCHLORIDE 10 MG/ML
4 INJECTION, SOLUTION INFILTRATION; PERINEURAL
Status: COMPLETED | OUTPATIENT
Start: 2021-05-19 | End: 2021-05-19

## 2021-05-19 RX ADMIN — DEXAMETHASONE SODIUM PHOSPHATE 40 MG: 100 INJECTION INTRAMUSCULAR; INTRAVENOUS at 08:33

## 2021-05-19 RX ADMIN — LIDOCAINE HYDROCHLORIDE 4 ML: 10 INJECTION, SOLUTION INFILTRATION; PERINEURAL at 08:33

## 2021-05-19 NOTE — PROGRESS NOTES
Assessment/Plan:  1  Impingement syndrome of left shoulder  Large joint arthrocentesis: L subacromial bursa   2  Chronic left shoulder pain  XR shoulder 2+ vw left    Large joint arthrocentesis: L subacromial bursa     Scribe Attestation    I,:  Susie Smart am acting as a scribe while in the presence of the attending physician :       I,:  Brent Joyce,  personally performed the services described in this documentation    as scribed in my presence :           Maximo Gonzalez is a pleasant 71-year-old male who presents today for initial evaluation of his left shoulder pain  After reviewing his imaging and performing a thorough history and physical exam I explained that he is suffering with impingement syndrome about his left shoulder  As he has not received relief from NSAID medications at home and does report good relief with prior corticosteroid injection, I did offer to perform an injection for his left shoulder in the office today  After discussing the risks and benefits, he elected to proceed  The injection was administered as documented below and was well tolerated by the patient  Post injection instructions were provided  He may continue with activity to his tolerance  We will see him back on an as-needed basis for this issue  He is a candidate for repeat viscosupplementation injection series for his right knee in June of 2021 and a candidate for repeat corticosteroid injection for his left knee when his activity related pain returns  Large joint arthrocentesis: L subacromial bursa  Universal Protocol:  Consent: Verbal consent obtained  Risks and benefits: risks, benefits and alternatives were discussed  Consent given by: patient  Patient understanding: patient states understanding of the procedure being performed  Site marked: the operative site was marked  Radiology Images displayed and confirmed   If images not available, report reviewed: imaging studies available  Patient identity confirmed: verbally with patient    Supporting Documentation  Indications: pain   Procedure Details  Location: shoulder - L subacromial bursa  Preparation: Patient was prepped and draped in the usual sterile fashion  Needle size: 22 G  Ultrasound guidance: no  Approach: posterior  Medications administered: 4 mL lidocaine 1 %; 40 mg dexamethasone 100 mg/10 mL    Patient tolerance: patient tolerated the procedure well with no immediate complications  Dressing:  Sterile dressing applied          Subjective:   Initial evaluation for left shoulder pain    Patient ID: Sandhya Griffin is a 72 y o  male  Who presents today for initial evaluation of his left shoulder pain  At today's visit, he describes aching pain about the lateral and superior aspect of his shoulder  His pain is worse with activity, particularly when reaching overhead and behind his back  He states that his pain has been increasing in frequency and intensity over the last 8 weeks  He has been using Advil for pain relief which provides little benefit for him  He does also report he suffered with a similar pain in his shoulder approximately 6 years ago which was treated with corticosteroid injection which was quite beneficial for him  He is very active  He denies any acute injury or trauma  He denies any distal paresthesias of the upper extremity  Review of Systems   Constitutional: Positive for activity change  Negative for chills, fever and unexpected weight change  HENT: Negative for hearing loss, nosebleeds and sore throat  Eyes: Negative for pain, redness and visual disturbance  Respiratory: Negative for cough, shortness of breath and wheezing  Cardiovascular: Negative for chest pain, palpitations and leg swelling  Gastrointestinal: Negative for abdominal pain, nausea and vomiting  Endocrine: Negative for polyphagia and polyuria  Genitourinary: Negative for dysuria and hematuria  Musculoskeletal: Positive for arthralgias and myalgias  Negative for joint swelling  See HPI   Skin: Negative for rash and wound  Neurological: Negative for dizziness, numbness and headaches  Psychiatric/Behavioral: Negative for decreased concentration and suicidal ideas  The patient is not nervous/anxious            Past Medical History:   Diagnosis Date    Arthritis     Chest tightness     Diabetes ([address] )     Diverticulosis of sigmoid colon     Esophageal reflux     Fatty liver     Hearing problem     High cholesterol     History of chest pain     Hyperlipemia     Hypertension     Lumbago     Malignant neoplasm of prostate (HCC)     Prostate CA (HCC)     Tinea pedis of both feet     Trochanteric bursitis        Past Surgical History:   Procedure Laterality Date    COLONOSCOPY  2016    PROSTATECTOMY N/A        Family History   Problem Relation Age of Onset    Arthritis Mother     Parkinsonism Mother     Heart disease Father     Arthritis Father     Coronary artery disease Father     Hypertension Father     Parkinsonism Father     Mental illness Neg Hx        Social History     Occupational History    Not on file   Tobacco Use    Smoking status: Former Smoker     Quit date: 2012     Years since quittin 8    Smokeless tobacco: Never Used   Substance and Sexual Activity    Alcohol use: Yes     Frequency: 2-3 times a week     Drinks per session: 3 or 4     Binge frequency: Never     Comment: Weekends    Drug use: No    Sexual activity: Not on file         Current Outpatient Medications:     ACCU-CHEK FASTCLIX LANCETS MISC, by Does not apply route daily Dx: E11 40, Disp: 100 each, Rfl: 1    aspirin 81 mg chewable tablet, Chew 1 tablet, Disp: , Rfl:     Diclofenac Sodium (VOLTAREN) 1 %, diclofenac 1 % topical gel, Disp: , Rfl:     glucose blood (ACCU-CHEK GUIDE) test strip, 1 each by Other route daily DX: e11 40, Disp: 100 each, Rfl: 1    lisinopril-hydrochlorothiazide (PRINZIDE,ZESTORETIC) 20-12 5 MG per tablet, Take 1 tablet by mouth daily, Disp: 90 tablet, Rfl: 3    lisinopril-hydrochlorothiazide (PRINZIDE,ZESTORETIC) 20-12 5 MG per tablet, Take 1 tablet by mouth daily, Disp: 30 tablet, Rfl: 0    metFORMIN (GLUCOPHAGE-XR) 500 mg 24 hr tablet, Take 4 tablets (2,000 mg total) by mouth daily with breakfast, Disp: 360 tablet, Rfl: 1    Omega-3 Fatty Acids (FISH OIL) 1,000 mg, Take 2,000 mg by mouth 2 (two) times a day  , Disp: , Rfl:     OrthoVisc 30 MG/2ML SOSY injection, , Disp: , Rfl:     rosuvastatin (CRESTOR) 20 MG tablet, TAKE 1 TABLET DAILY, Disp: 90 tablet, Rfl: 3    VITAMIN E PO, Take by mouth daily , Disp: , Rfl:     Allergies   Allergen Reactions    Penicillins        Objective:  Vitals:    05/19/21 0742   BP: 126/76   Pulse: 80       Body mass index is 38 22 kg/m²  Left Shoulder Exam     Tenderness   The patient is experiencing no tenderness  Range of Motion   Left shoulder active abduction: 160  Left shoulder forward flexion: 160  Internal rotation 0 degrees: L3     Muscle Strength   Abduction: 5/5   Internal rotation: 5/5   External rotation: 5/5     Tests   Jaimes test: positive  Cross arm: negative  Impingement: positive  Drop arm: positive    Other   Erythema: absent  Scars: absent  Sensation: normal  Pulse: present     Comments:  Empty can: negative  Speed's: positive            Physical Exam  Vitals signs and nursing note reviewed  Constitutional:       Appearance: He is well-developed  HENT:      Head: Normocephalic and atraumatic  Eyes:      General: No scleral icterus  Conjunctiva/sclera: Conjunctivae normal    Neck:      Musculoskeletal: Normal range of motion and neck supple  Cardiovascular:      Rate and Rhythm: Normal rate  Pulmonary:      Effort: Pulmonary effort is normal  No respiratory distress  Musculoskeletal:      Comments: As noted in HPI   Skin:     General: Skin is warm and dry     Neurological:      Mental Status: He is alert and oriented to person, place, and time    Psychiatric:         Behavior: Behavior normal          I have personally reviewed pertinent films in PACS  x-ray of the left shoulder obtained on 05/19/2021 reviewed demonstrating no acute fracture, dislocation, lytic or blastic lesion  There is mild degenerative change of the [facility] and glenohumeral joints with osteophytosis

## 2021-07-02 ENCOUNTER — APPOINTMENT (OUTPATIENT)
Dept: LAB | Facility: CLINIC | Age: 66
End: 2021-07-02
Payer: COMMERCIAL

## 2021-07-02 DIAGNOSIS — E11.40 TYPE 2 DIABETES MELLITUS WITH DIABETIC NEUROPATHY, WITHOUT LONG-TERM CURRENT USE OF INSULIN (HCC): ICD-10-CM

## 2021-07-02 DIAGNOSIS — I10 ESSENTIAL HYPERTENSION: ICD-10-CM

## 2021-07-02 DIAGNOSIS — E78.2 MIXED HYPERLIPIDEMIA: ICD-10-CM

## 2021-07-02 LAB
ALBUMIN SERPL BCP-MCNC: 4 G/DL (ref 3.5–5)
ALP SERPL-CCNC: 72 U/L (ref 46–116)
ALT SERPL W P-5'-P-CCNC: 83 U/L (ref 12–78)
ANION GAP SERPL CALCULATED.3IONS-SCNC: 9 MMOL/L (ref 4–13)
AST SERPL W P-5'-P-CCNC: 58 U/L (ref 5–45)
BILIRUB SERPL-MCNC: 0.45 MG/DL (ref 0.2–1)
BUN SERPL-MCNC: 20 MG/DL (ref 5–25)
CALCIUM SERPL-MCNC: 9.3 MG/DL (ref 8.3–10.1)
CHLORIDE SERPL-SCNC: 102 MMOL/L (ref 100–108)
CHOLEST SERPL-MCNC: 159 MG/DL (ref 50–200)
CO2 SERPL-SCNC: 28 MMOL/L (ref 21–32)
CREAT SERPL-MCNC: 1.18 MG/DL (ref 0.6–1.3)
ERYTHROCYTE [DISTWIDTH] IN BLOOD BY AUTOMATED COUNT: 12.3 % (ref 11.6–15.1)
GFR SERPL CREATININE-BSD FRML MDRD: 64 ML/MIN/1.73SQ M
GLUCOSE P FAST SERPL-MCNC: 214 MG/DL (ref 65–99)
HCT VFR BLD AUTO: 43.9 % (ref 36.5–49.3)
HDLC SERPL-MCNC: 46 MG/DL
HGB BLD-MCNC: 14.5 G/DL (ref 12–17)
LDLC SERPL CALC-MCNC: 81 MG/DL (ref 0–100)
MCH RBC QN AUTO: 31.6 PG (ref 26.8–34.3)
MCHC RBC AUTO-ENTMCNC: 33 G/DL (ref 31.4–37.4)
MCV RBC AUTO: 96 FL (ref 82–98)
PLATELET # BLD AUTO: 200 THOUSANDS/UL (ref 149–390)
PMV BLD AUTO: 11 FL (ref 8.9–12.7)
POTASSIUM SERPL-SCNC: 5 MMOL/L (ref 3.5–5.3)
PROT SERPL-MCNC: 7.6 G/DL (ref 6.4–8.2)
RBC # BLD AUTO: 4.59 MILLION/UL (ref 3.88–5.62)
SODIUM SERPL-SCNC: 139 MMOL/L (ref 136–145)
TRIGL SERPL-MCNC: 158 MG/DL
WBC # BLD AUTO: 7 THOUSAND/UL (ref 4.31–10.16)

## 2021-07-02 PROCEDURE — 83036 HEMOGLOBIN GLYCOSYLATED A1C: CPT

## 2021-07-02 PROCEDURE — 80053 COMPREHEN METABOLIC PANEL: CPT

## 2021-07-02 PROCEDURE — 85027 COMPLETE CBC AUTOMATED: CPT

## 2021-07-02 PROCEDURE — 36415 COLL VENOUS BLD VENIPUNCTURE: CPT

## 2021-07-02 PROCEDURE — 80061 LIPID PANEL: CPT

## 2021-07-03 LAB
EST. AVERAGE GLUCOSE BLD GHB EST-MCNC: 192 MG/DL
HBA1C MFR BLD: 8.3 %

## 2021-07-03 PROCEDURE — 3052F HG A1C>EQUAL 8.0%<EQUAL 9.0%: CPT | Performed by: FAMILY MEDICINE

## 2021-07-12 ENCOUNTER — OFFICE VISIT (OUTPATIENT)
Dept: OBGYN CLINIC | Facility: CLINIC | Age: 66
End: 2021-07-12
Payer: COMMERCIAL

## 2021-07-12 VITALS
SYSTOLIC BLOOD PRESSURE: 143 MMHG | HEART RATE: 80 BPM | HEIGHT: 71 IN | WEIGHT: 276 LBS | DIASTOLIC BLOOD PRESSURE: 80 MMHG | BODY MASS INDEX: 38.64 KG/M2

## 2021-07-12 DIAGNOSIS — M67.441 GANGLION CYST OF FLEXOR TENDON SHEATH OF FINGER OF RIGHT HAND: Primary | ICD-10-CM

## 2021-07-12 PROCEDURE — 20550 NJX 1 TENDON SHEATH/LIGAMENT: CPT | Performed by: SURGERY

## 2021-07-12 PROCEDURE — 99215 OFFICE O/P EST HI 40 MIN: CPT | Performed by: SURGERY

## 2021-07-12 PROCEDURE — 3079F DIAST BP 80-89 MM HG: CPT | Performed by: SURGERY

## 2021-07-12 PROCEDURE — 3077F SYST BP >= 140 MM HG: CPT | Performed by: SURGERY

## 2021-07-12 RX ORDER — LIDOCAINE HYDROCHLORIDE 10 MG/ML
1 INJECTION, SOLUTION INFILTRATION; PERINEURAL
Status: COMPLETED | OUTPATIENT
Start: 2021-07-12 | End: 2021-07-12

## 2021-07-12 RX ADMIN — LIDOCAINE HYDROCHLORIDE 1 ML: 10 INJECTION, SOLUTION INFILTRATION; PERINEURAL at 08:23

## 2021-07-12 NOTE — PROGRESS NOTES
Mark CASEY  Attending, Orthopaedic Surgery  Hand, Wrist, and Elbow Surgery  [facility]      ORTHOPAEDIC HAND, WRIST, AND ELBOW OFFICE  VISIT       ASSESSMENT/PLAN:      72year old male with right long finger proximal phalanx retinacular cyst    Discussed etiology of retinacular cysts of fingers with patient today  Explained that needle and manual decompression could be attempted in office, risks and benefits of procedure were discussed, decision was made to proceed with procedure today in office  Procedure as noted below was successful in decompressing the cyst fully  Advised patient that the cyst may recur, if so repeat decompression vs surgical excision could be considered  Advised he take APAP or NSAID tonight due to likely soreness from procedure  He will follow up PRN  The patient verbalized understanding of exam findings and treatment plan  We engaged in the shared decision-making process and treatment options were discussed at length with the patient  Surgical and conservative management discussed today along with risks and benefits  Diagnoses and all orders for this visit:    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  -     Hand/upper extremity injection        Follow Up:  Return if symptoms worsen or fail to improve             ____________________________________________________________________________________________________________________________________________      CHIEF COMPLAINT:  Chief Complaint   Patient presents with    Right Hand - Pain       SUBJECTIVE:  Bill Holguin is a 72y o  year old RHD male who presents complaining of a small lump in the right long finger  Reports this is been present for about 2 months  Denies any injury or trauma to the hand finger  He does not feel that it is getting any larger since he has noticed a  There is no associated erythema, warmth, numbness, tingling    Is in the volar aspect right around the middle of the proximal phalanx long finger  Pain is mild, intermittent, only worse with pressure over the lump  Pain does not radiate  He has not tried any treatments    He has had no prior problems like this before past        Pain/symptom timing:  Worse during the day when active  Pain/symptom context:  Worse with activites and work  Pain/symptom modifying factors:  Rest makes better, activities make worse  Pain/symptom associated signs/symptoms: none    Prior treatment   · NSAIDsNo   · Injections No   · Bracing/Orthotics No    Physical Therapy No     I have personally reviewed all the relevant PMH, PSH, SH, FH, Medications and allergies      PAST MEDICAL HISTORY:  Past Medical History:   Diagnosis Date    Arthritis     Chest tightness     Diabetes [facility] [address] [facility]     Diverticulosis of sigmoid colon     Esophageal reflux     Fatty liver     Hearing problem     High cholesterol     History of chest pain     Hyperlipemia     Hypertension     Lumbago     Malignant neoplasm of prostate ([facility] [address] [facility]     [facility]     Tinea pedis of both feet     Trochanteric bursitis        PAST SURGICAL HISTORY:  Past Surgical History:   Procedure Laterality Date    COLONOSCOPY  2016    PROSTATECTOMY N/A        FAMILY HISTORY:  Family History   Problem Relation Age of Onset    Arthritis Mother     Parkinsonism Mother     Heart disease Father     Arthritis Father     Coronary artery disease Father     Hypertension Father     Parkinsonism Father     Mental illness Neg Hx        SOCIAL HISTORY:  Social History     Tobacco Use    Smoking status: Former Smoker     Quit date: 2012     Years since quittin 0    Smokeless tobacco: Never Used   Substance Use Topics    Alcohol use: Yes     Comment: Weekends    Drug use: No       MEDICATIONS:    Current Outpatient Medications:     ACCU-CHEK FASTCLIX LANCETS MISC, by Does not apply route daily Dx: E11 40, Disp: 100 each, Rfl: 1    aspirin 81 mg chewable tablet, Chew 1 tablet, Disp: , Rfl:     Diclofenac Sodium (VOLTAREN) 1 %, diclofenac 1 % topical gel, Disp: , Rfl:     glucose blood (ACCU-CHEK GUIDE) test strip, 1 each by Other route daily DX: e11 40, Disp: 100 each, Rfl: 1    lisinopril-hydrochlorothiazide (PRINZIDE,ZESTORETIC) 20-12 5 MG per tablet, Take 1 tablet by mouth daily, Disp: 90 tablet, Rfl: 3    lisinopril-hydrochlorothiazide (PRINZIDE,ZESTORETIC) 20-12 5 MG per tablet, Take 1 tablet by mouth daily, Disp: 30 tablet, Rfl: 0    metFORMIN (GLUCOPHAGE-XR) 500 mg 24 hr tablet, Take 4 tablets (2,000 mg total) by mouth daily with breakfast, Disp: 360 tablet, Rfl: 1    Omega-3 Fatty Acids (FISH OIL) 1,000 mg, Take 2,000 mg by mouth 2 (two) times a day  , Disp: , Rfl:     OrthoVisc 30 MG/2ML SOSY injection, , Disp: , Rfl:     rosuvastatin (CRESTOR) 20 MG tablet, TAKE 1 TABLET DAILY, Disp: 90 tablet, Rfl: 3    VITAMIN E PO, Take by mouth daily , Disp: , Rfl:     ALLERGIES:  Allergies   Allergen Reactions    Penicillins            REVIEW OF SYSTEMS:  Review of Systems    VITALS:  Vitals:    07/12/21 0747   BP: 143/80   Pulse: 80       LABS:  HgA1c:   Lab Results   Component Value Date    HGBA1C 8 3 (H) 07/02/2021     BMP:   Lab Results   Component Value Date    GLUCOSE 137 (H) 12/05/2017    CALCIUM 9 3 07/02/2021     12/05/2017    K 5 0 07/02/2021    CO2 28 07/02/2021     07/02/2021    BUN 20 07/02/2021    CREATININE 1 18 07/02/2021       _____________________________________________________  PHYSICAL EXAMINATION:  General: well developed and well nourished, alert, oriented times 3 and appears comfortable  Psychiatric: Normal  HEENT: Normocephalic, Atraumatic Trachea Midline, No torticollis  Pulmonary: No audible wheezing or respiratory distress   Abdomen/GI: Non tender, non distended   Cardiovascular: No pitting edema, 2+ radial pulse   Skin: No Erythema, No Lacerations, No Fluctuation, No Ulcerations, just ulnar to the flexor tendon of right long finger p1 there is a firm round sub-cm nodule that appears to be associated with the flexor tendon  Neurovascular: Sensation Intact to the Median, Ulnar, Radial Nerve, Motor Intact to the Median, Ulnar, Radial Nerve and Pulses Intact  Musculoskeletal: Normal, except as noted in detailed exam and in HPI  MUSCULOSKELETAL EXAMINATION:  right hand long finger:  Long finger nodule as above, this is mildy ttp  Full ROM of left MCP and IP joints, no visible deformities  No triggering or locking of the long finger  BCR    ___________________________________________________  STUDIES REVIEWED:  n/a          PROCEDURES PERFORMED:  Hand/upper extremity injection  Supporting Documentation  Indications: pain and therapeutic   Procedure Details  Condition comment: long finger P1 retinacular cyst   Needle size: 25 G  Medications administered: 1 mL lidocaine 1 %    Patient tolerance: patient tolerated the procedure well with no immediate complications  Dressing:  Sterile dressing applied    Following infiltration of 1ml of 1% lidocaine in and around the palpable volar nodule of right long finger P1, needle was repeatedly inserted and withdrawn from the nodule subcutaneously and then the nodule was manually decompressed   After procedure was complete the nodule was no longer palpable             _____________________________________________________

## 2021-07-14 NOTE — ASSESSMENT & PLAN NOTE
Lab Results   Component Value Date    HGBA1C 8 3 (H) 07/02/2021     Hemoglobin A1c is down from 8 9 to 8 3

## 2021-07-15 ENCOUNTER — OFFICE VISIT (OUTPATIENT)
Dept: FAMILY MEDICINE CLINIC | Facility: CLINIC | Age: 66
End: 2021-07-15
Payer: COMMERCIAL

## 2021-07-15 VITALS
HEART RATE: 97 BPM | BODY MASS INDEX: 38.22 KG/M2 | SYSTOLIC BLOOD PRESSURE: 130 MMHG | RESPIRATION RATE: 18 BRPM | HEIGHT: 71 IN | OXYGEN SATURATION: 95 % | DIASTOLIC BLOOD PRESSURE: 67 MMHG | TEMPERATURE: 98.8 F | WEIGHT: 273 LBS

## 2021-07-15 DIAGNOSIS — I10 ESSENTIAL HYPERTENSION: ICD-10-CM

## 2021-07-15 DIAGNOSIS — E78.2 MIXED HYPERLIPIDEMIA: ICD-10-CM

## 2021-07-15 DIAGNOSIS — E11.40 TYPE 2 DIABETES MELLITUS WITH DIABETIC NEUROPATHY, WITHOUT LONG-TERM CURRENT USE OF INSULIN (HCC): Primary | ICD-10-CM

## 2021-07-15 DIAGNOSIS — E66.01 SEVERE OBESITY (BMI 35.0-39.9) WITH COMORBIDITY (HCC): ICD-10-CM

## 2021-07-15 PROCEDURE — 1036F TOBACCO NON-USER: CPT | Performed by: FAMILY MEDICINE

## 2021-07-15 PROCEDURE — 1160F RVW MEDS BY RX/DR IN RCRD: CPT | Performed by: FAMILY MEDICINE

## 2021-07-15 PROCEDURE — 99214 OFFICE O/P EST MOD 30 MIN: CPT | Performed by: FAMILY MEDICINE

## 2021-07-15 PROCEDURE — 3008F BODY MASS INDEX DOCD: CPT | Performed by: FAMILY MEDICINE

## 2021-07-15 RX ORDER — EMPAGLIFLOZIN 10 MG/1
10 TABLET, FILM COATED ORAL EVERY MORNING
Qty: 90 TABLET | Refills: 1 | Status: SHIPPED | OUTPATIENT
Start: 2021-07-15 | End: 2022-01-18 | Stop reason: SDUPTHER

## 2021-07-15 NOTE — PROGRESS NOTES
Assessment/Plan:    1  Type 2 diabetes mellitus with diabetic neuropathy, without long-term current use of insulin ([facility])  Assessment & Plan:    Lab Results   Component Value Date    HGBA1C 8 3 (H) 07/02/2021     Hemoglobin A1c is down from 8 9 to 8 3    Orders:  -     Empagliflozin (Jardiance) 10 MG TABS; Take 1 tablet (10 mg total) by mouth every morning  -     Hemoglobin A1C; Future; Expected date: 09/28/2021    2  Essential hypertension  Assessment & Plan:  Well controlled     Orders:  -     CBC; Future; Expected date: 09/28/2021  -     Comprehensive metabolic panel; Future; Expected date: 09/28/2021  -     Lipid Panel with Direct LDL reflex; Future; Expected date: 09/28/2021    3  Mixed hyperlipidemia  Assessment & Plan:  Stable  Continue rosuvastatin 20 mg a day    Orders:  -     Comprehensive metabolic panel; Future; Expected date: 09/28/2021  -     Lipid Panel with Direct LDL reflex; Future; Expected date: 09/28/2021    4  Severe obesity (BMI 35 0-39  9) with comorbidity [facility])  Assessment & Plan:  unchanged            There are no Patient Instructions on file for this visit  Return in about 3 months (around 10/15/2021) for Diabetic follow up  Subjective:      Patient ID: Gena Palacios is a 72 y o  male  Chief Complaint   Patient presents with    Follow-up     3mo f/u  rana       Patient reports that he has been trying to watch his diet  He has been very active with volunteer work  He is disappointed that his sugar did not improve more  He is ready to start medication  He does not want to go to diabetic Education again  Hyperlipidemia-patient is taking his cholesterol medicine regularly  Denies any associated myalgias        The following portions of the patient's history were reviewed and updated as appropriate: allergies, current medications, past family history, past medical history, past social history, past surgical history and problem list     Review of Systems   Constitutional: Negative  Respiratory: Negative  Cardiovascular: Negative  Current Outpatient Medications   Medication Sig Dispense Refill    ACCU-CHEK FASTCLIX LANCETS MISC by Does not apply route daily Dx: E11 40 100 each 1    aspirin 81 mg chewable tablet Chew 1 tablet      glucose blood (ACCU-CHEK GUIDE) test strip 1 each by Other route daily DX: e11 40 100 each 1    lisinopril-hydrochlorothiazide (PRINZIDE,ZESTORETIC) 20-12 5 MG per tablet Take 1 tablet by mouth daily 90 tablet 3    metFORMIN (GLUCOPHAGE-XR) 500 mg 24 hr tablet Take 4 tablets (2,000 mg total) by mouth daily with breakfast 360 tablet 1    Omega-3 Fatty Acids (FISH OIL) 1,000 mg Take 2,000 mg by mouth 2 (two) times a day        rosuvastatin (CRESTOR) 20 MG tablet TAKE 1 TABLET DAILY 90 tablet 3    VITAMIN E PO Take by mouth daily       Diclofenac Sodium (VOLTAREN) 1 % diclofenac 1 % topical gel (Patient not taking: Reported on 7/15/2021)      Empagliflozin (Jardiance) 10 MG TABS Take 1 tablet (10 mg total) by mouth every morning 90 tablet 1    OrthoVisc 30 MG/2ML SOSY injection  (Patient not taking: Reported on 7/15/2021)       No current facility-administered medications for this visit  Objective:    /67   Pulse 97   Temp 98 8 °F (37 1 °C)   Resp 18   Ht 5' 11" (1 803 m)   Wt 124 kg (273 lb)   SpO2 95%   BMI 38 08 kg/m²        Physical Exam  Vitals and nursing note reviewed  Constitutional:       Appearance: He is well-developed  HENT:      Head: Normocephalic and atraumatic  Right Ear: Tympanic membrane and external ear normal       Left Ear: Tympanic membrane and external ear normal    Cardiovascular:      Rate and Rhythm: Normal rate and regular rhythm  Pulses:           Dorsalis pedis pulses are 2+ on the right side and 2+ on the left side  Posterior tibial pulses are 2+ on the right side and 2+ on the left side  Heart sounds: Normal heart sounds  No murmur heard       Pulmonary: Effort: Pulmonary effort is normal  No respiratory distress  Breath sounds: Normal breath sounds  No wheezing or rales  Musculoskeletal:      Right lower leg: No edema  Left lower leg: No edema  Feet:      Right foot:      Skin integrity: No ulcer, skin breakdown, erythema, warmth, callus or dry skin  Left foot:      Skin integrity: No ulcer, skin breakdown, erythema, warmth, callus or dry skin  Patient's shoes and socks removed  Right Foot/Ankle   Right Foot Inspection  Skin Exam: skin normal skin not intact, no dry skin, no warmth, no callus, no erythema, no maceration, no abnormal color, no pre-ulcer, no ulcer and no callus                          Toe Exam: ROM and strength within normal limits  Sensory       Monofilament testing: intact  Vascular  Capillary refills: < 3 seconds  The right DP pulse is 2+  The right PT pulse is 2+  Left Foot/Ankle  Left Foot Inspection  Skin Exam: skin normalskin not intact, no dry skin, no warmth, no erythema, no maceration, normal color, no pre-ulcer, no ulcer and no callus                         Toe Exam: ROM and strength within normal limits                   Sensory       Monofilament: intact  Vascular  Capillary refills: < 3 seconds  The left DP pulse is 2+  The left PT pulse is 2+     Assign Risk Category:  No deformity present; ;        Risk: 0       Lucy Briseno DO

## 2021-08-05 DIAGNOSIS — K76.0 FATTY LIVER: ICD-10-CM

## 2021-08-05 DIAGNOSIS — E78.2 MIXED HYPERLIPIDEMIA: ICD-10-CM

## 2021-08-05 RX ORDER — ROSUVASTATIN CALCIUM 20 MG/1
TABLET, COATED ORAL
Qty: 90 TABLET | Refills: 3 | Status: SHIPPED | OUTPATIENT
Start: 2021-08-05 | End: 2022-08-01

## 2021-09-14 ENCOUNTER — OFFICE VISIT (OUTPATIENT)
Dept: DERMATOLOGY | Facility: CLINIC | Age: 66
End: 2021-09-14
Payer: COMMERCIAL

## 2021-09-14 VITALS — TEMPERATURE: 98 F | BODY MASS INDEX: 37.67 KG/M2 | WEIGHT: 269.1 LBS | HEIGHT: 71 IN

## 2021-09-14 DIAGNOSIS — Z12.83 SCREENING FOR MALIGNANT NEOPLASM OF SKIN: ICD-10-CM

## 2021-09-14 DIAGNOSIS — L91.8 ACROCHORDON: ICD-10-CM

## 2021-09-14 DIAGNOSIS — L57.0 ACTINIC KERATOSIS: ICD-10-CM

## 2021-09-14 DIAGNOSIS — L21.9 SEBORRHEIC DERMATITIS: ICD-10-CM

## 2021-09-14 DIAGNOSIS — L57.8 ACTINIC SKIN DAMAGE: ICD-10-CM

## 2021-09-14 DIAGNOSIS — Z86.007 HISTORY OF SQUAMOUS CELL CARCINOMA IN SITU: Primary | ICD-10-CM

## 2021-09-14 PROCEDURE — 17003 DESTRUCT PREMALG LES 2-14: CPT | Performed by: DERMATOLOGY

## 2021-09-14 PROCEDURE — 3008F BODY MASS INDEX DOCD: CPT | Performed by: DERMATOLOGY

## 2021-09-14 PROCEDURE — 1160F RVW MEDS BY RX/DR IN RCRD: CPT | Performed by: DERMATOLOGY

## 2021-09-14 PROCEDURE — 1036F TOBACCO NON-USER: CPT | Performed by: DERMATOLOGY

## 2021-09-14 PROCEDURE — 99214 OFFICE O/P EST MOD 30 MIN: CPT | Performed by: DERMATOLOGY

## 2021-09-14 PROCEDURE — 17000 DESTRUCT PREMALG LESION: CPT | Performed by: DERMATOLOGY

## 2021-09-14 RX ORDER — FLUOROURACIL 50 MG/G
CREAM TOPICAL 2 TIMES DAILY
Qty: 40 G | Refills: 0 | Status: SHIPPED | OUTPATIENT
Start: 2021-09-14 | End: 2022-04-26 | Stop reason: ALTCHOICE

## 2021-09-14 RX ORDER — KETOCONAZOLE 20 MG/G
CREAM TOPICAL DAILY
Qty: 15 G | Refills: 0 | Status: SHIPPED | OUTPATIENT
Start: 2021-09-14 | End: 2021-10-21 | Stop reason: ALTCHOICE

## 2021-09-14 NOTE — PROGRESS NOTES
[facility] Follow Up Note    Patient Name: Saman Morejon  Encounter Date: 9/14/2021    Today's Chief Concerns:  Helen Flood Concern #1:  Skin exam/Skin tag    Current Medications:    Current Outpatient Medications:     ACCU-CHEK FASTCLIX LANCETS MISC, by Does not apply route daily Dx: E11 40, Disp: 100 each, Rfl: 1    aspirin 81 mg chewable tablet, Chew 1 tablet, Disp: , Rfl:     Empagliflozin (Jardiance) 10 MG TABS, Take 1 tablet (10 mg total) by mouth every morning, Disp: 90 tablet, Rfl: 1    glucose blood (ACCU-CHEK GUIDE) test strip, 1 each by Other route daily DX: e11 40, Disp: 100 each, Rfl: 1    lisinopril-hydrochlorothiazide (PRINZIDE,ZESTORETIC) 20-12 5 MG per tablet, Take 1 tablet by mouth daily, Disp: 90 tablet, Rfl: 3    metFORMIN (GLUCOPHAGE-XR) 500 mg 24 hr tablet, Take 4 tablets (2,000 mg total) by mouth daily with breakfast, Disp: 360 tablet, Rfl: 1    Omega-3 Fatty Acids (FISH OIL) 1,000 mg, Take 2,000 mg by mouth 2 (two) times a day  , Disp: , Rfl:     rosuvastatin (CRESTOR) 20 MG tablet, TAKE 1 TABLET DAILY, Disp: 90 tablet, Rfl: 3    VITAMIN E PO, Take by mouth daily , Disp: , Rfl:     Diclofenac Sodium (VOLTAREN) 1 %, diclofenac 1 % topical gel (Patient not taking: Reported on 7/15/2021), Disp: , Rfl:     OrthoVisc 30 MG/2ML SOSY injection, , Disp: , Rfl:     CONSTITUTIONAL:   Vitals:    09/14/21 0838   Temp: 98 °F (36 7 °C)   TempSrc: Temporal   Weight: 122 kg (269 lb 1 6 oz)   Height: 5' 11" (1 803 m)       Specific Alerts:    Have you been seen by a [facility] Dermatologist in the last 3 years? YES    Are you pregnant or planning to become pregnant? N/A    Are you currently or planning to be nursing or breast feeding? N/A    Allergies   Allergen Reactions    Penicillins        May we call your Preferred Phone number to discuss your specific medical information? YES    May we leave a detailed message that includes your specific medical information?  YES    Have you traveled outside of the [address] [place] [address] in the past 3 months? No    Do you currently have a pacemaker or defibrillator? No    Do you have any artificial heart valves, joints, plates, screws, rods, stents, pins, etc? No    Do you require any medications prior to a surgical procedure? No    Are you taking any medications that cause you to bleed more easily ("blood thinners") YES, aspirin 81 mg    Have you ever experienced a rapid heartbeat with epinephrine? No    Have you ever been treated with "gold" (gold sodium thiomalate) therapy? No    [facility] can help with wrinkles, "laugh lines," facial volume loss, "double chin," "love handles," age spots, and more  Are you interested in learning today about some of the skin enhancement procedures that we offer? (If Yes, please provide more detail) No    Review of Systems:  Have you recently had or currently have any of the following?     · Fever or chills: No  · Night Sweats: No  · Headaches: No  · Weight Gain: No  · Weight Loss: No  · Blurry Vision: No  · Nausea: No  · Vomiting: No  · Diarrhea: No  · Blood in Stool: No  · Abdominal Pain: YES  · Itchy Skin: YES  · Painful Joints: No  · Swollen Joints: No  · Muscle Pain: No  · Irregular Mole: No  · Sun Burn: No  · Dry Skin: No  · Skin Color Changes: No  · Scar or Keloid: No  · Cold Sores/Fever Blisters: No  · Bacterial Infections/MRSA: No  · Anxiety: No  · Depression: No  · Suicidal or Homicidal Thoughts: No      PSYCH: Normal mood and affect  EYES: Normal conjunctiva  ENT: Normal lips and oral mucosa  CARDIOVASCULAR: No edema  RESPIRATORY: Normal respirations  HEME/LYMPH/IMMUNO:  No regional lymphadenopathy except as noted below in ASSESSMENT AND PLAN BY DIAGNOSIS    FULL ORGAN SYSTEM SKIN EXAM (SKIN)   Hair, Scalp, Ears, Face Normal except as noted below in Assessment   Neck, Cervical Chain Nodes Normal except as noted below in Assessment   Right Arm/Hand/Fingers Normal except as noted below in Assessment   Left Arm/Hand/Fingers Normal except as noted below in Assessment   Chest/Breasts/Axillae Viewed areas Normal except as noted below in Assessment   Abdomen, Umbilicus Normal except as noted below in Assessment   Back/Spine Normal except as noted below in Assessment   Groin/Genitalia/Buttocks Viewed areas Normal except as noted below in Assessment   Right Leg, Foot, Toes Normal except as noted below in Assessment   Left Leg, Foot, Toes Normal except as noted below in Assessment       1  HISTORY OF SQUAMOUS CELL CARCINOMA     Physical Exam:   Anatomic Location Affected:  Right dorsal forearm   Morphological Description of Scar:  Well healed scar   Suspected Recurrence: no   Regional adenopathy: no    Additional History of Present Condition:  History of squamous cell carcinoma  Treated with D&C  Assessment and Plan:  Based on a thorough discussion of this condition and the management approach to it (including a comprehensive discussion of the known risks, side effects and potential benefits of treatment), the patient (family) agrees to implement the following specific plan:  · When outside we recommend using a wide brim hat, sunglasses, long sleeve and pants, sunscreen with SPF 72+ with reapplication every 2 hours, or SPF specific clothing    How can SCC be prevented? The most important way to prevent SCC is to avoid sunburn  This is especially important in childhood and early life  Fair skinned individuals and those with a personal or family history of BCC should protect their skin from sun exposure daily, year-round and lifelong   Stay indoors or under the shade in the middle of the day    Wear covering clothing    Apply high protection factor SPF50+ broad-spectrum sunscreens generously to exposed skin if outdoors    Avoid indoor tanning (sun beds, solaria)      What is the outlook for SCC? Most SCC are cured by treatment   Cure is most likely if treatment is undertaken when the lesion is small  A small percent of SCC's can spread to lymph  nodes and long term monitoring is indicated  They are also at increased risk of other skin cancers, especially melanoma  Regular self-skin examinations and long-term annual skin checks by an experienced health professional are recommended  2  EDUARDOON ("SKIN TAG")    Physical Exam:   Anatomic Location Affected:  Left neck, axilla   Morphological Description:  Skin colored and brown pedunculated papules   Pertinent Positives:   Pertinent Negatives: Additional History of Present Condition:  Patient reports a lesion on his neck that his tends to irritate when shaving  Assessment and Plan:  Based on a thorough discussion of this condition and the management approach to it (including a comprehensive discussion of the known risks, side effects and potential benefits of treatment), the patient (family) agrees to implement the following specific plan:   Reassured, benign    Skin tags are common, soft, harmless skin lesions that are also called, in the appropriate settings, papillomas, fibroepithelial polyps, and soft fibromas  They are made up of loosely arranged collagen fibers and blood vessels surrounded by a thickened or thinned-out epidermis  Skin tags tend to develop in both men and women as we grow older  They are usually found on the skin folds (neck, armpits, groin)  It is not known what specifically causes skin tags  Certain factors, though, do appear to play a role:   Chaffing and irritation from skin rubbing together   High levels of growth factors (as seen, for example, in pregnancy or in acromegaly/gigantism)   Insulin resistance   Human papillomavirus (wart virus)    We discussed that most skin tags do not need to be treated unless they are specifically causing the patient physical distress or limitation or pose a risk for a larger problem such as an infection that forms secondary to excoriation or chronic irritation      We had a thorough discussion of treatment options and specific risks (including that any procedural treatment may not be covered by insurance and would then be the patient's responsibility) and benefits/alternatives including but not limited to the following:   Cryotherapy (freezing)   Shave removal   Surgical excision (snip excision with scissors)   Electrosurgery   Ligation (we do not do this procedure and counseled against it due to risk of tissue necrosis and infection)    3  ACTINIC KERATOSIS    Physical Exam:   Anatomic Location Affected:  Right dorsal hand, left neck   Morphological Description:  Pink scaly papules    Additional History of Present Condition:  History of actinic keratosis treated with cryotherapy  Assessment and Plan:  Based on a thorough discussion of this condition and the management approach to it (including a comprehensive discussion of the known risks, side effects and potential benefits of treatment), the patient (family) agrees to implement the following specific plan:     Cryotherapy used today in office    Actinic keratoses are very common on sites repeatedly exposed to the sun, especially the backs of the hands and the face, most often affecting the ears, nose, cheeks, upper lip, vermilion of the lower lip, temples, forehead and balding scalp  In severely chronically sun-damaged individuals, they may also be found on the upper trunk, upper and lower limbs, and dorsum of feet  We discussed the theoretical premalignant (pre-cancerous) nature and etiology of these growths  We discussed the prevailing notion that actinic keratoses are a reflection of abnormal skin cell development due to DNA damage by short wavelength UVB  They are more likely to appear if the immune function is poor, due to aging, recent sun exposure, predisposing disease or certain drugs  We discussed that the main concern is that actinic keratoses may predispose to squamous cell carcinoma   It is rare for a solitary actinic keratosis to evolve to squamous cell carcinoma (SCC), but the risk of SCC occurring at some stage in a patient with more than 10 actinic keratoses is thought to be about 10 to 15%  A tender, thickened, ulcerated or enlarging actinic keratosis is suspicious of SCC  Actinic keratoses may be prevented by strict sun protection  If already present, keratoses may improve with a very high sun protection factor (50+) broad-spectrum sunscreen applied at least daily to affected areas, year-round  We recommend that UPF-rated clothing and hats and sunglasses be worn whenever possible and that a sunscreen-moisturizer combination product such as Neutrogena Daily Defense be applied at least three times a day  We performed a thorough discussion of treatment options and specific risk/benefits/alternatives including but not limited to medical field treatment with medications such as the following:     Topical field area medications such as 5-fluorouracil or Aldara (specifically, the trouble with long-term compliance, blistering and local skin reaction versus the convenience of at-home therapy and that field therapy gets what is not yet seen)   Cryotherapy (specifically, local pain, scarring, dyspigmentation, blistering, possible superinfection, and treats only what we see versus directed treatment today)   Photodynamic therapy (specifically, local pain, scarring, dyspigmentation, blistering, possible superinfection, need to schedule for a later date, and time spent in the office versus field therapy that gets what is not yet seen)      PROCEDURE:  DESTRUCTION OF PRE-MALIGNANT LESIONS  After a thorough discussion of treatment options and risk/benefits/alternatives (including but not limited to local pain, scarring, dyspigmentation, blistering, and possible superinfection), verbal and written consent were obtained and the aforementioned lesions were treated on with cryotherapy using liquid nitrogen x 1 cycle for 5-10 seconds   TOTAL NUMBER of 2 pre-malignant lesions were treated today on the ANATOMIC LOCATION: right dorsal hand, left neck  The patient tolerated the procedure well, and after-care instructions were provided  4  ACTINIC DAMAGE (Chronic Ultraviolet Radiation Exposure)    Physical Exam:   Anatomic Location Affected:  Scalp    Morphological Description:  Mottled (hyper- and hypo-pigmented), slightly atrophic skin with overlying telangiectasia   Pertinent Positives:   Pertinent Negatives:    Assessment and Plan:  Based on a thorough discussion of this condition and the management approach to it (including a comprehensive discussion of the known risks, side effects and potential benefits of treatment), the patient (family) agrees to implement the following specific plan:   Discussed topical treatment   Apply Efudex topically twice daily for 4 weeks     Photo-aging and actinic damage of skin is common on sites repeatedly exposed to the sun, especially the backs of the hands and the face, most often affecting the ears, nose, cheeks, upper lip, vermilion of the lower lip, temples, forehead and balding scalp  In severely chronically sun-exposed individuals, this condition may also be found on the upper trunk, upper and lower limbs, and dorsum of feet  Photo-aging induces cutaneous changes that vary among individuals, reflecting inherent differences in vulnerability to sun exposure and repair capacity  We discussed further steps to minimize or avoid UV exposure:     Be aware of daily UV index levels  In the [facility], this index is often reported on the [address]   Avoid outdoor activities during the middle of the day   Wear sun-protective clothing (e g , UPF-rated, broad-brimmed hats, long sleeves, and trousers or skirts)     Apply a high sun protection factor (60+) broad-spectrum sunscreen moisturizer at least three times a day to affected areas, year-round  I recommended Neutrogena Daily Defense or CeraVe AM or Aveeno   Do not smoke, and where possible, avoid exposure to pollutants   Get plenty of exercise -- active people appear younger than inactive people   Eat fruit and vegetables daily   Many oral supplements with antioxidant and anti-inflammatory properties have been advocated to mitigate skin aging and to improve skin health  These include carotenoids; polyphenols; chlorophyll; aloe vera; vitamins B, C, and E; red ginseng; squalene; and omega-3 fatty acids  Their role in combatting skin aging is unclear  5  SEBORRHEIC DERMATITIS    Physical Exam:   Anatomic Location Affected:  Mid sternum    Morphological Description:  Yellow and red scaly plaques and papule   Pertinent Positives:   Pertinent Negatives: Additional History of Present Condition:  Patient states the area on his chest becomes itchy  Assessment and Plan:  Based on a thorough discussion of this condition and the management approach to it (including a comprehensive discussion of the known risks, side effects and potential benefits of treatment), the patient (family) agrees to implement the following specific plan:   Apply Ketoconazole 2% cream topically to chest      Seborrheic Dermatitis   Seborrheic dermatitis is a common, chronic or relapsing form of eczema/dermatitis that mainly affects the sebaceous, gland-rich regions of the scalp, face, and trunk  There are infantile and adult forms of seborrhoeic dermatitis  It is sometimes associated with psoriasis and, in that clinical scenario, may be referred to as "sebo-psoriasis "  Seborrheic dermatitis is also known as "seborrheic eczema "  Dandruff (also called "pityriasis capitis") is an uninflamed form of seborrhoeic dermatitis  Dandruff presents as bran-like scaly patches scattered within hair-bearing areas of the scalp    In an infant, this condition may be referred to as "cradle cap "  The cause of seborrheic dermatitis is not completely understood  It is associated with proliferation of various species of the skin commensal Malassezia, in its yeast (non-pathogenic) form  Its metabolites (such as the fatty acids oleic acid, malssezin, and indole-3-carbaldehyde) may cause an inflammatory reaction  Differences in skin barrier lipid content and function may account for individual presentations  Infantile Seborrheic Dermatitis  Infantile seborrheic dermatitis affects babies under the age of 1 months and usually resolves by 1012 months of age  Infantile seborrheic dermatitis causes "cradle cap" (diffuse, greasy scaling on scalp)  The rash may spread to affect armpit and groin folds (a type of "napkin dermatitis")  There may be associated salmon-pink colored patches that may flake or peel  The rash in this case is usually not especially itchy, so the baby often appears undisturbed by the rash, even when more generalized  Adult Seborrheic Dermatitis  Adult seborrheic dermatitis tends to begin in late adolescence; prevalence is greatest in young adults and in the elderly  It is more common in males than in females  The following factors are sometimes associated with severe adult seborrheic dermatitis:   Oily skin   Familial tendency to seborrhoeic dermatitis or a family history of psoriasis   Immunosuppression: organ transplant recipient, human immunodeficiency virus (HIV) infection and patients with lymphoma   Neurological and psychiatric diseases: Parkinson disease, tardive dyskinesia, depression, epilepsy, facial nerve palsy, spinal cord injury and congenital disorders such as Down syndrome   Treatment for psoriasis with psoralen and ultraviolet A (PUVA) therapy   Lack of sleep   Stressful events  In adults, seborrheic dermatitis may typically affect the scalp, face (creases around the nose, behind ears, within eyebrows) and upper trunk   Typical clinical features include:   Winter flares, improving in summer following sun exposure   Minimal itch most of the time   Combination oily and dry mid-facial skin   Ill-defined localized scaly patches or diffuse scale in the scalp   Blepharitis; scaly red eyelid margins   Jolo-pink, thin, scaly, and ill-defined plaques in skin folds on both sides of the face   Petal or ring-shaped flaky patches on hair-line and on anterior chest   Rash in armpits, under the breasts, in the groin folds and genital creases   Superficial folliculitis (inflamed hair follicles) on cheeks and upper trunk    Seborrheic dermatitis is diagnosed by its clinical appearance and behavior  Skin biopsy may be helpful but is rarely necessary to make this diagnosis          Scribe Attestation    I,:  Niecy Mckeon am acting as a scribe while in the presence of the attending physician :       I,:  Zo Carias MD personally performed the services described in this documentation    as scribed in my presence :

## 2021-09-14 NOTE — PATIENT INSTRUCTIONS
HISTORY OF SQUAMOUS CELL CARCINOMA   Assessment and Plan:  Based on a thorough discussion of this condition and the management approach to it (including a comprehensive discussion of the known risks, side effects and potential benefits of treatment), the patient (family) agrees to implement the following specific plan:  · When outside we recommend using a wide brim hat, sunglasses, long sleeve and pants, sunscreen with SPF 28+ with reapplication every 2 hours, or SPF specific clothing    How can SCC be prevented? The most important way to prevent SCC is to avoid sunburn  This is especially important in childhood and early life  Fair skinned individuals and those with a personal or family history of BCC should protect their skin from sun exposure daily, year-round and lifelong   Stay indoors or under the shade in the middle of the day    Wear covering clothing    Apply high protection factor SPF50+ broad-spectrum sunscreens generously to exposed skin if outdoors    Avoid indoor tanning (sun beds, solaria)      What is the outlook for SCC? Most SCC are cured by treatment  Cure is most likely if treatment is undertaken when the lesion is small  A small percent of SCC's can spread to lymph  nodes and long term monitoring is indicated  They are also at increased risk of other skin cancers, especially melanoma  Regular self-skin examinations and long-term annual skin checks by an experienced health professional are recommended  ACROCHORDON ("SKIN TAG")    Skin tags are common, soft, harmless skin lesions that are also called, in the appropriate settings, papillomas, fibroepithelial polyps, and soft fibromas  They are made up of loosely arranged collagen fibers and blood vessels surrounded by a thickened or thinned-out epidermis  Skin tags tend to develop in both men and women as we grow older  They are usually found on the skin folds (neck, armpits, groin)    It is not known what specifically causes skin tags   Certain factors, though, do appear to play a role:   Chaffing and irritation from skin rubbing together   High levels of growth factors (as seen, for example, in pregnancy or in acromegaly/gigantism)   Insulin resistance   Human papillomavirus (wart virus)    We discussed that most skin tags do not need to be treated unless they are specifically causing the patient physical distress or limitation or pose a risk for a larger problem such as an infection that forms secondary to excoriation or chronic irritation  We had a thorough discussion of treatment options and specific risks (including that any procedural treatment may not be covered by insurance and would then be the patient's responsibility) and benefits/alternatives including but not limited to the following:   Cryotherapy (freezing)   Shave removal   Surgical excision (snip excision with scissors)   Electrosurgery   Ligation (we do not do this procedure and counseled against it due to risk of tissue necrosis and infection)    ACTINIC KERATOSIS  Assessment and Plan:  Based on a thorough discussion of this condition and the management approach to it (including a comprehensive discussion of the known risks, side effects and potential benefits of treatment), the patient (family) agrees to implement the following specific plan:     Cryotherapy used today in office    Actinic keratoses are very common on sites repeatedly exposed to the sun, especially the backs of the hands and the face, most often affecting the ears, nose, cheeks, upper lip, vermilion of the lower lip, temples, forehead and balding scalp  In severely chronically sun-damaged individuals, they may also be found on the upper trunk, upper and lower limbs, and dorsum of feet  We discussed the theoretical premalignant (pre-cancerous) nature and etiology of these growths    We discussed the prevailing notion that actinic keratoses are a reflection of abnormal skin cell development due to DNA damage by short wavelength UVB  They are more likely to appear if the immune function is poor, due to aging, recent sun exposure, predisposing disease or certain drugs  We discussed that the main concern is that actinic keratoses may predispose to squamous cell carcinoma  It is rare for a solitary actinic keratosis to evolve to squamous cell carcinoma (SCC), but the risk of SCC occurring at some stage in a patient with more than 10 actinic keratoses is thought to be about 10 to 15%  A tender, thickened, ulcerated or enlarging actinic keratosis is suspicious of SCC  Actinic keratoses may be prevented by strict sun protection  If already present, keratoses may improve with a very high sun protection factor (50+) broad-spectrum sunscreen applied at least daily to affected areas, year-round  We recommend that UPF-rated clothing and hats and sunglasses be worn whenever possible and that a sunscreen-moisturizer combination product such as Neutrogena Daily Defense be applied at least three times a day  We performed a thorough discussion of treatment options and specific risk/benefits/alternatives including but not limited to medical field treatment with medications such as the following:     Topical field area medications such as 5-fluorouracil or Aldara (specifically, the trouble with long-term compliance, blistering and local skin reaction versus the convenience of at-home therapy and that field therapy gets what is not yet seen)   Cryotherapy (specifically, local pain, scarring, dyspigmentation, blistering, possible superinfection, and treats only what we see versus directed treatment today)   Photodynamic therapy (specifically, local pain, scarring, dyspigmentation, blistering, possible superinfection, need to schedule for a later date, and time spent in the office versus field therapy that gets what is not yet seen)      ACTINIC DAMAGE (Chronic Ultraviolet Radiation Exposure)  Assessment and Plan:  Based on a thorough discussion of this condition and the management approach to it (including a comprehensive discussion of the known risks, side effects and potential benefits of treatment), the patient (family) agrees to implement the following specific plan:   Discussed topical treatment     Photo-aging and actinic damage of skin is common on sites repeatedly exposed to the sun, especially the backs of the hands and the face, most often affecting the ears, nose, cheeks, upper lip, vermilion of the lower lip, temples, forehead and balding scalp  In severely chronically sun-exposed individuals, this condition may also be found on the upper trunk, upper and lower limbs, and dorsum of feet  Photo-aging induces cutaneous changes that vary among individuals, reflecting inherent differences in vulnerability to sun exposure and repair capacity  We discussed further steps to minimize or avoid UV exposure:     Be aware of daily UV index levels  In the [facility], this index is often reported on the [address]   Avoid outdoor activities during the middle of the day   Wear sun-protective clothing (e g , UPF-rated, broad-brimmed hats, long sleeves, and trousers or skirts)   Apply a high sun protection factor (60+) broad-spectrum sunscreen moisturizer at least three times a day to affected areas, year-round  I recommended Neutrogena Daily Defense or CeraVe AM or Aveeno   Do not smoke, and where possible, avoid exposure to pollutants   Get plenty of exercise -- active people appear younger than inactive people   Eat fruit and vegetables daily   Many oral supplements with antioxidant and anti-inflammatory properties have been advocated to mitigate skin aging and to improve skin health  These include carotenoids; polyphenols; chlorophyll; aloe vera; vitamins B, C, and E; red ginseng; squalene; and omega-3 fatty acids   Their role in combatting skin aging is unclear  SEBORRHEIC DERMATITIS  Assessment and Plan:  Based on a thorough discussion of this condition and the management approach to it (including a comprehensive discussion of the known risks, side effects and potential benefits of treatment), the patient (family) agrees to implement the following specific plan:   Apply Ketoconazole 2% cream topically to chest      Seborrheic Dermatitis   Seborrheic dermatitis is a common, chronic or relapsing form of eczema/dermatitis that mainly affects the sebaceous, gland-rich regions of the scalp, face, and trunk  There are infantile and adult forms of seborrhoeic dermatitis  It is sometimes associated with psoriasis and, in that clinical scenario, may be referred to as "sebo-psoriasis "  Seborrheic dermatitis is also known as "seborrheic eczema "  Dandruff (also called "pityriasis capitis") is an uninflamed form of seborrhoeic dermatitis  Dandruff presents as bran-like scaly patches scattered within hair-bearing areas of the scalp  In an infant, this condition may be referred to as "cradle cap "  The cause of seborrheic dermatitis is not completely understood  It is associated with proliferation of various species of the skin commensal Malassezia, in its yeast (non-pathogenic) form  Its metabolites (such as the fatty acids oleic acid, malssezin, and indole-3-carbaldehyde) may cause an inflammatory reaction  Differences in skin barrier lipid content and function may account for individual presentations  Infantile Seborrheic Dermatitis  Infantile seborrheic dermatitis affects babies under the age of 1 months and usually resolves by 1012 months of age  Infantile seborrheic dermatitis causes "cradle cap" (diffuse, greasy scaling on scalp)  The rash may spread to affect armpit and groin folds (a type of "napkin dermatitis")  There may be associated salmon-pink colored patches that may flake or peel    The rash in this case is usually not especially itchy, so the baby often appears undisturbed by the rash, even when more generalized  Adult Seborrheic Dermatitis  Adult seborrheic dermatitis tends to begin in late adolescence; prevalence is greatest in young adults and in the elderly  It is more common in males than in females  The following factors are sometimes associated with severe adult seborrheic dermatitis:   Oily skin   Familial tendency to seborrhoeic dermatitis or a family history of psoriasis   Immunosuppression: organ transplant recipient, human immunodeficiency virus (HIV) infection and patients with lymphoma   Neurological and psychiatric diseases: Parkinson disease, tardive dyskinesia, depression, epilepsy, facial nerve palsy, spinal cord injury and congenital disorders such as Down syndrome   Treatment for psoriasis with psoralen and ultraviolet A (PUVA) therapy   Lack of sleep   Stressful events  In adults, seborrheic dermatitis may typically affect the scalp, face (creases around the nose, behind ears, within eyebrows) and upper trunk  Typical clinical features include:   Winter flares, improving in summer following sun exposure   Minimal itch most of the time   Combination oily and dry mid-facial skin   Ill-defined localized scaly patches or diffuse scale in the scalp   Blepharitis; scaly red eyelid margins   Collins-pink, thin, scaly, and ill-defined plaques in skin folds on both sides of the face   Petal or ring-shaped flaky patches on hair-line and on anterior chest   Rash in armpits, under the breasts, in the groin folds and genital creases   Superficial folliculitis (inflamed hair follicles) on cheeks and upper trunk    Seborrheic dermatitis is diagnosed by its clinical appearance and behavior  Skin biopsy may be helpful but is rarely necessary to make this diagnosis

## 2021-10-04 ENCOUNTER — CONSULT (OUTPATIENT)
Dept: SURGERY | Facility: CLINIC | Age: 66
End: 2021-10-04
Payer: COMMERCIAL

## 2021-10-04 ENCOUNTER — PREP FOR PROCEDURE (OUTPATIENT)
Dept: SURGERY | Facility: CLINIC | Age: 66
End: 2021-10-04

## 2021-10-04 VITALS — HEIGHT: 72 IN | WEIGHT: 270 LBS | BODY MASS INDEX: 36.57 KG/M2

## 2021-10-04 DIAGNOSIS — K43.2 INCISIONAL HERNIA: Primary | ICD-10-CM

## 2021-10-04 DIAGNOSIS — Z85.46 HISTORY OF PROSTATE CANCER: Primary | ICD-10-CM

## 2021-10-04 DIAGNOSIS — K43.2 INCISIONAL HERNIA, WITHOUT OBSTRUCTION OR GANGRENE: ICD-10-CM

## 2021-10-04 PROCEDURE — 99202 OFFICE O/P NEW SF 15 MIN: CPT | Performed by: SURGERY

## 2021-10-04 RX ORDER — TAMSULOSIN HYDROCHLORIDE 0.4 MG/1
0.4 CAPSULE ORAL
Qty: 10 CAPSULE | Refills: 0 | Status: SHIPPED | OUTPATIENT
Start: 2021-10-04 | End: 2021-10-21 | Stop reason: ALTCHOICE

## 2021-10-08 ENCOUNTER — APPOINTMENT (OUTPATIENT)
Dept: LAB | Facility: CLINIC | Age: 66
End: 2021-10-08
Payer: COMMERCIAL

## 2021-10-08 ENCOUNTER — OFFICE VISIT (OUTPATIENT)
Dept: LAB | Facility: CLINIC | Age: 66
End: 2021-10-08
Payer: COMMERCIAL

## 2021-10-08 DIAGNOSIS — K43.2 INCISIONAL HERNIA: ICD-10-CM

## 2021-10-08 DIAGNOSIS — E11.40 TYPE 2 DIABETES MELLITUS WITH DIABETIC NEUROPATHY, WITHOUT LONG-TERM CURRENT USE OF INSULIN (HCC): ICD-10-CM

## 2021-10-08 DIAGNOSIS — E78.2 MIXED HYPERLIPIDEMIA: ICD-10-CM

## 2021-10-08 DIAGNOSIS — I10 ESSENTIAL HYPERTENSION: ICD-10-CM

## 2021-10-08 LAB
ALBUMIN SERPL BCP-MCNC: 4 G/DL (ref 3.5–5)
ALP SERPL-CCNC: 59 U/L (ref 46–116)
ALT SERPL W P-5'-P-CCNC: 63 U/L (ref 12–78)
ANION GAP SERPL CALCULATED.3IONS-SCNC: 11 MMOL/L (ref 4–13)
AST SERPL W P-5'-P-CCNC: 47 U/L (ref 5–45)
ATRIAL RATE: 80 BPM
BILIRUB SERPL-MCNC: 0.65 MG/DL (ref 0.2–1)
BUN SERPL-MCNC: 20 MG/DL (ref 5–25)
CALCIUM SERPL-MCNC: 8.9 MG/DL (ref 8.3–10.1)
CHLORIDE SERPL-SCNC: 104 MMOL/L (ref 100–108)
CHOLEST SERPL-MCNC: 167 MG/DL (ref 50–200)
CO2 SERPL-SCNC: 23 MMOL/L (ref 21–32)
CREAT SERPL-MCNC: 0.98 MG/DL (ref 0.6–1.3)
CREAT UR-MCNC: 60.7 MG/DL
ERYTHROCYTE [DISTWIDTH] IN BLOOD BY AUTOMATED COUNT: 12.1 % (ref 11.6–15.1)
EST. AVERAGE GLUCOSE BLD GHB EST-MCNC: 166 MG/DL
GFR SERPL CREATININE-BSD FRML MDRD: 81 ML/MIN/1.73SQ M
GLUCOSE P FAST SERPL-MCNC: 151 MG/DL (ref 65–99)
HBA1C MFR BLD: 7.4 %
HCT VFR BLD AUTO: 46.3 % (ref 36.5–49.3)
HDLC SERPL-MCNC: 54 MG/DL
HGB BLD-MCNC: 15.2 G/DL (ref 12–17)
LDLC SERPL CALC-MCNC: 84 MG/DL (ref 0–100)
MCH RBC QN AUTO: 31.4 PG (ref 26.8–34.3)
MCHC RBC AUTO-ENTMCNC: 32.8 G/DL (ref 31.4–37.4)
MCV RBC AUTO: 96 FL (ref 82–98)
MICROALBUMIN UR-MCNC: 31.1 MG/L (ref 0–20)
MICROALBUMIN/CREAT 24H UR: 51 MG/G CREATININE (ref 0–30)
P AXIS: 60 DEGREES
PLATELET # BLD AUTO: 184 THOUSANDS/UL (ref 149–390)
PMV BLD AUTO: 10.8 FL (ref 8.9–12.7)
POTASSIUM SERPL-SCNC: 4.2 MMOL/L (ref 3.5–5.3)
PR INTERVAL: 192 MS
PROT SERPL-MCNC: 7.4 G/DL (ref 6.4–8.2)
QRS AXIS: -12 DEGREES
QRSD INTERVAL: 90 MS
QT INTERVAL: 390 MS
QTC INTERVAL: 449 MS
RBC # BLD AUTO: 4.84 MILLION/UL (ref 3.88–5.62)
SODIUM SERPL-SCNC: 138 MMOL/L (ref 136–145)
T WAVE AXIS: 50 DEGREES
TRIGL SERPL-MCNC: 143 MG/DL
VENTRICULAR RATE: 80 BPM
WBC # BLD AUTO: 5.74 THOUSAND/UL (ref 4.31–10.16)

## 2021-10-08 PROCEDURE — 93005 ELECTROCARDIOGRAM TRACING: CPT

## 2021-10-08 PROCEDURE — 85027 COMPLETE CBC AUTOMATED: CPT

## 2021-10-08 PROCEDURE — 83036 HEMOGLOBIN GLYCOSYLATED A1C: CPT

## 2021-10-08 PROCEDURE — 93010 ELECTROCARDIOGRAM REPORT: CPT | Performed by: INTERNAL MEDICINE

## 2021-10-08 PROCEDURE — 36415 COLL VENOUS BLD VENIPUNCTURE: CPT

## 2021-10-08 PROCEDURE — 80053 COMPREHEN METABOLIC PANEL: CPT

## 2021-10-08 PROCEDURE — 82043 UR ALBUMIN QUANTITATIVE: CPT

## 2021-10-08 PROCEDURE — 80061 LIPID PANEL: CPT

## 2021-10-08 PROCEDURE — 3060F POS MICROALBUMINURIA REV: CPT | Performed by: FAMILY MEDICINE

## 2021-10-08 PROCEDURE — 82570 ASSAY OF URINE CREATININE: CPT

## 2021-10-08 PROCEDURE — 3051F HG A1C>EQUAL 7.0%<8.0%: CPT | Performed by: FAMILY MEDICINE

## 2021-10-18 ENCOUNTER — OFFICE VISIT (OUTPATIENT)
Dept: OBGYN CLINIC | Facility: CLINIC | Age: 66
End: 2021-10-18
Payer: COMMERCIAL

## 2021-10-18 VITALS
SYSTOLIC BLOOD PRESSURE: 128 MMHG | HEART RATE: 76 BPM | BODY MASS INDEX: 35.89 KG/M2 | DIASTOLIC BLOOD PRESSURE: 74 MMHG | WEIGHT: 265 LBS | HEIGHT: 72 IN

## 2021-10-18 DIAGNOSIS — M25.512 CHRONIC LEFT SHOULDER PAIN: ICD-10-CM

## 2021-10-18 DIAGNOSIS — E11.40 TYPE 2 DIABETES MELLITUS WITH DIABETIC NEUROPATHY, WITHOUT LONG-TERM CURRENT USE OF INSULIN (HCC): ICD-10-CM

## 2021-10-18 DIAGNOSIS — G89.29 CHRONIC LEFT SHOULDER PAIN: ICD-10-CM

## 2021-10-18 DIAGNOSIS — M75.42 IMPINGEMENT SYNDROME OF LEFT SHOULDER: Primary | ICD-10-CM

## 2021-10-18 PROCEDURE — 3074F SYST BP LT 130 MM HG: CPT | Performed by: ORTHOPAEDIC SURGERY

## 2021-10-18 PROCEDURE — 20610 DRAIN/INJ JOINT/BURSA W/O US: CPT | Performed by: ORTHOPAEDIC SURGERY

## 2021-10-18 PROCEDURE — 99213 OFFICE O/P EST LOW 20 MIN: CPT | Performed by: ORTHOPAEDIC SURGERY

## 2021-10-18 PROCEDURE — 3078F DIAST BP <80 MM HG: CPT | Performed by: ORTHOPAEDIC SURGERY

## 2021-10-18 RX ORDER — DEXAMETHASONE SODIUM PHOSPHATE 100 MG/10ML
40 INJECTION INTRAMUSCULAR; INTRAVENOUS
Status: COMPLETED | OUTPATIENT
Start: 2021-10-18 | End: 2021-10-18

## 2021-10-18 RX ORDER — LIDOCAINE HYDROCHLORIDE 10 MG/ML
4 INJECTION, SOLUTION INFILTRATION; PERINEURAL
Status: COMPLETED | OUTPATIENT
Start: 2021-10-18 | End: 2021-10-18

## 2021-10-18 RX ADMIN — DEXAMETHASONE SODIUM PHOSPHATE 40 MG: 100 INJECTION INTRAMUSCULAR; INTRAVENOUS at 10:52

## 2021-10-18 RX ADMIN — LIDOCAINE HYDROCHLORIDE 4 ML: 10 INJECTION, SOLUTION INFILTRATION; PERINEURAL at 10:52

## 2021-10-21 ENCOUNTER — OFFICE VISIT (OUTPATIENT)
Dept: FAMILY MEDICINE CLINIC | Facility: CLINIC | Age: 66
End: 2021-10-21
Payer: COMMERCIAL

## 2021-10-21 VITALS
HEART RATE: 86 BPM | SYSTOLIC BLOOD PRESSURE: 132 MMHG | DIASTOLIC BLOOD PRESSURE: 80 MMHG | WEIGHT: 267 LBS | BODY MASS INDEX: 36.16 KG/M2 | HEIGHT: 72 IN | TEMPERATURE: 97.5 F | RESPIRATION RATE: 18 BRPM

## 2021-10-21 DIAGNOSIS — I10 ESSENTIAL HYPERTENSION: ICD-10-CM

## 2021-10-21 DIAGNOSIS — Z23 NEED FOR VACCINATION: ICD-10-CM

## 2021-10-21 DIAGNOSIS — E78.2 MIXED HYPERLIPIDEMIA: ICD-10-CM

## 2021-10-21 DIAGNOSIS — E11.40 TYPE 2 DIABETES MELLITUS WITH DIABETIC NEUROPATHY, WITHOUT LONG-TERM CURRENT USE OF INSULIN (HCC): Primary | ICD-10-CM

## 2021-10-21 DIAGNOSIS — Z12.5 PROSTATE CANCER SCREENING: ICD-10-CM

## 2021-10-21 PROCEDURE — 4040F PNEUMOC VAC/ADMIN/RCVD: CPT | Performed by: FAMILY MEDICINE

## 2021-10-21 PROCEDURE — 90472 IMMUNIZATION ADMIN EACH ADD: CPT

## 2021-10-21 PROCEDURE — 90471 IMMUNIZATION ADMIN: CPT

## 2021-10-21 PROCEDURE — 1160F RVW MEDS BY RX/DR IN RCRD: CPT | Performed by: FAMILY MEDICINE

## 2021-10-21 PROCEDURE — 99214 OFFICE O/P EST MOD 30 MIN: CPT | Performed by: FAMILY MEDICINE

## 2021-10-21 PROCEDURE — 90670 PCV13 VACCINE IM: CPT

## 2021-10-21 PROCEDURE — 3008F BODY MASS INDEX DOCD: CPT | Performed by: FAMILY MEDICINE

## 2021-10-21 PROCEDURE — 1036F TOBACCO NON-USER: CPT | Performed by: FAMILY MEDICINE

## 2021-10-21 PROCEDURE — 90662 IIV NO PRSV INCREASED AG IM: CPT

## 2021-10-21 PROCEDURE — 3725F SCREEN DEPRESSION PERFORMED: CPT | Performed by: FAMILY MEDICINE

## 2021-10-21 RX ORDER — METFORMIN HYDROCHLORIDE 500 MG/1
2000 TABLET, EXTENDED RELEASE ORAL
Qty: 360 TABLET | Refills: 1 | Status: SHIPPED | OUTPATIENT
Start: 2021-10-21 | End: 2022-01-25 | Stop reason: SDUPTHER

## 2021-11-08 ENCOUNTER — ANESTHESIA EVENT (OUTPATIENT)
Dept: PERIOP | Facility: AMBULARY SURGERY CENTER | Age: 66
End: 2021-11-08
Payer: COMMERCIAL

## 2021-11-08 RX ORDER — OMEPRAZOLE 20 MG/1
20 CAPSULE, DELAYED RELEASE ORAL AS NEEDED
COMMUNITY

## 2021-11-09 RX ORDER — OXYCODONE HYDROCHLORIDE AND ACETAMINOPHEN 5; 325 MG/1; MG/1
1 TABLET ORAL EVERY 4 HOURS PRN
Qty: 10 TABLET | Refills: 0 | Status: SHIPPED | OUTPATIENT
Start: 2021-11-09 | End: 2022-01-25 | Stop reason: ALTCHOICE

## 2021-11-11 ENCOUNTER — ANESTHESIA (OUTPATIENT)
Dept: PERIOP | Facility: AMBULARY SURGERY CENTER | Age: 66
End: 2021-11-11
Payer: COMMERCIAL

## 2021-11-11 ENCOUNTER — HOSPITAL ENCOUNTER (OUTPATIENT)
Facility: AMBULARY SURGERY CENTER | Age: 66
Setting detail: OUTPATIENT SURGERY
Discharge: HOME | End: 2021-11-11
Attending: SURGERY | Admitting: SURGERY
Payer: COMMERCIAL

## 2021-11-11 VITALS
HEART RATE: 86 BPM | SYSTOLIC BLOOD PRESSURE: 125 MMHG | BODY MASS INDEX: 35.89 KG/M2 | OXYGEN SATURATION: 97 % | DIASTOLIC BLOOD PRESSURE: 69 MMHG | TEMPERATURE: 97 F | RESPIRATION RATE: 18 BRPM | HEIGHT: 72 IN | WEIGHT: 265 LBS

## 2021-11-11 DIAGNOSIS — K43.2 INCISIONAL HERNIA, WITHOUT OBSTRUCTION OR GANGRENE: Primary | ICD-10-CM

## 2021-11-11 LAB — GLUCOSE SERPL-MCNC: 156 MG/DL (ref 65–140)

## 2021-11-11 PROCEDURE — 49568 PR IMPLANT MESH HERNIA REPAIR/DEBRIDEMENT CLOSURE: CPT | Performed by: SURGERY

## 2021-11-11 PROCEDURE — 49560 PR REPAIR INCISIONAL HERNIA,REDUCIBLE: CPT | Performed by: SURGERY

## 2021-11-11 PROCEDURE — 82948 REAGENT STRIP/BLOOD GLUCOSE: CPT

## 2021-11-11 PROCEDURE — C1781 MESH (IMPLANTABLE): HCPCS | Performed by: SURGERY

## 2021-11-11 DEVICE — VENTRALEX ST HERNIA PATCH
Type: IMPLANTABLE DEVICE | Site: ABDOMEN | Status: FUNCTIONAL
Brand: VENTRALEX ST HERNIA PATCH

## 2021-11-11 RX ORDER — SODIUM CHLORIDE, SODIUM LACTATE, POTASSIUM CHLORIDE, CALCIUM CHLORIDE 600; 310; 30; 20 MG/100ML; MG/100ML; MG/100ML; MG/100ML
INJECTION, SOLUTION INTRAVENOUS CONTINUOUS PRN
Status: DISCONTINUED | OUTPATIENT
Start: 2021-11-11 | End: 2021-11-11

## 2021-11-11 RX ORDER — CEFAZOLIN SODIUM 1 G/50ML
1000 SOLUTION INTRAVENOUS ONCE
Status: COMPLETED | OUTPATIENT
Start: 2021-11-11 | End: 2021-11-11

## 2021-11-11 RX ORDER — ONDANSETRON 2 MG/ML
4 INJECTION INTRAMUSCULAR; INTRAVENOUS EVERY 4 HOURS PRN
Status: DISCONTINUED | OUTPATIENT
Start: 2021-11-11 | End: 2021-11-11 | Stop reason: HOSPADM

## 2021-11-11 RX ORDER — HYDROMORPHONE HCL/PF 1 MG/ML
0.5 SYRINGE (ML) INJECTION
Status: DISCONTINUED | OUTPATIENT
Start: 2021-11-11 | End: 2021-11-11 | Stop reason: HOSPADM

## 2021-11-11 RX ORDER — CEFAZOLIN SODIUM 2 G/50ML
2000 SOLUTION INTRAVENOUS ONCE
Status: COMPLETED | OUTPATIENT
Start: 2021-11-11 | End: 2021-11-11

## 2021-11-11 RX ORDER — MAGNESIUM HYDROXIDE 1200 MG/15ML
LIQUID ORAL AS NEEDED
Status: DISCONTINUED | OUTPATIENT
Start: 2021-11-11 | End: 2021-11-11 | Stop reason: HOSPADM

## 2021-11-11 RX ORDER — OXYCODONE HYDROCHLORIDE AND ACETAMINOPHEN 5; 325 MG/1; MG/1
1 TABLET ORAL EVERY 4 HOURS PRN
Status: DISCONTINUED | OUTPATIENT
Start: 2021-11-11 | End: 2021-11-11 | Stop reason: HOSPADM

## 2021-11-11 RX ORDER — ACETAMINOPHEN 325 MG/1
650 TABLET ORAL EVERY 4 HOURS PRN
Status: DISCONTINUED | OUTPATIENT
Start: 2021-11-11 | End: 2021-11-11 | Stop reason: HOSPADM

## 2021-11-11 RX ORDER — BUPIVACAINE HYDROCHLORIDE 2.5 MG/ML
INJECTION, SOLUTION EPIDURAL; INFILTRATION; INTRACAUDAL AS NEEDED
Status: DISCONTINUED | OUTPATIENT
Start: 2021-11-11 | End: 2021-11-11 | Stop reason: HOSPADM

## 2021-11-11 RX ORDER — ONDANSETRON 2 MG/ML
INJECTION INTRAMUSCULAR; INTRAVENOUS AS NEEDED
Status: DISCONTINUED | OUTPATIENT
Start: 2021-11-11 | End: 2021-11-11

## 2021-11-11 RX ORDER — PROPOFOL 10 MG/ML
INJECTION, EMULSION INTRAVENOUS AS NEEDED
Status: DISCONTINUED | OUTPATIENT
Start: 2021-11-11 | End: 2021-11-11

## 2021-11-11 RX ORDER — DEXAMETHASONE SODIUM PHOSPHATE 10 MG/ML
INJECTION, SOLUTION INTRAMUSCULAR; INTRAVENOUS AS NEEDED
Status: DISCONTINUED | OUTPATIENT
Start: 2021-11-11 | End: 2021-11-11

## 2021-11-11 RX ORDER — FENTANYL CITRATE/PF 50 MCG/ML
25 SYRINGE (ML) INJECTION
Status: DISCONTINUED | OUTPATIENT
Start: 2021-11-11 | End: 2021-11-11 | Stop reason: HOSPADM

## 2021-11-11 RX ORDER — FENTANYL CITRATE 50 UG/ML
INJECTION, SOLUTION INTRAMUSCULAR; INTRAVENOUS AS NEEDED
Status: DISCONTINUED | OUTPATIENT
Start: 2021-11-11 | End: 2021-11-11

## 2021-11-11 RX ORDER — ONDANSETRON 2 MG/ML
4 INJECTION INTRAMUSCULAR; INTRAVENOUS ONCE AS NEEDED
Status: DISCONTINUED | OUTPATIENT
Start: 2021-11-11 | End: 2021-11-11 | Stop reason: HOSPADM

## 2021-11-11 RX ADMIN — FENTANYL CITRATE 50 MCG: 50 INJECTION, SOLUTION INTRAMUSCULAR; INTRAVENOUS at 09:57

## 2021-11-11 RX ADMIN — ONDANSETRON 4 MG: 2 INJECTION INTRAMUSCULAR; INTRAVENOUS at 10:03

## 2021-11-11 RX ADMIN — CEFAZOLIN SODIUM 2000 MG: 2 SOLUTION INTRAVENOUS at 09:53

## 2021-11-11 RX ADMIN — PROPOFOL 200 MG: 10 INJECTION, EMULSION INTRAVENOUS at 09:57

## 2021-11-11 RX ADMIN — SODIUM CHLORIDE, SODIUM LACTATE, POTASSIUM CHLORIDE, AND CALCIUM CHLORIDE: .6; .31; .03; .02 INJECTION, SOLUTION INTRAVENOUS at 09:53

## 2021-11-11 RX ADMIN — DEXAMETHASONE SODIUM PHOSPHATE 4 MG: 10 INJECTION, SOLUTION INTRAMUSCULAR; INTRAVENOUS at 10:03

## 2021-11-11 RX ADMIN — CEFAZOLIN SODIUM 1000 MG: 1 SOLUTION INTRAVENOUS at 09:53

## 2021-11-22 ENCOUNTER — TELEPHONE (OUTPATIENT)
Dept: FAMILY MEDICINE CLINIC | Facility: CLINIC | Age: 66
End: 2021-11-22

## 2021-11-22 DIAGNOSIS — Z85.46 HISTORY OF PROSTATE CANCER: Primary | ICD-10-CM

## 2021-11-22 RX ORDER — TAMSULOSIN HYDROCHLORIDE 0.4 MG/1
0.4 CAPSULE ORAL
Qty: 90 CAPSULE | Refills: 1 | Status: SHIPPED | OUTPATIENT
Start: 2021-11-22 | End: 2022-04-26 | Stop reason: SDUPTHER

## 2022-01-05 ENCOUNTER — APPOINTMENT (OUTPATIENT)
Dept: LAB | Facility: CLINIC | Age: 67
End: 2022-01-05
Payer: COMMERCIAL

## 2022-01-05 DIAGNOSIS — I10 ESSENTIAL HYPERTENSION: ICD-10-CM

## 2022-01-05 DIAGNOSIS — Z12.5 PROSTATE CANCER SCREENING: ICD-10-CM

## 2022-01-05 DIAGNOSIS — E78.2 MIXED HYPERLIPIDEMIA: ICD-10-CM

## 2022-01-05 DIAGNOSIS — E11.40 TYPE 2 DIABETES MELLITUS WITH DIABETIC NEUROPATHY, WITHOUT LONG-TERM CURRENT USE OF INSULIN (HCC): ICD-10-CM

## 2022-01-05 LAB
ALBUMIN SERPL BCP-MCNC: 3.7 G/DL (ref 3.5–5)
ALP SERPL-CCNC: 64 U/L (ref 46–116)
ALT SERPL W P-5'-P-CCNC: 79 U/L (ref 12–78)
ANION GAP SERPL CALCULATED.3IONS-SCNC: 9 MMOL/L (ref 4–13)
AST SERPL W P-5'-P-CCNC: 47 U/L (ref 5–45)
BILIRUB SERPL-MCNC: 0.71 MG/DL (ref 0.2–1)
BUN SERPL-MCNC: 15 MG/DL (ref 5–25)
CALCIUM SERPL-MCNC: 9.5 MG/DL (ref 8.3–10.1)
CHLORIDE SERPL-SCNC: 102 MMOL/L (ref 100–108)
CHOLEST SERPL-MCNC: 167 MG/DL
CO2 SERPL-SCNC: 28 MMOL/L (ref 21–32)
CREAT SERPL-MCNC: 0.89 MG/DL (ref 0.6–1.3)
ERYTHROCYTE [DISTWIDTH] IN BLOOD BY AUTOMATED COUNT: 12.4 % (ref 11.6–15.1)
GFR SERPL CREATININE-BSD FRML MDRD: 89 ML/MIN/1.73SQ M
GLUCOSE P FAST SERPL-MCNC: 156 MG/DL (ref 65–99)
HCT VFR BLD AUTO: 44.6 % (ref 36.5–49.3)
HDLC SERPL-MCNC: 53 MG/DL
HGB BLD-MCNC: 14.8 G/DL (ref 12–17)
LDLC SERPL CALC-MCNC: 81 MG/DL (ref 0–100)
MCH RBC QN AUTO: 31.4 PG (ref 26.8–34.3)
MCHC RBC AUTO-ENTMCNC: 33.2 G/DL (ref 31.4–37.4)
MCV RBC AUTO: 95 FL (ref 82–98)
PLATELET # BLD AUTO: 174 THOUSANDS/UL (ref 149–390)
PMV BLD AUTO: 10.3 FL (ref 8.9–12.7)
POTASSIUM SERPL-SCNC: 4.7 MMOL/L (ref 3.5–5.3)
PROT SERPL-MCNC: 7.4 G/DL (ref 6.4–8.2)
PSA SERPL-MCNC: <0.1 NG/ML (ref 0–4)
RBC # BLD AUTO: 4.72 MILLION/UL (ref 3.88–5.62)
SODIUM SERPL-SCNC: 139 MMOL/L (ref 136–145)
TRIGL SERPL-MCNC: 163 MG/DL
WBC # BLD AUTO: 5.81 THOUSAND/UL (ref 4.31–10.16)

## 2022-01-05 PROCEDURE — 36415 COLL VENOUS BLD VENIPUNCTURE: CPT

## 2022-01-05 PROCEDURE — G0103 PSA SCREENING: HCPCS

## 2022-01-05 PROCEDURE — 83036 HEMOGLOBIN GLYCOSYLATED A1C: CPT

## 2022-01-05 PROCEDURE — 85027 COMPLETE CBC AUTOMATED: CPT

## 2022-01-05 PROCEDURE — 80061 LIPID PANEL: CPT

## 2022-01-05 PROCEDURE — 80053 COMPREHEN METABOLIC PANEL: CPT

## 2022-01-06 ENCOUNTER — APPOINTMENT (OUTPATIENT)
Dept: RADIOLOGY | Facility: CLINIC | Age: 67
End: 2022-01-06
Payer: COMMERCIAL

## 2022-01-06 ENCOUNTER — OFFICE VISIT (OUTPATIENT)
Dept: OBGYN CLINIC | Facility: CLINIC | Age: 67
End: 2022-01-06
Payer: COMMERCIAL

## 2022-01-06 VITALS
WEIGHT: 263 LBS | HEIGHT: 72 IN | SYSTOLIC BLOOD PRESSURE: 130 MMHG | DIASTOLIC BLOOD PRESSURE: 76 MMHG | HEART RATE: 88 BPM | BODY MASS INDEX: 35.62 KG/M2

## 2022-01-06 DIAGNOSIS — M17.12 PRIMARY OSTEOARTHRITIS OF LEFT KNEE: ICD-10-CM

## 2022-01-06 DIAGNOSIS — S83.412A SPRAIN OF MEDIAL COLLATERAL LIGAMENT OF LEFT KNEE, INITIAL ENCOUNTER: Primary | ICD-10-CM

## 2022-01-06 DIAGNOSIS — Z01.89 ENCOUNTER FOR OTHER SPECIFIED SPECIAL EXAMINATIONS: ICD-10-CM

## 2022-01-06 LAB
EST. AVERAGE GLUCOSE BLD GHB EST-MCNC: 169 MG/DL
HBA1C MFR BLD: 7.5 %

## 2022-01-06 PROCEDURE — 73562 X-RAY EXAM OF KNEE 3: CPT

## 2022-01-06 PROCEDURE — 99214 OFFICE O/P EST MOD 30 MIN: CPT | Performed by: ORTHOPAEDIC SURGERY

## 2022-01-06 PROCEDURE — 3051F HG A1C>EQUAL 7.0%<8.0%: CPT | Performed by: FAMILY MEDICINE

## 2022-01-06 PROCEDURE — 73560 X-RAY EXAM OF KNEE 1 OR 2: CPT

## 2022-01-06 NOTE — PROGRESS NOTES
Assessment/Plan:  1  Sprain of medial collateral ligament of left knee, initial encounter  Durable Medical Equipment   2  Primary osteoarthritis of left knee  XR knee 3 vw left non injury     Scribe Attestation    I,:  Katharine Khan am acting as a scribe while in the presence of the attending physician :       I,:  Barbara Frias, DO personally performed the services described in this documentation    as scribed in my presence :         Toshia Gudino is a pleasant 77year old who presents to the office today for a follow-up evaluation of his left knee osteoarthritis  Unfortunately, he sustained injury on 12/03/2021 when he stepped off a curb and twisted his left knee  Upon evaluation today, he has clinical findings consistent with a left knee low-grade MCL sprain  Non-operative treatments were discussed with the patient in the forms of a left knee hinged knee brace and the use of Voltaren gel  He was fitted for a hinged knee brace at today's visit  He was instructed to use Voltaren gel as needed to help alleviate his pain  I will follow-up with him in 4-6 weeks for a clinical re-evaluation  He understood and had no further questions  Subjective: Follow-up evaluation of bilateral knees    Patient ID: Mukesh David is a 77 y o  male who presents to the office today for a follow-up evaluation of his left knee osteoarthritis  He underwent a left knee corticosteroid injection on 01/14/2021 which was providing him with pain relief up until 12/03/2021 when he accidentally stepped off a curb and twisted his left knee  He states that he felt an initial sharp pain located over the medial aspect of his left knee  He states that his pain has been improving since the initial injury  He notes he will experience an achiness and soreness mostly at nighttime when he tries to sleep  He states that his pain is exacerbated when ambulating up and down stairs  He denies any swelling about his knee    He states that he will use Tylenol as needed to help alleviate his pain  He denies any distal paresthesias  Review of Systems   Constitutional: Positive for activity change  Negative for chills, fever and unexpected weight change  HENT: Negative for hearing loss, nosebleeds and sore throat  Eyes: Negative for pain, redness and visual disturbance  Respiratory: Negative for cough, shortness of breath and wheezing  Cardiovascular: Negative for chest pain, palpitations and leg swelling  Gastrointestinal: Negative for abdominal pain, nausea and vomiting  Endocrine: Negative for polyphagia and polyuria  Genitourinary: Negative for dysuria and hematuria  Musculoskeletal: Positive for arthralgias, gait problem and myalgias  Skin: Negative for rash and wound  Neurological: Negative for dizziness, numbness and headaches  Psychiatric/Behavioral: Negative for decreased concentration  The patient is not nervous/anxious  Past Medical History:   Diagnosis Date    Arthritis     Chest tightness     Diabetes ([place] [address] )     Diabetes mellitus ([facility] )     Diverticulosis of sigmoid colon     Esophageal reflux     Fatty liver     GERD (gastroesophageal reflux disease)     Hearing problem     High cholesterol     History of chest pain     Hyperlipemia     Hypertension     Lumbago     Malignant neoplasm of prostate ([facility] )     Prostate CA ([place] [address] )     Squamous cell skin cancer 08/27/2020    right dorsal forearm    Tinea pedis of both feet     Trochanteric bursitis        Past Surgical History:   Procedure Laterality Date    APPENDECTOMY      COLONOSCOPY  2016    HERNIA REPAIR      ?  Umbilical    WY REPAIR INCISIONAL HERNIA,REDUCIBLE N/A 11/11/2021    Procedure: INCISIONAL HERNIA REPAIR AT THE UMBILICUS;  Surgeon: Cindy Snyder MD;  [facility];  Service: General    PROSTATECTOMY N/A 2011    PROSTATECTOMY         Family History   Problem Relation Age of Onset    Arthritis Mother     Parkinsonism Mother  Heart disease Father     Arthritis Father     Coronary artery disease Father     Hypertension Father     Parkinsonism Father     Mental illness Neg Hx        Social History     Occupational History    Not on file   Tobacco Use    Smoking status: Former Smoker     Packs/day: 0 00     Years: 0 00     Pack years: 0 00     Quit date: 2012     Years since quittin 5    Smokeless tobacco: Never Used   Vaping Use    Vaping Use: Never used   Substance and Sexual Activity    Alcohol use:  Yes     Alcohol/week: 6 0 standard drinks     Types: 6 Cans of beer per week     Comment: Weekends    Drug use: No    Sexual activity: Not Currently     Partners: Female         Current Outpatient Medications:     ACCU-CHEK FASTCLIX LANCETS MISC, by Does not apply route daily Dx: E11 40, Disp: 100 each, Rfl: 1    aspirin 81 mg chewable tablet, Chew 1 tablet, Disp: , Rfl:     Empagliflozin (Jardiance) 10 MG TABS, Take 1 tablet (10 mg total) by mouth every morning, Disp: 90 tablet, Rfl: 1    fluorouracil (EFUDEX) 5 % cream, Apply topically 2 (two) times a day For 4 weeks, Disp: 40 g, Rfl: 0    glucose blood (ACCU-CHEK GUIDE) test strip, 1 each by Other route daily DX: e11 40, Disp: 100 each, Rfl: 1    lisinopril-hydrochlorothiazide (PRINZIDE,ZESTORETIC) 20-12 5 MG per tablet, Take 1 tablet by mouth daily, Disp: 90 tablet, Rfl: 3    metFORMIN (GLUCOPHAGE-XR) 500 mg 24 hr tablet, Take 4 tablets (2,000 mg total) by mouth daily with breakfast, Disp: 360 tablet, Rfl: 1    omeprazole (PriLOSEC) 20 mg delayed release capsule, Take 20 mg by mouth daily, Disp: , Rfl:     oxyCODONE-acetaminophen (PERCOCET) 5-325 mg per tablet, Take 1 tablet by mouth every 4 (four) hours as needed for moderate pain for up to 10 doses Max Daily Amount: 6 tablets, Disp: 10 tablet, Rfl: 0    rosuvastatin (CRESTOR) 20 MG tablet, TAKE 1 TABLET DAILY, Disp: 90 tablet, Rfl: 3    tamsulosin (FLOMAX) 0 4 mg, Take 1 capsule (0 4 mg total) by mouth daily with dinner, Disp: 90 capsule, Rfl: 1    VITAMIN E PO, Take by mouth daily , Disp: , Rfl:     Allergies   Allergen Reactions    Penicillins Other (See Comments)      Ancef can tolerate       Objective:  Vitals:    01/06/22 1136   BP: 130/76   Pulse: 88       Body mass index is 35 67 kg/m²  Left Knee Exam     Tenderness   The patient is experiencing tenderness in the medial joint line and MCL  Range of Motion   Extension: 0   Flexion: 120     Tests   Varus: negative Valgus: positive    Other   Erythema: absent  Scars: absent  Sensation: normal  Pulse: present  Swelling: none  Effusion: no effusion present    Comments:  Minimal parapatellar crepitus  Neurovascularly intact          Observations   Left Knee   Negative for effusion  Physical Exam  Vitals reviewed  Constitutional:       Appearance: He is well-developed  HENT:      Head: Normocephalic and atraumatic  Eyes:      Conjunctiva/sclera: Conjunctivae normal       Pupils: Pupils are equal, round, and reactive to light  Cardiovascular:      Rate and Rhythm: Normal rate  Pulmonary:      Effort: Pulmonary effort is normal  No respiratory distress  Musculoskeletal:      Cervical back: Normal range of motion and neck supple  Left knee: No effusion  Comments: As noted in HPI   Skin:     General: Skin is warm and dry  Neurological:      Mental Status: He is alert and oriented to person, place, and time  Psychiatric:         Behavior: Behavior normal          I have personally reviewed pertinent films in PACS  X-rays performed in the office today of his left knee demonstrates mild tricompartmental osteoarthritis  There is evidence of joint space narrowing and osteophytes present  There are no acute fractures, dislocations, lytic or blastic lesions

## 2022-01-18 DIAGNOSIS — E11.40 TYPE 2 DIABETES MELLITUS WITH DIABETIC NEUROPATHY, WITHOUT LONG-TERM CURRENT USE OF INSULIN (HCC): ICD-10-CM

## 2022-01-18 RX ORDER — EMPAGLIFLOZIN 10 MG/1
10 TABLET, FILM COATED ORAL EVERY MORNING
Qty: 90 TABLET | Refills: 1 | Status: SHIPPED | OUTPATIENT
Start: 2022-01-18 | End: 2022-04-26

## 2022-01-25 ENCOUNTER — OFFICE VISIT (OUTPATIENT)
Dept: FAMILY MEDICINE CLINIC | Facility: CLINIC | Age: 67
End: 2022-01-25
Payer: COMMERCIAL

## 2022-01-25 VITALS
WEIGHT: 267 LBS | HEART RATE: 90 BPM | TEMPERATURE: 97.7 F | OXYGEN SATURATION: 99 % | DIASTOLIC BLOOD PRESSURE: 76 MMHG | SYSTOLIC BLOOD PRESSURE: 130 MMHG | HEIGHT: 72 IN | RESPIRATION RATE: 16 BRPM | BODY MASS INDEX: 36.16 KG/M2

## 2022-01-25 DIAGNOSIS — E11.40 TYPE 2 DIABETES MELLITUS WITH DIABETIC NEUROPATHY, WITHOUT LONG-TERM CURRENT USE OF INSULIN (HCC): Primary | ICD-10-CM

## 2022-01-25 DIAGNOSIS — E66.01 SEVERE OBESITY (BMI 35.0-39.9) WITH COMORBIDITY (HCC): ICD-10-CM

## 2022-01-25 DIAGNOSIS — Z12.11 SCREENING FOR COLORECTAL CANCER: ICD-10-CM

## 2022-01-25 DIAGNOSIS — Z79.899 ENCOUNTER FOR LONG-TERM CURRENT USE OF MEDICATION: ICD-10-CM

## 2022-01-25 DIAGNOSIS — I10 ESSENTIAL HYPERTENSION: ICD-10-CM

## 2022-01-25 DIAGNOSIS — Z12.12 SCREENING FOR COLORECTAL CANCER: ICD-10-CM

## 2022-01-25 DIAGNOSIS — E78.2 MIXED HYPERLIPIDEMIA: ICD-10-CM

## 2022-01-25 PROCEDURE — 1160F RVW MEDS BY RX/DR IN RCRD: CPT | Performed by: FAMILY MEDICINE

## 2022-01-25 PROCEDURE — 1036F TOBACCO NON-USER: CPT | Performed by: FAMILY MEDICINE

## 2022-01-25 PROCEDURE — 3075F SYST BP GE 130 - 139MM HG: CPT | Performed by: FAMILY MEDICINE

## 2022-01-25 PROCEDURE — 3008F BODY MASS INDEX DOCD: CPT | Performed by: FAMILY MEDICINE

## 2022-01-25 PROCEDURE — 3078F DIAST BP <80 MM HG: CPT | Performed by: FAMILY MEDICINE

## 2022-01-25 PROCEDURE — 3725F SCREEN DEPRESSION PERFORMED: CPT | Performed by: FAMILY MEDICINE

## 2022-01-25 PROCEDURE — 99214 OFFICE O/P EST MOD 30 MIN: CPT | Performed by: FAMILY MEDICINE

## 2022-01-25 RX ORDER — METFORMIN HYDROCHLORIDE 500 MG/1
2000 TABLET, EXTENDED RELEASE ORAL
Qty: 360 TABLET | Refills: 1 | Status: SHIPPED | OUTPATIENT
Start: 2022-01-25 | End: 2022-04-26 | Stop reason: SDUPTHER

## 2022-01-25 NOTE — ASSESSMENT & PLAN NOTE
Lab Results   Component Value Date    HGBA1C 7 5 (H) 01/05/2022     Not controlled  Will keep working on diet

## 2022-01-25 NOTE — PROGRESS NOTES
Assessment/Plan:    1  Type 2 diabetes mellitus with diabetic neuropathy, without long-term current use of insulin [facility])  Assessment & Plan:    Lab Results   Component Value Date    HGBA1C 7 5 (H) 01/05/2022     Not controlled  Will keep working on diet     Orders:  -     Hemoglobin A1C; Future; Expected date: 04/10/2022  -     metFORMIN (GLUCOPHAGE-XR) 500 mg 24 hr tablet; Take 4 tablets (2,000 mg total) by mouth daily with breakfast    2  Screening for colorectal cancer  -     Ambulatory referral to Gastroenterology; Future    3  Severe obesity (BMI 35 0-39  9) with comorbidity ([facility] [address] )  Assessment & Plan:  Unchanged      4  Essential hypertension  Assessment & Plan:  Well controlled   Continue lisinopril-hydrochlorothiazide 20-12 5 mg daily    Orders:  -     CBC; Future; Expected date: 04/10/2022  -     Comprehensive metabolic panel; Future; Expected date: 04/10/2022  -     Lipid Panel with Direct LDL reflex; Future; Expected date: 04/10/2022    5  Mixed hyperlipidemia  -     Comprehensive metabolic panel; Future; Expected date: 04/10/2022  -     Lipid Panel with Direct LDL reflex; Future; Expected date: 04/10/2022    6  Encounter for long-term current use of medication  -     Vitamin B12; Future; Expected date: 04/10/2022    BMI Counseling: Body mass index is 36 21 kg/m²  The BMI is above normal  Nutrition recommendations include moderation in carbohydrate intake  Rationale for BMI follow-up plan is due to patient being overweight or obese  Depression Screening and Follow-up Plan: Patient was screened for depression during today's encounter  They screened negative with a PHQ-2 score of 0  Covid booster    There are no Patient Instructions on file for this visit  Return in about 3 months (around 4/25/2022) for Next scheduled follow up  Subjective:      Patient ID: Merry Haines is a 77 y o  male      Chief Complaint   Patient presents with    Follow-up     on meds [email or web address]        Patient reports that he is feeling well  He did indulge over the holidays he thinks that is why his blood sugars up  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       The following portions of the patient's history were reviewed and updated as appropriate: allergies, current medications, past family history, past medical history, past social history, past surgical history and problem list     Review of Systems   Constitutional: Negative  Respiratory: Negative  Cardiovascular: Negative  Current Outpatient Medications   Medication Sig Dispense Refill    ACCU-CHEK FASTCLIX LANCETS MISC by Does not apply route daily Dx: E11 40 100 each 1    aspirin 81 mg chewable tablet Chew 1 tablet      Empagliflozin (Jardiance) 10 MG TABS Take 1 tablet (10 mg total) by mouth every morning 90 tablet 1    fluorouracil (EFUDEX) 5 % cream Apply topically 2 (two) times a day For 4 weeks 40 g 0    glucose blood (ACCU-CHEK GUIDE) test strip 1 each by Other route daily DX: e11 40 100 each 1    lisinopril-hydrochlorothiazide (PRINZIDE,ZESTORETIC) 20-12 5 MG per tablet Take 1 tablet by mouth daily 90 tablet 3    metFORMIN (GLUCOPHAGE-XR) 500 mg 24 hr tablet Take 4 tablets (2,000 mg total) by mouth daily with breakfast 360 tablet 1    omeprazole (PriLOSEC) 20 mg delayed release capsule Take 20 mg by mouth daily      rosuvastatin (CRESTOR) 20 MG tablet TAKE 1 TABLET DAILY 90 tablet 3    tamsulosin (FLOMAX) 0 4 mg Take 1 capsule (0 4 mg total) by mouth daily with dinner 90 capsule 1    VITAMIN E PO Take by mouth daily  (Patient not taking: Reported on 1/25/2022 )       No current facility-administered medications for this visit  Objective:    /76   Pulse 90   Temp 97 7 °F (36 5 °C)   Resp 16   Ht 6' (1 829 m)   Wt 121 kg (267 lb)   SpO2 99%   BMI 36 21 kg/m²        Physical Exam  Vitals and nursing note reviewed  Constitutional:       Appearance: He is well-developed     HENT:      Head: Normocephalic and atraumatic  Right Ear: Tympanic membrane and external ear normal       Left Ear: Tympanic membrane and external ear normal    Cardiovascular:      Rate and Rhythm: Normal rate and regular rhythm  Heart sounds: Normal heart sounds  No murmur heard  Pulmonary:      Effort: Pulmonary effort is normal  No respiratory distress  Breath sounds: Normal breath sounds  No wheezing or rales  Musculoskeletal:      Right lower leg: No edema  Left lower leg: No edema                  Hilary Brilliant, DO

## 2022-02-10 ENCOUNTER — OFFICE VISIT (OUTPATIENT)
Dept: OBGYN CLINIC | Facility: CLINIC | Age: 67
End: 2022-02-10
Payer: COMMERCIAL

## 2022-02-10 VITALS
SYSTOLIC BLOOD PRESSURE: 110 MMHG | DIASTOLIC BLOOD PRESSURE: 66 MMHG | BODY MASS INDEX: 35.76 KG/M2 | HEART RATE: 82 BPM | WEIGHT: 264 LBS | HEIGHT: 72 IN

## 2022-02-10 DIAGNOSIS — M23.92 INTERNAL DERANGEMENT OF LEFT KNEE: Primary | ICD-10-CM

## 2022-02-10 DIAGNOSIS — M25.562 CHRONIC PAIN OF LEFT KNEE: ICD-10-CM

## 2022-02-10 DIAGNOSIS — S83.412D SPRAIN OF MEDIAL COLLATERAL LIGAMENT OF LEFT KNEE, SUBSEQUENT ENCOUNTER: ICD-10-CM

## 2022-02-10 DIAGNOSIS — M17.12 PRIMARY OSTEOARTHRITIS OF LEFT KNEE: ICD-10-CM

## 2022-02-10 DIAGNOSIS — M25.462 EFFUSION OF LEFT KNEE: ICD-10-CM

## 2022-02-10 DIAGNOSIS — G89.29 CHRONIC PAIN OF LEFT KNEE: ICD-10-CM

## 2022-02-10 PROCEDURE — 3078F DIAST BP <80 MM HG: CPT | Performed by: ORTHOPAEDIC SURGERY

## 2022-02-10 PROCEDURE — 99214 OFFICE O/P EST MOD 30 MIN: CPT | Performed by: ORTHOPAEDIC SURGERY

## 2022-02-10 PROCEDURE — 1160F RVW MEDS BY RX/DR IN RCRD: CPT | Performed by: ORTHOPAEDIC SURGERY

## 2022-02-10 PROCEDURE — 1036F TOBACCO NON-USER: CPT | Performed by: ORTHOPAEDIC SURGERY

## 2022-02-10 PROCEDURE — 3074F SYST BP LT 130 MM HG: CPT | Performed by: ORTHOPAEDIC SURGERY

## 2022-02-10 PROCEDURE — 3008F BODY MASS INDEX DOCD: CPT | Performed by: ORTHOPAEDIC SURGERY

## 2022-02-10 NOTE — PROGRESS NOTES
Assessment/Plan:  1  Internal derangement of left knee  MRI knee left  wo contrast   2  Effusion of left knee  MRI knee left  wo contrast   3  Sprain of medial collateral ligament of left knee, subsequent encounter  MRI knee left  wo contrast   4  Primary osteoarthritis of left knee     5  Chronic pain of left knee       Scribe Attestation    I,:  America Gtz am acting as a scribe while in the presence of the attending physician :       I,:  Temo Diazpojas DO personally performed the services described in this documentation    as scribed in my presence :         Samantha Nuñez is a pleasant 71-year-old male who returns today for follow-up evaluation of his left knee pain  After reviewing his imaging and performing a thorough history and physical exam I explained that I do concern for underlying meniscus tear based on his clinical presentation today  I have ordered an MRI of the left knee to evaluate for this  We will also be able to evaluate the severity of his MCL sprain  I encouraged him to continue using his hinged knee brace  We will follow up with him after his MRI is completed to review the results and further delineate the plan of care  All his questions and concerns were addressed today  Subjective: Follow-up evaluation for left knee pain    Patient ID: Deanna Mckee is a 77 y o  male who returns today for follow-up evaluation of his left knee pain  He was seen approximately 4 weeks ago and diagnosed with an MCL sprain  He was given a hinged knee brace and instructed to use Voltaren gel  At today's visit, he states that his pain persists  He continues to experience sharp pain about the medial aspect of his knee as well as about the anterior aspect of the knee  This pain increases with activity, particularly when descending stairs  He also notices pain when he twists his leg  He denies any recurrent injury or trauma  Review of Systems   Constitutional: Positive for activity change  Negative for chills, fever and unexpected weight change  HENT: Negative for hearing loss, nosebleeds and sore throat  Eyes: Negative for pain, redness and visual disturbance  Respiratory: Negative for cough, shortness of breath and wheezing  Cardiovascular: Negative for chest pain, palpitations and leg swelling  Gastrointestinal: Negative for abdominal pain, nausea and vomiting  Endocrine: Negative for polyphagia and polyuria  Genitourinary: Negative for dysuria and hematuria  Musculoskeletal: Positive for arthralgias and myalgias  Negative for joint swelling  See HPI   Skin: Negative for rash and wound  Neurological: Negative for dizziness, numbness and headaches  Psychiatric/Behavioral: Negative for decreased concentration and suicidal ideas  The patient is not nervous/anxious  Past Medical History:   Diagnosis Date    Arthritis     Chest tightness     Diabetes ([facility] )     Diabetes mellitus ([facility] )     Diverticulosis of sigmoid colon     Esophageal reflux     Fatty liver     GERD (gastroesophageal reflux disease)     Hearing problem     High cholesterol     History of chest pain     Hyperlipemia     Hypertension     Lumbago     Malignant neoplasm of prostate ([address] )     Prostate CA ([facility] )     Squamous cell skin cancer 08/27/2020    right dorsal forearm    Tinea pedis of both feet     Trochanteric bursitis        Past Surgical History:   Procedure Laterality Date    APPENDECTOMY      COLONOSCOPY  2016    HERNIA REPAIR      ?  Umbilical    MA REPAIR INCISIONAL HERNIA,REDUCIBLE N/A 11/11/2021    Procedure: INCISIONAL HERNIA REPAIR AT THE UMBILICUS;  Surgeon: Melvin Calloway MD;  [facility] MAIN [facility];  Service: General    PROSTATECTOMY N/A 2011    PROSTATECTOMY         Family History   Problem Relation Age of Onset    Arthritis Mother     Parkinsonism Mother     Heart disease Father     Arthritis Father     Coronary artery disease Father     Hypertension Father     Parkinsonism Father     Mental illness Neg Hx        Social History     Occupational History    Not on file   Tobacco Use    Smoking status: Former Smoker     Packs/day: 0 00     Years: 0 00     Pack years: 0 00     Quit date: 2012     Years since quittin 6    Smokeless tobacco: Never Used   Vaping Use    Vaping Use: Never used   Substance and Sexual Activity    Alcohol use:  Yes     Alcohol/week: 6 0 standard drinks     Types: 6 Cans of beer per week     Comment: Weekends    Drug use: No    Sexual activity: Not Currently     Partners: Female         Current Outpatient Medications:     ACCU-CHEK FASTCLIX LANCETS MISC, by Does not apply route daily Dx: E11 40, Disp: 100 each, Rfl: 1    aspirin 81 mg chewable tablet, Chew 1 tablet, Disp: , Rfl:     Empagliflozin (Jardiance) 10 MG TABS, Take 1 tablet (10 mg total) by mouth every morning, Disp: 90 tablet, Rfl: 1    fluorouracil (EFUDEX) 5 % cream, Apply topically 2 (two) times a day For 4 weeks, Disp: 40 g, Rfl: 0    glucose blood (ACCU-CHEK GUIDE) test strip, 1 each by Other route daily DX: e11 40, Disp: 100 each, Rfl: 1    lisinopril-hydrochlorothiazide (PRINZIDE,ZESTORETIC) 20-12 5 MG per tablet, Take 1 tablet by mouth daily, Disp: 90 tablet, Rfl: 3    metFORMIN (GLUCOPHAGE-XR) 500 mg 24 hr tablet, Take 4 tablets (2,000 mg total) by mouth daily with breakfast, Disp: 360 tablet, Rfl: 1    omeprazole (PriLOSEC) 20 mg delayed release capsule, Take 20 mg by mouth daily, Disp: , Rfl:     rosuvastatin (CRESTOR) 20 MG tablet, TAKE 1 TABLET DAILY, Disp: 90 tablet, Rfl: 3    tamsulosin (FLOMAX) 0 4 mg, Take 1 capsule (0 4 mg total) by mouth daily with dinner, Disp: 90 capsule, Rfl: 1    VITAMIN E PO, Take by mouth daily  (Patient not taking: Reported on 2022 ), Disp: , Rfl:     Allergies   Allergen Reactions    Penicillins Other (See Comments)      Ancef can tolerate       Objective:  Vitals:    02/10/22 1108   BP: 110/66   Pulse: 82       Body mass index is 35 8 kg/m²  Left Knee Exam     Tenderness   The patient is experiencing tenderness in the MCL, patella and medial joint line  Range of Motion   Left knee extension: 0  Left knee flexion: 125  Tests   Zenaida:  Medial - positive Lateral - negative  Varus: negative Valgus: negative  Drawer:  Anterior - negative     Posterior - negative    Other   Erythema: absent  Scars: absent  Sensation: normal  Pulse: present  Swelling: none  Effusion: effusion (1+) present    Comments:  Stable at 0, 30 and 90 degrees  Neurovascularly in tact distally  No warmth or erythema  Parapatellar crepitance noted  Patellofemoral grind: positive  Apley's: positive          Observations   Left Knee   Positive for effusion (1+)  Physical Exam  Vitals and nursing note reviewed  Constitutional:       Appearance: He is well-developed  HENT:      Head: Normocephalic and atraumatic  Eyes:      General: No scleral icterus  Conjunctiva/sclera: Conjunctivae normal    Cardiovascular:      Rate and Rhythm: Normal rate  Pulmonary:      Effort: Pulmonary effort is normal  No respiratory distress  Musculoskeletal:      Cervical back: Normal range of motion and neck supple  Left knee: Effusion (1+) present  Instability Tests: Medial Zenaida test positive  Lateral Zenaida test negative  Comments: As noted in HPI   Skin:     General: Skin is warm and dry  Neurological:      Mental Status: He is alert and oriented to person, place, and time     Psychiatric:         Behavior: Behavior normal

## 2022-02-15 ENCOUNTER — TELEPHONE (OUTPATIENT)
Dept: GASTROENTEROLOGY | Facility: CLINIC | Age: 67
End: 2022-02-15

## 2022-02-15 ENCOUNTER — OFFICE VISIT (OUTPATIENT)
Dept: PODIATRY | Facility: CLINIC | Age: 67
End: 2022-02-15
Payer: COMMERCIAL

## 2022-02-15 VITALS — WEIGHT: 264 LBS | HEIGHT: 72 IN | BODY MASS INDEX: 35.76 KG/M2 | RESPIRATION RATE: 17 BRPM

## 2022-02-15 DIAGNOSIS — B07.0 PLANTAR WARTS: ICD-10-CM

## 2022-02-15 DIAGNOSIS — E11.42 DIABETIC POLYNEUROPATHY ASSOCIATED WITH TYPE 2 DIABETES MELLITUS (HCC): Primary | ICD-10-CM

## 2022-02-15 DIAGNOSIS — L60.0 INGROWN TOENAIL: ICD-10-CM

## 2022-02-15 DIAGNOSIS — M77.41 METATARSALGIA OF BOTH FEET: ICD-10-CM

## 2022-02-15 DIAGNOSIS — M54.16 RADICULOPATHY OF LUMBAR REGION: ICD-10-CM

## 2022-02-15 DIAGNOSIS — M77.42 METATARSALGIA OF BOTH FEET: ICD-10-CM

## 2022-02-15 PROCEDURE — 99203 OFFICE O/P NEW LOW 30 MIN: CPT | Performed by: PODIATRIST

## 2022-02-15 PROCEDURE — 17110 DESTRUCTION B9 LES UP TO 14: CPT | Performed by: PODIATRIST

## 2022-02-15 NOTE — TELEPHONE ENCOUNTER
Received order from Dr Venkat Manuel to call and schedule colonoscopy for hx of polyps and tubular adenoma with Dr Cheryle Duong he was due for in 2021  Patient was also due for EGD at the same time  I left message for patient to call and schedule  Will call again in 1 wk if he doesn't return call to schedule

## 2022-02-15 NOTE — PROGRESS NOTES
Assessment/Plan:  Metatarsalgia  Rule out plantar verruca left foot  Diabetic neuropathy  Radiculopathy  Chronic ingrown toenail hallux bilateral    Plan  Foot exam performed  Patient educated on care of the diabetic foot  All nails debrided  Today plantar left lesion debrided  Cantharone applied  Patient advised on aftercare  Patient advised on care of diabetic and radicular type pain  We will consider gabapentin  Return 2 weeks         Diagnoses and all orders for this visit:    Diabetic polyneuropathy associated with type 2 diabetes mellitus ([address] )    Metatarsalgia of both feet    Ingrown toenail    Plantar warts    Radiculopathy of lumbar region          Subjective:  Patient has pain in his feet left greater than right  Sometimes gets a feeling of fullness or numbness in his left foot  Patient is diabetic with history of low back pain  No history of trauma              Allergies   Allergen Reactions    Penicillins Other (See Comments)      Ancef can tolerate         Current Outpatient Medications:     ACCU-CHEK FASTCLIX LANCETS MISC, by Does not apply route daily Dx: E11 40, Disp: 100 each, Rfl: 1    aspirin 81 mg chewable tablet, Chew 1 tablet, Disp: , Rfl:     Empagliflozin (Jardiance) 10 MG TABS, Take 1 tablet (10 mg total) by mouth every morning, Disp: 90 tablet, Rfl: 1    fluorouracil (EFUDEX) 5 % cream, Apply topically 2 (two) times a day For 4 weeks, Disp: 40 g, Rfl: 0    glucose blood (ACCU-CHEK GUIDE) test strip, 1 each by Other route daily DX: e11 40, Disp: 100 each, Rfl: 1    lisinopril-hydrochlorothiazide (PRINZIDE,ZESTORETIC) 20-12 5 MG per tablet, Take 1 tablet by mouth daily, Disp: 90 tablet, Rfl: 3    metFORMIN (GLUCOPHAGE-XR) 500 mg 24 hr tablet, Take 4 tablets (2,000 mg total) by mouth daily with breakfast, Disp: 360 tablet, Rfl: 1    omeprazole (PriLOSEC) 20 mg delayed release capsule, Take 20 mg by mouth daily, Disp: , Rfl:     rosuvastatin (CRESTOR) 20 MG tablet, TAKE 1 TABLET DAILY, Disp: 90 tablet, Rfl: 3    tamsulosin (FLOMAX) 0 4 mg, Take 1 capsule (0 4 mg total) by mouth daily with dinner, Disp: 90 capsule, Rfl: 1    VITAMIN E PO, Take by mouth daily  (Patient not taking: Reported on 1/25/2022 ), Disp: , Rfl:     Patient Active Problem List   Diagnosis    Abnormal EKG    History of prostate cancer    Arthropathy of multiple sites    Type 2 diabetes mellitus with diabetic neuropathy, without long-term current use of insulin ([facility]    Diverticulosis of sigmoid colon    Esophageal reflux    Fatty liver    Abdominal hernia    Mixed hyperlipidemia    Essential hypertension    Chronic bilateral low back pain without sciatica    Severe obesity (BMI 35 0-39  9) with comorbidity [facility])          Patient ID: Marcia Richter is a 77 y o  male  HPI    The following portions of the patient's history were reviewed and updated as appropriate: He  has a past medical history of Arthritis, Chest tightness, Diabetes ([address] ), Diabetes mellitus ([facility] ), Diverticulosis of sigmoid colon, Esophageal reflux, Fatty liver, GERD (gastroesophageal reflux disease), Hearing problem, High cholesterol, History of chest pain, Hyperlipemia, Hypertension, Lumbago, Malignant neoplasm of prostate ([address] ), Prostate CA ([address] ), Squamous cell skin cancer (08/27/2020), Tinea pedis of both feet, and Trochanteric bursitis  He   Patient Active Problem List    Diagnosis Date Noted    Severe obesity (BMI 35 0-39  9) with comorbidity ([facility] ) 01/24/2019    Chronic bilateral low back pain without sciatica 06/27/2018    Abnormal EKG 06/02/2017    Type 2 diabetes mellitus with diabetic neuropathy, without long-term current use of insulin ([facility] ) 03/31/2017    Diverticulosis of sigmoid colon 12/30/2016    Fatty liver 06/29/2016    Essential hypertension 04/16/2014    Abdominal hernia 01/02/2013    Mixed hyperlipidemia 01/02/2013    History of prostate cancer 09/04/2012    Arthropathy of multiple sites 09/04/2012  Esophageal reflux 09/04/2012     He  has a past surgical history that includes Prostatectomy (N/A, 2011); Colonoscopy (2016); Prostatectomy; Appendectomy; Hernia repair; and pr repair incisional hernia,reducible (N/A, 11/11/2021)  His family history includes Arthritis in his father and mother; Coronary artery disease in his father; Heart disease in his father; Hypertension in his father; Parkinsonism in his father and mother  He  reports that he quit smoking about 9 years ago  He smoked 0 00 packs per day for 0 00 years  He has never used smokeless tobacco  He reports current alcohol use of about 6 0 standard drinks of alcohol per week  He reports that he does not use drugs  Current Outpatient Medications   Medication Sig Dispense Refill    ACCU-CHEK FASTCLIX LANCETS MISC by Does not apply route daily Dx: E11 40 100 each 1    aspirin 81 mg chewable tablet Chew 1 tablet      Empagliflozin (Jardiance) 10 MG TABS Take 1 tablet (10 mg total) by mouth every morning 90 tablet 1    fluorouracil (EFUDEX) 5 % cream Apply topically 2 (two) times a day For 4 weeks 40 g 0    glucose blood (ACCU-CHEK GUIDE) test strip 1 each by Other route daily DX: e11 40 100 each 1    lisinopril-hydrochlorothiazide (PRINZIDE,ZESTORETIC) 20-12 5 MG per tablet Take 1 tablet by mouth daily 90 tablet 3    metFORMIN (GLUCOPHAGE-XR) 500 mg 24 hr tablet Take 4 tablets (2,000 mg total) by mouth daily with breakfast 360 tablet 1    omeprazole (PriLOSEC) 20 mg delayed release capsule Take 20 mg by mouth daily      rosuvastatin (CRESTOR) 20 MG tablet TAKE 1 TABLET DAILY 90 tablet 3    tamsulosin (FLOMAX) 0 4 mg Take 1 capsule (0 4 mg total) by mouth daily with dinner 90 capsule 1    VITAMIN E PO Take by mouth daily  (Patient not taking: Reported on 1/25/2022 )       No current facility-administered medications for this visit       Current Outpatient Medications on File Prior to Visit   Medication Sig    ACCU-CHEK FASTCLIX LANCETS [facility] by Does not apply route daily Dx: E11 40    aspirin 81 mg chewable tablet Chew 1 tablet    Empagliflozin (Jardiance) 10 MG TABS Take 1 tablet (10 mg total) by mouth every morning    fluorouracil (EFUDEX) 5 % cream Apply topically 2 (two) times a day For 4 weeks    glucose blood (ACCU-CHEK GUIDE) test strip 1 each by Other route daily DX: e11 40    lisinopril-hydrochlorothiazide (PRINZIDE,ZESTORETIC) 20-12 5 MG per tablet Take 1 tablet by mouth daily    metFORMIN (GLUCOPHAGE-XR) 500 mg 24 hr tablet Take 4 tablets (2,000 mg total) by mouth daily with breakfast    omeprazole (PriLOSEC) 20 mg delayed release capsule Take 20 mg by mouth daily    rosuvastatin (CRESTOR) 20 MG tablet TAKE 1 TABLET DAILY    tamsulosin (FLOMAX) 0 4 mg Take 1 capsule (0 4 mg total) by mouth daily with dinner    VITAMIN E PO Take by mouth daily  (Patient not taking: Reported on 1/25/2022 )     No current facility-administered medications on file prior to visit  He is allergic to penicillins       Vitals:    02/15/22 1620   Resp: 17       Review of Systems      Objective:  Patient's shoes and socks removed  Foot Exam    General  General Appearance: appears stated age and healthy   Orientation: alert and oriented to person, place, and time   Affect: appropriate   Gait: antalgic       Right Foot/Ankle     Inspection and Palpation  Tenderness: bony tenderness and metatarsals   Swelling: dorsum   Arch: pes planus  Hallux valgus: yes  Skin Exam: callus; Neurovascular  Dorsalis pedis: 2+  Posterior tibial: 2+  Saphenous nerve sensation: diminished  Tibial nerve sensation: diminished  Superficial peroneal nerve sensation: diminished  Deep peroneal nerve sensation: diminished  Sural nerve sensation: diminished      Left Foot/Ankle      Inspection and Palpation  Tenderness: bony tenderness and metatarsals   Swelling: dorsum   Arch: pes planus  Hallux valgus: yes  Skin Exam: callus and warts;      Neurovascular  Dorsalis pedis: 2+  Posterior tibial: 2+  Saphenous nerve sensation: diminished  Tibial nerve sensation: diminished  Superficial peroneal nerve sensation: diminished  Deep peroneal nerve sensation: diminished  Sural nerve sensation: diminished        Physical Exam  Vitals and nursing note reviewed  Constitutional:       Appearance: Normal appearance  Cardiovascular:      Rate and Rhythm: Normal rate and regular rhythm  Pulses: no weak pulses          Dorsalis pedis pulses are 2+ on the right side and 2+ on the left side  Posterior tibial pulses are 2+ on the right side and 2+ on the left side  Musculoskeletal:      Right foot: Bunion and bony tenderness present  Left foot: Bunion and bony tenderness present  Feet:      Right foot:      Skin integrity: Callus present  Left foot:      Skin integrity: Callus present  Skin:     Capillary Refill: Capillary refill takes less than 2 seconds  Comments: Patient has xerosis of skin  He has dystrophy of nail  Hallux bilateral is wide incurvated toenail ingrown fibular aspect  Negative pus  Submetatarsal 5 of the left foot demonstrates small punctate lesion consistent with atypical verruca  It is approximately 0 2 centimeter squared in size   Neurological:      Mental Status: He is alert  Psychiatric:         Mood and Affect: Mood normal          Thought Content: Thought content normal          Judgment: Judgment normal      Patient's shoes and socks removed  Right Foot/Ankle   Right Foot Inspection  Skin Exam: callus and callus  Vascular  The right DP pulse is 2+  The right PT pulse is 2+  Right Toe  - Comprehensive Exam  Arch: pes planus  Hallux valgus: yes  Swelling: dorsum   Tenderness: bony tenderness and metatarsals         Left Foot/Ankle  Left Foot Inspection  Skin Exam: callus  Vascular  The left DP pulse is 2+  The left PT pulse is 2+       Left Toe  - Comprehensive Exam  Arch: pes planus  Hallux valgus: yes  Swelling: dorsum   Tenderness: bony tenderness and metatarsals           Assign Risk Category  Deformity present  Loss of protective sensation  No weak pulses  Risk: 2

## 2022-02-16 DIAGNOSIS — S83.412D SPRAIN OF MEDIAL COLLATERAL LIGAMENT OF LEFT KNEE, SUBSEQUENT ENCOUNTER: ICD-10-CM

## 2022-02-16 DIAGNOSIS — M17.12 PRIMARY OSTEOARTHRITIS OF LEFT KNEE: ICD-10-CM

## 2022-02-16 DIAGNOSIS — M23.92 INTERNAL DERANGEMENT OF LEFT KNEE: Primary | ICD-10-CM

## 2022-02-16 DIAGNOSIS — M75.42 IMPINGEMENT SYNDROME OF LEFT SHOULDER: ICD-10-CM

## 2022-02-16 RX ORDER — TRAMADOL HYDROCHLORIDE 50 MG/1
50 TABLET ORAL EVERY 6 HOURS PRN
Qty: 25 TABLET | Refills: 0 | Status: SHIPPED | OUTPATIENT
Start: 2022-02-16 | End: 2022-03-16 | Stop reason: HOSPADM

## 2022-02-22 NOTE — TELEPHONE ENCOUNTER
Called and spoke with patient  He is having problems with a knee, going for MRI and may need surgery  I will call in a month to f/u or he will call if everything works out with his knee to schedule

## 2022-02-23 ENCOUNTER — HOSPITAL ENCOUNTER (OUTPATIENT)
Dept: MRI IMAGING | Facility: HOSPITAL | Age: 67
Discharge: HOME | End: 2022-02-23
Attending: ORTHOPAEDIC SURGERY
Payer: COMMERCIAL

## 2022-02-23 DIAGNOSIS — S83.412D SPRAIN OF MEDIAL COLLATERAL LIGAMENT OF LEFT KNEE, SUBSEQUENT ENCOUNTER: ICD-10-CM

## 2022-02-23 DIAGNOSIS — M25.462 EFFUSION OF LEFT KNEE: ICD-10-CM

## 2022-02-23 DIAGNOSIS — M23.92 INTERNAL DERANGEMENT OF LEFT KNEE: ICD-10-CM

## 2022-02-23 PROCEDURE — G1004 CDSM NDSC: HCPCS

## 2022-02-23 PROCEDURE — 73721 MRI JNT OF LWR EXTRE W/O DYE: CPT

## 2022-02-24 ENCOUNTER — TELEPHONE (OUTPATIENT)
Dept: OBGYN CLINIC | Facility: CLINIC | Age: 67
End: 2022-02-24

## 2022-02-24 NOTE — TELEPHONE ENCOUNTER
Patient has apt 03/03  ----- Message from David Kenney DO sent at 2/24/2022  9:54 AM EST -----  Please have this patient return to the office to discuss a plan moving forward

## 2022-03-03 ENCOUNTER — OFFICE VISIT (OUTPATIENT)
Dept: OBGYN CLINIC | Facility: CLINIC | Age: 67
End: 2022-03-03
Payer: COMMERCIAL

## 2022-03-03 ENCOUNTER — OFFICE VISIT (OUTPATIENT)
Dept: LAB | Facility: CLINIC | Age: 67
End: 2022-03-03
Payer: COMMERCIAL

## 2022-03-03 ENCOUNTER — HOSPITAL ENCOUNTER (OUTPATIENT)
Dept: RADIOLOGY | Facility: HOSPITAL | Age: 67
Discharge: HOME | End: 2022-03-03
Payer: COMMERCIAL

## 2022-03-03 ENCOUNTER — APPOINTMENT (OUTPATIENT)
Dept: LAB | Facility: CLINIC | Age: 67
End: 2022-03-03
Payer: COMMERCIAL

## 2022-03-03 VITALS
BODY MASS INDEX: 35.76 KG/M2 | DIASTOLIC BLOOD PRESSURE: 70 MMHG | HEIGHT: 72 IN | HEART RATE: 90 BPM | SYSTOLIC BLOOD PRESSURE: 130 MMHG | WEIGHT: 264 LBS

## 2022-03-03 DIAGNOSIS — M25.562 ACUTE PAIN OF LEFT KNEE: ICD-10-CM

## 2022-03-03 DIAGNOSIS — S83.232D COMPLEX TEAR OF MEDIAL MENISCUS OF LEFT KNEE AS CURRENT INJURY, SUBSEQUENT ENCOUNTER: ICD-10-CM

## 2022-03-03 DIAGNOSIS — Z01.812 PRE-OPERATIVE LABORATORY EXAMINATION: ICD-10-CM

## 2022-03-03 DIAGNOSIS — I10 ESSENTIAL HYPERTENSION: ICD-10-CM

## 2022-03-03 DIAGNOSIS — S83.232D COMPLEX TEAR OF MEDIAL MENISCUS OF LEFT KNEE AS CURRENT INJURY, SUBSEQUENT ENCOUNTER: Primary | ICD-10-CM

## 2022-03-03 DIAGNOSIS — E11.40 TYPE 2 DIABETES MELLITUS WITH DIABETIC NEUROPATHY, WITHOUT LONG-TERM CURRENT USE OF INSULIN (HCC): ICD-10-CM

## 2022-03-03 DIAGNOSIS — M17.12 PRIMARY OSTEOARTHRITIS OF LEFT KNEE: ICD-10-CM

## 2022-03-03 LAB
ALBUMIN SERPL BCP-MCNC: 4.2 G/DL (ref 3.5–5)
ALP SERPL-CCNC: 55 U/L (ref 46–116)
ALT SERPL W P-5'-P-CCNC: 54 U/L (ref 12–78)
ANION GAP SERPL CALCULATED.3IONS-SCNC: 13 MMOL/L (ref 4–13)
APTT PPP: 29 SECONDS (ref 23–37)
AST SERPL W P-5'-P-CCNC: 32 U/L (ref 5–45)
BASOPHILS # BLD AUTO: 0.02 THOUSANDS/ΜL (ref 0–0.1)
BASOPHILS NFR BLD AUTO: 0 % (ref 0–1)
BILIRUB SERPL-MCNC: 0.6 MG/DL (ref 0.2–1)
BUN SERPL-MCNC: 17 MG/DL (ref 5–25)
CALCIUM SERPL-MCNC: 9.3 MG/DL (ref 8.3–10.1)
CHLORIDE SERPL-SCNC: 101 MMOL/L (ref 100–108)
CO2 SERPL-SCNC: 25 MMOL/L (ref 21–32)
CREAT SERPL-MCNC: 0.87 MG/DL (ref 0.6–1.3)
EOSINOPHIL # BLD AUTO: 0.08 THOUSAND/ΜL (ref 0–0.61)
EOSINOPHIL NFR BLD AUTO: 1 % (ref 0–6)
ERYTHROCYTE [DISTWIDTH] IN BLOOD BY AUTOMATED COUNT: 12.4 % (ref 11.6–15.1)
GFR SERPL CREATININE-BSD FRML MDRD: 89 ML/MIN/1.73SQ M
GLUCOSE SERPL-MCNC: 134 MG/DL (ref 65–140)
HCT VFR BLD AUTO: 45.6 % (ref 36.5–49.3)
HGB BLD-MCNC: 15.5 G/DL (ref 12–17)
IMM GRANULOCYTES # BLD AUTO: 0.03 THOUSAND/UL (ref 0–0.2)
IMM GRANULOCYTES NFR BLD AUTO: 1 % (ref 0–2)
INR PPP: 1.02 (ref 0.84–1.19)
LYMPHOCYTES # BLD AUTO: 1.52 THOUSANDS/ΜL (ref 0.6–4.47)
LYMPHOCYTES NFR BLD AUTO: 25 % (ref 14–44)
MCH RBC QN AUTO: 31.9 PG (ref 26.8–34.3)
MCHC RBC AUTO-ENTMCNC: 34 G/DL (ref 31.4–37.4)
MCV RBC AUTO: 94 FL (ref 82–98)
MONOCYTES # BLD AUTO: 0.68 THOUSAND/ΜL (ref 0.17–1.22)
MONOCYTES NFR BLD AUTO: 11 % (ref 4–12)
NEUTROPHILS # BLD AUTO: 3.75 THOUSANDS/ΜL (ref 1.85–7.62)
NEUTS SEG NFR BLD AUTO: 62 % (ref 43–75)
NRBC BLD AUTO-RTO: 0 /100 WBCS
PLATELET # BLD AUTO: 165 THOUSANDS/UL (ref 149–390)
PMV BLD AUTO: 10.4 FL (ref 8.9–12.7)
POTASSIUM SERPL-SCNC: 3.8 MMOL/L (ref 3.5–5.3)
PROT SERPL-MCNC: 7.4 G/DL (ref 6.4–8.2)
PROTHROMBIN TIME: 13.4 SECONDS (ref 11.6–14.5)
RBC # BLD AUTO: 4.86 MILLION/UL (ref 3.88–5.62)
SODIUM SERPL-SCNC: 139 MMOL/L (ref 136–145)
WBC # BLD AUTO: 6.08 THOUSAND/UL (ref 4.31–10.16)

## 2022-03-03 PROCEDURE — 99215 OFFICE O/P EST HI 40 MIN: CPT | Performed by: ORTHOPAEDIC SURGERY

## 2022-03-03 PROCEDURE — 71046 X-RAY EXAM CHEST 2 VIEWS: CPT

## 2022-03-03 PROCEDURE — 85610 PROTHROMBIN TIME: CPT

## 2022-03-03 PROCEDURE — 93005 ELECTROCARDIOGRAM TRACING: CPT

## 2022-03-03 PROCEDURE — 36415 COLL VENOUS BLD VENIPUNCTURE: CPT

## 2022-03-03 PROCEDURE — 85730 THROMBOPLASTIN TIME PARTIAL: CPT

## 2022-03-03 PROCEDURE — 80053 COMPREHEN METABOLIC PANEL: CPT

## 2022-03-03 PROCEDURE — 85025 COMPLETE CBC W/AUTO DIFF WBC: CPT

## 2022-03-03 RX ORDER — CHLORHEXIDINE GLUCONATE 0.12 MG/ML
15 RINSE ORAL ONCE
Status: CANCELLED | OUTPATIENT
Start: 2022-03-15 | End: 2022-03-03

## 2022-03-03 NOTE — PROGRESS NOTES
Assessment/Plan:  1  Complex tear of medial meniscus of left knee as current injury, subsequent encounter  Case request operating room: ARTHROSCOPY KNEE - partial menisectomy    Comprehensive metabolic panel    CBC and differential    Protime-INR    APTT    Ambulatory referral to [facility]    Ambulatory referral to Physical Therapy    EKG 12 lead    XR chest pa & lateral    Case request operating room: ARTHROSCOPY KNEE - partial menisectomy    Crutches   2  Acute pain of left knee  Case request operating room: ARTHROSCOPY KNEE - partial menisectomy    Comprehensive metabolic panel    CBC and differential    Protime-INR    APTT    Ambulatory referral to [facility]    Ambulatory referral to Physical Therapy    EKG 12 lead    XR chest pa & lateral    Case request operating room: ARTHROSCOPY KNEE - partial menisectomy    Crutches   3  Primary osteoarthritis of left knee  Ambulatory referral to [facility]   4  Type 2 diabetes mellitus with diabetic neuropathy, without long-term current use of insulin [facility]  Ambulatory referral to [facility]   5  Essential hypertension  Ambulatory referral to [facility]   6  Pre-operative laboratory examination  Case request operating room: ARTHROSCOPY KNEE - partial menisectomy    Comprehensive metabolic panel    CBC and differential    Protime-INR    APTT    Ambulatory referral to [facility]    Ambulatory referral to Physical Therapy    EKG 12 lead    XR chest pa & lateral    Case request operating room: ARTHROSCOPY KNEE - partial menisectomy     Eduard Brumfield is a pleasant 57-year-old presenting today for follow-up after undergoing a left knee MRI  The MRI did demonstrate a complex posterior horn medial meniscus tear with an unstable fragment  He has known mild underlying osteoarthritis which previously did respond well to conservative treatment    However, given the acute changes in his symptoms and feeling of instability, we believe that he is symptomatic of an acute medial meniscus tear  We discussed potential treatment options with him  Based on the mild nature of his osteoarthritis, we do not believe that he is in need of a total knee arthroplasty at this time  Given the acute mechanical symptoms he is having, we have recommended that he undergo a left knee arthroscopy for partial meniscectomy  Risks of surgery including not limited to bleeding, infection, stiffness, need for further surgery, failure to relieve symptoms, persistent limp, persistent pain, damage to vessels or nerves, blood clots, heart attack, stroke, and death were discussed  Consents were signed and placed into the chart for a left knee arthroscopic partial meniscectomy and all associated procedures  He will need clearance from his primary care physician and basic labs prior to surgery  He will be fitted with crutches as well  We will tentatively plan for surgery on March 15th  He expressed understanding all of his questions were addressed today  He was introduced to our surgical scheduler for further planning    Subjective: Left knee MRI follow up    Patient ID: Keysha Martin is a 77 y o  male  Prashant Loo is a very pleasant 51-year-old gentleman very well known to our practice presenting today for follow-up of his left knee pain and MRI review  We had previously treated his arthritis with periodic injections, which had been helpful  However, after an incident stepping off a curb and twisting, he has had acute medial-sided pain  He describes pain on a daily basis and feelings of instability, especially when going up and down stairs  This is caused him to fall a few times  He failed to see significant relief from tramadol, and has been taking Tylenol which does provide some relief  However, he remains limited in his ability to perform activities daily living due to medial-sided knee pain  Review of Systems   Constitutional: Positive for activity change  HENT: Negative  Eyes: Negative  Respiratory: Negative  Cardiovascular: Negative  Gastrointestinal: Negative  Endocrine: Negative  Genitourinary: Negative  Musculoskeletal: Positive for arthralgias, gait problem, joint swelling and myalgias  Skin: Negative  Allergic/Immunologic: Negative  Hematological: Negative  Psychiatric/Behavioral: Negative  Past Medical History:   Diagnosis Date    Arthritis     Chest tightness     [place] [address] [place]     Diabetes mellitus ([place] [address] )     Diverticulosis of sigmoid colon     Esophageal reflux     Fatty liver     GERD (gastroesophageal reflux disease)     Hearing problem     High cholesterol     History of chest pain     Hyperlipemia     Hypertension     Lumbago     Malignant ne[place] of [place] [address] )     Prostate CA ([place] [address] )     Squamous cell skin cancer 2020    right dorsal forearm    Tinea pedis of both feet     Trochanteric bursitis        Past Surgical History:   Procedure Laterality Date    APPENDECTOMY      COLONOSCOPY      HERNIA REPAIR      ? Umbilical    GA REPAIR INCISIONAL HERNIA,REDUCIBLE N/A 2021    Procedure: INCISIONAL HERNIA REPAIR AT THE UMBILICUS;  Surgeon: Jordan Cockayne, MD;  Location: [facility] MAIN OR;  Service: General    PROSTATECTOMY N/A     PROSTATECTOMY         Family History   Problem Relation Age of Onset    Arthritis Mother     Parkinsonism Mother     Heart disease Father     Arthritis Father     Coronary artery disease Father     Hypertension Father     Parkinsonism Father     Mental illness Neg Hx        Social History     Occupational History    Not on file   Tobacco Use    Smoking status: Former Smoker     Packs/day: 0 00     Years: 0 00     Pack years: 0 00     Quit date: 2012     Years since quittin 6    Smokeless tobacco: Never Used   Vaping Use    Vaping Use: Never used   Substance and Sexual Activity    Alcohol use:  Yes     Alcohol/week: 6 0 standard drinks     Types: 6 Cans of beer per week     Comment: Weekends    Drug use: No    Sexual activity: Not Currently     Partners: Female         Current Outpatient Medications:     ACCU-CHEK FASTCLIX LANCETS MISC, by Does not apply route daily Dx: E11 40, Disp: 100 each, Rfl: 1    aspirin 81 mg chewable tablet, Chew 1 tablet, Disp: , Rfl:     Empagliflozin (Jardiance) 10 MG TABS, Take 1 tablet (10 mg total) by mouth every morning, Disp: 90 tablet, Rfl: 1    fluorouracil (EFUDEX) 5 % cream, Apply topically 2 (two) times a day For 4 weeks, Disp: 40 g, Rfl: 0    glucose blood (ACCU-CHEK GUIDE) test strip, 1 each by Other route daily DX: e11 40, Disp: 100 each, Rfl: 1    lisinopril-hydrochlorothiazide (PRINZIDE,ZESTORETIC) 20-12 5 MG per tablet, Take 1 tablet by mouth daily, Disp: 90 tablet, Rfl: 3    metFORMIN (GLUCOPHAGE-XR) 500 mg 24 hr tablet, Take 4 tablets (2,000 mg total) by mouth daily with breakfast, Disp: 360 tablet, Rfl: 1    omeprazole (PriLOSEC) 20 mg delayed release capsule, Take 20 mg by mouth daily, Disp: , Rfl:     rosuvastatin (CRESTOR) 20 MG tablet, TAKE 1 TABLET DAILY, Disp: 90 tablet, Rfl: 3    tamsulosin (FLOMAX) 0 4 mg, Take 1 capsule (0 4 mg total) by mouth daily with dinner, Disp: 90 capsule, Rfl: 1    traMADol (Ultram) 50 mg tablet, Take 1 tablet (50 mg total) by mouth every 6 (six) hours as needed for moderate pain, Disp: 25 tablet, Rfl: 0    VITAMIN E PO, Take by mouth daily  (Patient not taking: Reported on 1/25/2022 ), Disp: , Rfl:     Allergies   Allergen Reactions    Penicillins Other (See Comments)      Ancef can tolerate       Objective:  Vitals:    03/03/22 1017   BP: 130/70   Pulse: 90       Body mass index is 35 8 kg/m²  Left Knee Exam     Muscle Strength   The patient has normal left knee strength  Tenderness   The patient is experiencing tenderness in the MCL, patella and medial joint line  Range of Motion   Extension: normal Left knee extension: 0     Flexion: normal Left knee flexion: 125  Tests   Zenaida:  Medial - positive Lateral - negative  Varus: negative Valgus: negative  Drawer:  Anterior - negative     Posterior - negative    Other   Erythema: absent  Scars: absent  Sensation: normal  Pulse: present  Swelling: none  Effusion: effusion (1+) present    Comments:  Collateral ligaments stable at 0, 30 and 90 degrees  Neurovascularly intact distally  No warmth or erythema  Parapatellar crepitance noted  Patellofemoral grind: equivocal  Apley's/Zenaida's: positive  Ambulates with slightly antalgic gait on left without assistive device          Observations   Left Knee   Positive for effusion (1+)  Physical Exam  Vitals and nursing note reviewed  Constitutional:       Appearance: Normal appearance  He is well-developed and normal weight  Comments: Body mass index is 35 8 kg/m²  HENT:      Head: Normocephalic and atraumatic  Right Ear: External ear normal       Left Ear: External ear normal       Nose: Nose normal       Mouth/Throat:      Mouth: Mucous membranes are moist    Eyes:      Extraocular Movements: Extraocular movements intact  Cardiovascular:      Rate and Rhythm: Normal rate  Pulses: Normal pulses  Pulmonary:      Effort: Pulmonary effort is normal       Breath sounds: Normal breath sounds  Abdominal:      Palpations: Abdomen is soft  Musculoskeletal:      Cervical back: Normal range of motion  Left knee: Effusion (1+) present  Instability Tests: Medial Zenaida test positive  Lateral Zenaida test negative  Comments: See ortho exam   Skin:     General: Skin is warm and dry  Neurological:      General: No focal deficit present  Mental Status: He is alert and oriented to person, place, and time  Mental status is at baseline  Psychiatric:         Mood and Affect: Mood normal          Behavior: Behavior normal          Thought Content:  Thought content normal          Judgment: Judgment normal        I have personally reviewed pertinent films in PACS of the MRI taken of his left knee which demonstrates a complex medial meniscus posterior horn tear extending into the posterior root  There is a flipped fragment into the medial gutter    There is mild tricompartmental partial-thickness articular cartilage defects and small marginal osteophytosis

## 2022-03-03 NOTE — H&P (VIEW-ONLY)
Assessment/Plan:  1  Complex tear of medial meniscus of left knee as current injury, subsequent encounter  Case request operating room: ARTHROSCOPY KNEE - partial menisectomy    Comprehensive metabolic panel    CBC and differential    Protime-INR    APTT    Ambulatory referral to [facility]    Ambulatory referral to Physical Therapy    EKG 12 lead    XR chest pa & lateral    Case request operating room: ARTHROSCOPY KNEE - partial menisectomy    Crutches   2  Acute pain of left knee  Case request operating room: ARTHROSCOPY KNEE - partial menisectomy    Comprehensive metabolic panel    CBC and differential    Protime-INR    APTT    Ambulatory referral to [facility]    Ambulatory referral to Physical Therapy    EKG 12 lead    XR chest pa & lateral    Case request operating room: ARTHROSCOPY KNEE - partial menisectomy    Crutches   3  Primary osteoarthritis of left knee  Ambulatory referral to [facility]   4  Type 2 diabetes mellitus with diabetic neuropathy, without long-term current use of insulin [facility]  Ambulatory referral to [facility]   5  Essential hypertension  Ambulatory referral to [facility]   6  Pre-operative laboratory examination  Case request operating room: ARTHROSCOPY KNEE - partial menisectomy    Comprehensive metabolic panel    CBC and differential    Protime-INR    APTT    Ambulatory referral to [facility]    Ambulatory referral to Physical Therapy    EKG 12 lead    XR chest pa & lateral    Case request operating room: ARTHROSCOPY KNEE - partial menisectomy     Miguelangel Wilson is a pleasant 75-year-old presenting today for follow-up after undergoing a left knee MRI  The MRI did demonstrate a complex posterior horn medial meniscus tear with an unstable fragment  He has known mild underlying osteoarthritis which previously did respond well to conservative treatment    However, given the acute changes in his symptoms and feeling of instability, we believe that he is symptomatic of an acute medial meniscus tear  We discussed potential treatment options with him  Based on the mild nature of his osteoarthritis, we do not believe that he is in need of a total knee arthroplasty at this time  Given the acute mechanical symptoms he is having, we have recommended that he undergo a left knee arthroscopy for partial meniscectomy  Risks of surgery including not limited to bleeding, infection, stiffness, need for further surgery, failure to relieve symptoms, persistent limp, persistent pain, damage to vessels or nerves, blood clots, heart attack, stroke, and death were discussed  Consents were signed and placed into the chart for a left knee arthroscopic partial meniscectomy and all associated procedures  He will need clearance from his primary care physician and basic labs prior to surgery  He will be fitted with crutches as well  We will tentatively plan for surgery on March 15th  He expressed understanding all of his questions were addressed today  He was introduced to our surgical scheduler for further planning    Subjective: Left knee MRI follow up    Patient ID: Queta Castillo is a 77 y o  male  Joanne Gonzalez is a very pleasant 42-year-old gentleman very well known to our practice presenting today for follow-up of his left knee pain and MRI review  We had previously treated his arthritis with periodic injections, which had been helpful  However, after an incident stepping off a curb and twisting, he has had acute medial-sided pain  He describes pain on a daily basis and feelings of instability, especially when going up and down stairs  This is caused him to fall a few times  He failed to see significant relief from tramadol, and has been taking Tylenol which does provide some relief  However, he remains limited in his ability to perform activities daily living due to medial-sided knee pain  Review of Systems   Constitutional: Positive for activity change  HENT: Negative  Eyes: Negative  Respiratory: Negative  Cardiovascular: Negative  Gastrointestinal: Negative  Endocrine: Negative  Genitourinary: Negative  Musculoskeletal: Positive for arthralgias, gait problem, joint swelling and myalgias  Skin: Negative  Allergic/Immunologic: Negative  Hematological: Negative  Psychiatric/Behavioral: Negative  Past Medical History:   Diagnosis Date    Arthritis     Chest tightness     Diabetes ([place] [address] )     Diabetes mellitus ([place] [address] )     Diverticulosis of sigmoid colon     Esophageal reflux     Fatty liver     GERD (gastroesophageal reflux disease)     Hearing problem     High cholesterol     History of chest pain     Hyperlipemia     Hypertension     Lumbago     Malignant ne[place] of prostate [place] [address] )     Prostate CA ([place] [address] )     Squamous cell skin cancer 2020    right dorsal forearm    Tinea pedis of both feet     Trochanteric bursitis        Past Surgical History:   Procedure Laterality Date    APPENDECTOMY      COLONOSCOPY      HERNIA REPAIR      ? Umbilical    ND REPAIR INCISIONAL HERNIA,REDUCIBLE N/A 2021    Procedure: INCISIONAL HERNIA REPAIR AT THE UMBILICUS;  Surgeon: Lenora Mario MD;  [facility];  Service: General    PROSTATECTOMY N/A     PROSTATECTOMY         Family History   Problem Relation Age of Onset    Arthritis Mother     Parkinsonism Mother     Heart disease Father     Arthritis Father     Coronary artery disease Father     Hypertension Father     Parkinsonism Father     Mental illness Neg Hx        Social History     Occupational History    Not on file   Tobacco Use    Smoking status: Former Smoker     Packs/day: 0 00     Years: 0 00     Pack years: 0 00     Quit date: 2012     Years since quittin 6    Smokeless tobacco: Never Used   Vaping Use    Vaping Use: Never used   Substance and Sexual Activity    Alcohol use:  Yes     Alcohol/week: 6 0 standard drinks     Types: 6 Cans of beer per week     Comment: Weekends    Drug use: No    Sexual activity: Not Currently     Partners: Female         Current Outpatient Medications:     ACCU-CHEK FASTCLIX LANCETS MISC, by Does not apply route daily Dx: E11 40, Disp: 100 each, Rfl: 1    aspirin 81 mg chewable tablet, Chew 1 tablet, Disp: , Rfl:     Empagliflozin (Jardiance) 10 MG TABS, Take 1 tablet (10 mg total) by mouth every morning, Disp: 90 tablet, Rfl: 1    fluorouracil (EFUDEX) 5 % cream, Apply topically 2 (two) times a day For 4 weeks, Disp: 40 g, Rfl: 0    glucose blood (ACCU-CHEK GUIDE) test strip, 1 each by Other route daily DX: e11 40, Disp: 100 each, Rfl: 1    lisinopril-hydrochlorothiazide (PRINZIDE,ZESTORETIC) 20-12 5 MG per tablet, Take 1 tablet by mouth daily, Disp: 90 tablet, Rfl: 3    metFORMIN (GLUCOPHAGE-XR) 500 mg 24 hr tablet, Take 4 tablets (2,000 mg total) by mouth daily with breakfast, Disp: 360 tablet, Rfl: 1    omeprazole (PriLOSEC) 20 mg delayed release capsule, Take 20 mg by mouth daily, Disp: , Rfl:     rosuvastatin (CRESTOR) 20 MG tablet, TAKE 1 TABLET DAILY, Disp: 90 tablet, Rfl: 3    tamsulosin (FLOMAX) 0 4 mg, Take 1 capsule (0 4 mg total) by mouth daily with dinner, Disp: 90 capsule, Rfl: 1    traMADol (Ultram) 50 mg tablet, Take 1 tablet (50 mg total) by mouth every 6 (six) hours as needed for moderate pain, Disp: 25 tablet, Rfl: 0    VITAMIN E PO, Take by mouth daily  (Patient not taking: Reported on 1/25/2022 ), Disp: , Rfl:     Allergies   Allergen Reactions    Penicillins Other (See Comments)      Ancef can tolerate       Objective:  Vitals:    03/03/22 1017   BP: 130/70   Pulse: 90       Body mass index is 35 8 kg/m²  Left Knee Exam     Muscle Strength   The patient has normal left knee strength  Tenderness   The patient is experiencing tenderness in the MCL, patella and medial joint line  Range of Motion   Extension: normal Left knee extension: 0     Flexion: normal Left knee flexion: 125  Tests   Zenaida:  Medial - positive Lateral - negative  Varus: negative Valgus: negative  Drawer:  Anterior - negative     Posterior - negative    Other   Erythema: absent  Scars: absent  Sensation: normal  Pulse: present  Swelling: none  Effusion: effusion (1+) present    Comments:  Collateral ligaments stable at 0, 30 and 90 degrees  Neurovascularly intact distally  No warmth or erythema  Parapatellar crepitance noted  Patellofemoral grind: equivocal  Apley's/Zenaida's: positive  Ambulates with slightly antalgic gait on left without assistive device          Observations   Left Knee   Positive for effusion (1+)  Physical Exam  Vitals and nursing note reviewed  Constitutional:       Appearance: Normal appearance  He is well-developed and normal weight  Comments: Body mass index is 35 8 kg/m²  HENT:      Head: Normocephalic and atraumatic  Right Ear: External ear normal       Left Ear: External ear normal       Nose: Nose normal       Mouth/Throat:      Mouth: Mucous membranes are moist    Eyes:      Extraocular Movements: Extraocular movements intact  Cardiovascular:      Rate and Rhythm: Normal rate  Pulses: Normal pulses  Pulmonary:      Effort: Pulmonary effort is normal       Breath sounds: Normal breath sounds  Abdominal:      Palpations: Abdomen is soft  Musculoskeletal:      Cervical back: Normal range of motion  Left knee: Effusion (1+) present  Instability Tests: Medial Zenaida test positive  Lateral Zenaida test negative  Comments: See ortho exam   Skin:     General: Skin is warm and dry  Neurological:      General: No focal deficit present  Mental Status: He is alert and oriented to person, place, and time  Mental status is at baseline  Psychiatric:         Mood and Affect: Mood normal          Behavior: Behavior normal          Thought Content:  Thought content normal          Judgment: Judgment normal        I have personally reviewed pertinent films in PACS of the MRI taken of his left knee which demonstrates a complex medial meniscus posterior horn tear extending into the posterior root  There is a flipped fragment into the medial gutter    There is mild tricompartmental partial-thickness articular cartilage defects and small marginal osteophytosis

## 2022-03-04 ENCOUNTER — OFFICE VISIT (OUTPATIENT)
Dept: PODIATRY | Facility: CLINIC | Age: 67
End: 2022-03-04
Payer: COMMERCIAL

## 2022-03-04 VITALS — RESPIRATION RATE: 17 BRPM | WEIGHT: 264 LBS | BODY MASS INDEX: 35.76 KG/M2 | HEIGHT: 72 IN

## 2022-03-04 DIAGNOSIS — B07.0 PLANTAR WARTS: Primary | ICD-10-CM

## 2022-03-04 DIAGNOSIS — M79.672 LEFT FOOT PAIN: ICD-10-CM

## 2022-03-04 LAB
ATRIAL RATE: 82 BPM
PR INTERVAL: 188 MS
QRS AXIS: 204 DEGREES
QRSD INTERVAL: 86 MS
QT INTERVAL: 374 MS
QTC INTERVAL: 436 MS
T WAVE AXIS: 149 DEGREES
VENTRICULAR RATE: 82 BPM

## 2022-03-04 PROCEDURE — 17110 DESTRUCTION B9 LES UP TO 14: CPT | Performed by: PODIATRIST

## 2022-03-04 PROCEDURE — 93010 ELECTROCARDIOGRAM REPORT: CPT | Performed by: INTERNAL MEDICINE

## 2022-03-09 ENCOUNTER — OFFICE VISIT (OUTPATIENT)
Dept: FAMILY MEDICINE CLINIC | Facility: CLINIC | Age: 67
End: 2022-03-09
Payer: COMMERCIAL

## 2022-03-09 VITALS
HEART RATE: 88 BPM | RESPIRATION RATE: 16 BRPM | TEMPERATURE: 98.8 F | DIASTOLIC BLOOD PRESSURE: 70 MMHG | SYSTOLIC BLOOD PRESSURE: 132 MMHG | WEIGHT: 269 LBS | BODY MASS INDEX: 36.44 KG/M2 | HEIGHT: 72 IN

## 2022-03-09 DIAGNOSIS — S83.282D ACUTE LATERAL MENISCUS TEAR OF LEFT KNEE, SUBSEQUENT ENCOUNTER: ICD-10-CM

## 2022-03-09 DIAGNOSIS — E11.40 TYPE 2 DIABETES MELLITUS WITH DIABETIC NEUROPATHY, WITHOUT LONG-TERM CURRENT USE OF INSULIN (HCC): ICD-10-CM

## 2022-03-09 DIAGNOSIS — Z01.818 PREOP EXAMINATION: Primary | ICD-10-CM

## 2022-03-09 DIAGNOSIS — I10 ESSENTIAL HYPERTENSION: ICD-10-CM

## 2022-03-09 DIAGNOSIS — E78.2 MIXED HYPERLIPIDEMIA: ICD-10-CM

## 2022-03-09 PROCEDURE — 3075F SYST BP GE 130 - 139MM HG: CPT | Performed by: NURSE PRACTITIONER

## 2022-03-09 PROCEDURE — 1036F TOBACCO NON-USER: CPT | Performed by: NURSE PRACTITIONER

## 2022-03-09 PROCEDURE — 99243 OFF/OP CNSLTJ NEW/EST LOW 30: CPT | Performed by: NURSE PRACTITIONER

## 2022-03-09 PROCEDURE — 1160F RVW MEDS BY RX/DR IN RCRD: CPT | Performed by: NURSE PRACTITIONER

## 2022-03-09 PROCEDURE — 3078F DIAST BP <80 MM HG: CPT | Performed by: NURSE PRACTITIONER

## 2022-03-09 PROCEDURE — 93000 ELECTROCARDIOGRAM COMPLETE: CPT | Performed by: NURSE PRACTITIONER

## 2022-03-09 NOTE — ASSESSMENT & PLAN NOTE
Previous A1c slightly out of range, however his diet was poor over the holidays    He has been working on diet control and is compliant with his medications  Lab Results   Component Value Date    HGBA1C 7 5 (H) 01/05/2022

## 2022-03-09 NOTE — PROGRESS NOTES
[address]    NAME: Morgan Espinoza  AGE: 77 y o  SEX: male  : 1955     DATE: 3/9/2022    [facility] Pre-Operative Evaluation      Chief Complaint: Pre-operative Evaluation     Surgery: left knee arthroscopy/menisectomy  Anticipated Date of Surgery: 3/15/22  Surgeon: Dr Kolby Esparza      History of Present Illness:     HPI    Anesthesia:  to be determined by anesthesiology   Bleeding Risk: no recent abnormal bleeding, no remote history of abnormal bleeding and no use of Ca-channel blockers  Current Anti-platelet/anticoagulation medication: Aspirin    Assessment of Cardiac Risk:  · Denies unstable or severe angina or MI in the last 6 weeks or history of stent placement in the last year   · Denies decompensated heart failure (e g  New onset heart failure, NYHA functional class IV heart failure, or worsening existing heart failure)  · Denies significant arrhythmias such as high grade AV block, symptomatic ventricular arrhythmia, newly recognized ventricular tachycardia, supraventricular tachycardia with resting heart rate >100, or symptomatic bradycardia  · Denies severe heart valve disease including aortic stenosis or symptomatic mitral stenosis     Exercise Capacity:  · Able to walk 4 blocks without symptoms?: Yes  · Able to walk 2 flights without symptoms?: Yes    Prior Anesthesia Reactions: No     Personal history of venous thromboembolic disease? No    History of steroid use for >2 weeks within last year?  No         Review of Systems:     Review of Systems    Current Problem List:     Patient Active Problem List   Diagnosis    Abnormal EKG    History of prostate cancer    Arthropathy of multiple sites    Type 2 diabetes mellitus with diabetic neuropathy, without long-term current use of insulin (HCC)    Diverticulosis of sigmoid colon    Esophageal reflux    Fatty liver    Abdominal hernia    Mixed hyperlipidemia  Essential hypertension    Chronic bilateral low back pain without sciatica    Severe obesity (BMI 35 0-39  9) with comorbidity ([facility] )       Allergies:      Allergies   Allergen Reactions    Penicillins Other (See Comments)      Ancef can tolerate       Current Medications:       Current Outpatient Medications:     ACCU-CHEK FASTCLIX LANCETS MISC, by Does not apply route daily Dx: E11 40, Disp: 100 each, Rfl: 1    aspirin 81 mg chewable tablet, Chew 1 tablet, Disp: , Rfl:     Empagliflozin (Jardiance) 10 MG TABS, Take 1 tablet (10 mg total) by mouth every morning, Disp: 90 tablet, Rfl: 1    fluorouracil (EFUDEX) 5 % cream, Apply topically 2 (two) times a day For 4 weeks, Disp: 40 g, Rfl: 0    glucose blood (ACCU-CHEK GUIDE) test strip, 1 each by Other route daily DX: e11 40, Disp: 100 each, Rfl: 1    lisinopril-hydrochlorothiazide (PRINZIDE,ZESTORETIC) 20-12 5 MG per tablet, Take 1 tablet by mouth daily, Disp: 90 tablet, Rfl: 3    metFORMIN (GLUCOPHAGE-XR) 500 mg 24 hr tablet, Take 4 tablets (2,000 mg total) by mouth daily with breakfast, Disp: 360 tablet, Rfl: 1    omeprazole (PriLOSEC) 20 mg delayed release capsule, Take 20 mg by mouth daily, Disp: , Rfl:     rosuvastatin (CRESTOR) 20 MG tablet, TAKE 1 TABLET DAILY, Disp: 90 tablet, Rfl: 3    tamsulosin (FLOMAX) 0 4 mg, Take 1 capsule (0 4 mg total) by mouth daily with dinner, Disp: 90 capsule, Rfl: 1    traMADol (Ultram) 50 mg tablet, Take 1 tablet (50 mg total) by mouth every 6 (six) hours as needed for moderate pain, Disp: 25 tablet, Rfl: 0    Past Medical History:       Past Medical History:   Diagnosis Date    Arthritis     Chest tightness     [facility]     Diabetes mellitus ([facility] )     Diverticulosis of sigmoid colon     Esophageal reflux     Fatty liver     GERD (gastroesophageal reflux disease)     Hearing problem     High cholesterol     History of chest pain     Hyperlipemia     Hypertension     Lumbago     Malignant neoplasm of prostate ([facility] )     Pro[facility] )     Squamous cell skin cancer 2020    right dorsal forearm    Tinea pedis of both feet     Trochanteric bursitis         Past Surgical History:   Procedure Laterality Date    APPENDECTOMY      COLONOSCOPY  2016    HERNIA REPAIR      ? Umbilical    IL REPAIR INCISIONAL HERNIA,REDUCIBLE N/A 2021    Procedure: INCISIONAL HERNIA REPAIR AT THE UMBILICUS;  Surgeon: Iris Lewis MD;  [facility] MA[facility];  Service: General    PROSTATECTOMY N/A     PROSTATECTOMY          Family History   Problem Relation Age of Onset    Arthritis Mother     Parkinsonism Mother     Heart disease Father     Arthritis Father     Coronary artery disease Father     Hypertension Father     Parkinsonism Father     Mental illness Neg Hx         Social History     Socioeconomic History    Marital status: /Civil Union     Spouse name: Not on file    Number of children: Not on file    Years of education: Not on file    Highest education level: Not on file   Occupational History    Not on file   Tobacco Use    Smoking status: Former Smoker     Packs/day: 0 00     Years: 0 00     Pack years: 0 00     Quit date: 2012     Years since quittin 7    Smokeless tobacco: Never Used   Vaping Use    Vaping Use: Never used   Substance and Sexual Activity    Alcohol use: Yes     Alcohol/week: 6 0 standard drinks     Types: 6 Cans of beer per week     Comment: Weekends    Drug use: No    Sexual activity: Not Currently     Partners: Female   Other Topics Concern    Not on file   Social History Narrative    Lack of exercise  Pets/ animals: cat      Sleeps 8-10 hours /day     Social Determinants of Health     Financial Resource Strain: Not on file   Food Insecurity: Not on file   Transportation Needs: Not on file   Physical Activity: Not on file   Stress: Not on file   Social Connections: Not on file   Intimate Partner Violence: Not on file   Housing Stability: Not on file        Physical Exam:     /70   Pulse 88   Temp 98 8 °F (37 1 °C)   Resp 16   Ht 6' (1 829 m)   Wt 122 kg (269 lb)   BMI 36 48 kg/m²     Physical Exam  Vitals and nursing note reviewed  Constitutional:       Appearance: Normal appearance  He is well-developed  He is obese  HENT:      Head: Normocephalic and atraumatic  Right Ear: Tympanic membrane, ear canal and external ear normal       Left Ear: Tympanic membrane, ear canal and external ear normal       Nose: No mucosal edema or rhinorrhea  Mouth/Throat:      Pharynx: Uvula midline  Eyes:      Conjunctiva/sclera: Conjunctivae normal    Neck:      Thyroid: No thyromegaly  Cardiovascular:      Rate and Rhythm: Normal rate and regular rhythm  Pulses: Normal pulses  Heart sounds: Normal heart sounds  No murmur heard  Pulmonary:      Effort: Pulmonary effort is normal       Breath sounds: Normal breath sounds  Abdominal:      General: Bowel sounds are normal  There is no distension  Palpations: There is no hepatomegaly or splenomegaly  Tenderness: There is no abdominal tenderness  Musculoskeletal:      Cervical back: Neck supple  No edema  Right lower le+ Edema present  Left lower le+ Edema present  Lymphadenopathy:      Cervical:      Right cervical: No superficial cervical adenopathy  Left cervical: No superficial cervical adenopathy  Skin:     General: Skin is warm and dry  Findings: No rash  Neurological:      Mental Status: He is alert  Psychiatric:         Mood and Affect: Mood normal          Behavior: Behavior normal           Data:     Pre-operative work-up    Laboratory Results: I have personally reviewed the pertinent laboratory results/reports      EKG: I have personally reviewed pertinent reports  PAT EKG with limb lead reversal, EKG repeated in office      Recent Results (from the past 672 hour(s))   ECG 12 lead    Collection Time: 22 12:42 PM   Result Value Ref Range    Ventricular Rate 82 BPM    Atrial Rate 82 BPM    WA Interval 188 ms    QRSD Interval 86 ms    QT Interval 374 ms    QTC Interval 436 ms    P Axis  degrees    QRS Axis 204 degrees    T Wave Axis 149 degrees   Comprehensive metabolic panel    Collection Time: 03/03/22 12:43 PM   Result Value Ref Range    Sodium 139 136 - 145 mmol/L    Potassium 3 8 3 5 - 5 3 mmol/L    Chloride 101 100 - 108 mmol/L    CO2 25 21 - 32 mmol/L    ANION GAP 13 4 - 13 mmol/L    BUN 17 5 - 25 mg/dL    Creatinine 0 87 0 60 - 1 30 mg/dL    Glucose 134 65 - 140 mg/dL    Calcium 9 3 8 3 - 10 1 mg/dL    AST 32 5 - 45 U/L    ALT 54 12 - 78 U/L    Alkaline Phosphatase 55 46 - 116 U/L    Total Protein 7 4 6 4 - 8 2 g/dL    Albumin 4 2 3 5 - 5 0 g/dL    Total Bilirubin 0 60 0 20 - 1 00 mg/dL    eGFR 89 ml/min/1 73sq m   CBC and differential    Collection Time: 03/03/22 12:43 PM   Result Value Ref Range    WBC 6 08 4 31 - 10 16 Thousand/uL    RBC 4 86 3 88 - 5 62 Million/uL    Hemoglobin 15 5 12 0 - 17 0 g/dL    Hematocrit 45 6 36 5 - 49 3 %    MCV 94 82 - 98 fL    MCH 31 9 26 8 - 34 3 pg    MCHC 34 0 31 4 - 37 4 g/dL    RDW 12 4 11 6 - 15 1 %    MPV 10 4 8 9 - 12 7 fL    Platelets 511 670 - 658 Thousands/uL    nRBC 0 /100 WBCs    Neutrophils Relative 62 43 - 75 %    Immat GRANS % 1 0 - 2 %    Lymphocytes Relative 25 14 - 44 %    Monocytes Relative 11 4 - 12 %    Eosinophils Relative 1 0 - 6 %    Basophils Relative 0 0 - 1 %    Neutrophils Absolute 3 75 1 85 - 7 62 Thousands/µL    Immature Grans Absolute 0 03 0 00 - 0 20 Thousand/uL    Lymphocytes Absolute 1 52 0 60 - 4 47 Thousands/µL    Monocytes Absolute 0 68 0 17 - 1 22 Thousand/µL    Eosinophils Absolute 0 08 0 00 - 0 61 Thousand/µL    Basophils Absolute 0 02 0 00 - 0 10 Thousands/µL   Protime-INR    Collection Time: 03/03/22 12:43 PM   Result Value Ref Range    Protime 13 4 11 6 - 14 5 seconds    INR 1 02 0 84 - 1 19   APTT    Collection Time: 03/03/22 12:43 PM   Result Value Ref Range    PTT 29 23 - 37 seconds           Assessment & Recommendations:     Problem List Items Addressed This Visit        Endocrine    Type 2 diabetes mellitus with diabetic neuropathy, without long-term current use of insulin (HCC)     Previous A1c slightly out of range, however his diet was poor over the holidays  He has been working on diet control and is compliant with his medications  Lab Results   Component Value Date    HGBA1C 7 5 (H) 01/05/2022               Cardiovascular and Mediastinum    Essential hypertension     Stable on Lisinopril/HCTZ            Other    Mixed hyperlipidemia     Continues with statin, labs reviewed           Other Visit Diagnoses     Preop examination    -  Primary    Relevant Orders    POCT ECG    Acute lateral meniscus tear of left knee, subsequent encounter              Pre-Op Evaluation Assessment  77 y o  male with planned surgery: as above  Known risk factors for perioperative complications: Diabetes mellitus  Current medications which may produce withdrawal symptoms if withheld perioperatively: none  Pre-Op Evaluation Plan  1  Further preoperative workup as follows:   - None; no further preoperative work-up is required    2  Medication Management/Recommendations:   - Hold metformin the morning of surgery and do not resume until 48 hours AFTER surgery to avoid risk of lactic acidosis  Do not resume if eGFR is < 30  - Patient has been instructed to avoid aspirin containing medications or non-steroidal anti-inflammatory drugs for the week preceding surgery  3  Prophylaxis for cardiac events with perioperative beta-blockers: not indicated  4  Patient requires further consultation with: None    Clearance  Patient is CLEARED for surgery without any additional cardiac testing       Fletcher Bryant, [address] [address]  Phone#  [phone]  Fax#  [phone]

## 2022-03-10 NOTE — PRE-PROCEDURE INSTRUCTIONS
My Surgical Experience    The following information was developed to assist you to prepare for your operation  What do I need to do before coming to the hospital?   Arrange for a responsible person to drive you to and from the hospital    Arrange care for your children at home  Children are not allowed in the recovery areas of the hospital   Plan to wear clothing that is easy to put on and take off  If you are having shoulder surgery, wear a shirt that buttons or zippers in the front  Bathing  o Shower the evening before and the morning of your surgery with an antibacterial soap  Please refer to the Pre Op Showering Instructions for Surgery Patients Sheet   o Remove nail polish and all body piercing jewelry  o Do not shave any body part for at least 24 hours before surgery-this includes face, arms, legs and upper body  Food  o Nothing to eat or drink after midnight the night before your surgery  This includes candy and chewing gum  o Exception: If your surgery is after 12:00pm (noon), you may have clear liquids such as 7-Up®, ginger ale, apple or cranberry juice, Jell-O®, water, or clear broth until 8:00 am  o Do not drink milk or juice with pulp on the morning before surgery  o Do not drink alcohol 24 hours before surgery  Medicine  o Follow instructions you received from your surgeon about which medicines you may take on the day of surgery  o If instructed to take medicine on the morning of surgery, take pills with just a small sip of water  Call your prescribing doctor for specific infroamtion on what to do if you take insulin    What should I bring to the hospital?    Bring:  Cathi Singh or a walker, if you have them, for foot or knee surgery   A list of the daily medicines, vitamins, minerals, herbals and nutritional supplements you take   Include the dosages of medicines and the time you take them each day   Glasses, dentures or hearing aids   Minimal clothing; you will be wearing hospital sleepwear   Photo ID; required to verify your identity   If you have a Living Will or Power of , bring a copy of the documents   If you have an ostomy, bring an extra pouch and any supplies you use    Do not bring   Medicines or inhalers   Money, valuables or jewelry    What other information should I know about the day of surgery?  Notify your surgeons if you develop a cold, sore throat, cough, fever, rash or any other illness   Report to the Ambulatory Surgical/Same Day Surgery Unit   You will be instructed to stop at Registration only if you have not been pre-registered   Inform your  fi they do not stay that they will be asked by the staff to leave a phone number where they can be reached   Be available to be reached before surgery  In the event the operating room schedule changes, you may be asked to come in earlier or later than expected    *It is important to tell your doctor and others involved in your health care if you are taking or have been taking any non-prescription drugs, vitamins, minerals, herbals or other nutritional supplements  Any of these may interact with some food or medicines and cause a reaction      Pre-Surgery Instructions:   Medication Instructions    ACCU-CHEK FASTCLIX LANCETS MISC Instructed patient per Anesthesia Guidelines   aspirin 81 mg chewable tablet Instructed patient per Anesthesia Guidelines   Empagliflozin (Jardiance) 10 MG TABS Instructed patient per Anesthesia Guidelines   glucose blood (ACCU-CHEK GUIDE) test strip Instructed patient per Anesthesia Guidelines   lisinopril-hydrochlorothiazide (PRINZIDE,ZESTORETIC) 20-12 5 MG per tablet Instructed patient per Anesthesia Guidelines   metFORMIN (GLUCOPHAGE-XR) 500 mg 24 hr tablet Instructed patient per Anesthesia Guidelines   omeprazole (PriLOSEC) 20 mg delayed release capsule Instructed patient per Anesthesia Guidelines      rosuvastatin (CRESTOR) 20 MG tablet Instructed patient per Anesthesia Guidelines   tamsulosin (FLOMAX) 0 4 mg Instructed patient per Anesthesia Guidelines   traMADol (Ultram) 50 mg tablet Instructed patient per Anesthesia Guidelines  To take prilosec a m  of surgery

## 2022-03-14 ENCOUNTER — ANESTHESIA EVENT (OUTPATIENT)
Dept: PERIOP | Facility: HOSPITAL | Age: 67
End: 2022-03-14
Payer: COMMERCIAL

## 2022-03-15 ENCOUNTER — HOSPITAL ENCOUNTER (OUTPATIENT)
Facility: HOSPITAL | Age: 67
Setting detail: OUTPATIENT SURGERY
Discharge: HOME | End: 2022-03-15
Attending: ORTHOPAEDIC SURGERY | Admitting: ORTHOPAEDIC SURGERY
Payer: COMMERCIAL

## 2022-03-15 ENCOUNTER — ANESTHESIA (OUTPATIENT)
Dept: PERIOP | Facility: HOSPITAL | Age: 67
End: 2022-03-15
Payer: COMMERCIAL

## 2022-03-15 VITALS
SYSTOLIC BLOOD PRESSURE: 142 MMHG | TEMPERATURE: 98.1 F | HEART RATE: 81 BPM | RESPIRATION RATE: 18 BRPM | DIASTOLIC BLOOD PRESSURE: 80 MMHG | OXYGEN SATURATION: 97 % | BODY MASS INDEX: 35.8 KG/M2 | WEIGHT: 264 LBS

## 2022-03-15 DIAGNOSIS — S83.282A ACUTE LATERAL MENISCUS TEAR OF LEFT KNEE, INITIAL ENCOUNTER: Primary | ICD-10-CM

## 2022-03-15 LAB — GLUCOSE SERPL-MCNC: 148 MG/DL (ref 65–140)

## 2022-03-15 PROCEDURE — 82948 REAGENT STRIP/BLOOD GLUCOSE: CPT

## 2022-03-15 PROCEDURE — 29881 ARTHRS KNE SRG MNISECTMY M/L: CPT | Performed by: ORTHOPAEDIC SURGERY

## 2022-03-15 RX ORDER — DEXAMETHASONE SODIUM PHOSPHATE 10 MG/ML
INJECTION, SOLUTION INTRAMUSCULAR; INTRAVENOUS AS NEEDED
Status: DISCONTINUED | OUTPATIENT
Start: 2022-03-15 | End: 2022-03-15

## 2022-03-15 RX ORDER — SODIUM CHLORIDE, SODIUM LACTATE, POTASSIUM CHLORIDE, CALCIUM CHLORIDE 600; 310; 30; 20 MG/100ML; MG/100ML; MG/100ML; MG/100ML
100 INJECTION, SOLUTION INTRAVENOUS CONTINUOUS
Status: DISCONTINUED | OUTPATIENT
Start: 2022-03-15 | End: 2022-03-16 | Stop reason: HOSPADM

## 2022-03-15 RX ORDER — BUPIVACAINE HYDROCHLORIDE 2.5 MG/ML
INJECTION, SOLUTION EPIDURAL; INFILTRATION; INTRACAUDAL AS NEEDED
Status: DISCONTINUED | OUTPATIENT
Start: 2022-03-15 | End: 2022-03-15 | Stop reason: HOSPADM

## 2022-03-15 RX ORDER — MAGNESIUM HYDROXIDE 1200 MG/15ML
LIQUID ORAL AS NEEDED
Status: DISCONTINUED | OUTPATIENT
Start: 2022-03-15 | End: 2022-03-15 | Stop reason: HOSPADM

## 2022-03-15 RX ORDER — ONDANSETRON 2 MG/ML
INJECTION INTRAMUSCULAR; INTRAVENOUS AS NEEDED
Status: DISCONTINUED | OUTPATIENT
Start: 2022-03-15 | End: 2022-03-15

## 2022-03-15 RX ORDER — FENTANYL CITRATE 50 UG/ML
INJECTION, SOLUTION INTRAMUSCULAR; INTRAVENOUS AS NEEDED
Status: DISCONTINUED | OUTPATIENT
Start: 2022-03-15 | End: 2022-03-15

## 2022-03-15 RX ORDER — ASPIRIN 325 MG
325 TABLET, DELAYED RELEASE (ENTERIC COATED) ORAL DAILY
Qty: 28 TABLET | Refills: 0 | Status: SHIPPED | OUTPATIENT
Start: 2022-03-15 | End: 2022-04-26 | Stop reason: ALTCHOICE

## 2022-03-15 RX ORDER — OXYCODONE HYDROCHLORIDE 5 MG/1
5 TABLET ORAL EVERY 4 HOURS PRN
Qty: 20 TABLET | Refills: 0 | Status: SHIPPED | OUTPATIENT
Start: 2022-03-15 | End: 2022-03-20

## 2022-03-15 RX ORDER — CHLORHEXIDINE GLUCONATE 0.12 MG/ML
15 RINSE ORAL ONCE
Status: COMPLETED | OUTPATIENT
Start: 2022-03-15 | End: 2022-03-15

## 2022-03-15 RX ORDER — MIDAZOLAM HYDROCHLORIDE 2 MG/2ML
INJECTION, SOLUTION INTRAMUSCULAR; INTRAVENOUS AS NEEDED
Status: DISCONTINUED | OUTPATIENT
Start: 2022-03-15 | End: 2022-03-15

## 2022-03-15 RX ORDER — CLINDAMYCIN PHOSPHATE 900 MG/50ML
900 INJECTION INTRAVENOUS ONCE
Status: COMPLETED | OUTPATIENT
Start: 2022-03-15 | End: 2022-03-15

## 2022-03-15 RX ORDER — PROPOFOL 10 MG/ML
INJECTION, EMULSION INTRAVENOUS AS NEEDED
Status: DISCONTINUED | OUTPATIENT
Start: 2022-03-15 | End: 2022-03-15

## 2022-03-15 RX ORDER — LIDOCAINE HYDROCHLORIDE 10 MG/ML
INJECTION, SOLUTION EPIDURAL; INFILTRATION; INTRACAUDAL; PERINEURAL AS NEEDED
Status: DISCONTINUED | OUTPATIENT
Start: 2022-03-15 | End: 2022-03-15

## 2022-03-15 RX ADMIN — MIDAZOLAM 1 MG: 1 INJECTION INTRAMUSCULAR; INTRAVENOUS at 13:41

## 2022-03-15 RX ADMIN — MIDAZOLAM 1 MG: 1 INJECTION INTRAMUSCULAR; INTRAVENOUS at 13:35

## 2022-03-15 RX ADMIN — CLINDAMYCIN PHOSPHATE 900 MG: 18 INJECTION, SOLUTION INTRAMUSCULAR; INTRAVENOUS at 13:48

## 2022-03-15 RX ADMIN — DEXAMETHASONE SODIUM PHOSPHATE 4 MG: 10 INJECTION, SOLUTION INTRAMUSCULAR; INTRAVENOUS at 13:50

## 2022-03-15 RX ADMIN — LIDOCAINE HYDROCHLORIDE 50 MG: 10 INJECTION, SOLUTION EPIDURAL; INFILTRATION; INTRACAUDAL; PERINEURAL at 13:43

## 2022-03-15 RX ADMIN — FENTANYL CITRATE 50 MCG: 50 INJECTION, SOLUTION INTRAMUSCULAR; INTRAVENOUS at 13:54

## 2022-03-15 RX ADMIN — FENTANYL CITRATE 50 MCG: 50 INJECTION, SOLUTION INTRAMUSCULAR; INTRAVENOUS at 13:43

## 2022-03-15 RX ADMIN — CHLORHEXIDINE GLUCONATE 15 ML: 1.2 SOLUTION ORAL at 12:07

## 2022-03-15 RX ADMIN — SODIUM CHLORIDE, SODIUM LACTATE, POTASSIUM CHLORIDE, AND CALCIUM CHLORIDE 100 ML/HR: .6; .31; .03; .02 INJECTION, SOLUTION INTRAVENOUS at 12:06

## 2022-03-15 RX ADMIN — PROPOFOL 200 MG: 10 INJECTION, EMULSION INTRAVENOUS at 13:43

## 2022-03-15 RX ADMIN — ONDANSETRON 4 MG: 2 INJECTION INTRAMUSCULAR; INTRAVENOUS at 13:50

## 2022-03-15 NOTE — PERIOPERATIVE NURSING NOTE
Vitals wdl, pt able to tolerate fluids and void post procedure, iv access removed, dressing clean dry intact, discharge education provided to patient, patient stated understanding, left floor via wheelchair by RN, discharged to home with wife

## 2022-03-15 NOTE — DISCHARGE INSTRUCTIONS
INSTRUCTIONS FOLLOWING YOUR KNEE ARTHROSCOPY    FIRST FOLLOW-UP VISIT AFTER SURGERY      Call the office the day after your surgery & make an appointment for 1 week to see Dr Luis Enrique Ruby  At that appointment, your stitches will be removed  CANE OR CRUTCHES      You may put as much weight on your leg as tolerated when using the crutches/cane  When you feel that the crutches/cane is not necessary, you may discontinue its use  Use common sense, let pain be your guide for your activities  Climbing stairs, walking and sitting are all permitted as you tolerate them  EXERCISE PROGRAM      At your 1st follow-up visit you will be given a prescription for physical therapy if you have not already been given one  SHOWERING      Keep original bandage on for 72 hours after surgery & replace with band-aids  You should keep the band-aids on until you see Dr Luis Enrique Ruby  After 72 hours, it is okay to get your incision wet & you may shower  Do not take any baths or soak in a hot tub or pool until your stitches have been removed  INCISION SITE      You may notice a small amount of blood on your bandage, about the size of a quarter, this is normal and there is no need to worry  If you notice uncontrollable or excessive bleeding, oozing, or redness of the wound please call the office  SWELLING AND DISCOMFORT      You will experience some pain and discomfort after surgery  You will be given a prescription for pain medication  Take the medication as prescribed  If the discomfort is mild, you can take 1 or 2 Tylenol, every 4-6 hours as needed, instead of the prescribed pain medication  You will notice some swelling of your knee after surgery, this is normal      To help reduce the swelling, elevate your leg as much as possible the first two days  In addition, you may place ice on your knee to reduce swelling  Place a plastic bag filled with ice, wrapped in a thin towel, on your knee   Do not keep ice on for more than 15-20 minutes, repeat 4-5 TIMES A DAY, IF POSSIBLE  BLOOD CLOT PREVENTION    Unless otherwise instructed, take one 325 mg aspirin daily for the first 4 weeks following surgery  This is to help decrease the risk of blood clots  If at any time you have discomfort, swelling, or redness in the calf (behind the leg between the knee and the ankle)  or difficulty breathing or heaviness in the chest, please call the doctor immediately  TEMPERATURE      A temperature of less than 101 degrees is common for the first 1-2 days after surgery  If your temperature is elevated above 101 degrees, call the office  IF YOU HAVE ANY OTHER CONCERNS OR QUESTIONS AT ANY TIME, DO NOT HESITATE TO CALL THE OFFICE [phone]

## 2022-03-15 NOTE — INTERVAL H&P NOTE
H&P reviewed  After examining the patient I find no changes in the patients condition since the H&P had been written  Patient was seen examined in the preoperative holding area and been no changes in his orthopedic exam or the appearance of his left lower extremity  He denies any recent fever, chills, nausea, vomiting, headache, chest pain, trouble breathing  He has been cleared by his medical subspecialist in preparation for the procedure today  All of his questions were addressed preoperatively  Proceed to OR for surgical arthroscopy of the left knee and partial medial meniscectomy    Vital signs were reviewed prior to the case    Darryle Schlatter D O    Division of Adult Reconstruction  Department of Orthopaedic Surgery  [facility]

## 2022-03-15 NOTE — OP NOTE
OPERATIVE REPORT  PATIENT NAME: Maulik Malone    :  1955  MRN: [id number]  Pt Location: [facility] OR ROOM 04    SURGERY DATE: 3/15/2022    Surgeon(s) and Role:     * Brent Joyce, DO - Primary     * Ale Doe,  ATC/OTC - 2nd Assist, no resident was available an assistant was needed for patient positioning, prepping and draping, assistance during arthroscopy, and application of dressing    Preop Diagnosis:  Complex tear of medial meniscus of left knee as current injury, subsequent encounter [S83 232D]  Acute pain of left knee [M25 562]    Post-Op Diagnosis Codes:     * Complex tear of medial meniscus of left knee as current injury, subsequent encounter [S83 232D]     * Acute pain of left knee [M25 562]    Procedure(s) (LRB):  ARTHROSCOPY KNEE,  partial medial menisectomy (Left)    Specimen(s):  * No specimens in log *    Estimated Blood Loss:   Minimal    Drains:  * No LDAs found *    Anesthesia Type:  General LMA    Intravenous fluids:  600 cc    Antibiotics:  Clindamycin 900 mg    Urine output:  None    Tourniquet time:  26 minutes at 250 mmHg    Implants:  None    Operative Indications:  Complex tear of medial meniscus of left knee as current injury, subsequent encounter [S83 232D]  Acute pain of left knee [M25 562]  Patient is a 30-year-old male well known to me for treatment of his activity-related left knee pain  Patient presented recently with an acute change in his overall pain picture and quality and quantity of pain  After thorough evaluation and workup he was found to have an acute medial meniscus tear of his right knee  Treatment options were discussed and the patient elected to pursue surgical arthroscopy with partial medial meniscectomy  Consents were signed and placed in chart  Patient was optimized for the intended procedure  Operative Findings:  Intraoperatively there was evidence of a complex tear in the posterior horn of the medial meniscus    There was also grade 3 chondromalacia diffusely on the proximal tibia and medial femoral condyle as well as on the patella and to a lesser extent on the trochlea  The lateral compartment remained intact  A partial medial meniscectomy was performed as well as a medial femoral condyle chondroplasty  The patient tolerated procedure well  There were no complications  Complications:   None    Procedure and Technique:  Patient was seen examined preoperative holding area the left lower extremity was identified marked  Patient was taken back to the operating room where there placed in the supine position on the operating room table administered general anesthesia  The patient was administered 900 mg of IV clindamycin as a form perioperative antibiotics  A tourniquet was placed upon the left thigh for the procedure  With all bony prominences well padded and the patient in supine position left lower extremity was prepped and draped in standard sterile fashion after the leg macias was applied  The right lower extremity was draped over the break in the table in the popliteal fossa was well-padded throughout the procedure  Final time-out was performed and all members of the operating room staff were in agreement that the patient was correctly identified, the extremity was correct, and the intended procedure was correct  The leg was exsanguinated and tourniquet was inflated to 250 mmHg  A longitudinal anterior lateral portal site was delineated and incised using an 11 blade scalpel and carried into the intra-articular space  The dilator was inserted into the notch through this portal and carried up into the suprapatellar pouch  The arthroscope was then inserted into the articular space and patellofemoral joint was visualized  Diagnostic arthroscopy was performed briefly and this visualized the patellofemoral joint, lateral compartment, medial compartment and the ACL  The findings were as follows    There was grade 3 degenerative change on the patella and to a lesser extent in the trochlea  The lateral compartment was relatively unaffected  The medial compartment had significant and diffuse grade 3 degenerative change throughout the medial femoral condyle as well as on the proximal tibia  There was a complex tear of the posterior horn of the medial meniscus  The ACL was intact  Under direct visualization the anteromedial portal was made above the level of the medial meniscus utilizing a spinal needle  Once the appropriate position was obtained the spinal needle was removed and the position was incised with 11 blade scalpel in a vertical fashion  The dilator was inserted in her notch and carried back through the medial compartment  The dilator was removed and the probe was inserted  The probe was taken back to the posterior horn of the medial meniscus to explore the tear  The tear was complex in nature involve the posterior horn  The root appeared to be intact  Utilizing a combination of biters, Carlos, and ArthroCare Wand the medial meniscus tear was removed and partial medial meniscectomy was completed  The medial femoral condyle did undergo a conservative chondroplasty due to the degenerative change present  The joint was then irrigated via the arthroscope  The knee arthroscope was removed  Diagnostic, working, and post meniscectomy pictures were obtained throughout the procedure procedure, these photos were labeled in place within the patient's chart  The anterior medial and anterior lateral portal sites were infiltrated with 0 25% Marcaine plain  The port sites were closed with simple 3-0 nylon sutures  The tourniquet was released after 26 minutes at 250 mmHg  The portal sites were dressed with Xeroform, gauze, ABD and an Ace wrap  The drapes were removed  The table was restored to its neutral position  The patient was awaken from general anesthesia without complication    Patient was taken back to PACU where he began his postoperative course  The patient was neurovascular intact in PACU  Postoperative instructions were discussed with the patient  The patient will be weight-bearing as tolerated  He will be given aspirin 325 mg p o  Daily for DVT prophylaxis  He was given crutches preoperatively which he will use in the immediate postoperative phase and wean off of them at his tolerance  He will start PT as well  All questions addressed from the patient and the patient's wife via telephone     I was present for all critical portions of the procedure    Patient Disposition:  PACU       SIGNATURE: Radhika Grossman DO  DATE: March 15, 2022  TIME: 3:26 PM

## 2022-03-15 NOTE — ANESTHESIA POSTPROCEDURE EVALUATION
Post-Op Assessment Note    CV Status:  Stable  Pain Score: 0    Pain management: adequate     Mental Status:  Sleepy and arousable   Hydration Status:  Stable   PONV Controlled:  None   Airway Patency:  Patent      Post Op Vitals Reviewed: Yes      Staff: CRNA   Comments: spontaneously breathing, HOB @ 30 degrees, protecting airway, vss, fully endorsed to recovery w/o AC        No complications documented      BP      Temp     Pulse     Resp      SpO2   99

## 2022-03-15 NOTE — ANESTHESIA PREPROCEDURE EVALUATION
Procedure:  ARTHROSCOPY KNEE,  partial medial menisectomy (Left Knee)    Relevant Problems   ANESTHESIA (within normal limits)      CARDIO   (+) Essential hypertension   (+) Mixed hyperlipidemia      ENDO   (+) Type 2 diabetes mellitus with diabetic neuropathy, without long-term current use of insulin (HCC)      GI/HEPATIC   (+) Esophageal reflux   (+) Fatty liver      MUSCULOSKELETAL   (+) Chronic bilateral low back pain without sciatica      NEURO/PSYCH   (+) History of prostate cancer        Physical Exam    Airway    Mallampati score: II  TM Distance: >3 FB  Neck ROM: full     Dental   Comment: No loose,     Cardiovascular  Rhythm: regular, Rate: normal,     Pulmonary  Breath sounds clear to auscultation,     Other Findings        Anesthesia Plan  ASA Score- 2     Anesthesia Type- general with ASA Monitors  Additional Monitors:   Airway Plan: ETT  Plan Factors-Exercise tolerance (METS): >4 METS  Chart reviewed  EKG reviewed  Existing labs reviewed  Patient summary reviewed  Patient is not a current smoker  Induction-     Postoperative Plan- Plan for postoperative opioid use  Planned trial extubation    Informed Consent- Anesthetic plan and risks discussed with patient  I personally reviewed this patient with the CRNA  Discussed and agreed on the Anesthesia Plan with the OLIVE Ruby

## 2022-03-16 ENCOUNTER — EVALUATION (OUTPATIENT)
Dept: PHYSICAL THERAPY | Facility: CLINIC | Age: 67
End: 2022-03-16
Payer: COMMERCIAL

## 2022-03-16 DIAGNOSIS — M25.562 ACUTE PAIN OF LEFT KNEE: ICD-10-CM

## 2022-03-16 DIAGNOSIS — Z01.812 PRE-OPERATIVE LABORATORY EXAMINATION: ICD-10-CM

## 2022-03-16 DIAGNOSIS — S83.232D COMPLEX TEAR OF MEDIAL MENISCUS OF LEFT KNEE AS CURRENT INJURY, SUBSEQUENT ENCOUNTER: Primary | ICD-10-CM

## 2022-03-16 PROCEDURE — 97161 PT EVAL LOW COMPLEX 20 MIN: CPT

## 2022-03-16 PROCEDURE — 97110 THERAPEUTIC EXERCISES: CPT

## 2022-03-16 NOTE — PROGRESS NOTES
PT Evaluation     Today's date: 3/16/2022  Patient name: Micaela Hernandez  : 1955  MRN: [id number]  Referring provider: Slick Dobbins  Dx:   Encounter Diagnosis     ICD-10-CM    1  Complex tear of medial meniscus of left knee as current injury, subsequent encounter  S83 232D Ambulatory referral to Physical Therapy   2  Acute pain of left knee  M25 562 Ambulatory referral to Physical Therapy   3  Pre-operative laboratory examination  Z01 812 Ambulatory referral to Physical Therapy                  Assessment  Assessment details: Pt is a 77 y o  male who presents to PT for evaluation of L kne s/p meniscectomy on 3/15/22  Patient presents with mild ROM deficits into L knee flexion and extension, as well as decreased activation of L quads, and decreased balance and strength  Patient would benefit from skilled PT in order to reduce impairments and maximize pain free functional mobility  Pt was educated regarding physical therapy diagnosis and the recommended plan of care, as well as the potential risks and benefits of treatment  Impairments: abnormal or restricted ROM, impaired balance, impaired physical strength, lacks appropriate home exercise program and pain with function  Functional limitations: ambulation , stairs  Symptom irritability: lowUnderstanding of Dx/Px/POC: good   Prognosis: good    Goals  STG (3 weeks)  1  Pt to be I in HEP  2 Pt to resolve pain with ambulation  3  Patient to increase L knee ROM by 5 degrees  LTG (By DC)  1 Pt to resolve pain with all functional mobility  2 Pt to improve FOTO score to predicted dc value  3  Pt to manage symptoms independently  4  Patient to increase L knee strength to 5/5      Plan  Patient would benefit from: skilled physical therapy  Planned modality interventions: cryotherapy  Planned therapy interventions: strengthening, stretching, therapeutic activities, therapeutic exercise, flexibility, functional ROM exercises, gait training, graded activity, graded exercise, home exercise program, therapeutic training, patient education, postural training, orthotic fitting/training, neuromuscular re-education, muscle pump exercises, Alfonso taping, massage, manual therapy, joint mobilization, abdominal trunk stabilization, activity modification, balance, balance/weight bearing training, behavior modification and body mechanics training  Frequency: 2x week  Duration in weeks: 6  Plan of Care beginning date: 3/16/2022  Plan of Care expiration date: 5/20/2022  Treatment plan discussed with: patient        Subjective    Etiology of symptoms: L knee medial meniscentomy 3/15/22    Nature of Pain: posterior knee  Current Pain: 0/10  At Best: 0/10  At Worst: 2/10  Aggravating Factors: ambulation, steps  Relieving Factors: prescription med, rest  Irritability: low  Stability: stable    Home setup:  5 LIDIA, 5 steps to kitchen; FF to bedroom    Primary functional impairments:  1  ambulation  2  Ascending / descending stairs    Patient Goals:  1  Be able to go skiing with [employer] next year  2  Return to gym program        Objective    Gait: Patient ambulating with SPC  Increased weight bearing through [facility]  Decreased stance time on L LE         Knee AROM   Right Left   Flexion 120 115   Extension 0 -5     Knee ROM   Right Left   Flexion 123 118   Extension 0 -3     Quad set: R good L fair             Precautions: Post op meniscectomy 3/15       3/16            Visit 1            Manuals             L knee PROM                                                    Neuro Re-Ed             Quad set                                                                                           Ther Ex             Nustep             Heel slide             SLR             SL hip abd             Calf stretch             Hamstring stretch             LAQ                          Ther Activity                                       Gait Training Modalities

## 2022-03-17 ENCOUNTER — APPOINTMENT (OUTPATIENT)
Dept: PHYSICAL THERAPY | Facility: CLINIC | Age: 67
End: 2022-03-17
Payer: COMMERCIAL

## 2022-03-21 ENCOUNTER — OFFICE VISIT (OUTPATIENT)
Dept: PHYSICAL THERAPY | Facility: CLINIC | Age: 67
End: 2022-03-21
Payer: COMMERCIAL

## 2022-03-21 ENCOUNTER — PATIENT MESSAGE (OUTPATIENT)
Dept: PODIATRY | Facility: CLINIC | Age: 67
End: 2022-03-21

## 2022-03-21 DIAGNOSIS — S83.232D COMPLEX TEAR OF MEDIAL MENISCUS OF LEFT KNEE AS CURRENT INJURY, SUBSEQUENT ENCOUNTER: Primary | ICD-10-CM

## 2022-03-21 DIAGNOSIS — Z01.812 PRE-OPERATIVE LABORATORY EXAMINATION: ICD-10-CM

## 2022-03-21 DIAGNOSIS — M25.562 ACUTE PAIN OF LEFT KNEE: ICD-10-CM

## 2022-03-21 PROCEDURE — 97110 THERAPEUTIC EXERCISES: CPT

## 2022-03-21 PROCEDURE — 97140 MANUAL THERAPY 1/> REGIONS: CPT

## 2022-03-21 NOTE — PROGRESS NOTES
Daily Note     Today's date: 3/21/2022  Patient name: Devon Martinez  : 1955  MRN: [id number]  Referring provider: Asher Dixon  Dx:   Encounter Diagnosis     ICD-10-CM    1  Complex tear of medial meniscus of left knee as current injury, subsequent encounter  S83 232D    2  Acute pain of left knee  M25 562    3  Pre-operative laboratory examination  Z01 812                   Subjective: Patient reports no new complaints  Has been working on [employer] daily  Objective: See treatment diary below      Assessment: Tolerated treatment well  No adverse effects or increase in pain during or after session  Patient exhibited good technique with therapeutic exercises and would benefit from continued PT      Plan: Progress treatment as tolerated             Precautions: Post op meniscectomy 3/15       3/16            Visit 1            Manuals             L knee PROM SD                                                   Neuro Re-Ed             Quad set 5s x 20                                                                                          Ther Ex             Nustep or RB RB 6'            Heel slide             SLR 2x10            SL hip abd 2x10            Supine clam 2x10 green TB            Calf stretch manual            Hamstring stretch manual            LAQ 5s hold x 20            Bridge on peanut 2x10            Ther Activity                                       Gait Training                                       Modalities

## 2022-03-22 ENCOUNTER — OFFICE VISIT (OUTPATIENT)
Dept: PODIATRY | Facility: CLINIC | Age: 67
End: 2022-03-22
Payer: COMMERCIAL

## 2022-03-22 VITALS — HEART RATE: 81 BPM | BODY MASS INDEX: 35.76 KG/M2 | WEIGHT: 264 LBS | HEIGHT: 72 IN | RESPIRATION RATE: 18 BRPM

## 2022-03-22 DIAGNOSIS — M21.41 ACQUIRED FLAT FOOT, RIGHT: ICD-10-CM

## 2022-03-22 DIAGNOSIS — E11.42 DIABETIC POLYNEUROPATHY ASSOCIATED WITH TYPE 2 DIABETES MELLITUS (HCC): ICD-10-CM

## 2022-03-22 DIAGNOSIS — M21.42 ACQUIRED FLAT FOOT, LEFT: ICD-10-CM

## 2022-03-22 DIAGNOSIS — M25.572 ARTHRALGIA OF LEFT FOOT: ICD-10-CM

## 2022-03-22 DIAGNOSIS — M21.961 ACQUIRED DEFORMITY OF RIGHT FOOT: Primary | ICD-10-CM

## 2022-03-22 DIAGNOSIS — M21.962 ACQUIRED DEFORMITY OF LEFT FOOT: ICD-10-CM

## 2022-03-22 PROCEDURE — 99212 OFFICE O/P EST SF 10 MIN: CPT | Performed by: PODIATRIST

## 2022-03-22 PROCEDURE — L3000 FT INSERT UCB BERKELEY SHELL: HCPCS | Performed by: PODIATRIST

## 2022-03-22 PROCEDURE — 20605 DRAIN/INJ JOINT/BURSA W/O US: CPT | Performed by: PODIATRIST

## 2022-03-22 NOTE — TELEPHONE ENCOUNTER
Called and spoke with patient  He had knee surgery 2 weeks ago and still going to physical therapy  Asking if he could have another 2 weeks  If he feels better prior, he will call me

## 2022-03-22 NOTE — PROGRESS NOTES
Assessment/Plan:  Pain upon ambulation/metatarsalgia  Acquired deformity of foot bilateral   Arthralgia left 5th MPJ  Acquired pes planus bilateral   Resolved verruca  Diabetic neuropathy  Plan  Foot exam performed  Patient educated on condition  No remaining verruca of left foot noted  Patient would benefit from pronation control  To that end, his feet have been casted for custom molded foot orthotics  He will use these daily  To help with pain today, 1 cc dexamethasone injected into the left 5th MPJ without pain or complication  In addition he will use arch strapping left lower extremity  Return p r n  Diagnoses and all orders for this visit:    Acquired deformity of right foot    Acquired deformity of left foot    Acquired flat foot, right    Acquired flat foot, left    Arthralgia of left foot    Diabetic polyneuropathy associated with type 2 diabetes mellitus (HCC)          Subjective:  Patient is status post knee surgery  Patient is a diabetic who now has left foot pain  No history of foot trauma      Allergies   Allergen Reactions    Penicillins Other (See Comments)      Ancef can tolerate         Current Outpatient Medications:     ACCU-CHEK FASTCLIX LANCETS MISC, by Does not apply route daily Dx: E11 40, Disp: 100 each, Rfl: 1    aspirin (ECOTRIN) 325 mg EC tablet, Take 1 tablet (325 mg total) by mouth daily for 28 days, Disp: 28 tablet, Rfl: 0    Empagliflozin (Jardiance) 10 MG TABS, Take 1 tablet (10 mg total) by mouth every morning, Disp: 90 tablet, Rfl: 1    fluorouracil (EFUDEX) 5 % cream, Apply topically 2 (two) times a day For 4 weeks, Disp: 40 g, Rfl: 0    glucose blood (ACCU-CHEK GUIDE) test strip, 1 each by Other route daily DX: e11 40, Disp: 100 each, Rfl: 1    lisinopril-hydrochlorothiazide (PRINZIDE,ZESTORETIC) 20-12 5 MG per tablet, Take 1 tablet by mouth daily, Disp: 90 tablet, Rfl: 3    metFORMIN (GLUCOPHAGE-XR) 500 mg 24 hr tablet, Take 4 tablets (2,000 mg total) by mouth daily with breakfast, Disp: 360 tablet, Rfl: 1    omeprazole (PriLOSEC) 20 mg delayed release capsule, Take 20 mg by mouth daily, Disp: , Rfl:     rosuvastatin (CRESTOR) 20 MG tablet, TAKE 1 TABLET DAILY, Disp: 90 tablet, Rfl: 3    tamsulosin (FLOMAX) 0 4 mg, Take 1 capsule (0 4 mg total) by mouth daily with dinner, Disp: 90 capsule, Rfl: 1    Patient Active Problem List   Diagnosis    Abnormal EKG    History of prostate cancer    Arthropathy of multiple sites    Type 2 diabetes mellitus with diabetic neuropathy, without long-term current use of insulin (HCC)    Diverticulosis of sigmoid colon    Esophageal reflux    Fatty liver    Abdominal hernia    Mixed hyperlipidemia    Essential hypertension    Chronic bilateral low back pain without sciatica    Severe obesity (BMI 35 0-39  9) with comorbidity [facility])          Patient ID: Adria Castillo is a 77 y o  male  HPI    The following portions of the patient's history were reviewed and updated as appropriate:     family history includes Arthritis in his father and mother; Coronary artery disease in his father; Heart disease in his father; Hypertension in his father; Parkinsonism in his father and mother  reports that he quit smoking about 9 years ago  He smoked 0 00 packs per day for 0 00 years  He has never used smokeless tobacco  He reports current alcohol use of about 6 0 standard drinks of alcohol per week  He reports that he does not use drugs  Vitals:    03/22/22 1414   Pulse: 81   Resp: 18       Review of Systems      Objective:  Patient's shoes and socks removed     Foot ExamPhysical Exam        Patient's shoes and socks removed    Foot Exam     General  General Appearance: appears stated age and healthy   Orientation: alert and oriented to person, place, and time   Affect: appropriate   Gait: antalgic         Right Foot/Ankle      Inspection and Palpation  Tenderness: bony tenderness and metatarsals   Swelling: dorsum   Arch: pes planus  Hallux valgus: yes  Skin Exam: callus;      Neurovascular  Dorsalis pedis: 2+  Posterior tibial: 2+  Saphenous nerve sensation: diminished  Tibial nerve sensation: diminished  Superficial peroneal nerve sensation: diminished  Deep peroneal nerve sensation: diminished  Sural nerve sensation: diminished        Left Foot/Ankle       Inspection and Palpation  Tenderness: bony tenderness and metatarsals   Swelling: dorsum   Arch: pes planus  Hallux valgus: yes  Skin Exam: callus and warts;      Neurovascular  Dorsalis pedis: 2+  Posterior tibial: 2+  Saphenous nerve sensation: diminished  Tibial nerve sensation: diminished  Superficial peroneal nerve sensation: diminished  Deep peroneal nerve sensation: diminished  Sural nerve sensation: diminished           Physical Exam  Vitals and nursing note reviewed  Constitutional:       Appearance: Normal appearance  Cardiovascular:      Rate and Rhythm: Normal rate and regular rhythm       Pulses: no weak pulses          Dorsalis pedis pulses are 2+ on the right side and 2+ on the left side         Posterior tibial pulses are 2+ on the right side and 2+ on the left side  Musculoskeletal:      Right foot: Bunion and bony tenderness present       Left foot: Bunion and bony tenderness present  Pain with palpation left 5th MPJ  No evidence of infection at this time  Plantar flexed 5th left metatarsal noted  Feet:      Right foot:      Skin integrity: Callus present       Left foot:      Skin integrity: Callus present  Skin:     Capillary Refill: Capillary refill takes less than 2 seconds       Comments: Patient has xerosis of skin   He has dystrophy of nail   Hallux bilateral is wide incurvated toenail ingrown fibular aspect   Negative pus  Neurological:      Mental Status: He is alert  Psychiatric:         Mood and Affect: Mood normal          Thought Content:  Thought content normal          Judgment: Judgment normal       Patient's shoes and socks removed      Right Foot/Ankle   Right Foot Inspection  Skin Exam: callus and callus       Vascular  The right DP pulse is 2+  The right PT pulse is 2+       Right Toe  - Comprehensive Exam  Arch: pes planus  Hallux valgus: yes  Swelling: dorsum   Tenderness: bony tenderness and metatarsals            Left Foot/Ankle  Left Foot Inspection  Skin Exam: callus       Vascular  The left DP pulse is 2+   The left PT pulse is 2+       Left Toe  - Comprehensive Exam  Arch: pes planus  Hallux valgus: yes  Swelling: dorsum   Tenderness: bony tenderness and metatarsals               Assign Risk Category  Deformity present  Loss of protective sensation  No weak pulses  Risk: 2

## 2022-03-23 ENCOUNTER — OFFICE VISIT (OUTPATIENT)
Dept: PHYSICAL THERAPY | Facility: CLINIC | Age: 67
End: 2022-03-23
Payer: COMMERCIAL

## 2022-03-23 DIAGNOSIS — S83.232D COMPLEX TEAR OF MEDIAL MENISCUS OF LEFT KNEE AS CURRENT INJURY, SUBSEQUENT ENCOUNTER: Primary | ICD-10-CM

## 2022-03-23 DIAGNOSIS — Z01.812 PRE-OPERATIVE LABORATORY EXAMINATION: ICD-10-CM

## 2022-03-23 DIAGNOSIS — M25.562 ACUTE PAIN OF LEFT KNEE: ICD-10-CM

## 2022-03-23 PROCEDURE — 97140 MANUAL THERAPY 1/> REGIONS: CPT

## 2022-03-23 PROCEDURE — 97110 THERAPEUTIC EXERCISES: CPT

## 2022-03-23 NOTE — PROGRESS NOTES
Daily Note     Today's date: 3/23/2022  Patient name: Mele Martin  : 1955  MRN: [id number]  Referring provider: Devon Sinha:   Encounter Diagnosis     ICD-10-CM    1  Complex tear of medial meniscus of left knee as current injury, subsequent encounter  S83 232D    2  Acute pain of left knee  M25 562    3  Pre-operative laboratory examination  Z01 812                   Subjective: Patient reports some medial knee discomfort when waking up yesterday morning  States it has improved since then  Objective: See treatment diary below      Assessment: Tolerated treatment well  No adverse effects following session  Withheld treatment progression due to patient's residual discomfort from yesterday this visit  Patient exhibited good technique with therapeutic exercises and would benefit from continued PT  Plan: Continue per plan of care            Precautions: Post op meniscectomy 3/15       3/16 3/23           Visit 1 2           Manuals             L knee PROM SD SD                                                  Neuro Re-Ed             Quad set 5s x 20 5s x 20                                                                                         Ther Ex             Nustep or RB RB 6' Nustep 6'           SLR 2x10 2x10 3s hold           SL hip abd 2x10 2x10           Supine clam 2x10 green TB 2x10 green TB           Calf stretch manual manual           Hamstring stretch manual manual           LAQ 5s hold x 20 5s hold x 20           Bridge on peanut 2x10 2x10           Ther Activity                                       Gait Training                                       Modalities

## 2022-03-24 ENCOUNTER — OFFICE VISIT (OUTPATIENT)
Dept: OBGYN CLINIC | Facility: CLINIC | Age: 67
End: 2022-03-24

## 2022-03-24 ENCOUNTER — OFFICE VISIT (OUTPATIENT)
Dept: PHYSICAL THERAPY | Facility: CLINIC | Age: 67
End: 2022-03-24
Payer: COMMERCIAL

## 2022-03-24 VITALS — WEIGHT: 268 LBS | HEIGHT: 72 IN | BODY MASS INDEX: 36.3 KG/M2

## 2022-03-24 DIAGNOSIS — Z98.890 STATUS POST ARTHROSCOPIC PARTIAL MEDIAL MENISCECTOMY: ICD-10-CM

## 2022-03-24 DIAGNOSIS — Z01.812 PRE-OPERATIVE LABORATORY EXAMINATION: ICD-10-CM

## 2022-03-24 DIAGNOSIS — S83.232D COMPLEX TEAR OF MEDIAL MENISCUS OF LEFT KNEE AS CURRENT INJURY, SUBSEQUENT ENCOUNTER: Primary | ICD-10-CM

## 2022-03-24 DIAGNOSIS — M25.562 ACUTE PAIN OF LEFT KNEE: ICD-10-CM

## 2022-03-24 DIAGNOSIS — Z48.89 AFTERCARE FOLLOWING SURGERY: Primary | ICD-10-CM

## 2022-03-24 PROCEDURE — 97140 MANUAL THERAPY 1/> REGIONS: CPT

## 2022-03-24 PROCEDURE — 3008F BODY MASS INDEX DOCD: CPT | Performed by: NURSE PRACTITIONER

## 2022-03-24 PROCEDURE — 99024 POSTOP FOLLOW-UP VISIT: CPT | Performed by: ORTHOPAEDIC SURGERY

## 2022-03-24 PROCEDURE — 97110 THERAPEUTIC EXERCISES: CPT

## 2022-03-24 NOTE — PROGRESS NOTES
PT-Discharge     Today's date: 3/24/2022  Patient name: Misha Henry  : 1955  MRN: [id number]  Referring provider: Viviana Calzada  Dx:   Encounter Diagnosis     ICD-10-CM    1  Complex tear of medial meniscus of left knee as current injury, subsequent encounter  S83 232D    2  Acute pain of left knee  M25 562    3  Pre-operative laboratory examination  Z01 [id number]                   Subjective: Patient reports he is doing well without pain  No new complaints  Patient would like to be DC to [facility] at this time  Objective: See treatment diary below  L knee PROM  122 degrees flexion  0 degrees extension0      Assessment: Tolerated treatment well  Patient has demonstrated some strength improvements as well as L knee ROM  Patient exhibited good technique with therapeutic exercises  Re-instructed in HEP  Educated in Leg press for patient to return to gym  Goals  STG (3 weeks)  1  Pt to be I in HEP  Met  2 Pt to resolve pain with ambulation  Met   3  Patient to increase L knee ROM by 5 degrees  Met  LTG (By DC)  1 Pt to resolve pain with all functional mobility  Partially met  2 Pt to improve FOTO score to predicted dc value  Met   3  Pt to manage symptoms independently  Partially met  4  Patient to increase L knee strength to 5/5  Not met    Plan: Discharge to [facility]           Precautions: Post op meniscectomy 3/15       3/16 3/23 3/24          Visit 1 2 3          Manuals             L knee PROM SD SD SD                                                 Neuro Re-Ed             Quad set 5s x 20 5s x 20                                                                                         Ther Ex             Nustep or RB RB 6' Nustep 6' Nustep 6'          SLR 2x10 2x10 3s hold 2x10 3s hold          SL hip abd 2x10 2x10 2x10          Supine clam 2x10 green TB 2x10 green TB 2x10 green TB          Calf stretch manual manual           Hamstring stretch manual manual           LAQ 5s hold x 20 5s hold x 20 5s x 20          Bridge on peanut 2x10 2x10 2x10          Ther Activity                                       Gait Training                                       Modalities

## 2022-03-24 NOTE — PROGRESS NOTES
Assessment/Plan:  1  Aftercare following surgery     2  Status post arthroscopic partial medial meniscectomy, left       57-year-old male 9 days postop from left knee partial medial meniscectomy  He is doing well and no longer has sharp medial knee pain  Incisions well healed on exam today and sutures were removed  Steri strips applied  Patient tolerated well  He should continue ASA 325mg for DVT ppx and physical therapy as directed  Over the counter anti-inflammatories as needed for pain control  He can follow up as need moving forward  Subjective:  Postop evaluation    Patient ID: Horacio Cruz is a 77 y o  male  57-year-old male presents for 1st postoperative visit status post left knee partial medial meniscectomy on 05/15/2022  He states that his sharp pain has completely resolved over the medial aspect of his knee since surgery  He is no longer taking any over-the-counter anti-inflammatories for pain control on a regular basis  He will occasionally take a Tylenol prior to bed following physical therapy  He denies any recent fevers or chills  No numbness or tingling to his left lower extremity  No complaints  Review of Systems   Constitutional: Negative for fever  HENT: Negative for congestion  Eyes: Negative for photophobia  Respiratory: Negative for shortness of breath  Cardiovascular: Negative for chest pain  Gastrointestinal: Negative for nausea and vomiting  Musculoskeletal: Positive for arthralgias  Skin: Negative for rash  Allergic/Immunologic: Negative for immunocompromised state  Neurological: Negative for headaches  Psychiatric/Behavioral: Negative for behavioral problems           Past Medical History:   Diagnosis Date    Arthritis     Cancer [facility]) 2012    Chest tightness     [facility]     [facility]     Diverticulosis of sigmoid colon     Esophageal reflux     Fatty liver     GERD (gastroesophageal reflux disease)     Hearing problem     High cholesterol     History of chest pain     Hyperlipemia     Hypertension     Lumbago     Malignant [facility] [address] [facility]     Prostate CA [facility]     Squamous cell skin cancer 2020    right dorsal forearm    Tinea pedis of both feet     Trochanteric bursitis        Past Surgical History:   Procedure Laterality Date    APPENDECTOMY      COLONOSCOPY  2016    HERNIA REPAIR      ? Umbilical    GA KNEE SCOPE,MED/LAT MENISECTOMY Left 3/15/2022    Procedure: ARTHROSCOPY KNEE,  partial medial menisectomy;  Surgeon: Bernarda Lozano DO;  Location: [facility] MAIN OR;  Service: Orthopedics    GA REPAIR INCISIONAL HERNIA,REDUCIBLE N/A 2021    Procedure: INCISIONAL HERNIA REPAIR AT THE UMBILICUS;  Surgeon: Sol Hyman MD;  Location: [facility] MA[facility] OR;  Service: General    PROSTATECTOMY N/A     PROSTATECTOMY         Family History   Problem Relation Age of Onset    Arthritis Mother     Parkinsonism Mother     Heart disease Father     Arthritis Father     Coronary artery disease Father     Hypertension Father     Parkinsonism Father     Mental illness Neg Hx        Social History     Occupational History    Not on file   Tobacco Use    Smoking status: Former Smoker     Packs/day: 0 00     Years: 0 00     Pack years: 0 00     Quit date: 2012     Years since quittin 7    Smokeless tobacco: Never Used   Vaping Use    Vaping Use: Never used   Substance and Sexual Activity    Alcohol use:  Yes     Alcohol/week: 6 0 standard drinks     Types: 6 Cans of beer per week     Comment: Weekends    Drug use: No    Sexual activity: Not Currently     Partners: Female         Current Outpatient Medications:     ACCU-CHEK FASTCLIX LANCETS MISC, by Does not apply route daily Dx: E11 40, Disp: 100 each, Rfl: 1    aspirin (ECOTRIN) 325 mg EC tablet, Take 1 tablet (325 mg total) by mouth daily for 28 days, Disp: 28 tablet, Rfl: 0    Empagliflozin (Jardiance) 10 MG TABS, Take 1 tablet (10 mg total) by mouth every morning, Disp: 90 tablet, Rfl: 1    fluorouracil (EFUDEX) 5 % cream, Apply topically 2 (two) times a day For 4 weeks, Disp: 40 g, Rfl: 0    glucose blood (ACCU-CHEK GUIDE) test strip, 1 each by Other route daily DX: e11 40, Disp: 100 each, Rfl: 1    lisinopril-hydrochlorothiazide (PRINZIDE,ZESTORETIC) 20-12 5 MG per tablet, Take 1 tablet by mouth daily, Disp: 90 tablet, Rfl: 3    metFORMIN (GLUCOPHAGE-XR) 500 mg 24 hr tablet, Take 4 tablets (2,000 mg total) by mouth daily with breakfast, Disp: 360 tablet, Rfl: 1    omeprazole (PriLOSEC) 20 mg delayed release capsule, Take 20 mg by mouth daily, Disp: , Rfl:     rosuvastatin (CRESTOR) 20 MG tablet, TAKE 1 TABLET DAILY, Disp: 90 tablet, Rfl: 3    tamsulosin (FLOMAX) 0 4 mg, Take 1 capsule (0 4 mg total) by mouth daily with dinner, Disp: 90 capsule, Rfl: 1    Allergies   Allergen Reactions    Penicillins Other (See Comments)      Ancef can tolerate       Objective: There were no vitals filed for this visit  Body mass index is 36 35 kg/m²  Left Knee Exam     Tenderness   The patient is experiencing no tenderness  Range of Motion   Extension: 0   Flexion: 120     Tests   Varus: negative Valgus: negative    Other   Erythema: absent  Scars: present  Sensation: normal  Pulse: present  Swelling: none  Effusion: no effusion present    Comments:  Anterior knee arthroscopic stab incisions well-healed with sutures in place            Observations   Left Knee   Negative for effusion  Physical Exam  Vitals reviewed  HENT:      Head: Normocephalic  Right Ear: External ear normal       Left Ear: External ear normal       Nose: Nose normal       Mouth/Throat:      Pharynx: Oropharynx is clear  Eyes:      Pupils: Pupils are equal, round, and reactive to light  Cardiovascular:      Rate and Rhythm: Normal rate  Pulses: Normal pulses     Pulmonary:      Effort: Pulmonary effort is normal    Abdominal:      Palpations: Abdomen is soft  Musculoskeletal:      Cervical back: Normal range of motion  Left knee: No effusion  Comments: Please see ortho exam    Skin:     Capillary Refill: Capillary refill takes less than 2 seconds  Neurological:      Mental Status: He is alert  Mental status is at baseline     Psychiatric:         Mood and Affect: Mood normal

## 2022-04-05 ENCOUNTER — TELEPHONE (OUTPATIENT)
Dept: GASTROENTEROLOGY | Facility: CLINIC | Age: 67
End: 2022-04-05

## 2022-04-05 ENCOUNTER — PREP FOR PROCEDURE (OUTPATIENT)
Dept: GASTROENTEROLOGY | Facility: CLINIC | Age: 67
End: 2022-04-05

## 2022-04-05 DIAGNOSIS — D12.6 TUBULAR ADENOMA OF COLON: ICD-10-CM

## 2022-04-05 DIAGNOSIS — Z86.010 HISTORY OF COLON POLYPS: Primary | ICD-10-CM

## 2022-04-05 NOTE — TELEPHONE ENCOUNTER
[facility]    Name: Isela Mejia  YOB: 1955  Last Height: 6' (1 829 m)  Last weight: 122 kg (268 lb)  BMI: 36 35 kg/m²  Procedure: Colon  Diagnosis: hx of polyps, ta  Date of procedure: 6/14/22  Prep: Miralax, dul  Responsible : wife  Phone#: [phone]  Name completing form: Albino Calvo  Date form completed: 04/05/22    If the patient answers yes to any of these questions, schedule in a hospital  Are you pregnant: No  Do you rely on a wheelchair for mobility: No  Have you been diagnosed with End Stage Renal Disease (ESRD): No  Do you need oxygen during the day: No  Have you had a heart attack or stroke within the past three months: No  Have you had a seizure within the past three months: No  Have you ever been informed by anesthesia that you have a difficult airway: No  Additional Questions  Have you had any cardiac testing or are under the care of a Cardiologist (see cardiac list): No  Cardiac list:   Do you have an implanted cardiac defibrillator: No (Comment:  This patient should be scheduled in the hospital)    Have any bleeding problems, such as anemia or hemophilia (If patient has H&H result below 8, schedule in hospital   H&H must be within 30 days of procedure): No    Had an organ transplant within the past 3 months: No    Do you have any present infections: No  Do you get short of breath when walking a few blocks: No  Have you been diagnosed with diabetes: Yes  Comments (provide cardiac provider information if applicable):

## 2022-04-19 ENCOUNTER — APPOINTMENT (OUTPATIENT)
Dept: LAB | Facility: CLINIC | Age: 67
End: 2022-04-19
Payer: COMMERCIAL

## 2022-04-19 DIAGNOSIS — E78.2 MIXED HYPERLIPIDEMIA: ICD-10-CM

## 2022-04-19 DIAGNOSIS — I10 ESSENTIAL HYPERTENSION: ICD-10-CM

## 2022-04-19 DIAGNOSIS — Z79.899 ENCOUNTER FOR LONG-TERM CURRENT USE OF MEDICATION: ICD-10-CM

## 2022-04-19 DIAGNOSIS — E11.40 TYPE 2 DIABETES MELLITUS WITH DIABETIC NEUROPATHY, WITHOUT LONG-TERM CURRENT USE OF INSULIN (HCC): ICD-10-CM

## 2022-04-19 DIAGNOSIS — R07.89 CHEST TIGHTNESS: ICD-10-CM

## 2022-04-19 LAB
ALBUMIN SERPL BCP-MCNC: 3.9 G/DL (ref 3.5–5)
ALP SERPL-CCNC: 63 U/L (ref 46–116)
ALT SERPL W P-5'-P-CCNC: 50 U/L (ref 12–78)
ANION GAP SERPL CALCULATED.3IONS-SCNC: 10 MMOL/L (ref 4–13)
AST SERPL W P-5'-P-CCNC: 32 U/L (ref 5–45)
BILIRUB SERPL-MCNC: 0.69 MG/DL (ref 0.2–1)
BUN SERPL-MCNC: 18 MG/DL (ref 5–25)
CALCIUM SERPL-MCNC: 9 MG/DL (ref 8.3–10.1)
CHLORIDE SERPL-SCNC: 105 MMOL/L (ref 100–108)
CHOLEST SERPL-MCNC: 163 MG/DL
CO2 SERPL-SCNC: 27 MMOL/L (ref 21–32)
CREAT SERPL-MCNC: 0.94 MG/DL (ref 0.6–1.3)
ERYTHROCYTE [DISTWIDTH] IN BLOOD BY AUTOMATED COUNT: 12.3 % (ref 11.6–15.1)
EST. AVERAGE GLUCOSE BLD GHB EST-MCNC: 174 MG/DL
GFR SERPL CREATININE-BSD FRML MDRD: 84 ML/MIN/1.73SQ M
GLUCOSE P FAST SERPL-MCNC: 148 MG/DL (ref 65–99)
HBA1C MFR BLD: 7.7 %
HCT VFR BLD AUTO: 46.1 % (ref 36.5–49.3)
HDLC SERPL-MCNC: 55 MG/DL
HGB BLD-MCNC: 15.5 G/DL (ref 12–17)
LDLC SERPL CALC-MCNC: 77 MG/DL (ref 0–100)
MCH RBC QN AUTO: 31.7 PG (ref 26.8–34.3)
MCHC RBC AUTO-ENTMCNC: 33.6 G/DL (ref 31.4–37.4)
MCV RBC AUTO: 94 FL (ref 82–98)
PLATELET # BLD AUTO: 199 THOUSANDS/UL (ref 149–390)
PMV BLD AUTO: 10.7 FL (ref 8.9–12.7)
POTASSIUM SERPL-SCNC: 4 MMOL/L (ref 3.5–5.3)
PROT SERPL-MCNC: 7.1 G/DL (ref 6.4–8.2)
RBC # BLD AUTO: 4.89 MILLION/UL (ref 3.88–5.62)
SODIUM SERPL-SCNC: 142 MMOL/L (ref 136–145)
TRIGL SERPL-MCNC: 154 MG/DL
VIT B12 SERPL-MCNC: 361 PG/ML (ref 100–900)
WBC # BLD AUTO: 5.76 THOUSAND/UL (ref 4.31–10.16)

## 2022-04-19 PROCEDURE — 83036 HEMOGLOBIN GLYCOSYLATED A1C: CPT

## 2022-04-19 PROCEDURE — 36415 COLL VENOUS BLD VENIPUNCTURE: CPT

## 2022-04-19 PROCEDURE — 85027 COMPLETE CBC AUTOMATED: CPT

## 2022-04-19 PROCEDURE — 80061 LIPID PANEL: CPT

## 2022-04-19 PROCEDURE — 80053 COMPREHEN METABOLIC PANEL: CPT

## 2022-04-19 PROCEDURE — 82607 VITAMIN B-12: CPT

## 2022-04-19 PROCEDURE — 3051F HG A1C>EQUAL 7.0%<8.0%: CPT | Performed by: FAMILY MEDICINE

## 2022-04-19 RX ORDER — LISINOPRIL AND HYDROCHLOROTHIAZIDE 20; 12.5 MG/1; MG/1
TABLET ORAL
Qty: 90 TABLET | Refills: 3 | Status: SHIPPED | OUTPATIENT
Start: 2022-04-19

## 2022-04-26 ENCOUNTER — OFFICE VISIT (OUTPATIENT)
Dept: FAMILY MEDICINE CLINIC | Facility: CLINIC | Age: 67
End: 2022-04-26
Payer: COMMERCIAL

## 2022-04-26 VITALS
DIASTOLIC BLOOD PRESSURE: 72 MMHG | RESPIRATION RATE: 17 BRPM | SYSTOLIC BLOOD PRESSURE: 134 MMHG | OXYGEN SATURATION: 99 % | WEIGHT: 268.6 LBS | HEART RATE: 90 BPM | TEMPERATURE: 97.1 F | BODY MASS INDEX: 36.38 KG/M2 | HEIGHT: 72 IN

## 2022-04-26 DIAGNOSIS — Z85.46 HISTORY OF PROSTATE CANCER: ICD-10-CM

## 2022-04-26 DIAGNOSIS — R22.9 LUMP OF SKIN: ICD-10-CM

## 2022-04-26 DIAGNOSIS — I10 ESSENTIAL HYPERTENSION: ICD-10-CM

## 2022-04-26 DIAGNOSIS — E78.2 MIXED HYPERLIPIDEMIA: ICD-10-CM

## 2022-04-26 DIAGNOSIS — E11.40 TYPE 2 DIABETES MELLITUS WITH DIABETIC NEUROPATHY, WITHOUT LONG-TERM CURRENT USE OF INSULIN (HCC): Primary | ICD-10-CM

## 2022-04-26 PROCEDURE — 1160F RVW MEDS BY RX/DR IN RCRD: CPT | Performed by: FAMILY MEDICINE

## 2022-04-26 PROCEDURE — 3075F SYST BP GE 130 - 139MM HG: CPT | Performed by: FAMILY MEDICINE

## 2022-04-26 PROCEDURE — 3008F BODY MASS INDEX DOCD: CPT | Performed by: FAMILY MEDICINE

## 2022-04-26 PROCEDURE — 3078F DIAST BP <80 MM HG: CPT | Performed by: FAMILY MEDICINE

## 2022-04-26 PROCEDURE — 99214 OFFICE O/P EST MOD 30 MIN: CPT | Performed by: FAMILY MEDICINE

## 2022-04-26 PROCEDURE — 1036F TOBACCO NON-USER: CPT | Performed by: FAMILY MEDICINE

## 2022-04-26 RX ORDER — TAMSULOSIN HYDROCHLORIDE 0.4 MG/1
0.4 CAPSULE ORAL
Qty: 90 CAPSULE | Refills: 1 | Status: SHIPPED | OUTPATIENT
Start: 2022-04-26

## 2022-04-26 RX ORDER — METFORMIN HYDROCHLORIDE 500 MG/1
2000 TABLET, EXTENDED RELEASE ORAL
Qty: 360 TABLET | Refills: 1 | Status: SHIPPED | OUTPATIENT
Start: 2022-04-26 | End: 2022-07-21 | Stop reason: SDUPTHER

## 2022-04-26 NOTE — PROGRESS NOTES
Assessment/Plan:    1  Type 2 diabetes mellitus with diabetic neuropathy, without long-term current use of insulin ([facility])  Assessment & Plan:    Lab Results   Component Value Date    HGBA1C 7 7 (H) 04/19/2022     Not controlled   Dose of Jardiance increased from 10 to 25 mg a day    Has his eye appointment scheduled in June - form given     Orders:  -     Empagliflozin (Jardiance) 25 MG TABS; Take 1 tablet (25 mg total) by mouth every morning  -     metFORMIN (GLUCOPHAGE-XR) 500 mg 24 hr tablet; Take 4 tablets (2,000 mg total) by mouth daily with breakfast  -     Hemoglobin A1C; Future; Expected date: 07/10/2022    2  Essential hypertension  Assessment & Plan:  Well controlled  Continue lisinopril-hydrochlorothiazide 20-12 5 mg daily    Orders:  -     CBC; Future; Expected date: 07/10/2022  -     Comprehensive metabolic panel; Future; Expected date: 07/10/2022  -     Lipid Panel with Direct LDL reflex; Future; Expected date: 07/10/2022    3  Mixed hyperlipidemia  Assessment & Plan:  Well controlled   Continue rosuvastatin 20 mg daily    Orders:  -     Lipid Panel with Direct LDL reflex; Future; Expected date: 07/10/2022    4  History of prostate cancer  -     tamsulosin (FLOMAX) 0 4 mg; Take 1 capsule (0 4 mg total) by mouth daily with dinner    5  Lump of skin  Comments:  new  Orders:  -     US extremity soft tissue; Future; Expected date: 04/26/2022            There are no Patient Instructions on file for this visit  Return in about 3 months (around 7/26/2022) for Next scheduled follow up  Subjective:      Patient ID: Jeff Patel is a 77 y o  male  Chief Complaint   Patient presents with    Follow-up     3 month f/u nm lpn    Mass     lump on left shoulder, noticed about 1 month ago, painful to touch nm lpn       He noticed a lump by his collar bone a month ago  It is uncomfortable  He injured his meniscus a few months ago  He thinks that is why his blood sugar has gone up    He has not been able to be as active  The following portions of the patient's history were reviewed and updated as appropriate: allergies, current medications, past family history, past medical history, past social history, past surgical history and problem list     Review of Systems      Current Outpatient Medications   Medication Sig Dispense Refill    ACCU-CHEK FASTCLIX LANCETS MISC by Does not apply route daily Dx: E11 40 100 each 1    glucose blood (ACCU-CHEK GUIDE) test strip 1 each by Other route daily DX: e11 40 100 each 1    lisinopril-hydrochlorothiazide (PRINZIDE,ZESTORETIC) 20-12 5 MG per tablet TAKE 1 TABLET DAILY 90 tablet 3    metFORMIN (GLUCOPHAGE-XR) 500 mg 24 hr tablet Take 4 tablets (2,000 mg total) by mouth daily with breakfast 360 tablet 1    omeprazole (PriLOSEC) 20 mg delayed release capsule Take 20 mg by mouth daily      rosuvastatin (CRESTOR) 20 MG tablet TAKE 1 TABLET DAILY 90 tablet 3    tamsulosin (FLOMAX) 0 4 mg Take 1 capsule (0 4 mg total) by mouth daily with dinner 90 capsule 1    Empagliflozin (Jardiance) 25 MG TABS Take 1 tablet (25 mg total) by mouth every morning 90 tablet 1     No current facility-administered medications for this visit  Objective:    /72   Pulse 90   Temp (!) 97 1 °F (36 2 °C)   Resp 17   Ht 6' (1 829 m)   Wt 122 kg (268 lb 9 6 oz)   SpO2 99%   BMI 36 43 kg/m²        Physical Exam  Vitals and nursing note reviewed  Constitutional:       Appearance: He is well-developed  HENT:      Head: Normocephalic and atraumatic  Right Ear: Tympanic membrane and external ear normal       Left Ear: Tympanic membrane and external ear normal    Cardiovascular:      Rate and Rhythm: Normal rate and regular rhythm  Heart sounds: Normal heart sounds  No murmur heard  Pulmonary:      Effort: Pulmonary effort is normal  No respiratory distress  Breath sounds: Normal breath sounds  No wheezing or rales     Musculoskeletal:      Right lower leg: No edema  Left lower leg: No edema     Skin:     Comments: Tender lump on left collarbone firm but mobile                Vivian Mercado, DO

## 2022-04-26 NOTE — ASSESSMENT & PLAN NOTE
Lab Results   Component Value Date    HGBA1C 7 7 (H) 04/19/2022     Not controlled   Dose of Jardiance increased from 10 to 25 mg a day    Has his eye appointment scheduled in June - form given

## 2022-06-03 ENCOUNTER — TELEPHONE (OUTPATIENT)
Dept: GASTROENTEROLOGY | Facility: CLINIC | Age: 67
End: 2022-06-03

## 2022-06-03 NOTE — TELEPHONE ENCOUNTER
Spoke to pt confirming his colonoscopy schedule on 6/14/22 at [facility] with Dr Jessica Melendez  I informed pt that [facility] would be calling her 1-2 days prior with arrival time  I informed pt to do a clear liquid diet the day before as well as the bowel cleansing preparation  I informed pt that he would need a  the day of the procedure due to being under sedation  Pt has instructions and does not have any questions

## 2022-06-07 LAB
LEFT EYE DIABETIC RETINOPATHY: NORMAL
RIGHT EYE DIABETIC RETINOPATHY: NORMAL

## 2022-06-07 PROCEDURE — 2023F DILAT RTA XM W/O RTNOPTHY: CPT | Performed by: ORTHOPAEDIC SURGERY

## 2022-06-08 ENCOUNTER — APPOINTMENT (OUTPATIENT)
Dept: RADIOLOGY | Facility: CLINIC | Age: 67
End: 2022-06-08
Payer: COMMERCIAL

## 2022-06-08 ENCOUNTER — OFFICE VISIT (OUTPATIENT)
Dept: OBGYN CLINIC | Facility: CLINIC | Age: 67
End: 2022-06-08
Payer: COMMERCIAL

## 2022-06-08 VITALS
WEIGHT: 266 LBS | HEART RATE: 80 BPM | BODY MASS INDEX: 36.03 KG/M2 | DIASTOLIC BLOOD PRESSURE: 70 MMHG | SYSTOLIC BLOOD PRESSURE: 120 MMHG | HEIGHT: 72 IN

## 2022-06-08 DIAGNOSIS — G89.29 CHRONIC PAIN OF RIGHT KNEE: ICD-10-CM

## 2022-06-08 DIAGNOSIS — M17.11 PRIMARY OSTEOARTHRITIS OF RIGHT KNEE: Primary | ICD-10-CM

## 2022-06-08 DIAGNOSIS — M25.561 CHRONIC PAIN OF RIGHT KNEE: ICD-10-CM

## 2022-06-08 DIAGNOSIS — M17.11 PRIMARY OSTEOARTHRITIS OF RIGHT KNEE: ICD-10-CM

## 2022-06-08 DIAGNOSIS — Z01.89 ENCOUNTER FOR LOWER EXTREMITY COMPARISON IMAGING STUDY: ICD-10-CM

## 2022-06-08 PROCEDURE — 73560 X-RAY EXAM OF KNEE 1 OR 2: CPT

## 2022-06-08 PROCEDURE — 1036F TOBACCO NON-USER: CPT | Performed by: ORTHOPAEDIC SURGERY

## 2022-06-08 PROCEDURE — 1160F RVW MEDS BY RX/DR IN RCRD: CPT | Performed by: ORTHOPAEDIC SURGERY

## 2022-06-08 PROCEDURE — 99214 OFFICE O/P EST MOD 30 MIN: CPT | Performed by: ORTHOPAEDIC SURGERY

## 2022-06-08 PROCEDURE — 73562 X-RAY EXAM OF KNEE 3: CPT

## 2022-06-08 NOTE — PROGRESS NOTES
Assessment/Plan:  1  Primary osteoarthritis of right knee  Injection Procedure Prior Authorization    XR knee 3 vw right non injury   2  Chronic pain of right knee       Scribe Attestation    I,:  Danii Calderon am acting as a scribe while in the presence of the attending physician :       I,:  Joselyn Matthews, DO personally performed the services described in this documentation    as scribed in my presence :         Evan Monzon is a very pleasant 19-year-old male who returns today for follow-up evaluation of his right knee pain  I am very pleased with the significant relief he experienced following viscosupplementation injection series in December of 2020  He appears symptomatic again of his underlying osteoarthritis  I have ordered prior authorization for a repeat Orthovisc series for his right knee  We will reach out to him after approval to schedule the appropriate visits  All of his questions and concerns were addressed today  Subjective: Follow-up evaluation for right knee pain    Patient ID: Augusto Contreras is a 77 y o  male who returns today for follow-up evaluation of his right knee pain  He concluded a 3 shot Orthovisc series in December of 2020  At today's visit, he reports that this provided excellent relief of his pain until the last few weeks  At today's visit, he complains return of his activity related right knee pain  He describes aching pain about the anteromedial and anterior aspect of his knee  He he would like to repeat the viscosupplementation injection series  He also reports that his left knee continues to do well after arthroscopy performed in March  He denies any new injury trauma  Review of Systems   Constitutional: Positive for activity change  Negative for chills, fever and unexpected weight change  HENT: Negative for hearing loss, nosebleeds and sore throat  Eyes: Negative for pain, redness and visual disturbance     Respiratory: Negative for cough, shortness of breath and wheezing  Cardiovascular: Negative for chest pain, palpitations and leg swelling  Gastrointestinal: Negative for abdominal pain, nausea and vomiting  Endocrine: Negative for polyphagia and polyuria  Genitourinary: Negative for dysuria and hematuria  Musculoskeletal: Positive for arthralgias and myalgias  Negative for joint swelling  See HPI   Skin: Negative for rash and wound  Neurological: Negative for dizziness, numbness and headaches  Psychiatric/Behavioral: Negative for decreased concentration and suicidal ideas  The patient is not nervous/anxious  Past Medical History:   Diagnosis Date    Arthritis     Cancer [facility]) 2012    Chest tightness     Diabetes ([facility] )     Diabetes mellitus ([place] [address] )     Diverticulosis of sigmoid colon     Esophageal reflux     Fatty liver     GERD (gastroesophageal reflux disease)     Hearing problem     High cholesterol     History of chest pain     Hyperlipemia     Hypertension     Lumbago     Malignant neoplasm of prostate ([place] [address] )     Prostate CA ([place] [address] )     Squamous cell skin cancer 08/27/2020    right dorsal forearm    Tinea pedis of both feet     Trochanteric bursitis        Past Surgical History:   Procedure Laterality Date    APPENDECTOMY      COLONOSCOPY  2016    HERNIA REPAIR      ?  Umbilical    UT KNEE SCOPE,MED/LAT MENISECTOMY Left 3/15/2022    Procedure: ARTHROSCOPY KNEE,  partial medial menisectomy;  Surgeon: Dave Ivan DO;  Location: [facility] MA[facility];  Service: Orthopedics    UT REPAIR INCISIONAL HERNIA,REDUCIBLE N/A 11/11/2021    Procedure: Marquez Klaus REPAIR AT THE UMBILICUS;  Surgeon: Macie Nolan MD;  Location: [facility] MAIN OR;  Service: General    PROSTATECTOMY N/A 2011    PROSTATECTOMY         Family History   Problem Relation Age of Onset    Arthritis Mother     Parkinsonism Mother     Heart disease Father     Arthritis Father     Coronary artery disease Father     Hypertension Father    [facility] Parkinsonism Father     Mental illness Neg Hx        Social History     Occupational History    Not on file   Tobacco Use    Smoking status: Former Smoker     Packs/day: 0 00     Years: 0 00     Pack years: 0 00     Quit date: 2012     Years since quittin 9    Smokeless tobacco: Never Used   Vaping Use    Vaping Use: Never used   Substance and Sexual Activity    Alcohol use: Yes     Alcohol/week: 6 0 standard drinks     Types: 6 Cans of beer per week     Comment: Weekends    Drug use: No    Sexual activity: Not Currently     Partners: Female         Current Outpatient Medications:     ACCU-CHEK FASTCLIX LANCETS MISC, by Does not apply route daily Dx: E11 40, Disp: 100 each, Rfl: 1    Empagliflozin (Jardiance) 25 MG TABS, Take 1 tablet (25 mg total) by mouth every morning, Disp: 90 tablet, Rfl: 1    glucose blood (ACCU-CHEK GUIDE) test strip, 1 each by Other route daily DX: e11 40, Disp: 100 each, Rfl: 1    lisinopril-hydrochlorothiazide (PRINZIDE,ZESTORETIC) 20-12 5 MG per tablet, TAKE 1 TABLET DAILY, Disp: 90 tablet, Rfl: 3    metFORMIN (GLUCOPHAGE-XR) 500 mg 24 hr tablet, Take 4 tablets (2,000 mg total) by mouth daily with breakfast, Disp: 360 tablet, Rfl: 1    omeprazole (PriLOSEC) 20 mg delayed release capsule, Take 20 mg by mouth daily, Disp: , Rfl:     rosuvastatin (CRESTOR) 20 MG tablet, TAKE 1 TABLET DAILY, Disp: 90 tablet, Rfl: 3    tamsulosin (FLOMAX) 0 4 mg, Take 1 capsule (0 4 mg total) by mouth daily with dinner, Disp: 90 capsule, Rfl: 1    Allergies   Allergen Reactions    Penicillins Other (See Comments)      Ancef can tolerate       Objective:  Vitals:    22 1059   BP: 120/70   Pulse: 80       Body mass index is 36 08 kg/m²  Right Knee Exam     Tenderness   The patient is experiencing tenderness in the medial joint line and patella  Range of Motion   Right knee extension: 0  Right knee flexion: 125       Tests   Varus: negative Valgus: negative  Drawer: Anterior - negative    Posterior - negative    Other   Erythema: absent  Scars: absent  Sensation: normal  Pulse: present  Swelling: none  Effusion: no effusion present    Comments:  Stable at 0, 30 90°  Neurovascular intact distally  No warmth erythema  Parapatellar crepitance noted  Patellofemoral grind:  Positive          Observations     Right Knee   Negative for effusion  Physical Exam  Vitals and nursing note reviewed  Constitutional:       Appearance: He is well-developed  HENT:      Head: Normocephalic and atraumatic  Eyes:      General: No scleral icterus  Conjunctiva/sclera: Conjunctivae normal    Cardiovascular:      Rate and Rhythm: Normal rate  Pulmonary:      Effort: Pulmonary effort is normal  No respiratory distress  Musculoskeletal:      Cervical back: Normal range of motion and neck supple  Right knee: No effusion  Comments: As noted in HPI   Skin:     General: Skin is warm and dry  Neurological:      Mental Status: He is alert and oriented to person, place, and time  Psychiatric:         Behavior: Behavior normal          I have personally reviewed pertinent films in PACS  X-ray the right knee obtained on 06/08/2022 reviewed demonstrating moderate degenerative change with narrowing in the medial and patellofemoral compartments  There is sclerosis and osteophytosis  There is no acute fracture, dislocation, lytic or blastic lesion

## 2022-06-14 ENCOUNTER — HOSPITAL ENCOUNTER (OUTPATIENT)
Dept: GASTROENTEROLOGY | Facility: AMBULATORY SURGERY CENTER | Age: 67
Discharge: HOME | End: 2022-06-14
Payer: COMMERCIAL

## 2022-06-14 ENCOUNTER — ANESTHESIA (OUTPATIENT)
Dept: GASTROENTEROLOGY | Facility: AMBULATORY SURGERY CENTER | Age: 67
End: 2022-06-14

## 2022-06-14 ENCOUNTER — TELEPHONE (OUTPATIENT)
Dept: GASTROENTEROLOGY | Facility: CLINIC | Age: 67
End: 2022-06-14

## 2022-06-14 ENCOUNTER — ANESTHESIA EVENT (OUTPATIENT)
Dept: GASTROENTEROLOGY | Facility: AMBULATORY SURGERY CENTER | Age: 67
End: 2022-06-14

## 2022-06-14 VITALS
WEIGHT: 260 LBS | TEMPERATURE: 97.7 F | HEIGHT: 72 IN | OXYGEN SATURATION: 99 % | SYSTOLIC BLOOD PRESSURE: 110 MMHG | BODY MASS INDEX: 35.21 KG/M2 | HEART RATE: 77 BPM | RESPIRATION RATE: 18 BRPM | DIASTOLIC BLOOD PRESSURE: 74 MMHG

## 2022-06-14 DIAGNOSIS — D12.6 TUBULAR ADENOMA OF COLON: ICD-10-CM

## 2022-06-14 DIAGNOSIS — Z86.010 HISTORY OF COLON POLYPS: ICD-10-CM

## 2022-06-14 PROCEDURE — 45385 COLONOSCOPY W/LESION REMOVAL: CPT | Performed by: INTERNAL MEDICINE

## 2022-06-14 PROCEDURE — 88305 TISSUE EXAM BY PATHOLOGIST: CPT | Performed by: PATHOLOGY

## 2022-06-14 RX ORDER — PROPOFOL 10 MG/ML
INJECTION, EMULSION INTRAVENOUS AS NEEDED
Status: DISCONTINUED | OUTPATIENT
Start: 2022-06-14 | End: 2022-06-14

## 2022-06-14 RX ORDER — LIDOCAINE HYDROCHLORIDE 10 MG/ML
INJECTION, SOLUTION EPIDURAL; INFILTRATION; INTRACAUDAL; PERINEURAL AS NEEDED
Status: DISCONTINUED | OUTPATIENT
Start: 2022-06-14 | End: 2022-06-14

## 2022-06-14 RX ORDER — SODIUM CHLORIDE, SODIUM LACTATE, POTASSIUM CHLORIDE, CALCIUM CHLORIDE 600; 310; 30; 20 MG/100ML; MG/100ML; MG/100ML; MG/100ML
125 INJECTION, SOLUTION INTRAVENOUS CONTINUOUS
Status: DISCONTINUED | OUTPATIENT
Start: 2022-06-14 | End: 2022-06-18 | Stop reason: HOSPADM

## 2022-06-14 RX ADMIN — PROPOFOL 100 MG: 10 INJECTION, EMULSION INTRAVENOUS at 08:43

## 2022-06-14 RX ADMIN — PROPOFOL 20 MG: 10 INJECTION, EMULSION INTRAVENOUS at 09:03

## 2022-06-14 RX ADMIN — PROPOFOL 50 MG: 10 INJECTION, EMULSION INTRAVENOUS at 08:45

## 2022-06-14 RX ADMIN — SODIUM CHLORIDE, SODIUM LACTATE, POTASSIUM CHLORIDE, CALCIUM CHLORIDE: 600; 310; 30; 20 INJECTION, SOLUTION INTRAVENOUS at 08:33

## 2022-06-14 RX ADMIN — PROPOFOL 30 MG: 10 INJECTION, EMULSION INTRAVENOUS at 08:52

## 2022-06-14 RX ADMIN — PROPOFOL 30 MG: 10 INJECTION, EMULSION INTRAVENOUS at 08:54

## 2022-06-14 RX ADMIN — PROPOFOL 50 MG: 10 INJECTION, EMULSION INTRAVENOUS at 08:44

## 2022-06-14 RX ADMIN — PROPOFOL 20 MG: 10 INJECTION, EMULSION INTRAVENOUS at 09:05

## 2022-06-14 RX ADMIN — PROPOFOL 20 MG: 10 INJECTION, EMULSION INTRAVENOUS at 08:47

## 2022-06-14 RX ADMIN — PROPOFOL 40 MG: 10 INJECTION, EMULSION INTRAVENOUS at 09:01

## 2022-06-14 RX ADMIN — PROPOFOL 40 MG: 10 INJECTION, EMULSION INTRAVENOUS at 08:58

## 2022-06-14 RX ADMIN — LIDOCAINE HYDROCHLORIDE 50 MG: 10 INJECTION, SOLUTION EPIDURAL; INFILTRATION; INTRACAUDAL; PERINEURAL at 08:43

## 2022-06-14 NOTE — ANESTHESIA PREPROCEDURE EVALUATION
Procedure:  COLONOSCOPY    Relevant Problems   CARDIO   (+) Essential hypertension   (+) Mixed hyperlipidemia      ENDO   (+) Type 2 diabetes mellitus with diabetic neuropathy, without long-term current use of insulin (HCC)      GI/HEPATIC   (+) Esophageal reflux   (+) Fatty liver      MUSCULOSKELETAL   (+) Chronic bilateral low back pain without sciatica      NEURO/PSYCH   (+) Chronic bilateral low back pain without sciatica   (+) History of prostate cancer        Physical Exam    Airway    Mallampati score: II  TM Distance: >3 FB  Neck ROM: full     Dental   No notable dental hx     Cardiovascular      Pulmonary      Other Findings        Anesthesia Plan  ASA Score- 2     Anesthesia Type- IV sedation with anesthesia with ASA Monitors  Additional Monitors:   Airway Plan:           Plan Factors-Exercise tolerance (METS): >4 METS  Chart reviewed  Patient summary reviewed  Induction- intravenous  Postoperative Plan- Plan for postoperative opioid use  Informed Consent- Anesthetic plan and risks discussed with patient  I personally reviewed this patient with the CRNA  Discussed and agreed on the Anesthesia Plan with the CRNA  Gilberto Catalan

## 2022-06-14 NOTE — TELEPHONE ENCOUNTER
----- Message from Byron Potter RN sent at 6/14/2022  9:56 AM EDT -----  Dr Renetta Leahy wants him to have an egd      thanks

## 2022-06-14 NOTE — H&P
History and Physical - [facility]  Jeff Patel 77 y o  male MRN: [id number]                  HPI: Jeff Patel is a 77y o  year old male who presents for colonoscopy due to history of tubular adenomas  Last colonoscopy 2016      REVIEW OF SYSTEMS: Per the HPI, and otherwise unremarkable  Historical Information   Past Medical History:   Diagnosis Date    Arthritis     Cancer [facility]) 2012    Chest tightness     Colon polyp     [facility] )     Diabetes mellitus ([facility] )     Diverticulosis of sigmoid colon     Esophageal reflux     Fatty liver     GERD (gastroesophageal reflux disease)     Hearing problem     High cholesterol     History of chest pain     Hyperlipemia     Hypertension     Lumbago     Malignant neoplasm of prostate ([facility] )     [place] ([place] [address] )     Squamous cell skin cancer 2020    right dorsal forearm    Tinea pedis of both feet     Trochanteric bursitis      Past Surgical History:   Procedure Laterality Date    APPENDECTOMY      COLONOSCOPY  2016    HERNIA REPAIR      ?  Umbilical    NM KNEE SCOPE,MED/LAT MENISECTOMY Left 3/15/2022    Procedure: ARTHROSCOPY KNEE,  partial medial menisectomy;  Surgeon: Ritta Merlin, DO;  Location: [address] [place] [address];  Service: Orthopedics    NM REPAIR INCISIONAL [address] N/A 2021    Procedure: INCISIONAL HERNIA REPAIR AT THE UMBILICUS;  Surgeon: Edy Leos MD;  [facility];  Service: General    PROSTATECTOMY N/A     PROSTATECTOMY       Social History   Social History     Substance and Sexual Activity   Alcohol Use Yes    Alcohol/week: 6 0 standard drinks    Types: 6 Cans of beer per week    Comment: Weekends     Social History     Substance and Sexual Activity   Drug Use No     Social History     Tobacco Use   Smoking Status Former Smoker    Packs/day: 0 00    Years: 0 00    Pack years: 0 00    Quit date: 2012    Years since quittin 9   Smokeless Tobacco Never Used     Family History   Problem Relation Age of Onset    Arthritis Mother     Parkinsonism Mother     Heart disease Father     Arthritis Father     Coronary artery disease Father     Hypertension Father     Parkinsonism Father     Mental illness Neg Hx        Meds/Allergies       Current Outpatient Medications:     Empagliflozin (Jardiance) 25 MG TABS    glucose blood (ACCU-CHEK GUIDE) test strip    lisinopril-hydrochlorothiazide (PRINZIDE,ZESTORETIC) 20-12 5 MG per tablet    metFORMIN (GLUCOPHAGE-XR) 500 mg 24 hr tablet    omeprazole (PriLOSEC) 20 mg delayed release capsule    rosuvastatin (CRESTOR) 20 MG tablet    tamsulosin (FLOMAX) 0 4 mg    ACCU-CHEK FASTCLIX LANCETS MISC    Current Facility-Administered Medications:     lactated ringers infusion, 125 mL/hr, Intravenous, Continuous    Allergies   Allergen Reactions    Penicillins Other (See Comments)      Ancef can tolerate       Objective     /84   Pulse 80   Temp 97 7 °F (36 5 °C) (Temporal)   Resp 18   Ht 6' (1 829 m)   Wt 118 kg (260 lb)   SpO2 97%   BMI 35 26 kg/m²       PHYSICAL EXAM    Gen: NAD  Head: NCAT  CV: RRR  CHEST: Clear  ABD: soft, NT/ND  EXT: no edema      ASSESSMENT/PLAN:  This is a 77y o  year old male here for colonoscopy, and he is stable and optimized for his procedure

## 2022-06-14 NOTE — ANESTHESIA POSTPROCEDURE EVALUATION
Post-Op Assessment Note    CV Status:  Stable  Pain Score: 0    Pain management: adequate     Mental Status:  Alert and awake   Hydration Status:  Euvolemic   PONV Controlled:  Controlled   Airway Patency:  Patent      Post Op Vitals Reviewed: Yes      Staff: CRNA         No complications documented      BP   103/73   Temp      Pulse  77   Resp   16   SpO2   99

## 2022-06-15 NOTE — TELEPHONE ENCOUNTER
I lmom for pt to please call back to schedule the EGD with Dr Jani Heard  Will call pt again in one week if do not hear back from him

## 2022-06-17 ENCOUNTER — PROCEDURE VISIT (OUTPATIENT)
Dept: OBGYN CLINIC | Facility: CLINIC | Age: 67
End: 2022-06-17
Payer: COMMERCIAL

## 2022-06-17 VITALS — BODY MASS INDEX: 35.21 KG/M2 | WEIGHT: 260 LBS | HEIGHT: 72 IN

## 2022-06-17 DIAGNOSIS — M17.11 PRIMARY OSTEOARTHRITIS OF RIGHT KNEE: Primary | ICD-10-CM

## 2022-06-17 DIAGNOSIS — G89.29 CHRONIC PAIN OF RIGHT KNEE: ICD-10-CM

## 2022-06-17 DIAGNOSIS — M25.561 CHRONIC PAIN OF RIGHT KNEE: ICD-10-CM

## 2022-06-17 PROCEDURE — 20610 DRAIN/INJ JOINT/BURSA W/O US: CPT | Performed by: PHYSICIAN ASSISTANT

## 2022-06-17 NOTE — PROGRESS NOTES
Assessment/Plan:  1  Primary osteoarthritis of right knee  Large joint arthrocentesis   2  Chronic pain of right knee  Large joint arthrocentesis     Coleen Tabor is a pleasant 55-year-old presenting today for follow-up of his activity related right knee pain due to his underlying osteoarthritis  He consented to and underwent the 1st of 3 Orthovisc injections as detailed below, which she tolerated well without difficulty or complication  Post injection instructions were provided  We will plan to see him back next week and week after to complete the series  All questions addressed    Large joint arthrocentesis: R knee  Universal Protocol:  Consent: Verbal consent obtained  Risks and benefits: risks, benefits and alternatives were discussed  Consent given by: patient  Time out: Immediately prior to procedure a "time out" was called to verify the correct patient, procedure, equipment, support staff and site/side marked as required  Timeout called at: 6/17/2022 8:10 AM   Site marked: the operative site was marked  Patient identity confirmed: verbally with patient    Supporting Documentation  Indications: pain   Procedure Details  Location: knee - R knee  Preparation: Patient was prepped and draped in the usual sterile fashion  Needle size: 20 G  Ultrasound guidance: no  Approach: anterolateral  Medications administered: 30 mg sodium hyaluronate 30 mg/2 mL    Patient tolerance: patient tolerated the procedure well with no immediate complications  Dressing:  Sterile dressing applied        Subjective: Right knee Orthovisc #1    Patient ID: Olegariocat Allyson is a 77 y o  male  Coleen Tabor is a pleasant 55-year-old presenting today for the 1st of 3 Orthovisc injections for his right knee osteoarthritis    He denies any new injury      Review of Systems      Past Medical History:   Diagnosis Date    Arthritis     Cancer [facility]) 2012    Chest tightness     Colon polyp     [facility]     Diabetes mellitus ([facility] )     Diverticulosis of sigmoid colon     Esophageal reflux     Fatty liver     GERD (gastroesophageal reflux disease)     Hearing problem     High cholesterol     History of chest pain     Hyperlipemia     Hypertension     Lumbago     [facility]     [facility]     Squamous cell skin cancer 2020    right dorsal forearm    Tinea pedis of both feet     Trochanteric bursitis        Past Surgical History:   Procedure Laterality Date    APPENDECTOMY      COLONOSCOPY  2016    HERNIA REPAIR      ? Umbilical    MA KNEE SCOPE,MED/LAT MENISECTOMY Left 3/15/2022    Procedure: ARTHROSCOPY KNEE,  partial medial menisectomy;  Surgeon: Wilfredo Wan DO;  [facility];  Service: Orthopedics    MA REPAIR INCISIONAL HERNIA,REDUCIBLE N/A 2021    Procedure: INCISIONAL HERNIA REPAIR AT THE UMBILICUS;  Surgeon: Sandy Dugan MD;  Location: [facility] MAIN OR;  Service: General    PROSTATECTOMY N/A     PROSTATECTOMY         Family History   Problem Relation Age of Onset    Arthritis Mother     Parkinsonism Mother     Heart disease Father     Arthritis Father     Coronary artery disease Father     Hypertension Father     Parkinsonism Father     Mental illness Neg Hx        Social History     Occupational History    Not on file   Tobacco Use    Smoking status: Former Smoker     Packs/day: 0 00     Years: 0 00     Pack years: 0 00     Quit date: 2012     Years since quittin 9    Smokeless tobacco: Never Used   Vaping Use    Vaping Use: Never used   Substance and Sexual Activity    Alcohol use:  Yes     Alcohol/week: 6 0 standard drinks     Types: 6 Cans of beer per week     Comment: Weekends    Drug use: No    Sexual activity: Not Currently     Partners: Female         Current Outpatient Medications:     ACCU-CHEK FASTCLIX LANCETS MISC, by Does not apply route daily Dx: E11 40, Disp: 100 each, Rfl: 1    Empagliflozin (Jardiance) 25 MG TABS, Take 1 tablet (25 mg total) by mouth every morning, Disp: 90 tablet, Rfl: 1    glucose blood (ACCU-CHEK GUIDE) test strip, 1 each by Other route daily DX: e11 40, Disp: 100 each, Rfl: 1    lisinopril-hydrochlorothiazide (PRINZIDE,ZESTORETIC) 20-12 5 MG per tablet, TAKE 1 TABLET DAILY, Disp: 90 tablet, Rfl: 3    metFORMIN (GLUCOPHAGE-XR) 500 mg 24 hr tablet, Take 4 tablets (2,000 mg total) by mouth daily with breakfast, Disp: 360 tablet, Rfl: 1    omeprazole (PriLOSEC) 20 mg delayed release capsule, Take 20 mg by mouth as needed, Disp: , Rfl:     rosuvastatin (CRESTOR) 20 MG tablet, TAKE 1 TABLET DAILY, Disp: 90 tablet, Rfl: 3    tamsulosin (FLOMAX) 0 4 mg, Take 1 capsule (0 4 mg total) by mouth daily with dinner, Disp: 90 capsule, Rfl: 1  No current facility-administered medications for this visit  Facility-Administered Medications Ordered in Other Visits:     lactated ringers infusion, 125 mL/hr, Intravenous, Continuous, Zamzam Casas MD, Last Rate: 125 mL/hr at 06/14/22 0836, Restarted at 06/14/22 0901    Allergies   Allergen Reactions    Penicillins Other (See Comments)      Ancef can tolerate       Objective: There were no vitals filed for this visit  Body mass index is 35 26 kg/m²      Ortho Exam    Physical Exam

## 2022-06-17 NOTE — LETTER
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

## 2022-06-22 ENCOUNTER — TELEPHONE (OUTPATIENT)
Dept: GASTROENTEROLOGY | Facility: CLINIC | Age: 67
End: 2022-06-22

## 2022-06-22 NOTE — TELEPHONE ENCOUNTER
[facility] Assessment    Name: Sonya Smalls  YOB: 1955  Last Height: 6' (1 829 m)  Last weight: 118 kg (260 lb)  BMI: 35 26 kg/m²  Procedure: Egd  Diagnosis: see order  Date of procedure: 8/10/22  Prep:   Responsible : yes  Phone#: [phone]  Name completing form: Gino Valencia  Date form completed: 06/22/22      If the patient answers yes to any of these questions, schedule in a hospital  Are you pregnant: No  Do you rely on a wheelchair for mobility: No  Have you been diagnosed with End Stage Renal Disease (ESRD): No  Do you need oxygen during the day: No  Have you had a heart attack or stroke within the past three months: No  Have you had a seizure within the past three months: No  Have you ever been informed by anesthesia that you have a difficult airway: No  Additional Questions  Have you had any cardiac testing or are under the care of a Cardiologist (see cardiac list): No  Cardiac list:   Do you have an implanted cardiac defibrillator: No (Comment:  This patient should be scheduled in the hospital)    Have any bleeding problems, such as anemia or hemophilia (If patient has H&H result below 8, schedule in hospital   H&H must be within 30 days of procedure): No    Had an organ transplant within the past 3 months: No    Do you have any present infections: No  Do you get short of breath when walking a few blocks: No  Have you been diagnosed with diabetes: Yes  Comments (provide cardiac provider information if applicable):

## 2022-06-22 NOTE — TELEPHONE ENCOUNTER
Scheduled date of EGD(as of today): 8/10/22  Physician performing EGD: Dr Slime Scott  Location of EGD: [facility]  Instructions reviewed with patient by:bran  Clearances: n/a

## 2022-06-22 NOTE — TELEPHONE ENCOUNTER
ASC Assessment    If the patient answers yes to any of these questions, schedule in a hospital  Are you pregnant: No  Do you rely on a wheelchair for mobility: No  Have you been diagnosed with End Stage Renal Disease (ESRD): No  Do you need oxygen during the day: No  Have you had a heart attack or stroke within the past three months: No  Have you had a seizure within the past three months: No  Have you ever been informed by anesthesia that you have a difficult airway: No  Additional Questions  Have you had any cardiac testing or are under the care of a Cardiologist (see cardiac list): No  Cardiac list:   Do you have an implanted cardiac defibrillator: No (Comment:  This patient should be scheduled in the hospital)    Have any bleeding problems, such as anemia or hemophilia (If patient has H&H result below 8, schedule in hospital   H&H must be within 30 days of procedure): No    Had an organ transplant within the past 3 months: No    Do you have any present infections: No  Do you get short of breath when walking a few blocks: No  Have you been diagnosed with diabetes: Yes  Comments (provide cardiac provider information if applicable):

## 2022-06-24 ENCOUNTER — PROCEDURE VISIT (OUTPATIENT)
Dept: OBGYN CLINIC | Facility: CLINIC | Age: 67
End: 2022-06-24
Payer: COMMERCIAL

## 2022-06-24 VITALS
RESPIRATION RATE: 19 BRPM | TEMPERATURE: 98.2 F | HEIGHT: 72 IN | DIASTOLIC BLOOD PRESSURE: 76 MMHG | BODY MASS INDEX: 36.03 KG/M2 | WEIGHT: 266 LBS | SYSTOLIC BLOOD PRESSURE: 134 MMHG | HEART RATE: 83 BPM

## 2022-06-24 DIAGNOSIS — M17.11 PRIMARY OSTEOARTHRITIS OF RIGHT KNEE: Primary | ICD-10-CM

## 2022-06-24 PROCEDURE — 20610 DRAIN/INJ JOINT/BURSA W/O US: CPT | Performed by: PHYSICIAN ASSISTANT

## 2022-06-24 PROCEDURE — 3008F BODY MASS INDEX DOCD: CPT | Performed by: ORTHOPAEDIC SURGERY

## 2022-06-24 NOTE — PROGRESS NOTES
Assessment/Plan:  1  Primary osteoarthritis of right knee       77year old male with right knee OA  Pt is here today for his 2nd/3 Orthovisc shots, which was provided today  Patient tolerated the procedure well  He has follow up next week for his 3rd shot  Large joint arthrocentesis: R knee  Universal Protocol:  Consent: Verbal consent obtained  Risks and benefits: risks, benefits and alternatives were discussed  Consent given by: patient  Patient understanding: patient states understanding of the procedure being performed  Patient identity confirmed: verbally with patient    Supporting Documentation  Indications: pain   Procedure Details  Location: knee - R knee  Needle size: 20 G  Approach: anterolateral  Medications administered: 30 mg sodium hyaluronate 30 mg/2 mL    Patient tolerance: patient tolerated the procedure well with no immediate complications  Dressing:  Sterile dressing applied            Subjective: Right knee pain    Patient ID: Marisel Mckeon is a 77 y o  male who presents to the office for his 2nd/3 Orthovisc shots  States his last set lasted him 1 5 years  States no adverse reactions to the first shot  Review of Systems   All other systems reviewed and are negative  Past Medical History:   Diagnosis Date    Arthritis     Cancer [facility]) 2012    Chest tightness     Colon polyp     [facility]     [facility] mellitus ([facility]     Diverticulosis of sigmoid colon     Esophageal reflux     Fatty liver     GERD (gastroesophageal reflux disease)     Hearing problem     High cholesterol     History of chest pain     Hyperlipemia     Hypertension     Lumbago     Malignant [facility]     [facility]     Squamous cell skin cancer 08/27/2020    right dorsal forearm    Tinea pedis of both feet     Trochanteric bursitis        Past Surgical History:   Procedure Laterality Date    APPENDECTOMY      COLONOSCOPY  2016    HERNIA REPAIR      ? Umbilical    NJ KNEE SCOPE,MED/LAT MENISECTOMY Left 3/15/2022    Procedure: ARTHROSCOPY KNEE,  partial medial menisectomy;  Surgeon: Jorge Cash DO;  [place];  Service: Orthopedics    [place] REPAIR INCISIONAL HERNIA,REDUCIBLE N/A 2021    Procedure: INCISIONAL HERNIA REPAIR AT THE UMBILICUS;  Surgeon: Brenda Marks MD;  [facility];  Service: General    PROSTATECTOMY N/A     PROSTATECTOMY         Family History   Problem Relation Age of Onset    Arthritis Mother     Parkinsonism Mother     Heart disease Father     Arthritis Father     Coronary artery disease Father     Hypertension Father     Parkinsonism Father     Mental illness Neg Hx        Social History     Occupational History    Not on file   Tobacco Use    Smoking status: Former Smoker     Packs/day: 0 00     Years: 0 00     Pack years: 0 00     Quit date: 2012     Years since quittin 9    Smokeless tobacco: Never Used   Vaping Use    Vaping Use: Never used   Substance and Sexual Activity    Alcohol use:  Yes     Alcohol/week: 6 0 standard drinks     Types: 6 Cans of beer per week     Comment: Weekends    Drug use: No    Sexual activity: Not Currently     Partners: Female         Current Outpatient Medications:     ACCU-CHEK FASTCLIX LANCETS MISC, by Does not apply route daily Dx: E11 40, Disp: 100 each, Rfl: 1    Empagliflozin (Jardiance) 25 MG TABS, Take 1 tablet (25 mg total) by mouth every morning, Disp: 90 tablet, Rfl: 1    glucose blood (ACCU-CHEK GUIDE) test strip, 1 each by Other route daily DX: e11 40, Disp: 100 each, Rfl: 1    lisinopril-hydrochlorothiazide (PRINZIDE,ZESTORETIC) 20-12 5 MG per tablet, TAKE 1 TABLET DAILY, Disp: 90 tablet, Rfl: 3    metFORMIN (GLUCOPHAGE-XR) 500 mg 24 hr tablet, Take 4 tablets (2,000 mg total) by mouth daily with breakfast, Disp: 360 tablet, Rfl: 1    omeprazole (PriLOSEC) 20 mg delayed release capsule, Take 20 mg by mouth as needed, Disp: , Rfl:   rosuvastatin (CRESTOR) 20 MG tablet, TAKE 1 TABLET DAILY, Disp: 90 tablet, Rfl: 3    tamsulosin (FLOMAX) 0 4 mg, Take 1 capsule (0 4 mg total) by mouth daily with dinner, Disp: 90 capsule, Rfl: 1    Allergies   Allergen Reactions    Penicillins Other (See Comments)      Ancef can tolerate       Objective:  Vitals:    06/24/22 0805   BP: 134/76   Pulse: 83   Resp: 19   Temp: 98 2 °F (36 8 °C)       Body mass index is 36 08 kg/m²  Ortho Exam   Right Knee:  No edema, erythema, effusion  Physical Exam  Constitutional:       General: He is not in acute distress  Appearance: Normal appearance  Neurological:      Mental Status: He is alert  Psychiatric:         Mood and Affect: Mood normal          Behavior: Behavior normal          Thought Content:  Thought content normal

## 2022-06-29 ENCOUNTER — APPOINTMENT (OUTPATIENT)
Dept: LAB | Facility: CLINIC | Age: 67
End: 2022-06-29
Payer: COMMERCIAL

## 2022-06-29 DIAGNOSIS — E78.2 MIXED HYPERLIPIDEMIA: ICD-10-CM

## 2022-06-29 DIAGNOSIS — E11.40 TYPE 2 DIABETES MELLITUS WITH DIABETIC NEUROPATHY, WITHOUT LONG-TERM CURRENT USE OF INSULIN (HCC): ICD-10-CM

## 2022-06-29 DIAGNOSIS — I10 ESSENTIAL HYPERTENSION: ICD-10-CM

## 2022-06-29 LAB
ALBUMIN SERPL BCP-MCNC: 4.4 G/DL (ref 3.5–5)
ALP SERPL-CCNC: 58 U/L (ref 34–104)
ALT SERPL W P-5'-P-CCNC: 38 U/L (ref 7–52)
ANION GAP SERPL CALCULATED.3IONS-SCNC: 7 MMOL/L (ref 4–13)
AST SERPL W P-5'-P-CCNC: 30 U/L (ref 13–39)
BILIRUB SERPL-MCNC: 0.74 MG/DL (ref 0.2–1)
BUN SERPL-MCNC: 16 MG/DL (ref 5–25)
CALCIUM SERPL-MCNC: 9.5 MG/DL (ref 8.4–10.2)
CHLORIDE SERPL-SCNC: 104 MMOL/L (ref 96–108)
CHOLEST SERPL-MCNC: 158 MG/DL
CO2 SERPL-SCNC: 28 MMOL/L (ref 21–32)
CREAT SERPL-MCNC: 0.83 MG/DL (ref 0.6–1.3)
ERYTHROCYTE [DISTWIDTH] IN BLOOD BY AUTOMATED COUNT: 12 % (ref 11.6–15.1)
EST. AVERAGE GLUCOSE BLD GHB EST-MCNC: 166 MG/DL
GFR SERPL CREATININE-BSD FRML MDRD: 91 ML/MIN/1.73SQ M
GLUCOSE P FAST SERPL-MCNC: 142 MG/DL (ref 65–99)
HBA1C MFR BLD: 7.4 %
HCT VFR BLD AUTO: 47.3 % (ref 36.5–49.3)
HDLC SERPL-MCNC: 49 MG/DL
HGB BLD-MCNC: 15.7 G/DL (ref 12–17)
LDLC SERPL CALC-MCNC: 79 MG/DL (ref 0–100)
MCH RBC QN AUTO: 31.7 PG (ref 26.8–34.3)
MCHC RBC AUTO-ENTMCNC: 33.2 G/DL (ref 31.4–37.4)
MCV RBC AUTO: 95 FL (ref 82–98)
PLATELET # BLD AUTO: 179 THOUSANDS/UL (ref 149–390)
PMV BLD AUTO: 10.5 FL (ref 8.9–12.7)
POTASSIUM SERPL-SCNC: 4.3 MMOL/L (ref 3.5–5.3)
PROT SERPL-MCNC: 7.3 G/DL (ref 6.4–8.4)
RBC # BLD AUTO: 4.96 MILLION/UL (ref 3.88–5.62)
SODIUM SERPL-SCNC: 139 MMOL/L (ref 135–147)
TRIGL SERPL-MCNC: 150 MG/DL
WBC # BLD AUTO: 5.69 THOUSAND/UL (ref 4.31–10.16)

## 2022-06-29 PROCEDURE — 80053 COMPREHEN METABOLIC PANEL: CPT

## 2022-06-29 PROCEDURE — 83036 HEMOGLOBIN GLYCOSYLATED A1C: CPT

## 2022-06-29 PROCEDURE — 80061 LIPID PANEL: CPT

## 2022-06-29 PROCEDURE — 36415 COLL VENOUS BLD VENIPUNCTURE: CPT

## 2022-06-29 PROCEDURE — 85027 COMPLETE CBC AUTOMATED: CPT

## 2022-06-29 PROCEDURE — 3051F HG A1C>EQUAL 7.0%<8.0%: CPT | Performed by: ORTHOPAEDIC SURGERY

## 2022-06-29 NOTE — RESULT ENCOUNTER NOTE
The colon polyps were adenomas (precancerous)  Repeat colonoscopy in 3 years  This was communicated to my chart

## 2022-07-01 ENCOUNTER — PROCEDURE VISIT (OUTPATIENT)
Dept: OBGYN CLINIC | Facility: CLINIC | Age: 67
End: 2022-07-01
Payer: COMMERCIAL

## 2022-07-01 VITALS
HEART RATE: 75 BPM | RESPIRATION RATE: 19 BRPM | DIASTOLIC BLOOD PRESSURE: 75 MMHG | BODY MASS INDEX: 32.51 KG/M2 | HEIGHT: 72 IN | WEIGHT: 240 LBS | TEMPERATURE: 98.2 F | SYSTOLIC BLOOD PRESSURE: 125 MMHG

## 2022-07-01 DIAGNOSIS — M17.11 PRIMARY OSTEOARTHRITIS OF RIGHT KNEE: Primary | ICD-10-CM

## 2022-07-01 DIAGNOSIS — G89.29 CHRONIC PAIN OF RIGHT KNEE: ICD-10-CM

## 2022-07-01 DIAGNOSIS — M25.561 CHRONIC PAIN OF RIGHT KNEE: ICD-10-CM

## 2022-07-01 PROCEDURE — 20610 DRAIN/INJ JOINT/BURSA W/O US: CPT | Performed by: ORTHOPAEDIC SURGERY

## 2022-07-01 NOTE — PROGRESS NOTES
Assessment/Plan:  1  Primary osteoarthritis of right knee  Large joint arthrocentesis: R knee   2  Chronic pain of right knee  Large joint arthrocentesis: R knee     Scribe Attestation    I,:  Luis Gordon am acting as a scribe while in the presence of the attending physician :       I,:  Domingo Fatima DO personally performed the services described in this documentation    as scribed in my presence :         Tamy Rouqe is a pleasant 27-year-old male who returns today for the 3rd and final injection in his Orthovisc series for his right knee  He tolerated the 1st 2 injections well  He consented to and underwent the 3rd and final injection as documented below without issue or complication  This was well tolerated by the patient  Post injection instructions were provided  He understands can be repeated in 6 months if beneficial   All of his questions and concerns were addressed today  Large joint arthrocentesis: R knee  Universal Protocol:  Consent: Verbal consent obtained  Risks and benefits: risks, benefits and alternatives were discussed  Consent given by: patient  Patient understanding: patient states understanding of the procedure being performed  Site marked: the operative site was marked  Patient identity confirmed: verbally with patient    Supporting Documentation  Indications: pain   Procedure Details  Location: knee - R knee  Preparation: Patient was prepped and draped in the usual sterile fashion  Needle size: 20 G  Ultrasound guidance: no  Approach: anterolateral  Medications administered: 30 mg sodium hyaluronate 30 mg/2 mL    Patient tolerance: patient tolerated the procedure well with no immediate complications  Dressing:  Sterile dressing applied          Subjective:  Right knee Orthovisc 3    Patient ID: Adria Castillo is a 77 y o  male who returns today for the 3rd and final injection in his Orthovisc series for his right knee  He tolerated the 1st 2 injections in the series well    He denies any new injury trauma  Review of Systems      Past Medical History:   Diagnosis Date    Arthritis     Cancer [facility]) 2012    Chest tightness     Colon polyp     [facility]     [facility]     Diverticulosis of sigmoid colon     Esophageal reflux     Fatty liver     GERD (gastroesophageal reflux disease)     Hearing problem     High cholesterol     History of chest pain     Hyperlipemia     Hypertension     Lumbago     [facility]     [facility] )     Squamous cell skin cancer 08/27/2020    right dorsal forearm    Tinea pedis of both feet     Trochanteric bursitis        Past Surgical History:   Procedure Laterality Date    APPENDECTOMY      COLONOSCOPY  2016    HERNIA REPAIR      ? Umbilical    [place] KNEE SCOPE,MED/LAT MENISECTOMY Left 3/15/2022    Procedure: ARTHROSCOPY KNEE,  partial medial menisectomy;  Surgeon: Home Olivo DO;  [place]: [facility];  Service: Orthopedics    [place] REPAIR INCISIONAL HERNIA,REDUCIBLE N/A 11/11/2021    Procedure: INCISIONAL HERNIA REPAIR AT THE UMBILICUS;  Surgeon: Bev Hayden MD;  Location: [facility] MAIN OR;  Service: General    PROSTATECTOMY N/A 2011    PROSTATECTOMY         Family History   Problem Relation Age of Onset    Arthritis Mother     Parkinsonism Mother     Heart disease Father     Arthritis Father     Coronary artery disease Father     Hypertension Father     Parkinsonism Father     Mental illness Neg Hx        Social History     Occupational History    Not on file   Tobacco Use    Smoking status: Former Smoker     Packs/day: 0 00     Years: 0 00     Pack years: 0 00     Quit date: 6/27/2012     Years since quitting: 10 0    Smokeless tobacco: Never Used   Vaping Use    Vaping Use: Never used   Substance and Sexual Activity    Alcohol use:  Yes     Alcohol/week: 6 0 standard drinks     Types: 6 Cans of beer per week     Comment: Weekends    Drug use: No    Sexual activity: Not Currently     Partners: Female         Current Outpatient Medications:     ACCU-CHEK FASTCLIX LANCETS MISC, by Does not apply route daily Dx: E11 40, Disp: 100 each, Rfl: 1    Empagliflozin (Jardiance) 25 MG TABS, Take 1 tablet (25 mg total) by mouth every morning, Disp: 90 tablet, Rfl: 1    glucose blood (ACCU-CHEK GUIDE) test strip, 1 each by Other route daily DX: e11 40, Disp: 100 each, Rfl: 1    lisinopril-hydrochlorothiazide (PRINZIDE,ZESTORETIC) 20-12 5 MG per tablet, TAKE 1 TABLET DAILY, Disp: 90 tablet, Rfl: 3    metFORMIN (GLUCOPHAGE-XR) 500 mg 24 hr tablet, Take 4 tablets (2,000 mg total) by mouth daily with breakfast, Disp: 360 tablet, Rfl: 1    omeprazole (PriLOSEC) 20 mg delayed release capsule, Take 20 mg by mouth as needed, Disp: , Rfl:     rosuvastatin (CRESTOR) 20 MG tablet, TAKE 1 TABLET DAILY, Disp: 90 tablet, Rfl: 3    tamsulosin (FLOMAX) 0 4 mg, Take 1 capsule (0 4 mg total) by mouth daily with dinner, Disp: 90 capsule, Rfl: 1    Allergies   Allergen Reactions    Penicillins Other (See Comments)      Ancef can tolerate       Objective:  Vitals:    07/01/22 0801   BP: 125/75   Pulse: 75   Resp: 19   Temp: 98 2 °F (36 8 °C)       Body mass index is 32 55 kg/m²      Ortho Exam    Physical Exam

## 2022-07-07 ENCOUNTER — OFFICE VISIT (OUTPATIENT)
Dept: FAMILY MEDICINE CLINIC | Facility: CLINIC | Age: 67
End: 2022-07-07
Payer: COMMERCIAL

## 2022-07-07 VITALS
SYSTOLIC BLOOD PRESSURE: 110 MMHG | HEART RATE: 83 BPM | DIASTOLIC BLOOD PRESSURE: 70 MMHG | RESPIRATION RATE: 16 BRPM | BODY MASS INDEX: 35.08 KG/M2 | TEMPERATURE: 97.5 F | WEIGHT: 259 LBS | HEIGHT: 72 IN | OXYGEN SATURATION: 97 %

## 2022-07-07 DIAGNOSIS — E11.40 TYPE 2 DIABETES MELLITUS WITH DIABETIC NEUROPATHY, WITHOUT LONG-TERM CURRENT USE OF INSULIN (HCC): Primary | ICD-10-CM

## 2022-07-07 DIAGNOSIS — E78.2 MIXED HYPERLIPIDEMIA: ICD-10-CM

## 2022-07-07 DIAGNOSIS — I10 ESSENTIAL HYPERTENSION: ICD-10-CM

## 2022-07-07 PROCEDURE — 1101F PT FALLS ASSESS-DOCD LE1/YR: CPT | Performed by: FAMILY MEDICINE

## 2022-07-07 PROCEDURE — 1160F RVW MEDS BY RX/DR IN RCRD: CPT | Performed by: FAMILY MEDICINE

## 2022-07-07 PROCEDURE — 3725F SCREEN DEPRESSION PERFORMED: CPT | Performed by: FAMILY MEDICINE

## 2022-07-07 PROCEDURE — 99214 OFFICE O/P EST MOD 30 MIN: CPT | Performed by: FAMILY MEDICINE

## 2022-07-07 PROCEDURE — 3288F FALL RISK ASSESSMENT DOCD: CPT | Performed by: FAMILY MEDICINE

## 2022-07-07 NOTE — ASSESSMENT & PLAN NOTE
Lab Results   Component Value Date    HGBA1C 7 4 (H) 06/29/2022     Improving, A1c is down to 7 4   Continue working on weight loss   Continue Jardiance 25 mg daily and metformin 2000 mg daily

## 2022-07-07 NOTE — PROGRESS NOTES
Assessment/Plan:    1  Type 2 diabetes mellitus with diabetic neuropathy, without long-term current use of insulin (HCC)  Assessment & Plan:    Lab Results   Component Value Date    HGBA1C 7 4 (H) 06/29/2022     Improving, A1c is down to 7 4   Continue working on weight loss   Continue Jardiance 25 mg daily and metformin 2000 mg daily    Orders:  -     Hemoglobin A1C; Future; Expected date: 09/20/2022    2  Essential hypertension  Assessment & Plan:  Blood pressure is well controlled   Continue lisinopril-hydrochlorothiazide 20-12 5 mg daily    Orders:  -     CBC; Future; Expected date: 09/20/2022  -     Comprehensive metabolic panel; Future; Expected date: 09/20/2022  -     Lipid Panel with Direct LDL reflex; Future; Expected date: 09/20/2022    3  Mixed hyperlipidemia  Assessment & Plan:  Stable   Continue rosuvastatin 20 mg daily    Orders:  -     Lipid Panel with Direct LDL reflex; Future; Expected date: 09/20/2022        Depression Screening and Follow-up Plan: Patient was screened for depression during today's encounter  They screened negative with a PHQ-2 score of 0  There are no Patient Instructions on file for this visit  Return in about 4 months (around 10/24/2022) for Annual physical and Prevnar 20  Subjective:      Patient ID: Misha Henry is a 77 y o  male  Chief Complaint   Patient presents with    Follow-up     3 month f/u-wma       Patient reports that he has been feeling well  He has been more active  He has been outside a lot  He has also been watching his diet  He has been able to lose 9 lb since his last appointment with me  He has been working with his orthopedist regarding his knee pain        The following portions of the patient's history were reviewed and updated as appropriate:  past social history    Review of Systems      Current Outpatient Medications   Medication Sig Dispense Refill    ACCU-CHEK FASTCLIX LANCETS MISC by Does not apply route daily Dx: E11 40 100 each 1    Empagliflozin (Jardiance) 25 MG TABS Take 1 tablet (25 mg total) by mouth every morning 90 tablet 1    glucose blood (ACCU-CHEK GUIDE) test strip 1 each by Other route daily DX: e11 40 100 each 1    lisinopril-hydrochlorothiazide (PRINZIDE,ZESTORETIC) 20-12 5 MG per tablet TAKE 1 TABLET DAILY 90 tablet 3    metFORMIN (GLUCOPHAGE-XR) 500 mg 24 hr tablet Take 4 tablets (2,000 mg total) by mouth daily with breakfast 360 tablet 1    omeprazole (PriLOSEC) 20 mg delayed release capsule Take 20 mg by mouth as needed      rosuvastatin (CRESTOR) 20 MG tablet TAKE 1 TABLET DAILY 90 tablet 3    tamsulosin (FLOMAX) 0 4 mg Take 1 capsule (0 4 mg total) by mouth daily with dinner 90 capsule 1     No current facility-administered medications for this visit  Objective:    /70   Pulse 83   Temp 97 5 °F (36 4 °C)   Resp 16   Ht 6' (1 829 m)   Wt 117 kg (259 lb)   SpO2 97%   BMI 35 13 kg/m²      Physical Exam  Vitals and nursing note reviewed  Constitutional:       Appearance: He is well-developed  HENT:      Head: Normocephalic and atraumatic  Right Ear: Tympanic membrane and external ear normal       Left Ear: Tympanic membrane and external ear normal    Cardiovascular:      Rate and Rhythm: Normal rate and regular rhythm  Heart sounds: Normal heart sounds  No murmur heard  Pulmonary:      Effort: Pulmonary effort is normal  No respiratory distress  Breath sounds: Normal breath sounds  No wheezing or rales  Musculoskeletal:      Right lower leg: No edema  Left lower leg: No edema               Kennedy Tavares DO

## 2022-07-21 DIAGNOSIS — E11.40 TYPE 2 DIABETES MELLITUS WITH DIABETIC NEUROPATHY, WITHOUT LONG-TERM CURRENT USE OF INSULIN (HCC): ICD-10-CM

## 2022-07-21 RX ORDER — METFORMIN HYDROCHLORIDE 500 MG/1
2000 TABLET, EXTENDED RELEASE ORAL
Qty: 360 TABLET | Refills: 1 | Status: SHIPPED | OUTPATIENT
Start: 2022-07-21

## 2022-08-01 DIAGNOSIS — E78.2 MIXED HYPERLIPIDEMIA: ICD-10-CM

## 2022-08-01 DIAGNOSIS — K76.0 FATTY LIVER: ICD-10-CM

## 2022-08-01 RX ORDER — ROSUVASTATIN CALCIUM 20 MG/1
TABLET, COATED ORAL
Qty: 90 TABLET | Refills: 3 | Status: SHIPPED | OUTPATIENT
Start: 2022-08-01

## 2022-08-02 ENCOUNTER — TELEPHONE (OUTPATIENT)
Dept: GASTROENTEROLOGY | Facility: CLINIC | Age: 67
End: 2022-08-02

## 2022-08-02 NOTE — TELEPHONE ENCOUNTER
I lmom confirming pt's EGD scheduled on 8/10/22 with Dr Darrion Hoyt at [facility]  Informed pt that [facility] would be calling 1-2 days prior with arrival time  Informed pt to be npo after midnight night prior and would need a  the day of the procedure  I asked pt to please call back if has any questions  Advised pt to contact insurance if has any questions regarding coverage of procedure

## 2022-08-10 ENCOUNTER — ANESTHESIA (OUTPATIENT)
Dept: GASTROENTEROLOGY | Facility: AMBULATORY SURGERY CENTER | Age: 67
End: 2022-08-10

## 2022-08-10 ENCOUNTER — HOSPITAL ENCOUNTER (OUTPATIENT)
Dept: GASTROENTEROLOGY | Facility: AMBULATORY SURGERY CENTER | Age: 67
Discharge: HOME | End: 2022-08-10
Payer: COMMERCIAL

## 2022-08-10 ENCOUNTER — ANESTHESIA EVENT (OUTPATIENT)
Dept: GASTROENTEROLOGY | Facility: AMBULATORY SURGERY CENTER | Age: 67
End: 2022-08-10

## 2022-08-10 VITALS
WEIGHT: 250 LBS | TEMPERATURE: 98.1 F | SYSTOLIC BLOOD PRESSURE: 128 MMHG | DIASTOLIC BLOOD PRESSURE: 75 MMHG | HEART RATE: 68 BPM | HEIGHT: 72 IN | RESPIRATION RATE: 18 BRPM | BODY MASS INDEX: 33.86 KG/M2 | OXYGEN SATURATION: 98 %

## 2022-08-10 DIAGNOSIS — K21.9 GASTROESOPHAGEAL REFLUX DISEASE WITHOUT ESOPHAGITIS: ICD-10-CM

## 2022-08-10 DIAGNOSIS — K44.9 DIAPHRAGMATIC HERNIA WITHOUT OBSTRUCTION AND WITHOUT GANGRENE: ICD-10-CM

## 2022-08-10 PROCEDURE — 88305 TISSUE EXAM BY PATHOLOGIST: CPT | Performed by: SPECIALIST

## 2022-08-10 PROCEDURE — 43239 EGD BIOPSY SINGLE/MULTIPLE: CPT | Performed by: INTERNAL MEDICINE

## 2022-08-10 PROCEDURE — 88313 SPECIAL STAINS GROUP 2: CPT | Performed by: SPECIALIST

## 2022-08-10 RX ORDER — SODIUM CHLORIDE, SODIUM LACTATE, POTASSIUM CHLORIDE, CALCIUM CHLORIDE 600; 310; 30; 20 MG/100ML; MG/100ML; MG/100ML; MG/100ML
INJECTION, SOLUTION INTRAVENOUS CONTINUOUS PRN
Status: DISCONTINUED | OUTPATIENT
Start: 2022-08-10 | End: 2022-08-10

## 2022-08-10 RX ORDER — PROPOFOL 10 MG/ML
INJECTION, EMULSION INTRAVENOUS AS NEEDED
Status: DISCONTINUED | OUTPATIENT
Start: 2022-08-10 | End: 2022-08-10

## 2022-08-10 RX ORDER — LIDOCAINE HYDROCHLORIDE 10 MG/ML
INJECTION, SOLUTION EPIDURAL; INFILTRATION; INTRACAUDAL; PERINEURAL AS NEEDED
Status: DISCONTINUED | OUTPATIENT
Start: 2022-08-10 | End: 2022-08-10

## 2022-08-10 RX ADMIN — PROPOFOL 20 MG: 10 INJECTION, EMULSION INTRAVENOUS at 09:37

## 2022-08-10 RX ADMIN — PROPOFOL 110 MG: 10 INJECTION, EMULSION INTRAVENOUS at 09:33

## 2022-08-10 RX ADMIN — PROPOFOL 30 MG: 10 INJECTION, EMULSION INTRAVENOUS at 09:36

## 2022-08-10 RX ADMIN — SODIUM CHLORIDE, SODIUM LACTATE, POTASSIUM CHLORIDE, CALCIUM CHLORIDE: 600; 310; 30; 20 INJECTION, SOLUTION INTRAVENOUS at 09:26

## 2022-08-10 RX ADMIN — PROPOFOL 40 MG: 10 INJECTION, EMULSION INTRAVENOUS at 09:34

## 2022-08-10 RX ADMIN — PROPOFOL 50 MG: 10 INJECTION, EMULSION INTRAVENOUS at 09:38

## 2022-08-10 RX ADMIN — LIDOCAINE HYDROCHLORIDE 100 MG: 10 INJECTION, SOLUTION EPIDURAL; INFILTRATION; INTRACAUDAL; PERINEURAL at 09:33

## 2022-08-10 RX ADMIN — PROPOFOL 50 MG: 10 INJECTION, EMULSION INTRAVENOUS at 09:35

## 2022-08-10 NOTE — ANESTHESIA PREPROCEDURE EVALUATION
Procedure:  EGD    Relevant Problems   CARDIO   (+) Essential hypertension   (+) Mixed hyperlipidemia      ENDO   (+) Type 2 diabetes mellitus with diabetic neuropathy, without long-term current use of insulin (HCC)      GI/HEPATIC   (+) Esophageal reflux   (+) Fatty liver      MUSCULOSKELETAL   (+) Chronic bilateral low back pain without sciatica      NEURO/PSYCH   (+) Chronic bilateral low back pain without sciatica   (+) History of prostate cancer        Physical Exam    Airway    Mallampati score: III  TM Distance: >3 FB  Neck ROM: full     Dental   No notable dental hx     Cardiovascular  Cardiovascular exam normal    Pulmonary  Pulmonary exam normal     Other Findings        Anesthesia Plan  ASA Score- 2     Anesthesia Type- IV sedation with anesthesia with ASA Monitors  Additional Monitors:   Airway Plan:           Plan Factors-Exercise tolerance (METS): >4 METS  Chart reviewed  Patient summary reviewed  Patient is not a current smoker  Induction- intravenous  Postoperative Plan-     Informed Consent- Anesthetic plan and risks discussed with patient

## 2022-08-10 NOTE — ANESTHESIA POSTPROCEDURE EVALUATION
Post-Op Assessment Note    CV Status:  Stable    Pain management: adequate     Mental Status:  Sleepy   Hydration Status:  Euvolemic   PONV Controlled:  Controlled   Airway Patency:  Patent   Two or more mitigation strategies used for obstructive sleep apnea   Post Op Vitals Reviewed: Yes      Staff: Anesthesiologist, CRNA         No complications documented      BP   113/67   Temp      Pulse  79   Resp   19   SpO2   98

## 2022-08-10 NOTE — H&P
History and Physical - [facility]  Devon Martinez 77 y o  male MRN: [id number]                  HPI: Devon Martinez is a 77y o  year old male who presents for EGD due to history of gastroesophageal reflux      REVIEW OF SYSTEMS: Per the HPI, and otherwise unremarkable  Historical Information   Past Medical History:   Diagnosis Date    Arthritis     Cancer [facility]) 2012    Chest tightness     Colon polyp     [facility]     [facility] mellitus ([facility]     Diverticulosis of sigmoid colon     Esophageal reflux     Fatty liver     GERD (gastroesophageal reflux disease)     Hearing problem     High cholesterol     History of chest pain     Hyperlipemia     Hypertension     Lumbago     Malignant neoplasm of prostate ([facility] [place] [facility] )     Prostate CA ([facility] [place] [facility] )     Squamous cell skin cancer 08/27/2020    right dorsal forearm    Tinea pedis of both feet     Trochanteric bursitis      Past Surgical History:   Procedure Laterality Date    APPENDECTOMY      COLONOSCOPY  2016    HERNIA REPAIR      ?  Umbilical    OK KNEE SCOPE,MED/LAT MENISECTOMY Left 3/15/2022    Procedure: ARTHROSCOPY KNEE,  partial medial menisectomy;  Surgeon: Huma Thomas DO;  Location: [address] [place] [address];  Service: Orthopedics    OK REPAIR INCISIONAL [address] N/A 11/11/2021    Procedure: INCISIONAL HERNIA REPAIR AT THE UMBILICUS;  Surgeon: Christin Esquivel MD;  [facility] MA[facility];  Service: General    PROSTATECTOMY N/A 2011    PROSTATECTOMY       Social History   Social History     Substance and Sexual Activity   Alcohol Use Yes    Alcohol/week: 6 0 standard drinks    Types: 6 Cans of beer per week    Comment: Weekends     Social History     Substance and Sexual Activity   Drug Use No     Social History     Tobacco Use   Smoking Status Former Smoker    Packs/day: 0 00    Years: 0 00    Pack years: 0 00    Quit date: 6/27/2012    Years since quitting: 10 1   Smokeless Tobacco Never Used     Family History Problem Relation Age of Onset    Arthritis Mother     Parkinsonism Mother     Heart disease Father     Arthritis Father     Coronary artery disease Father     Hypertension Father     Parkinsonism Father     Mental illness Neg Hx        Meds/Allergies       Current Outpatient Medications:     Empagliflozin (Jardiance) 25 MG TABS    lisinopril-hydrochlorothiazide (PRINZIDE,ZESTORETIC) 20-12 5 MG per tablet    metFORMIN (GLUCOPHAGE-XR) 500 mg 24 hr tablet    omeprazole (PriLOSEC) 20 mg delayed release capsule    rosuvastatin (CRESTOR) 20 MG tablet    tamsulosin (FLOMAX) 0 4 mg    ACCU-CHEK FASTCLIX LANCETS MISC    glucose blood (ACCU-CHEK GUIDE) test strip  No current facility-administered medications for this encounter  Facility-Administered Medications Ordered in Other Encounters:     lactated ringers infusion, , Intravenous, Continuous PRN, New Bag at 08/10/22 8135    Allergies   Allergen Reactions    Penicillins Other (See Comments)      Ancef can tolerate       Objective     /79   Pulse 85   Temp 98 1 °F (36 7 °C) (Temporal)   Resp 18   Ht 6' (1 829 m)   Wt 113 kg (250 lb)   SpO2 93%   BMI 33 91 kg/m²       PHYSICAL EXAM    Gen: NAD  Head: NCAT  CV: RRR  CHEST: Clear  ABD: soft, NT/ND  EXT: no edema      ASSESSMENT/PLAN:  This is a 77y o  year old male here for EGD, and he is stable and optimized for his procedure

## 2022-09-01 ENCOUNTER — OFFICE VISIT (OUTPATIENT)
Dept: GASTROENTEROLOGY | Facility: CLINIC | Age: 67
End: 2022-09-01
Payer: COMMERCIAL

## 2022-09-01 VITALS
HEART RATE: 77 BPM | SYSTOLIC BLOOD PRESSURE: 128 MMHG | HEIGHT: 72 IN | DIASTOLIC BLOOD PRESSURE: 68 MMHG | BODY MASS INDEX: 35.35 KG/M2 | WEIGHT: 261 LBS

## 2022-09-01 DIAGNOSIS — D12.6 TUBULAR ADENOMA OF COLON: ICD-10-CM

## 2022-09-01 DIAGNOSIS — K22.70 BARRETT'S ESOPHAGUS WITHOUT DYSPLASIA: ICD-10-CM

## 2022-09-01 DIAGNOSIS — K21.9 GASTROESOPHAGEAL REFLUX DISEASE WITHOUT ESOPHAGITIS: Primary | ICD-10-CM

## 2022-09-01 DIAGNOSIS — K76.0 FATTY LIVER: ICD-10-CM

## 2022-09-01 DIAGNOSIS — K44.9 DIAPHRAGMATIC HERNIA WITHOUT OBSTRUCTION AND WITHOUT GANGRENE: ICD-10-CM

## 2022-09-01 PROCEDURE — 99214 OFFICE O/P EST MOD 30 MIN: CPT | Performed by: INTERNAL MEDICINE

## 2022-09-01 PROCEDURE — 3074F SYST BP LT 130 MM HG: CPT | Performed by: INTERNAL MEDICINE

## 2022-09-01 PROCEDURE — 1160F RVW MEDS BY RX/DR IN RCRD: CPT | Performed by: INTERNAL MEDICINE

## 2022-09-01 PROCEDURE — 3078F DIAST BP <80 MM HG: CPT | Performed by: INTERNAL MEDICINE

## 2022-09-01 NOTE — PATIENT INSTRUCTIONS
Next upper endoscopy for your Barretts esophagus will be in August of 2023  Your next colonoscopy in June of 2025  For breakthrough reflux you can use Pepcid Complete   Otherwise recommend taking your omeprazole every day

## 2022-09-01 NOTE — PROGRESS NOTES
[facility] - Outpatient Follow-up Note  Ina Rodriguez 77 y o  male MRN: [id number]  Encounter: [id number]          ASSESSMENT AND PLAN:      1  Gastroesophageal reflux disease without esophagitis  Uses his 20 mg of omeprazole intermittently suggested using it every day due to his underlying Barretts  Pepcid Complete can be used for breakthrough  He was given a handout on anti-reflux diet  2  Diaphragmatic hernia without obstruction and without gangrene  As above    3  Ash's esophagus without dysplasia  Surveillance August of 2023    4  Tubular adenoma of colon  Colonoscopy June of 2025    5  Fatty liver  Counseled on portion control weight loss which she is working on  He he does have diabetes and hyperlipidemia on a statin  His most recent T is normal   He will otherwise follow-up 6 months     ______________________________________________________________________    SUBJECTIVE:  Very pleasant 58-year-old gentleman history of gastroesophageal reflux  Recently had upper endoscopy had intestinal metaplasia though lower esophagus with islands included in in the biopsies  Discussed Ash's esophagus and surveillance  Additionally colon cancer screening is up-to-date he does have a history of tubular adenomas  He is working on weight loss and portion control  He does have fatty liver with normal LFTs  REVIEW OF SYSTEMS IS OTHERWISE NEGATIVE        Historical Information   Past Medical History:   Diagnosis Date    Arthritis     Cancer [facility]) 2012    Chest tightness     Colon polyp     [facility]     [facility]     Diverticulosis of sigmoid colon     Esophageal reflux     Fatty liver     GERD (gastroesophageal reflux disease)     Hearing problem     High cholesterol     History of chest pain     Hyperlipemia     Hypertension     Lumbago     Malignant neoplasm of prostate (HCC)     Prostate CA [facility]     Squamous cell skin cancer 08/27/2020    right dorsal forearm    Tinea pedis of both feet     Trochanteric bursitis      Past Surgical History:   Procedure Laterality Date    APPENDECTOMY      COLONOSCOPY  2016    HERNIA REPAIR      ?  Umbilical    LA KNEE SCOPE,MED/LAT MENISECTOMY Left 3/15/2022    Procedure: ARTHROSCOPY KNEE,  partial medial menisectomy;  Surgeon: Teddy Mcginnis DO;  [facility] MAIN OR;  Service: Orthopedics    LA REPAIR INCISIONAL [address] N/A 11/11/2021    Procedure: INCISIONAL HERNIA REPAIR AT THE UMBILICUS;  Surgeon: Mariana Donnelly MD;  [facility];  Service: General    PROSTATECTOMY N/A 2011    PROSTATECTOMY       Social History   Social History     Substance and Sexual Activity   Alcohol Use Yes    Alcohol/week: 6 0 standard drinks    Types: 6 Cans of beer per week    Comment: Weekends     Social History     Substance and Sexual Activity   Drug Use No     Social History     Tobacco Use   Smoking Status Former Smoker    Packs/day: 0 00    Years: 0 00    Pack years: 0 00    Quit date: 6/27/2012    Years since quitting: 10 1   Smokeless Tobacco Never Used     Family History   Problem Relation Age of Onset    Arthritis Mother     Parkinsonism Mother     Heart disease Father     Arthritis Father     Coronary artery disease Father     Hypertension Father     Parkinsonism Father     Mental illness Neg Hx        Meds/Allergies       Current Outpatient Medications:     ACCU-CHEK FASTCLIX LANCETS MISC    Empagliflozin (Jardiance) 25 MG TABS    glucose blood (ACCU-CHEK GUIDE) test strip    lisinopril-hydrochlorothiazide (PRINZIDE,ZESTORETIC) 20-12 5 MG per tablet    metFORMIN (GLUCOPHAGE-XR) 500 mg 24 hr tablet    omeprazole (PriLOSEC) 20 mg delayed release capsule    rosuvastatin (CRESTOR) 20 MG tablet    tamsulosin (FLOMAX) 0 4 mg    Allergies   Allergen Reactions    Penicillins Other (See Comments)      Ancef can tolerate           Objective     Blood pressure 128/68, pulse 77, height 6' (1 829 m), weight 118 kg (261 lb)  Body mass index is 35 4 kg/m²  PHYSICAL EXAM:      General Appearance:   Alert, cooperative, no distress   HEENT:   Normocephalic, atraumatic, anicteric      Neck:  Supple, symmetrical, trachea midline   Lungs:   Clear to auscultation bilaterally; no rales, rhonchi or wheezing; respirations unlabored    Heart[de-identified]   Regular rate and rhythm; no murmur, rub, or gallop  Abdomen:   Soft, non-tender, non-distended; normal bowel sounds; no masses, no organomegaly    Genitalia:   Deferred    Rectal:   Deferred    Extremities:  No cyanosis, clubbing or edema    Pulses:  2+ and symmetric    Skin:  No jaundice, rashes, or lesions    Lymph nodes:  No palpable cervical lymphadenopathy        Lab Results:   No visits with results within 1 Day(s) from this visit  Latest known visit with results is:   Hospital Outpatient Visit on 08/10/2022   Component Date Value    Case Report 08/10/2022                      Value:Surgical Pathology Report                         Case: [id number]                                   Authorizing Provider:  Leandro Cruz MD    Collected:           08/10/2022 0940              Ordering Location:     [address] [place] [address] End[facility] Center Received:            08/10/2022 2124                                     [place]                                                                  Pathologist:           Mc Marie MD                                                     Specimens:   A) - Stomach, cold bx antrum erythema ck for hpy                                                    B) - Esophagus, cold bx eg junction erythema eval for barretts                             Final Diagnosis 08/10/2022                      Value: This result contains rich text formatting which cannot be displayed here   Additional Information 08/10/2022                      Value: This result contains rich text formatting which cannot be displayed here  Lisa Tabares Gross Description 08/10/2022                      Value: This result contains rich text formatting which cannot be displayed here  Radiology Results:   EGD    Result Date: 8/10/2022  Narrative: [facility] [address] [id number] [phone] DATE OF SERVICE: 8/10/22 PHYSICIAN(S): Attending: Francisco Floyd MD Fellow: No Staff Documented INDICATION: Gastroesophageal reflux disease without esophagitis, Diaphragmatic hernia without obstruction and without gangrene POST-OP DIAGNOSIS: See the impression below  PREPROCEDURE: Informed consent was obtained for the procedure, including sedation  Risks of perforation, hemorrhage, adverse drug reaction and aspiration were discussed  The patient was placed in the left lateral decubitus position  Patient was explained about the risks and benefits of the procedure  Risks including but not limited to bleeding, infection, and perforation were explained in detail  Also explained about less than 100% sensitivity with the exam and other alternatives  DETAILS OF PROCEDURE: Patient was taken to the procedure room where a time out was performed to confirm correct patient and correct procedure  The patient underwent monitored anesthesia care, which was administered by an anesthesia professional  The patient's blood pressure, heart rate, level of consciousness, respirations and oxygen were monitored throughout the procedure  The scope was advanced to the second part of the duodenum  Retroflexion was performed in the fundus  The patient experienced no blood loss  The procedure was not difficult  The patient tolerated the procedure well  There were no apparent complications   ANESTHESIA INFORMATION: ASA: II Anesthesia Type: IV Sedation with Anesthesia MEDICATIONS: No administrations occurring from 0930 to 0941 on 08/10/22 FINDINGS: Erythematous mucosa in the antrum; performed cold forceps biopsy 2 cm hiatal hernia Irregular Z-line; performed cold forceps biopsy  With some small islands, extensions less than a cm The remainder of a detailed exam otherwise unremarkable SPECIMENS: ID Type Source Tests Collected by Time Destination 1 : cold bx antrum erythema ck for hpy Tissue Stomach TISSUE EXAM Gabino Branch MD 8/10/2022  9:40 AM  2 : cold bx eg junction erythema eval for barretts Tissue Esophagus TISSUE EXAM Gabino Branch MD 8/10/2022  9:40 AM      Impression: Small hiatal hernia, mild antritis, gastroesophageal reflux RECOMMENDATION: Await biopsies, acid suppression and reflux precautions    Schedule follow-up office visit 3-4 months   Gabino Branch MD

## 2022-12-21 ENCOUNTER — APPOINTMENT (OUTPATIENT)
Dept: LAB | Facility: CLINIC | Age: 67
End: 2022-12-21

## 2022-12-21 DIAGNOSIS — I10 ESSENTIAL HYPERTENSION: ICD-10-CM

## 2022-12-21 DIAGNOSIS — E11.40 TYPE 2 DIABETES MELLITUS WITH DIABETIC NEUROPATHY, WITHOUT LONG-TERM CURRENT USE OF INSULIN (HCC): ICD-10-CM

## 2022-12-21 DIAGNOSIS — E78.2 MIXED HYPERLIPIDEMIA: ICD-10-CM

## 2022-12-21 LAB
ALBUMIN SERPL BCP-MCNC: 4.5 G/DL (ref 3.5–5)
ALP SERPL-CCNC: 55 U/L (ref 34–104)
ALT SERPL W P-5'-P-CCNC: 33 U/L (ref 7–52)
ANION GAP SERPL CALCULATED.3IONS-SCNC: 12 MMOL/L (ref 4–13)
AST SERPL W P-5'-P-CCNC: 23 U/L (ref 13–39)
BILIRUB SERPL-MCNC: 0.94 MG/DL (ref 0.2–1)
BUN SERPL-MCNC: 17 MG/DL (ref 5–25)
CALCIUM SERPL-MCNC: 9.5 MG/DL (ref 8.4–10.2)
CHLORIDE SERPL-SCNC: 103 MMOL/L (ref 96–108)
CHOLEST SERPL-MCNC: 155 MG/DL
CO2 SERPL-SCNC: 24 MMOL/L (ref 21–32)
CREAT SERPL-MCNC: 0.89 MG/DL (ref 0.6–1.3)
ERYTHROCYTE [DISTWIDTH] IN BLOOD BY AUTOMATED COUNT: 12.1 % (ref 11.6–15.1)
EST. AVERAGE GLUCOSE BLD GHB EST-MCNC: 166 MG/DL
GFR SERPL CREATININE-BSD FRML MDRD: 88 ML/MIN/1.73SQ M
GLUCOSE P FAST SERPL-MCNC: 152 MG/DL (ref 65–99)
HBA1C MFR BLD: 7.4 %
HCT VFR BLD AUTO: 46.4 % (ref 36.5–49.3)
HDLC SERPL-MCNC: 48 MG/DL
HGB BLD-MCNC: 15.2 G/DL (ref 12–17)
LDLC SERPL CALC-MCNC: 59 MG/DL (ref 0–100)
MCH RBC QN AUTO: 30.9 PG (ref 26.8–34.3)
MCHC RBC AUTO-ENTMCNC: 32.8 G/DL (ref 31.4–37.4)
MCV RBC AUTO: 94 FL (ref 82–98)
PLATELET # BLD AUTO: 183 THOUSANDS/UL (ref 149–390)
PMV BLD AUTO: 10.8 FL (ref 8.9–12.7)
POTASSIUM SERPL-SCNC: 3.8 MMOL/L (ref 3.5–5.3)
PROT SERPL-MCNC: 7.5 G/DL (ref 6.4–8.4)
RBC # BLD AUTO: 4.92 MILLION/UL (ref 3.88–5.62)
SODIUM SERPL-SCNC: 139 MMOL/L (ref 135–147)
TRIGL SERPL-MCNC: 238 MG/DL
WBC # BLD AUTO: 7.5 THOUSAND/UL (ref 4.31–10.16)

## 2023-01-27 DIAGNOSIS — E11.40 TYPE 2 DIABETES MELLITUS WITH DIABETIC NEUROPATHY, WITHOUT LONG-TERM CURRENT USE OF INSULIN (HCC): ICD-10-CM

## 2023-01-27 RX ORDER — METFORMIN HYDROCHLORIDE 500 MG/1
2000 TABLET, EXTENDED RELEASE ORAL
Qty: 360 TABLET | Refills: 1 | Status: SHIPPED | OUTPATIENT
Start: 2023-01-27

## 2023-01-31 ENCOUNTER — OFFICE VISIT (OUTPATIENT)
Dept: PODIATRY | Facility: CLINIC | Age: 68
End: 2023-01-31

## 2023-01-31 VITALS
SYSTOLIC BLOOD PRESSURE: 128 MMHG | DIASTOLIC BLOOD PRESSURE: 68 MMHG | BODY MASS INDEX: 35.35 KG/M2 | WEIGHT: 261 LBS | RESPIRATION RATE: 17 BRPM | HEIGHT: 72 IN

## 2023-01-31 DIAGNOSIS — M79.672 LEFT FOOT PAIN: ICD-10-CM

## 2023-01-31 DIAGNOSIS — M77.42 METATARSALGIA OF BOTH FEET: Primary | ICD-10-CM

## 2023-01-31 DIAGNOSIS — E11.42 DIABETIC POLYNEUROPATHY ASSOCIATED WITH TYPE 2 DIABETES MELLITUS (HCC): ICD-10-CM

## 2023-01-31 DIAGNOSIS — M54.16 RADICULOPATHY OF LUMBAR REGION: ICD-10-CM

## 2023-01-31 DIAGNOSIS — M77.41 METATARSALGIA OF BOTH FEET: Primary | ICD-10-CM

## 2023-01-31 DIAGNOSIS — B07.0 PLANTAR WARTS: ICD-10-CM

## 2023-01-31 NOTE — PROGRESS NOTES
Assessment/Plan: Patient with diabetes  Small amount of diabetic neuropathy noted  Pain upon ambulation  Dystrophy of nail  Acquired deformity of foot  Atypical verruca submetatarsal 5 left foot  Plan  Foot exam performed  Patient educated on condition  Today all nails debrided  Plantar lesion left foot debrided Cantharone applied  Patient advised on aftercare  Diagnoses and all orders for this visit:    Metatarsalgia of both feet    Radiculopathy of lumbar region    Diabetic polyneuropathy associated with type 2 diabetes mellitus ([address] )    Plantar warts    Left foot pain          Subjective: Patient presents for evaluation  Patient is diabetic  His biggest concern today is pain in the ball of the left foot      Allergies   Allergen Reactions   • Penicillins Other (See Comments)      Ancef can tolerate         Current Outpatient Medications:   •  ACCU-CHEK FASTCLIX LANCETS MISC, by Does not apply route daily Dx: E11 40, Disp: 100 each, Rfl: 1  •  Empagliflozin (Jardiance) 25 MG TABS, Take 1 tablet (25 mg total) by mouth every morning, Disp: 90 tablet, Rfl: 1  •  glucose blood (ACCU-CHEK GUIDE) test strip, 1 each by Other route daily DX: e11 40, Disp: 100 each, Rfl: 1  •  lisinopril-hydrochlorothiazide (PRINZIDE,ZESTORETIC) 20-12 5 MG per tablet, TAKE 1 TABLET DAILY, Disp: 90 tablet, Rfl: 3  •  metFORMIN (GLUCOPHAGE-XR) 500 mg 24 hr tablet, Take 4 tablets (2,000 mg total) by mouth daily with breakfast, Disp: 360 tablet, Rfl: 1  •  omeprazole (PriLOSEC) 20 mg delayed release capsule, Take 20 mg by mouth as needed, Disp: , Rfl:   •  rosuvastatin (CRESTOR) 20 MG tablet, TAKE 1 TABLET DAILY, Disp: 90 tablet, Rfl: 3  •  tamsulosin (FLOMAX) 0 4 mg, Take 1 capsule (0 4 mg total) by mouth daily with dinner, Disp: 90 capsule, Rfl: 1    Patient Active Problem List   Diagnosis   • Abnormal EKG   • History of prostate cancer   • Arthropathy of multiple sites   • Type 2 diabetes mellitus with diabetic neuropathy, without long-term current use of insulin (HCC)   • Diverticulosis of sigmoid colon   • Esophageal reflux   • Fatty liver   • Abdominal hernia   • Mixed hyperlipidemia   • Essential hypertension   • Chronic bilateral low back pain without sciatica   • Severe obesity (BMI 35 0-39  9) with comorbidity [facility])   • Status post arthroscopic partial medial meniscectomy          Patient ID: Iraida Cervantes is a 79 y o  male  HPI    The following portions of the patient's history were reviewed and updated as appropriate:     family history includes Arthritis in his father and mother; Coronary artery disease in his father; Heart disease in his father; Hypertension in his father; Parkinsonism in his father and mother  reports that he quit smoking about 10 years ago  His smoking use included cigarettes  He has never used smokeless tobacco  He reports current alcohol use of about 6 0 standard drinks per week  He reports that he does not use drugs  Vitals:    01/31/23 0906   BP: 128/68   Resp: 17       Review of Systems      Objective:  Patient's shoes and socks removed     Foot ExamPhysical Exam  Cardiovascular:      Pulses: no weak pulses             Patient's shoes and socks removed    Foot Exam     General  General Appearance: appears stated age and healthy   Orientation: alert and oriented to person, place, and time   Affect: appropriate   Gait: antalgic         Right Foot/Ankle      Inspection and Palpation  Tenderness: bony tenderness and metatarsals   Swelling: dorsum   Arch: pes planus  Hallux valgus: yes  Skin Exam: callus;      Neurovascular  Dorsalis pedis: 2+  Posterior tibial: 2+  Saphenous nerve sensation: diminished  Tibial nerve sensation: diminished  Superficial peroneal nerve sensation: diminished  Deep peroneal nerve sensation: diminished  Sural nerve sensation: diminished        Left Foot/Ankle       Inspection and Palpation  Tenderness: bony tenderness and metatarsals   Swelling: dorsum   Arch: pes planus  Hallux valgus: yes  Skin Exam: callus and warts;      Neurovascular  Dorsalis pedis: 2+  Posterior tibial: 2+  Saphenous nerve sensation: diminished  Tibial nerve sensation: diminished  Superficial peroneal nerve sensation: diminished  Deep peroneal nerve sensation: diminished  Sural nerve sensation: diminished           Physical Exam  Vitals and nursing note reviewed  Constitutional:       Appearance: Normal appearance  Cardiovascular:      Rate and Rhythm: Normal rate and regular rhythm       Pulses: no weak pulses          Dorsalis pedis pulses are 2+ on the right side and 2+ on the left side         Posterior tibial pulses are 2+ on the right side and 2+ on the left side  Musculoskeletal:      Right foot: Bunion and bony tenderness present       Left foot: Bunion and bony tenderness present  Pain with palpation left 5th MPJ  No evidence of infection at this time  Plantar flexed 5th left metatarsal noted  Feet:      Right foot:      Skin integrity: Callus present       Left foot:      Skin integrity: Callus present  Skin:     Capillary Refill: Capillary refill takes less than 2 seconds       Comments: Patient has xerosis of skin   He has dystrophy of nail   Hallux bilateral is wide incurvated toenail ingrown fibular aspect   Negative pus  Neurological:      Mental Status: He is alert  Psychiatric:         Mood and Affect: Mood normal          Thought Content: Thought content normal          Judgment: Judgment normal       Patient's shoes and socks removed      Right Foot/Ankle   Right Foot Inspection  Skin Exam: callus and callus       Vascular  The right DP pulse is 2+  The right PT pulse is 2+       Right Toe  - Comprehensive Exam  Arch: pes planus  Hallux valgus: yes  Swelling: dorsum   Tenderness: bony tenderness and metatarsals            Left Foot/Ankle  Left Foot Inspection  Skin Exam: callus       Vascular  The left DP pulse is 2+   The left PT pulse is 2+       Left Toe  - Comprehensive Exam  Arch: pes planus  Hallux valgus: yes  Swelling: dorsum   Tenderness: bony tenderness and metatarsals  Patient's shoes and socks removed      Right Foot/Ankle   Right Foot Inspection      Sensory   Vibration: diminished  Proprioception: intact  Monofilament testing: intact    Vascular  Capillary refills: < 3 seconds          Left Foot/Ankle  Left Foot Inspection      Sensory   Vibration: diminished  Proprioception: intact  Monofilament testing: intact    Vascular  Capillary refills: < 3 seconds        Assign Risk Category  Deformity present  Loss of protective sensation  No weak pulses  Risk: 1

## 2023-02-21 ENCOUNTER — OFFICE VISIT (OUTPATIENT)
Dept: PODIATRY | Facility: CLINIC | Age: 68
End: 2023-02-21

## 2023-02-21 VITALS
DIASTOLIC BLOOD PRESSURE: 68 MMHG | WEIGHT: 261 LBS | HEIGHT: 72 IN | SYSTOLIC BLOOD PRESSURE: 128 MMHG | BODY MASS INDEX: 35.35 KG/M2 | RESPIRATION RATE: 17 BRPM

## 2023-02-21 DIAGNOSIS — M79.672 LEFT FOOT PAIN: ICD-10-CM

## 2023-02-21 DIAGNOSIS — E11.42 DIABETIC POLYNEUROPATHY ASSOCIATED WITH TYPE 2 DIABETES MELLITUS (HCC): ICD-10-CM

## 2023-02-21 DIAGNOSIS — S91.302A OPEN WOUND OF LEFT FOOT, INITIAL ENCOUNTER: Primary | ICD-10-CM

## 2023-02-21 NOTE — PROGRESS NOTES
Assessment/Plan: Resolved verruca left foot  Pain left foot  Patient with diabetic neuropathy  Open wound left foot  Negative cellulitis    Plan  Chart reviewed  Patient examined  Patient advised on condition  Today wound debrided  Patient advised on aftercare  Silver nitrate applied  Patient will watch for signs of recurrence or infection         Diagnoses and all orders for this visit:    Open wound of left foot, initial encounter    Diabetic polyneuropathy associated with type 2 diabetes mellitus ([address] )    Left foot pain          Subjective: Patient is doing better  He still has some occasional pain left foot    No history of fever or night sweats    Allergies   Allergen Reactions   • Penicillins Other (See Comments)      Ancef can tolerate         Current Outpatient Medications:   •  ACCU-CHEK FASTCLIX LANCETS MISC, by Does not apply route daily Dx: E11 40, Disp: 100 each, Rfl: 1  •  Empagliflozin (Jardiance) 25 MG TABS, Take 1 tablet (25 mg total) by mouth every morning, Disp: 90 tablet, Rfl: 1  •  glucose blood (ACCU-CHEK GUIDE) test strip, 1 each by Other route daily DX: e11 40, Disp: 100 each, Rfl: 1  •  lisinopril-hydrochlorothiazide (PRINZIDE,ZESTORETIC) 20-12 5 MG per tablet, TAKE 1 TABLET DAILY, Disp: 90 tablet, Rfl: 3  •  metFORMIN (GLUCOPHAGE-XR) 500 mg 24 hr tablet, Take 4 tablets (2,000 mg total) by mouth daily with breakfast, Disp: 360 tablet, Rfl: 1  •  omeprazole (PriLOSEC) 20 mg delayed release capsule, Take 20 mg by mouth as needed, Disp: , Rfl:   •  rosuvastatin (CRESTOR) 20 MG tablet, TAKE 1 TABLET DAILY, Disp: 90 tablet, Rfl: 3  •  tamsulosin (FLOMAX) 0 4 mg, Take 1 capsule (0 4 mg total) by mouth daily with dinner, Disp: 90 capsule, Rfl: 1    Patient Active Problem List   Diagnosis   • Abnormal EKG   • History of prostate cancer   • Arthropathy of multiple sites   • Type 2 diabetes mellitus with diabetic neuropathy, without long-term current use of insulin (HCC)   • Diverticulosis of sigmoid colon   • Esophageal reflux   • Fatty liver   • Abdominal hernia   • Mixed hyperlipidemia   • Essential hypertension   • Chronic bilateral low back pain without sciatica   • Severe obesity (BMI 35 0-39  9) with comorbidity [facility])   • Status post arthroscopic partial medial meniscectomy          Patient ID: Devon Martinez is a 79 y o  male  HPI    The following portions of the patient's history were reviewed and updated as appropriate:     family history includes Arthritis in his father and mother; Coronary artery disease in his father; Heart disease in his father; Hypertension in his father; Parkinsonism in his father and mother  reports that he quit smoking about 10 years ago  His smoking use included cigarettes  He has never used smokeless tobacco  He reports current alcohol use of about 6 0 standard drinks per week  He reports that he does not use drugs  Vitals:    02/21/23 1020   BP: 128/68   Resp: 17       Review of Systems      Objective:  Patient's shoes and socks removed     Foot ExamPhysical Exam       Foot Exam     General  General Appearance: appears stated age and healthy   Orientation: alert and oriented to person, place, and time   Affect: appropriate   Gait: antalgic         Right Foot/Ankle      Inspection and Palpation  Tenderness: bony tenderness and metatarsals   Swelling: dorsum   Arch: pes planus  Hallux valgus: yes  Skin Exam: callus;      Neurovascular  Dorsalis pedis: 2+  Posterior tibial: 2+  Saphenous nerve sensation: diminished  Tibial nerve sensation: diminished  Superficial peroneal nerve sensation: diminished  Deep peroneal nerve sensation: diminished  Sural nerve sensation: diminished        Left Foot/Ankle       Inspection and Palpation  Tenderness: bony tenderness and metatarsals   Swelling: dorsum   Arch: pes planus  Hallux valgus: yes  Skin Exam: callus and warts;      Neurovascular  Dorsalis pedis: 2+  Posterior tibial: 2+  Saphenous nerve sensation: diminished  Tibial nerve sensation: diminished  Superficial peroneal nerve sensation: diminished  Deep peroneal nerve sensation: diminished  Sural nerve sensation: diminished           Physical Exam  Vitals and nursing note reviewed  Constitutional:       Appearance: Normal appearance  Cardiovascular:      Rate and Rhythm: Normal rate and regular rhythm       Pulses: no weak pulses          Dorsalis pedis pulses are 2+ on the right side and 2+ on the left side         Posterior tibial pulses are 2+ on the right side and 2+ on the left side  Musculoskeletal:      Right foot: Bunion and bony tenderness present       Left foot: Bunion and bony tenderness present   Pain with palpation left 5th MPJ   No evidence of infection at this time   Plantar flexed 5th left metatarsal noted  Feet:      Right foot:      Skin integrity: Callus present       Left foot:      Skin integrity: Callus present  Skin:     Capillary Refill: Capillary refill takes less than 2 seconds       Comments: Patient has xerosis of skin   He has dystrophy of nail   Submetatarsal 5 left hallux demonstrates dried bullae  Lesion debrided  Superficial ulcer noted  No evidence of cellulitis or verruca      Neurological:      Mental Status: He is alert  Psychiatric:         Mood and Affect: Mood normal          Thought Content: Thought content normal          Judgment: Judgment normal       Patient's shoes and socks removed      Right Foot/Ankle   Right Foot Inspection  Skin Exam: callus and callus       Vascular  The right DP pulse is 2+  The right PT pulse is 2+       Right Toe  - Comprehensive Exam  Arch: pes planus  Hallux valgus: yes  Swelling: dorsum   Tenderness: bony tenderness and metatarsals            Left Foot/Ankle  Left Foot Inspection  Skin Exam: callus       Vascular  The left DP pulse is 2+   The left PT pulse is 2+       Left Toe  - Comprehensive Exam  Arch: pes planus  Hallux valgus: yes  Swelling: dorsum   Tenderness: bony tenderness and metatarsals      Patient's shoes and socks removed      Right Foot/Ankle   Right Foot Inspection        Sensory   Vibration: diminished  Proprioception: intact  Monofilament testing: intact     Vascular  Capillary refills: < 3 seconds              Left Foot/Ankle  Left Foot Inspection        Sensory   Vibration: diminished  Proprioception: intact  Monofilament testing: intact     Vascular  Capillary refills: < 3 seconds           Assign Risk Category  Deformity present  Loss of protective sensation  No weak pulses  Risk: 1

## 2023-03-28 ENCOUNTER — OFFICE VISIT (OUTPATIENT)
Dept: DERMATOLOGY | Age: 68
End: 2023-03-28

## 2023-03-28 VITALS — TEMPERATURE: 99.8 F | WEIGHT: 268.2 LBS | BODY MASS INDEX: 36.33 KG/M2 | HEIGHT: 72 IN

## 2023-03-28 DIAGNOSIS — Z85.828 HISTORY OF BASAL CELL CARCINOMA (BCC): ICD-10-CM

## 2023-03-28 DIAGNOSIS — D22.9 MULTIPLE MELANOCYTIC NEVI: ICD-10-CM

## 2023-03-28 DIAGNOSIS — D36.9 ANGIOFIBROMA: ICD-10-CM

## 2023-03-28 DIAGNOSIS — L21.9 SEBORRHEIC DERMATITIS: ICD-10-CM

## 2023-03-28 DIAGNOSIS — Z86.007 HISTORY OF SQUAMOUS CELL CARCINOMA IN SITU: Primary | ICD-10-CM

## 2023-03-28 DIAGNOSIS — L57.0 ACTINIC KERATOSIS: ICD-10-CM

## 2023-03-28 DIAGNOSIS — L81.4 LENTIGO: ICD-10-CM

## 2023-03-28 DIAGNOSIS — Z85.820 HISTORY OF MELANOMA: ICD-10-CM

## 2023-03-28 RX ORDER — KETOCONAZOLE 20 MG/G
CREAM TOPICAL DAILY
Qty: 60 G | Refills: 3 | Status: SHIPPED | OUTPATIENT
Start: 2023-03-28

## 2023-03-28 NOTE — PROGRESS NOTES
"[facility] Note     Patient Name: Chioma Thapa  Encounter Date: 21 05 [id number]     Have you been cared for by a [place] [id number] Dermatologist in the last 3 years and, if so, which description applies to you? Yes  I have been here within the last 3 years, and my medical history has NOT changed since that time  I am MALE/not capable of bearing children  REVIEW OF SYSTEMS:  Have you recently had or currently have any of the following? · No changes in my recent health  PAST MEDICAL HISTORY:  Have you personally ever had or currently have any of the following? If \"YES,\" then please provide more detail  · No changes in my medical history  FAMILY HISTORY:  Any \"first degree relatives\" (parent, brother, sister, or child) with the following? • No changes in my family's known health  PATIENT EXPERIENCE:    • Do you want the Dermatologist to perform a COMPLETE skin exam today including a clinical examination under the \"bra and underwear\" areas? Yes  • If necessary, do we have your permission to call and leave a detailed message on your Preferred Phone number that includes your specific medical information?   Yes      Allergies   Allergen Reactions   • Penicillins Other (See Comments)      Ancef can tolerate      Current Outpatient Medications:   •  ACCU-CHEK FASTCLIX LANCETS MISC, by Does not apply route daily Dx: E11 40, Disp: 100 each, Rfl: 1  •  Empagliflozin (Jardiance) 25 MG TABS, Take 1 tablet (25 mg total) by mouth every morning, Disp: 90 tablet, Rfl: 1  •  glucose blood (ACCU-CHEK GUIDE) test strip, 1 each by Other route daily DX: e11 40, Disp: 100 each, Rfl: 1  •  lisinopril-hydrochlorothiazide (PRINZIDE,ZESTORETIC) 20-12 5 MG per tablet, TAKE 1 TABLET DAILY, Disp: 90 tablet, Rfl: 3  •  metFORMIN (GLUCOPHAGE-XR) 500 mg 24 hr tablet, Take 4 tablets (2,000 mg total) by mouth daily with breakfast, Disp: 360 tablet, Rfl: 1  •  omeprazole (PriLOSEC) 20 mg delayed release capsule, Take 20 mg " by mouth as needed, Disp: , Rfl:   •  rosuvastatin (CRESTOR) 20 MG tablet, TAKE 1 TABLET DAILY, Disp: 90 tablet, Rfl: 3  •  tamsulosin (FLOMAX) 0 4 mg, Take 1 capsule (0 4 mg total) by mouth daily with dinner, Disp: 90 capsule, Rfl: 1          • Whom besides the patient is providing clinical information about today's encounter?   o NO ADDITIONAL HISTORIAN (patient alone provided history)    Physical Exam and Assessment/Plan by Diagnosis:    HISTORY OF SQUAMOUS CELL CARCINOMA      Physical Exam:  • Anatomic Location Affected:  Right dorsal forearm  • Morphological Description of Scar:  Well healed scar  • Suspected Recurrence: no  • Regional adenopathy: no     Additional History of Present Condition:  History of squamous cell carcinoma  Treated with D&C       Assessment and Plan:  Based on a thorough discussion of this condition and the management approach to it (including a comprehensive discussion of the known risks, side effects and potential benefits of treatment), the patient (family) agrees to implement the following specific plan:  • When outside we recommend using a wide brim hat, sunglasses, long sleeve and pants, sunscreen with SPF 32+ with reapplication every 2 hours, or SPF specific clothing     How can SCC be prevented? The most important way to prevent SCC is to avoid sunburn  This is especially important in childhood and early life  Fair skinned individuals and those with a personal or family history of BCC should protect their skin from sun exposure daily, year-round and lifelong  • Stay indoors or under the shade in the middle of the day   • Wear covering clothing   • Apply high protection factor SPF50+ broad-spectrum sunscreens generously to exposed skin if outdoors   • Avoid indoor tanning (sun beds, solaria)        What is the outlook for SCC? Most SCC are cured by treatment  Cure is most likely if treatment is undertaken when the lesion is small   A small percent of SCC's can spread to lymph nodes and long term monitoring is indicated  They are also at increased risk of other skin cancers, especially melanoma  Regular self-skin examinations and long-term annual skin checks by an experienced health professional are recommended  HISTORY OF BASAL CELL CARCINOMA    Physical Exam:  • Anatomic Location Affected:  Back   • Morphological Description of scar:  Well healed scar , left eye   • Suspected Recurrence: No  • Pertinent Positives:  • Pertinent Negatives: Additional History of Present Condition:  History of basal cell carcinoma with no sign of recurrence    Assessment and Plan:  Based on a thorough discussion of this condition and the management approach to it (including a comprehensive discussion of the known risks, side effects and potential benefits of treatment), the patient (family) agrees to implement the following specific plan:  • When outside we recommend using a wide brim hat, sunglasses, long sleeve and pants, sunscreen with SPF 38+ with reapplication every 2 hours, or SPF specific clothing   •     How can basal cell carcinoma be prevented? The most important way to prevent BCC is to avoid sunburn  This is especially important in childhood and early life  Fair skinned individuals and those with a personal or family history of BCC should protect their skin from sun exposure daily, year-round and lifelong  • Stay indoors or under the shade in the middle of the day   • Wear covering clothing   • Apply high protection factor SPF50+ broad-spectrum sunscreens generously to exposed skin if outdoors   • Avoid indoor tanning (sun beds, solaria)  • Oral nicotinamide (vitamin B3) in a dose of 500 mg twice daily may reduce the number and severity of BCCs  What is the outlook for basal cell carcinoma? Most BCCs are cured by treatment  Cure is most likely if treatment is undertaken when the lesion is small    About 50% of people with BCC develop a second one within 3 years of the first  They are also at increased risk of other skin cancers, especially melanoma  Regular self-skin examinations and long-term annual skin checks by an experienced health professional are recommended  HISTORY OF MELANOMA    Physical Exam:  • Anatomic Location Affected:  Patient can not recall     Additional History of Present Condition:  Removed on 1999     Assessment and Plan:  Based on a thorough discussion of this condition and the management approach to it (including a comprehensive discussion of the known risks, side effects and potential benefits of treatment), the patient (family) agrees to implement the following specific plan:  • When outside we recommend using a wide brim hat, sunglasses, long sleeve and pants, sunscreen with SPF 23+ with reapplication every 2 hours, or SPF specific clothing          ACTINIC KERATOSIS    Physical Exam:  • Anatomic Location Affected:  Scalp , forehead, right temple   • Morphological Description:  Pink scaly papules    Additional History of Present Condition:  Present on exam     Assessment and Plan:  Based on a thorough discussion of this condition and the management approach to it (including a comprehensive discussion of the known risks, side effects and potential benefits of treatment), the patient (family) agrees to implement the following specific plan:    • Liquid nitrogen applied today's office visit   • Consent obtained and signed   • Areas will become red and puffy  Then scab up and fall off in 2-3 weeks            Actinic keratoses are very common on sites repeatedly exposed to the sun, especially the backs of the hands and the face  They are considered precancers and have a low risk of turning into squamous cell carcinoma  It is rare for a solitary actinic keratosis to evolve into a squamous cell carcinoma (SCC), but the risk is 10-15% when more than 10 actinic keratoses are present   A tender, thickened, ulcerated or enlarging actinic keratosis is suspicious of SCC     Actinic keratoses may be prevented by strict sun protection  If already present, keratoses may improve with a very high sun protection factor (50+) broad-spectrum sunscreen applied at least daily to affected areas, year-round  We recommend that sun protective clothing and hats and sunglasses be worn whenever possible  Note that you can make you own UPF 30 rate clothing using just your own washing machine with a product called sun guard    There are several different options for treating actinic keratoses    • Topical “medications such as 5-fluorouracil or Aldara  - good for field treatment ie treats what's seen and not seen    • Cryotherapy - good for single spots but treats Viky what we see” versus a field treatment    • Photodynamic therapy - involves application of a light sensitizing medicine and then exposure to a special light, also a good field treatment        Today we opted to treat your actinic keratoses with liquid nitrogen   PROCEDURE:  DESTRUCTION OF PRE-MALIGNANT LESIONS  After a thorough discussion of treatment options and risk/benefits/alternatives (including but not limited to local pain, scarring, dyspigmentation, blistering, and possible superinfection), verbal and written consent were obtained and the aforementioned lesions were treated on with cryotherapy using liquid nitrogen x 1 cycle for 5-10 seconds  • TOTAL NUMBER of 6 pre-malignant lesions were treated today on the ANATOMIC LOCATION: scalp , forehead , right temple   The patient tolerated the procedure well, and after-care instructions were provided  ANGIOMA    Physical Exam:  • Anatomic Location Affected:  Left upper forearm   • Morphological Description:  4 mm vascular papule   • Pertinent Positives:  • Pertinent Negatives:     Additional History of Present Condition:  Present for over a year , denies pain , itchiness or bleeding     Assessment and Plan:  Based on a thorough discussion of this condition and the "management approach to it (including a comprehensive discussion of the known risks, side effects and potential benefits of treatment), the patient (family) agrees to implement the following specific plan:  •    reassured benign, may be a consequence of sun damage      MELANOCYTIC NEVI (\"Moles\")    Physical Exam:  • Anatomic Location Affected:   Mostly on sun-exposed areas of the trunk and extremities  • Morphological Description:  Scattered, 1-4mm round to ovoid, symmetrical-appearing, even bordered, skin colored to dark brown macules/papules, mostly in sun-exposed areas  • Pertinent Positives:  • Pertinent Negatives: Additional History of Present Condition:      Assessment and Plan:  Based on a thorough discussion of this condition and the management approach to it (including a comprehensive discussion of the known risks, side effects and potential benefits of treatment), the patient (family) agrees to implement the following specific plan:  • When outside we recommend using a wide brim hat, sunglasses, long sleeve and pants, sunscreen with SPF 81+ with reapplication every 2 hours, or SPF specific clothing   • Benign, reassured  • Annual skin check     Melanocytic Nevi  Melanocytic nevi (\"moles\") are tan or brown, raised or flat areas of the skin which have an increased number of melanocytes  Melanocytes are the cells in our body which make pigment and account for skin color  Some moles are present at birth (I e , \"congenital nevi\"), while others come up later in life (i e , \"acquired nevi\")  The sun can stimulate the body to make more moles  Sunburns are not the only thing that triggers more moles  Chronic sun exposure can do it too  Clinically distinguishing a healthy mole from melanoma may be difficult, even for experienced dermatologists  The \"ABCDE's\" of moles have been suggested as a means of helping to alert a person to a suspicious mole and the possible increased risk of melanoma    The " "suggestions for raising alert are as follows:    Asymmetry: Healthy moles tend to be symmetric, while melanomas are often asymmetric  Asymmetry means if you draw a line through the mole, the two halves do not match in color, size, shape, or surface texture  Asymmetry can be a result of rapid enlargement of a mole, the development of a raised area on a previously flat lesion, scaling, ulceration, bleeding or scabbing within the mole  Any mole that starts to demonstrate \"asymmetry\" should be examined promptly by a board certified dermatologist      Border: Healthy moles tend to have discrete, even borders  The border of a melanoma often blends into the normal skin and does not sharply delineate the mole from normal skin  Any mole that starts to demonstrate \"uneven borders\" should be examined promptly by a board certified dermatologist      Color: Healthy moles tend to be one color throughout  Melanomas tend to be made up of different colors ranging from dark black, blue, white, or red  Any mole that demonstrates a color change should be examined promptly by a board certified dermatologist      Diameter: Healthy moles tend to be smaller than 0 6 cm in size; an exception are \"congenital nevi\" that can be larger  Melanomas tend to grow and can often be greater than 0 6 cm (1/4 of an inch, or the size of a pencil eraser)  This is only a guideline, and many normal moles may be larger than 0 6 cm without being unhealthy  Any mole that starts to change in size (small to bigger or bigger to smaller) should be examined promptly by a board certified dermatologist      Evolving: Healthy moles tend to \"stay the same  \"  Melanomas may often show signs of change or evolution such as a change in size, shape, color, or elevation    Any mole that starts to itch, bleed, crust, burn, hurt, or ulcerate or demonstrate a change or evolution should be examined promptly by a board certified dermatologist         Bertin Kim    Physical " Exam:  • Anatomic Location Affected:  Shoulders   • Morphological Description:  Light brown macules  • Pertinent Positives:  • Pertinent Negatives: Additional History of Present Condition:  Present on exam     Assessment and Plan:  Based on a thorough discussion of this condition and the management approach to it (including a comprehensive discussion of the known risks, side effects and potential benefits of treatment), the patient (family) agrees to implement the following specific plan:  • When outside we recommend using a wide brim hat, sunglasses, long sleeve and pants, sunscreen with SPF 56+ with reapplication every 2 hours, or SPF specific clothing       What is a lentigo? A lentigo is a pigmented flat or slightly raised lesion with a clearly defined edge  Unlike an ephelis (freckle), it does not fade in the winter months  There are several kinds of lentigo  The name lentigo originally referred to its appearance resembling a small lentil  The plural of lentigo is lentigines, although “lentigos” is also in common use  Who gets lentigines? Lentigines can affect males and females of all ages and races  Solar lentigines are especially prevalent in fair skinned adults  Lentigines associated with syndromes are present at birth or arise during childhood  What causes lentigines? Common forms of lentigo are due to exposure to ultraviolet radiation:  • Sun damage including sunburn   • Indoor tanning   • Phototherapy, especially photochemotherapy (PUVA)    Ionizing radiation, eg radiation therapy, can also cause lentigines  Several familial syndromes associated with widespread lentigines originate from mutations in Fransico-MAP kinase, mTOR signaling and PTEN pathways  What is the treatment for lentigines? Most lentigines are left alone  Attempts to lighten them may not be successful   The following approaches are used:  • SPF 50+ broad-spectrum sunscreen   • Hydroquinone bleaching cream   • Alpha hydroxy "acids   • Vitamin C   • Retinoids   • Azelaic acid   • Chemical peels  Individual lesions can be permanently removed using:  • Cryotherapy   • Intense pulsed light   • Pigment lasers    How can lentigines be prevented? Lentigines associated with exposure ultraviolet radiation can be prevented by very careful sun protection  Clothing is more successful at preventing new lentigines than are sunscreens  What is the outlook for lentigines? Lentigines usually persist  They may increase in number with age and sun exposure  Some in sun-protected sites may fade and disappear  SEBORRHEIC DERMATITIS     Physical Exam:  • Anatomic Location Affected:  Mid sternum   • Morphological Description:  Yellow and red scaly plaques and papule  • Pertinent Positives:  • Pertinent Negatives:     Additional History of Present Condition: treated in the past with ketoconazole 2% cream  , comes and goes        Assessment and Plan:  Based on a thorough discussion of this condition and the management approach to it (including a comprehensive discussion of the known risks, side effects and potential benefits of treatment), the patient (family) agrees to implement the following specific plan:  • Continue  Ketoconazole 2% cream topically to chest                               Seborrheic Dermatitis   Seborrheic dermatitis is a common, chronic or relapsing form of eczema/dermatitis that mainly affects the sebaceous, gland-rich regions of the scalp, face, and trunk  There are infantile and adult forms of seborrhoeic dermatitis  It is sometimes associated with psoriasis and, in that clinical scenario, may be referred to as \"sebo-psoriasis  \"  Seborrheic dermatitis is also known as \"seborrheic eczema  \"  Dandruff (also called \"pityriasis capitis\") is an uninflamed form of seborrhoeic dermatitis  Dandruff presents as bran-like scaly patches scattered within hair-bearing areas of the scalp    In an infant, this condition may be referred to as " "\"cradle cap  \"  The cause of seborrheic dermatitis is not completely understood  It is associated with proliferation of various species of the skin commensal Malassezia, in its yeast (non-pathogenic) form  Its metabolites (such as the fatty acids oleic acid, malssezin, and indole-3-carbaldehyde) may cause an inflammatory reaction  Differences in skin barrier lipid content and function may account for individual presentations      Infantile Seborrheic Dermatitis  Infantile seborrheic dermatitis affects babies under the age of 1 months and usually resolves by 1012 months of age  Infantile seborrheic dermatitis causes \"cradle cap\" (diffuse, greasy scaling on scalp)  The rash may spread to affect armpit and groin folds (a type of \"napkin dermatitis\")  There may be associated salmon-pink colored patches that may flake or peel  The rash in this case is usually not especially itchy, so the baby often appears undisturbed by the rash, even when more generalized      Adult Seborrheic Dermatitis  Adult seborrheic dermatitis tends to begin in late adolescence; prevalence is greatest in young adults and in the elderly  It is more common in males than in females      The following factors are sometimes associated with severe adult seborrheic dermatitis:  • Oily skin  • Familial tendency to seborrhoeic dermatitis or a family history of psoriasis  • Immunosuppression: organ transplant recipient, human immunodeficiency virus (HIV) infection and patients with lymphoma  • Neurological and psychiatric diseases: Parkinson disease, tardive dyskinesia, depression, epilepsy, facial nerve palsy, spinal cord injury and congenital disorders such as Down syndrome  • Treatment for psoriasis with psoralen and ultraviolet A (PUVA) therapy  • Lack of sleep  • Stressful events      In adults, seborrheic dermatitis may typically affect the scalp, face (creases around the nose, behind ears, within eyebrows) and upper trunk   Typical clinical " features include:  • Winter flares, improving in summer following sun exposure  • Minimal itch most of the time  • Combination oily and dry mid-facial skin  • Ill-defined localized scaly patches or diffuse scale in the scalp  • Blepharitis; scaly red eyelid margins  • [place]-pink, thin, scaly, and ill-defined plaques in skin folds on both sides of the face  • Petal or ring-shaped flaky patches on hair-line and on anterior chest  • Rash in armpits, under the breasts, in the groin folds and genital creases  • Superficial folliculitis (inflamed hair follicles) on cheeks and upper trunk     Seborrheic dermatitis is diagnosed by its clinical appearance and behavior  Skin biopsy may be helpful but is rarely necessary to make this diagnosis    PROCEDURE:  DESTRUCTION OF PRE-MALIGNANT LESIONS  After a thorough discussion of treatment options and risk/benefits/alternatives (including but not limited to local pain, scarring, dyspigmentation, blistering, and possible superinfection), verbal and written consent were obtained and the aforementioned lesions were treated on with cryotherapy using liquid nitrogen x 1 cycle for 5-10 seconds  • TOTAL NUMBER of 5 pre-malignant lesions were treated today on the ANATOMIC LOCATION: scalp  The patient tolerated the procedure well, and after-care instructions were provided      Scribe Attestation    I,:  Trace Welch am acting as a scribe while in the presence of the attending physician :       I,:  Andrea Jackson MD personally performed the services described in this documentation    as scribed in my presence :

## 2023-03-28 NOTE — PATIENT INSTRUCTIONS
HISTORY OF SQUAMOUS CELL CARCINOMA      Assessment and Plan:  Based on a thorough discussion of this condition and the management approach to it (including a comprehensive discussion of the known risks, side effects and potential benefits of treatment), the patient (family) agrees to implement the following specific plan:  When outside we recommend using a wide brim hat, sunglasses, long sleeve and pants, sunscreen with SPF 68+ with reapplication every 2 hours, or SPF specific clothing     How can SCC be prevented? The most important way to prevent SCC is to avoid sunburn  This is especially important in childhood and early life  Fair skinned individuals and those with a personal or family history of BCC should protect their skin from sun exposure daily, year-round and lifelong  Stay indoors or under the shade in the middle of the day   Wear covering clothing   Apply high protection factor SPF50+ broad-spectrum sunscreens generously to exposed skin if outdoors   Avoid indoor tanning (sun beds, solaria)        What is the outlook for SCC? Most SCC are cured by treatment  Cure is most likely if treatment is undertaken when the lesion is small  A small percent of SCC's can spread to lymph  nodes and long term monitoring is indicated  They are also at increased risk of other skin cancers, especially melanoma  Regular self-skin examinations and long-term annual skin checks by an experienced health professional are recommended      HISTORY OF BASAL CELL CARCINOMA      Assessment and Plan:  Based on a thorough discussion of this condition and the management approach to it (including a comprehensive discussion of the known risks, side effects and potential benefits of treatment), the patient (family) agrees to implement the following specific plan:  When outside we recommend using a wide brim hat, sunglasses, long sleeve and pants, sunscreen with SPF 33+ with reapplication every 2 hours, or SPF specific clothing How can basal cell carcinoma be prevented? The most important way to prevent BCC is to avoid sunburn  This is especially important in childhood and early life  Fair skinned individuals and those with a personal or family history of BCC should protect their skin from sun exposure daily, year-round and lifelong  Stay indoors or under the shade in the middle of the day   Wear covering clothing   Apply high protection factor SPF50+ broad-spectrum sunscreens generously to exposed skin if outdoors   Avoid indoor tanning (sun beds, solaria)  Oral nicotinamide (vitamin B3) in a dose of 500 mg twice daily may reduce the number and severity of BCCs  What is the outlook for basal cell carcinoma? Most BCCs are cured by treatment  Cure is most likely if treatment is undertaken when the lesion is small  About 50% of people with BCC develop a second one within 3 years of the first  They are also at increased risk of other skin cancers, especially melanoma  Regular self-skin examinations and long-term annual skin checks by an experienced health professional are recommended      HISTORY OF MELANOMA      Assessment and Plan:  Based on a thorough discussion of this condition and the management approach to it (including a comprehensive discussion of the known risks, side effects and potential benefits of treatment), the patient (family) agrees to implement the following specific plan:  When outside we recommend using a wide brim hat, sunglasses, long sleeve and pants, sunscreen with SPF 86+ with reapplication every 2 hours, or SPF specific clothing          ACTINIC KERATOSIS    Physical Exam:  Anatomic Location Affected:  Scalp , forehead, right temple       Assessment and Plan:  Based on a thorough discussion of this condition and the management approach to it (including a comprehensive discussion of the known risks, side effects and potential benefits of treatment), the patient (family) agrees to implement the following "specific plan:    Liquid nitrogen applied today's office visit   Consent obtained and signed   Areas will become red and puffy  Then scab up and fall off in 2-3 weeks            Actinic keratoses are very common on sites repeatedly exposed to the sun, especially the backs of the hands and the face  They are considered precancers and have a low risk of turning into squamous cell carcinoma  It is rare for a solitary actinic keratosis to evolve into a squamous cell carcinoma (SCC), but the risk is 10-15% when more than 10 actinic keratoses are present  A tender, thickened, ulcerated or enlarging actinic keratosis is suspicious of SCC  Actinic keratoses may be prevented by strict sun protection  If already present, keratoses may improve with a very high sun protection factor (50+) broad-spectrum sunscreen applied at least daily to affected areas, year-round  We recommend that sun protective clothing and hats and sunglasses be worn whenever possible    Note that you can make you own UPF 30 rate clothing using just your own washing machine with a product called sun guard    There are several different options for treating actinic keratoses    Topical “medications such as 5-fluorouracil or Aldara  - good for field treatment ie treats what's seen and not seen    Cryotherapy - good for single spots but treats Viky what we see” versus a field treatment    Photodynamic therapy - involves application of a light sensitizing medicine and then exposure to a special light, also a good field treatment        Today we opted to treat your actinic keratoses with liquid nitrogen         FIBROUS PAPULE (\"ANGIOFIBROMA\")    Physical Exam:  Anatomic Location Affected:  Left upper forearm       Assessment and Plan:  Based on a thorough discussion of this condition and the management approach to it (including a comprehensive discussion of the known risks, side effects and potential benefits of treatment), the patient (family) agrees to " "implement the following specific plan:         A fibrous papule is a firm bump that most often occurs on the nose  It is very common and has a characteristic appearance under the microscope  A fibrous papule develops during late adolescence or early adult life on the nose, or less often, elsewhere on the face  It is a dome shaped shiny bump measuring 2-6 mm in diameter, sometimes with a central hair  Although it looks similar to a skin-colored mole (dermal nevus), it is firmer in texture  It is harmless but will not go away on its own  It is important to distinguish fibrous papule from basal cell carcinoma, which may also present as a firm shiny bump  Basal cell carcinoma most often arises later in life  It grows slowly and tends to bleed and ulcerate  A fibrous papule is diagnosed either clinically or by skin biopsy  There are distinctive features  on pathology (proliferation of fibroblasts, fibrotic stroma, and dilated blood vessels)  Fibrous papule is considered a nevus, and develops spontaneously  The precise reason is unknown  Fibrous papules do not require any treatment  If desired it may be removed by a minor surgical procedure (excision biopsy, shave biopsy or electrosurgery)        MELANOCYTIC NEVI (\"Moles\")    Physical Exam:  Anatomic Location Affected:   Mostly on sun-exposed areas of the trunk and extremities      Assessment and Plan:  Based on a thorough discussion of this condition and the management approach to it (including a comprehensive discussion of the known risks, side effects and potential benefits of treatment), the patient (family) agrees to implement the following specific plan:  When outside we recommend using a wide brim hat, sunglasses, long sleeve and pants, sunscreen with SPF 24+ with reapplication every 2 hours, or SPF specific clothing   Benign, reassured  Annual skin check     Melanocytic Nevi  Melanocytic nevi (\"moles\") are tan or brown, raised or flat areas of the " "skin which have an increased number of melanocytes  Melanocytes are the cells in our body which make pigment and account for skin color  Some moles are present at birth (I e , \"congenital nevi\"), while others come up later in life (i e , \"acquired nevi\")  The sun can stimulate the body to make more moles  Sunburns are not the only thing that triggers more moles  Chronic sun exposure can do it too  Clinically distinguishing a healthy mole from melanoma may be difficult, even for experienced dermatologists  The \"ABCDE's\" of moles have been suggested as a means of helping to alert a person to a suspicious mole and the possible increased risk of melanoma  The suggestions for raising alert are as follows:    Asymmetry: Healthy moles tend to be symmetric, while melanomas are often asymmetric  Asymmetry means if you draw a line through the mole, the two halves do not match in color, size, shape, or surface texture  Asymmetry can be a result of rapid enlargement of a mole, the development of a raised area on a previously flat lesion, scaling, ulceration, bleeding or scabbing within the mole  Any mole that starts to demonstrate \"asymmetry\" should be examined promptly by a board certified dermatologist      Border: Healthy moles tend to have discrete, even borders  The border of a melanoma often blends into the normal skin and does not sharply delineate the mole from normal skin  Any mole that starts to demonstrate \"uneven borders\" should be examined promptly by a board certified dermatologist      Color: Healthy moles tend to be one color throughout  Melanomas tend to be made up of different colors ranging from dark black, blue, white, or red  Any mole that demonstrates a color change should be examined promptly by a board certified dermatologist      Diameter: Healthy moles tend to be smaller than 0 6 cm in size; an exception are \"congenital nevi\" that can be larger    Melanomas tend to grow and can often be " "greater than 0 6 cm (1/4 of an inch, or the size of a pencil eraser)  This is only a guideline, and many normal moles may be larger than 0 6 cm without being unhealthy  Any mole that starts to change in size (small to bigger or bigger to smaller) should be examined promptly by a board certified dermatologist      Evolving: Healthy moles tend to \"stay the same  \"  Melanomas may often show signs of change or evolution such as a change in size, shape, color, or elevation  Any mole that starts to itch, bleed, crust, burn, hurt, or ulcerate or demonstrate a change or evolution should be examined promptly by a board certified dermatologist         LENTIGO    Physical Exam:  Anatomic Location Affected:  Shoulders        Assessment and Plan:  Based on a thorough discussion of this condition and the management approach to it (including a comprehensive discussion of the known risks, side effects and potential benefits of treatment), the patient (family) agrees to implement the following specific plan:  When outside we recommend using a wide brim hat, sunglasses, long sleeve and pants, sunscreen with SPF 82+ with reapplication every 2 hours, or SPF specific clothing       What is a lentigo? A lentigo is a pigmented flat or slightly raised lesion with a clearly defined edge  Unlike an ephelis (freckle), it does not fade in the winter months  There are several kinds of lentigo  The name lentigo originally referred to its appearance resembling a small lentil  The plural of lentigo is lentigines, although “lentigos” is also in common use  Who gets lentigines? Lentigines can affect males and females of all ages and races  Solar lentigines are especially prevalent in fair skinned adults  Lentigines associated with syndromes are present at birth or arise during childhood  What causes lentigines?   Common forms of lentigo are due to exposure to ultraviolet radiation:  Sun damage including sunburn   Indoor tanning " "  Phototherapy, especially photochemotherapy (PUVA)    Ionizing radiation, eg radiation therapy, can also cause lentigines  Several familial syndromes associated with widespread lentigines originate from mutations in Fransico-MAP kinase, mTOR signaling and PTEN pathways  What is the treatment for lentigines? Most lentigines are left alone  Attempts to lighten them may not be successful  The following approaches are used:  SPF 50+ broad-spectrum sunscreen   Hydroquinone bleaching cream   Alpha hydroxy acids   Vitamin C   Retinoids   Azelaic acid   Chemical peels  Individual lesions can be permanently removed using:  Cryotherapy   Intense pulsed light   Pigment lasers    How can lentigines be prevented? Lentigines associated with exposure ultraviolet radiation can be prevented by very careful sun protection  Clothing is more successful at preventing new lentigines than are sunscreens  What is the outlook for lentigines? Lentigines usually persist  They may increase in number with age and sun exposure  Some in sun-protected sites may fade and disappear  SEBORRHEIC DERMATITIS     Physical Exam:  Anatomic Location Affected:  Mid sternum     Assessment and Plan:  Based on a thorough discussion of this condition and the management approach to it (including a comprehensive discussion of the known risks, side effects and potential benefits of treatment), the patient (family) agrees to implement the following specific plan:  Continue  Ketoconazole 2% cream topically to chest                               Seborrheic Dermatitis   Seborrheic dermatitis is a common, chronic or relapsing form of eczema/dermatitis that mainly affects the sebaceous, gland-rich regions of the scalp, face, and trunk  There are infantile and adult forms of seborrhoeic dermatitis  It is sometimes associated with psoriasis and, in that clinical scenario, may be referred to as \"sebo-psoriasis  \"  Seborrheic dermatitis is also known as " "\"seborrheic eczema  \"  Dandruff (also called \"pityriasis capitis\") is an uninflamed form of seborrhoeic dermatitis  Dandruff presents as bran-like scaly patches scattered within hair-bearing areas of the scalp  In an infant, this condition may be referred to as \"cradle cap  \"  The cause of seborrheic dermatitis is not completely understood  It is associated with proliferation of various species of the skin commensal Malassezia, in its yeast (non-pathogenic) form  Its metabolites (such as the fatty acids oleic acid, malssezin, and indole-3-carbaldehyde) may cause an inflammatory reaction  Differences in skin barrier lipid content and function may account for individual presentations  Infantile Seborrheic Dermatitis  Infantile seborrheic dermatitis affects babies under the age of 1 months and usually resolves by 1012 months of age  Infantile seborrheic dermatitis causes \"cradle cap\" (diffuse, greasy scaling on scalp)  The rash may spread to affect armpit and groin folds (a type of \"napkin dermatitis\")  There may be associated salmon-pink colored patches that may flake or peel  The rash in this case is usually not especially itchy, so the baby often appears undisturbed by the rash, even when more generalized  Adult Seborrheic Dermatitis  Adult seborrheic dermatitis tends to begin in late adolescence; prevalence is greatest in young adults and in the elderly  It is more common in males than in females       The following factors are sometimes associated with severe adult seborrheic dermatitis:  Oily skin  Familial tendency to seborrhoeic dermatitis or a family history of psoriasis  Immunosuppression: organ transplant recipient, human immunodeficiency virus (HIV) infection and patients with lymphoma  Neurological and psychiatric diseases: Parkinson disease, tardive dyskinesia, depression, epilepsy, facial nerve palsy, spinal cord injury and congenital disorders such as Down syndrome  Treatment for psoriasis with " psoralen and ultraviolet A (PUVA) therapy  Lack of sleep  Stressful events  In adults, seborrheic dermatitis may typically affect the scalp, face (creases around the nose, behind ears, within eyebrows) and upper trunk  Typical clinical features include: Winter flares, improving in summer following sun exposure  Minimal itch most of the time  Combination oily and dry mid-facial skin  Ill-defined localized scaly patches or diffuse scale in the scalp  Blepharitis; scaly red eyelid margins  Elberfeld-pink, thin, scaly, and ill-defined plaques in skin folds on both sides of the face  Petal or ring-shaped flaky patches on hair-line and on anterior chest  Rash in armpits, under the breasts, in the groin folds and genital creases  Superficial folliculitis (inflamed hair follicles) on cheeks and upper trunk     Seborrheic dermatitis is diagnosed by its clinical appearance and behavior  Skin biopsy may be helpful but is rarely necessary to make this diagnosis

## 2023-03-29 ENCOUNTER — OFFICE VISIT (OUTPATIENT)
Dept: FAMILY MEDICINE CLINIC | Facility: CLINIC | Age: 68
End: 2023-03-29

## 2023-03-29 VITALS
BODY MASS INDEX: 36.16 KG/M2 | WEIGHT: 267 LBS | DIASTOLIC BLOOD PRESSURE: 72 MMHG | TEMPERATURE: 97.2 F | OXYGEN SATURATION: 96 % | SYSTOLIC BLOOD PRESSURE: 126 MMHG | HEIGHT: 72 IN | HEART RATE: 76 BPM | RESPIRATION RATE: 16 BRPM

## 2023-03-29 DIAGNOSIS — I10 ESSENTIAL HYPERTENSION: ICD-10-CM

## 2023-03-29 DIAGNOSIS — Z23 NEED FOR VACCINATION: ICD-10-CM

## 2023-03-29 DIAGNOSIS — E66.01 SEVERE OBESITY (BMI 35.0-39.9) WITH COMORBIDITY (HCC): ICD-10-CM

## 2023-03-29 DIAGNOSIS — Z12.5 PROSTATE CANCER SCREENING: ICD-10-CM

## 2023-03-29 DIAGNOSIS — E11.40 TYPE 2 DIABETES MELLITUS WITH DIABETIC NEUROPATHY, WITHOUT LONG-TERM CURRENT USE OF INSULIN (HCC): ICD-10-CM

## 2023-03-29 DIAGNOSIS — Z85.46 HISTORY OF PROSTATE CANCER: ICD-10-CM

## 2023-03-29 DIAGNOSIS — J30.9 ALLERGIC RHINITIS, UNSPECIFIED SEASONALITY, UNSPECIFIED TRIGGER: ICD-10-CM

## 2023-03-29 DIAGNOSIS — Z00.00 ANNUAL PHYSICAL EXAM: Primary | ICD-10-CM

## 2023-03-29 RX ORDER — IBUPROFEN 600 MG/1
TABLET ORAL
COMMUNITY
Start: 2022-12-21

## 2023-03-29 RX ORDER — FLUTICASONE PROPIONATE 50 MCG
1 SPRAY, SUSPENSION (ML) NASAL DAILY
Qty: 48 G | Refills: 3 | Status: SHIPPED | OUTPATIENT
Start: 2023-03-29

## 2023-03-29 RX ORDER — TAMSULOSIN HYDROCHLORIDE 0.4 MG/1
0.4 CAPSULE ORAL
Qty: 90 CAPSULE | Refills: 3 | Status: SHIPPED | OUTPATIENT
Start: 2023-03-29

## 2023-03-29 NOTE — PROGRESS NOTES
[address] [facility]    NAME: Kamari Diamond  AGE: 79 y o  SEX: male  : 1955     DATE: 3/29/2023     Assessment and Plan:     Problem List Items Addressed This Visit     Essential hypertension    Relevant Orders    CBC    Comprehensive metabolic panel    Lipid Panel with Direct LDL reflex    History of prostate cancer    Relevant Medications    tamsulosin (FLOMAX) 0 4 mg    Severe obesity (BMI 35 0-39  9) with comorbidity ([facility] [address] )    Type 2 diabetes mellitus with diabetic neuropathy, without long-term current use of insulin ([facility] )       Lab Results   Component Value Date    HGBA1C 7 4 (H) 2022     Stable          Relevant Orders    Hemoglobin A1C    Microalbumin / creatinine urine ratio   Other Visit Diagnoses     Annual physical exam    -  Primary    Need for vaccination        Relevant Orders    Pneumococcal Conjugate Vaccine 20-valent (Pcv20) (Completed)    Allergic rhinitis, unspecified seasonality, unspecified trigger        Relevant Medications    ibuprofen (MOTRIN) 600 mg tablet    fluticasone (FLONASE) 50 mcg/act nasal spray    Prostate cancer screening        Relevant Orders    PSA, Total Screen      shingles vaccine recommended    Immunizations and preventive care screenings were discussed with patient today  Appropriate education was printed on patient's after visit summary  Counseling:  Exercise: the importance of regular exercise/physical activity was discussed  Recommend exercise 3-5 times per week for at least 30 minutes  Return in about 3 months (around 2023) for Diabetic follow up  Chief Complaint:     Chief Complaint   Patient presents with   • Physical Exam     Mauricio Black CMA       History of Present Illness:     Adult Annual Physical   Patient here for a comprehensive physical exam  The patient reports no problems  Diet and Physical Activity  Diet/Nutrition: well balanced diet     Exercise: no formal exercise  Depression Screening  PHQ-2/9 Depression Screening    Little interest or pleasure in doing things: 0 - not at all  Feeling down, depressed, or hopeless: 0 - not at all  PHQ-2 Score: 0  PHQ-2 Interpretation: Negative depression screen       General Health  Sleep: sleeps well  Hearing: normal - bilateral   Vision: no vision problems  Dental: regular dental visits   Health  Symptoms include: history of prostate cancer     Review of Systems:     Review of Systems   Constitutional: Negative  Respiratory: Negative  Cardiovascular: Negative  Past Medical History:     Past Medical History:   Diagnosis Date   • Arthritis    • Basal cell carcinoma 2005   • Cancer [facility]) 2012   • Chest tightness    • Colon polyp    • Diabetes ([facility] [address] )    • Diabetes mellitus ([facility] [address] )    • Diverticulosis of sigmoid colon    • Esophageal reflux    • Fatty liver    • GERD (gastroesophageal reflux disease)    • Hearing problem    • High cholesterol    • History of chest pain    • Hyperlipemia    • Hypertension    • Lumbago    • Malignant neoplasm of prostate ([facility] [address] )    • Melanoma ([facility] [address] ) 1999   • Prostate CA ([facility] [address] )    • Squamous cell skin cancer 08/27/2020    right dorsal forearm   • Tinea pedis of both feet    • Trochanteric bursitis       Past Surgical History:     Past Surgical History:   Procedure Laterality Date   • APPENDECTOMY     • COLONOSCOPY  2016   • HERNIA REPAIR      ?  Umbilical   • MOHS SURGERY  2019   • NJ ARTHRS KNE SURG W/MENISCECTOMY MED/LAT W/SHVG Left 03/15/2022    Procedure: ARTHROSCOPY KNEE,  partial medial menisectomy;  Surgeon: James Oreilly DO;  Location: [address] [place] [address];  Service: Orthopedics   • [place] REPAIR FIRST ABDOMINAL WALL HERNIA N/A 11/11/2021    Procedure: Renaye Mcardle HERNIA REPAIR AT THE UMBILICUS;  Surgeon: Brittany Morrow MD;  Location: [facility] MAIN OR;  Service: General   • PROSTATECTOMY N/A 2011   • PROSTATECTOMY     • SKIN BIOPSY  2019      Family History:     Family History Problem Relation Age of Onset   • Arthritis Mother    • Parkinsonism Mother    • Heart disease Father    • Arthritis Father    • Coronary artery disease Father    • Hypertension Father    • Parkinsonism Father    • Mental illness Neg Hx       Social History:     Social History     Socioeconomic History   • Marital status: /Civil Union     Spouse name: None   • Number of children: None   • Years of education: None   • Highest education level: None   Occupational History   • None   Tobacco Use   • Smoking status: Former     Packs/day: 1 00     Years: 15 00     Pack years: 15 00     Types: Cigarettes     Start date: 6/1/1997     Quit date: 6/27/2012     Years since quitting: 10 7   • Smokeless tobacco: Never   Vaping Use   • Vaping Use: Never used   Substance and Sexual Activity   • Alcohol use: Yes     Alcohol/week: 6 0 standard drinks     Types: 6 Cans of beer per week     Comment: Weekends   • Drug use: No   • Sexual activity: Not Currently     Partners: Female   Other Topics Concern   • None   Social History Narrative    Lack of exercise  Pets/ animals: cat      Sleeps 8-10 hours /day     Social Determinants of Health     Financial Resource Strain: Not on file   Food Insecurity: Not on file   Transportation Needs: Not on file   Physical Activity: Not on file   Stress: Not on file   Social Connections: Not on file   Intimate Partner Violence: Not on file   Housing Stability: Not on file      Current Medications:     Current Outpatient Medications   Medication Sig Dispense Refill   • ACCU-CHEK FASTCLIX LANCETS MISC by Does not apply route daily Dx: E11 40 100 each 1   • Empagliflozin (Jardiance) 25 MG TABS Take 1 tablet (25 mg total) by mouth every morning 90 tablet 1   • fluticasone (FLONASE) 50 mcg/act nasal spray 1 spray into each nostril daily 48 g 3   • glucose blood (ACCU-CHEK GUIDE) test strip 1 each by Other route daily DX: e11 40 100 each 1   • ibuprofen (MOTRIN) 600 mg tablet      • ketoconazole (NIZORAL) 2 % cream Apply topically daily To chest for seborrhea   60 g 3   • lisinopril-hydrochlorothiazide (PRINZIDE,ZESTORETIC) 20-12 5 MG per tablet TAKE 1 TABLET DAILY 90 tablet 3   • metFORMIN (GLUCOPHAGE-XR) 500 mg 24 hr tablet Take 4 tablets (2,000 mg total) by mouth daily with breakfast 360 tablet 1   • omeprazole (PriLOSEC) 20 mg delayed release capsule Take 20 mg by mouth as needed     • rosuvastatin (CRESTOR) 20 MG tablet TAKE 1 TABLET DAILY 90 tablet 3   • tamsulosin (FLOMAX) 0 4 mg Take 1 capsule (0 4 mg total) by mouth daily with dinner 90 capsule 3     No current facility-administered medications for this visit  Allergies: Allergies   Allergen Reactions   • Penicillins Other (See Comments)      Ancef can tolerate      Physical Exam:     /72   Pulse 76   Temp (!) 97 2 °F (36 2 °C)   Resp 16   Ht 6' (1 829 m)   Wt 121 kg (267 lb)   SpO2 96%   BMI 36 21 kg/m²     Physical Exam  Vitals reviewed  Constitutional:       Appearance: He is well-developed  HENT:      Head: Normocephalic and atraumatic  Right Ear: External ear normal       Left Ear: External ear normal    Cardiovascular:      Rate and Rhythm: Normal rate and regular rhythm  Heart sounds: Normal heart sounds  No murmur heard  Pulmonary:      Effort: Pulmonary effort is normal  No respiratory distress  Breath sounds: Normal breath sounds  No wheezing  Musculoskeletal:         General: Normal range of motion  Skin:     General: Skin is warm and dry  Neurological:      Mental Status: He is alert and oriented to person, place, and time          Vision Screening    Right eye Left eye Both eyes   Without correction 20/25 20/25 20/20   With correction          Odilon Arriaga, [address]

## 2023-04-26 DIAGNOSIS — E11.40 TYPE 2 DIABETES MELLITUS WITH DIABETIC NEUROPATHY, WITHOUT LONG-TERM CURRENT USE OF INSULIN (HCC): ICD-10-CM

## 2023-04-26 DIAGNOSIS — J30.9 ALLERGIC RHINITIS, UNSPECIFIED SEASONALITY, UNSPECIFIED TRIGGER: ICD-10-CM

## 2023-04-26 RX ORDER — FLUTICASONE PROPIONATE 50 MCG
1 SPRAY, SUSPENSION (ML) NASAL DAILY
Qty: 48 G | Refills: 0 | Status: SHIPPED | OUTPATIENT
Start: 2023-04-26

## 2023-04-26 RX ORDER — METFORMIN HYDROCHLORIDE 500 MG/1
2000 TABLET, EXTENDED RELEASE ORAL
Qty: 360 TABLET | Refills: 0 | Status: SHIPPED | OUTPATIENT
Start: 2023-04-26

## 2023-06-23 ENCOUNTER — APPOINTMENT (OUTPATIENT)
Dept: LAB | Facility: CLINIC | Age: 68
End: 2023-06-23
Payer: COMMERCIAL

## 2023-06-23 DIAGNOSIS — I10 ESSENTIAL HYPERTENSION: ICD-10-CM

## 2023-06-23 DIAGNOSIS — E11.40 TYPE 2 DIABETES MELLITUS WITH DIABETIC NEUROPATHY, WITHOUT LONG-TERM CURRENT USE OF INSULIN (HCC): ICD-10-CM

## 2023-06-23 DIAGNOSIS — Z12.5 PROSTATE CANCER SCREENING: ICD-10-CM

## 2023-06-23 LAB
ALBUMIN SERPL BCP-MCNC: 4.5 G/DL (ref 3.5–5)
ALP SERPL-CCNC: 50 U/L (ref 34–104)
ALT SERPL W P-5'-P-CCNC: 48 U/L (ref 7–52)
ANION GAP SERPL CALCULATED.3IONS-SCNC: 7 MMOL/L
AST SERPL W P-5'-P-CCNC: 39 U/L (ref 13–39)
BILIRUB SERPL-MCNC: 0.73 MG/DL (ref 0.2–1)
BUN SERPL-MCNC: 15 MG/DL (ref 5–25)
CALCIUM SERPL-MCNC: 9.2 MG/DL (ref 8.4–10.2)
CHLORIDE SERPL-SCNC: 104 MMOL/L (ref 96–108)
CHOLEST SERPL-MCNC: 146 MG/DL
CO2 SERPL-SCNC: 27 MMOL/L (ref 21–32)
CREAT SERPL-MCNC: 0.96 MG/DL (ref 0.6–1.3)
CREAT UR-MCNC: 66.9 MG/DL
ERYTHROCYTE [DISTWIDTH] IN BLOOD BY AUTOMATED COUNT: 12.4 % (ref 11.6–15.1)
EST. AVERAGE GLUCOSE BLD GHB EST-MCNC: 174 MG/DL
GFR SERPL CREATININE-BSD FRML MDRD: 81 ML/MIN/1.73SQ M
GLUCOSE P FAST SERPL-MCNC: 163 MG/DL (ref 65–99)
HBA1C MFR BLD: 7.7 %
HCT VFR BLD AUTO: 47 % (ref 36.5–49.3)
HDLC SERPL-MCNC: 57 MG/DL
HGB BLD-MCNC: 15.4 G/DL (ref 12–17)
LDLC SERPL CALC-MCNC: 69 MG/DL (ref 0–100)
MCH RBC QN AUTO: 31.4 PG (ref 26.8–34.3)
MCHC RBC AUTO-ENTMCNC: 32.8 G/DL (ref 31.4–37.4)
MCV RBC AUTO: 96 FL (ref 82–98)
MICROALBUMIN UR-MCNC: 45.1 MG/L (ref 0–20)
MICROALBUMIN/CREAT 24H UR: 67 MG/G CREATININE (ref 0–30)
PLATELET # BLD AUTO: 180 THOUSANDS/UL (ref 149–390)
PMV BLD AUTO: 10.3 FL (ref 8.9–12.7)
POTASSIUM SERPL-SCNC: 4.4 MMOL/L (ref 3.5–5.3)
PROT SERPL-MCNC: 7.4 G/DL (ref 6.4–8.4)
PSA SERPL-MCNC: <0.01 NG/ML (ref 0–4)
RBC # BLD AUTO: 4.91 MILLION/UL (ref 3.88–5.62)
SODIUM SERPL-SCNC: 138 MMOL/L (ref 135–147)
TRIGL SERPL-MCNC: 98 MG/DL
WBC # BLD AUTO: 6.32 THOUSAND/UL (ref 4.31–10.16)

## 2023-06-23 PROCEDURE — 83036 HEMOGLOBIN GLYCOSYLATED A1C: CPT

## 2023-06-23 PROCEDURE — 36415 COLL VENOUS BLD VENIPUNCTURE: CPT

## 2023-06-23 PROCEDURE — 85027 COMPLETE CBC AUTOMATED: CPT

## 2023-06-23 PROCEDURE — G0103 PSA SCREENING: HCPCS

## 2023-06-23 PROCEDURE — 82570 ASSAY OF URINE CREATININE: CPT

## 2023-06-23 PROCEDURE — 82043 UR ALBUMIN QUANTITATIVE: CPT

## 2023-06-23 PROCEDURE — 80061 LIPID PANEL: CPT

## 2023-06-23 PROCEDURE — 80053 COMPREHEN METABOLIC PANEL: CPT

## 2023-06-29 ENCOUNTER — OFFICE VISIT (OUTPATIENT)
Dept: FAMILY MEDICINE CLINIC | Facility: CLINIC | Age: 68
End: 2023-06-29
Payer: COMMERCIAL

## 2023-06-29 VITALS
BODY MASS INDEX: 35.89 KG/M2 | TEMPERATURE: 97.1 F | WEIGHT: 265 LBS | RESPIRATION RATE: 16 BRPM | DIASTOLIC BLOOD PRESSURE: 78 MMHG | HEIGHT: 72 IN | SYSTOLIC BLOOD PRESSURE: 138 MMHG | HEART RATE: 72 BPM

## 2023-06-29 DIAGNOSIS — E78.2 MIXED HYPERLIPIDEMIA: ICD-10-CM

## 2023-06-29 DIAGNOSIS — E11.40 TYPE 2 DIABETES MELLITUS WITH DIABETIC NEUROPATHY, WITHOUT LONG-TERM CURRENT USE OF INSULIN (HCC): Primary | ICD-10-CM

## 2023-06-29 DIAGNOSIS — I10 ESSENTIAL HYPERTENSION: ICD-10-CM

## 2023-06-29 DIAGNOSIS — K76.0 FATTY LIVER: ICD-10-CM

## 2023-06-29 PROCEDURE — 99214 OFFICE O/P EST MOD 30 MIN: CPT | Performed by: FAMILY MEDICINE

## 2023-06-29 NOTE — ASSESSMENT & PLAN NOTE
FIB 4 score is up 2 1   Liver elastography ordered  Encouraged him to work on better sugar control to help with his fatty liver

## 2023-06-29 NOTE — PROGRESS NOTES
Name: Danae Khan      : 1955      MRN: [id number]  Encounter Provider: Markus Eaton DO  Encounter Date: 2023   Encounter department: [facility]     1  Type 2 diabetes mellitus with diabetic neuropathy, without long-term current use of insulin [facility])  Assessment & Plan:    Lab Results   Component Value Date    HGBA1C 7 7 (H) 2023     Worsening control  He is going to work on diet and exercise  If he does not improve consider GLP-1    Orders:  -     Hemoglobin A1C; Future; Expected date: 2023    2  Essential hypertension  Assessment & Plan:  Stable  Continue lisinopril-hydrochlorothiazide 20-12 5 mg daily    Orders:  -     CBC; Future; Expected date: 2023  -     Comprehensive metabolic panel; Future; Expected date: 2023  -     Lipid Panel with Direct LDL reflex; Future; Expected date: 2023    3  Mixed hyperlipidemia  Assessment & Plan:  Stable   Continue rosuvastatin     Orders:  -     Lipid Panel with Direct LDL reflex; Future; Expected date: 2023    4  Fatty liver  Assessment & Plan:  FIB 4 score is up 2 1   Liver elastography ordered  Encouraged him to work on better sugar control to help with his fatty liver    Orders:  -     US elastography/UGAP; Future; Expected date: 2023  -     CBC; Future; Expected date: 2023  -     Comprehensive metabolic panel; Future; Expected date: 2023      BMI Counseling: Body mass index is 35 94 kg/m²  The BMI is above normal  Nutrition recommendations include moderation in carbohydrate intake  Rationale for BMI follow-up plan is due to patient being overweight or obese  Return in about 3 months (around 2023) for Next scheduled follow up  Subjective      Patient ports that he has been feeling well  He continues to volunteer  He has been working out in the yard  He has been walking regularly  He is taking his medication daily      Review of Systems    Current Outpatient Medications on File Prior to Visit   Medication Sig   • ACCU-CHEK FASTCLIX LANCETS MISC by Does not apply route daily Dx: E11 40   • Empagliflozin (Jardiance) 25 MG TABS Take 1 tablet (25 mg total) by mouth every morning   • fluticasone (FLONASE) 50 mcg/act nasal spray 1 spray into each nostril daily   • glucose blood (ACCU-CHEK GUIDE) test strip 1 each by Other route daily DX: e11 40   • ibuprofen (MOTRIN) 600 mg tablet    • ketoconazole (NIZORAL) 2 % cream Apply topically daily To chest for seborrhea   • lisinopril-hydrochlorothiazide (PRINZIDE,ZESTORETIC) 20-12 5 MG per tablet TAKE 1 TABLET DAILY   • metFORMIN (GLUCOPHAGE-XR) 500 mg 24 hr tablet Take 4 tablets (2,000 mg total) by mouth daily with breakfast   • omeprazole (PriLOSEC) 20 mg delayed release capsule Take 20 mg by mouth as needed   • rosuvastatin (CRESTOR) 20 MG tablet TAKE 1 TABLET DAILY   • tamsulosin (FLOMAX) 0 4 mg Take 1 capsule (0 4 mg total) by mouth daily with dinner       Objective     /78   Pulse 72   Temp (!) 97 1 °F (36 2 °C)   Resp 16   Ht 6' (1 829 m)   Wt 120 kg (265 lb)   BMI 35 94 kg/m²     Physical Exam  Vitals and nursing note reviewed  Constitutional:       Appearance: He is well-developed  HENT:      Head: Normocephalic and atraumatic  Right Ear: Tympanic membrane and external ear normal       Left Ear: Tympanic membrane and external ear normal    Cardiovascular:      Rate and Rhythm: Normal rate and regular rhythm  Heart sounds: Normal heart sounds  No murmur heard  Pulmonary:      Effort: Pulmonary effort is normal  No respiratory distress  Breath sounds: Normal breath sounds  No wheezing or rales  Musculoskeletal:      Right lower leg: No edema  Left lower leg: No edema         Oral Raider, DO

## 2023-06-29 NOTE — ASSESSMENT & PLAN NOTE
Lab Results   Component Value Date    HGBA1C 7 7 (H) 06/23/2023     Worsening control  He is going to work on diet and exercise  If he does not improve consider GLP-1

## 2023-07-07 ENCOUNTER — HOSPITAL ENCOUNTER (OUTPATIENT)
Dept: ULTRASOUND IMAGING | Facility: HOSPITAL | Age: 68
Discharge: HOME | End: 2023-07-07
Payer: COMMERCIAL

## 2023-07-07 ENCOUNTER — APPOINTMENT (OUTPATIENT)
Dept: RADIOLOGY | Facility: HOSPITAL | Age: 68
End: 2023-07-07
Payer: COMMERCIAL

## 2023-07-07 DIAGNOSIS — K76.0 FATTY LIVER: ICD-10-CM

## 2023-07-07 PROCEDURE — 76981 USE PARENCHYMA: CPT

## 2023-07-14 ENCOUNTER — TELEPHONE (OUTPATIENT)
Dept: FAMILY MEDICINE CLINIC | Facility: CLINIC | Age: 68
End: 2023-07-14

## 2023-07-14 DIAGNOSIS — K76.0 FATTY LIVER: ICD-10-CM

## 2023-07-14 DIAGNOSIS — K74.00 LIVER FIBROSIS: Primary | ICD-10-CM

## 2023-07-14 NOTE — TELEPHONE ENCOUNTER
Chaitanya Reilly called back and you were not available at the time, please try again before you leave today.

## 2023-07-14 NOTE — TELEPHONE ENCOUNTER
7/14/2023 12:59 PM Called Denver Pointer regarding his liver elastography results    He has severe fibrosis of his liver and needs to see a hepatologist.    Left message on his voicemail to call the office.    Vannesa Grijalva DO

## 2023-07-14 NOTE — TELEPHONE ENCOUNTER
7/14/2023 4:38 PM returned call to Brigida Hinton the results and he agreed to follow up with hepatology.      Message complete  Mars Villanueva DO

## 2023-07-26 DIAGNOSIS — K76.0 FATTY LIVER: ICD-10-CM

## 2023-07-26 DIAGNOSIS — E78.2 MIXED HYPERLIPIDEMIA: ICD-10-CM

## 2023-07-26 RX ORDER — ROSUVASTATIN CALCIUM 20 MG/1
TABLET, COATED ORAL
Qty: 90 TABLET | Refills: 3 | Status: SHIPPED | OUTPATIENT
Start: 2023-07-26

## 2023-07-31 DIAGNOSIS — Z85.46 HISTORY OF PROSTATE CANCER: ICD-10-CM

## 2023-07-31 DIAGNOSIS — E11.40 TYPE 2 DIABETES MELLITUS WITH DIABETIC NEUROPATHY, WITHOUT LONG-TERM CURRENT USE OF INSULIN (HCC): ICD-10-CM

## 2023-07-31 DIAGNOSIS — J30.9 ALLERGIC RHINITIS, UNSPECIFIED SEASONALITY, UNSPECIFIED TRIGGER: ICD-10-CM

## 2023-08-01 RX ORDER — TAMSULOSIN HYDROCHLORIDE 0.4 MG/1
0.4 CAPSULE ORAL
Qty: 90 CAPSULE | Refills: 0 | Status: SHIPPED | OUTPATIENT
Start: 2023-08-01

## 2023-08-01 RX ORDER — FLUTICASONE PROPIONATE 50 MCG
1 SPRAY, SUSPENSION (ML) NASAL DAILY
Qty: 48 G | Refills: 0 | Status: SHIPPED | OUTPATIENT
Start: 2023-08-01

## 2023-08-01 RX ORDER — METFORMIN HYDROCHLORIDE 500 MG/1
2000 TABLET, EXTENDED RELEASE ORAL
Qty: 360 TABLET | Refills: 0 | Status: SHIPPED | OUTPATIENT
Start: 2023-08-01

## 2023-10-18 ENCOUNTER — TELEPHONE (OUTPATIENT)
Dept: GASTROENTEROLOGY | Facility: CLINIC | Age: 68
End: 2023-10-18

## 2023-10-18 NOTE — TELEPHONE ENCOUNTER
Anesthesia POST Procedure Evaluation    Patient: Elijah Godoy   MRN:     [id number] Gender:   male   Age:    17 year old :      2002        Preoperative Diagnosis: * No pre-op diagnosis entered *   Procedure(s):  ESOPHAGOGASTRODUODENOSCOPY, WITH BIOPSY   Postop Comments: No value filed.       Anesthesia Type:  Not documented  MAC    Reportable Event: NO     PAIN: Uncomplicated   Sign Out status: Comfortable, Well controlled pain     PONV: No PONV   Sign Out status:  No Nausea or Vomiting     Neuro/Psych: Uneventful perioperative course   Sign Out Status: Preoperative baseline; Age appropriate mentation     Airway/Resp.: Uneventful perioperative course   Sign Out Status: Non labored breathing, age appropriate RR; Resp. Status within EXPECTED Parameters     CV: Uneventful perioperative course   Sign Out status: Appropriate BP and perfusion indices; Appropriate HR/Rhythm     Disposition:   Sign Out in:  Phase II  Disposition:  Home  Recovery Course: Uneventful  Follow-Up: Not required           Last Anesthesia Record Vitals:  CRNA VITALS  2019 0738 - 2019 0838      2019             Pulse:  52    SpO2:  98 %    Resp Rate (observed):  (!) 1          Last PACU Vitals:  Vitals Value Taken Time   BP     Temp     Pulse     Resp     SpO2     Temp src Skin 2019  8:00 AM   NIBP 97/47 2019  8:01 AM   Pulse 52 2019  8:07 AM   SpO2 98 % 2019  8:07 AM   Resp     Temp 97.7  F (36.5  C) 2019  8:00 AM   Ht Rate     Temp 2           Electronically Signed By: Boston Fowler MD, 2019   Patient was due for EGD recall with Dr. Kamari Lazcano in August for [place], [place]. Called and spoke with dad who is visiting. Will give him message I called and I will call the patient tomorrow.

## 2023-10-19 NOTE — TELEPHONE ENCOUNTER
Left message asking patient to call and schedule with Dr. Navi Callahan if he would like done before he retires in December. Will call him again in 1 wk if he doesn't return call.

## 2023-10-30 DIAGNOSIS — E11.40 TYPE 2 DIABETES MELLITUS WITH DIABETIC NEUROPATHY, WITHOUT LONG-TERM CURRENT USE OF INSULIN (HCC): ICD-10-CM

## 2023-10-30 RX ORDER — METFORMIN HYDROCHLORIDE 500 MG/1
2000 TABLET, EXTENDED RELEASE ORAL
Qty: 360 TABLET | Refills: 0 | Status: SHIPPED | OUTPATIENT
Start: 2023-10-30

## 2023-12-10 DIAGNOSIS — J30.9 ALLERGIC RHINITIS, UNSPECIFIED SEASONALITY, UNSPECIFIED TRIGGER: ICD-10-CM

## 2023-12-10 DIAGNOSIS — Z85.46 HISTORY OF PROSTATE CANCER: ICD-10-CM

## 2023-12-11 RX ORDER — TAMSULOSIN HYDROCHLORIDE 0.4 MG/1
0.4 CAPSULE ORAL
Qty: 90 CAPSULE | Refills: 1 | Status: SHIPPED | OUTPATIENT
Start: 2023-12-11

## 2023-12-11 RX ORDER — FLUTICASONE PROPIONATE 50 MCG
1 SPRAY, SUSPENSION (ML) NASAL DAILY
Qty: 48 G | Refills: 1 | Status: SHIPPED | OUTPATIENT
Start: 2023-12-11

## 2024-01-26 ENCOUNTER — APPOINTMENT (OUTPATIENT)
Dept: LAB | Facility: CLINIC | Age: 69
End: 2024-01-26
Payer: COMMERCIAL

## 2024-01-26 DIAGNOSIS — E78.2 MIXED HYPERLIPIDEMIA: ICD-10-CM

## 2024-01-26 DIAGNOSIS — K76.0 FATTY LIVER: ICD-10-CM

## 2024-01-26 DIAGNOSIS — E11.40 TYPE 2 DIABETES MELLITUS WITH DIABETIC NEUROPATHY, WITHOUT LONG-TERM CURRENT USE OF INSULIN (HCC): ICD-10-CM

## 2024-01-26 DIAGNOSIS — I10 ESSENTIAL HYPERTENSION: ICD-10-CM

## 2024-01-26 LAB
ALBUMIN SERPL BCP-MCNC: 4.7 G/DL (ref 3.5–5)
ALP SERPL-CCNC: 56 U/L (ref 34–104)
ALT SERPL W P-5'-P-CCNC: 43 U/L (ref 7–52)
ANION GAP SERPL CALCULATED.3IONS-SCNC: 9 MMOL/L
AST SERPL W P-5'-P-CCNC: 32 U/L (ref 13–39)
BILIRUB SERPL-MCNC: 0.69 MG/DL (ref 0.2–1)
BUN SERPL-MCNC: 19 MG/DL (ref 5–25)
CALCIUM SERPL-MCNC: 9.7 MG/DL (ref 8.4–10.2)
CHLORIDE SERPL-SCNC: 105 MMOL/L (ref 96–108)
CHOLEST SERPL-MCNC: 161 MG/DL
CO2 SERPL-SCNC: 26 MMOL/L (ref 21–32)
CREAT SERPL-MCNC: 0.86 MG/DL (ref 0.6–1.3)
ERYTHROCYTE [DISTWIDTH] IN BLOOD BY AUTOMATED COUNT: 11.9 % (ref 11.6–15.1)
EST. AVERAGE GLUCOSE BLD GHB EST-MCNC: 177 MG/DL
GFR SERPL CREATININE-BSD FRML MDRD: 89 ML/MIN/1.73SQ M
GLUCOSE P FAST SERPL-MCNC: 171 MG/DL (ref 65–99)
HBA1C MFR BLD: 7.8 %
HCT VFR BLD AUTO: 48.3 % (ref 36.5–49.3)
HDLC SERPL-MCNC: 56 MG/DL
HGB BLD-MCNC: 16 G/DL (ref 12–17)
LDLC SERPL CALC-MCNC: 74 MG/DL (ref 0–100)
MCH RBC QN AUTO: 31.6 PG (ref 26.8–34.3)
MCHC RBC AUTO-ENTMCNC: 33.1 G/DL (ref 31.4–37.4)
MCV RBC AUTO: 95 FL (ref 82–98)
PLATELET # BLD AUTO: 168 THOUSANDS/UL (ref 149–390)
PMV BLD AUTO: 10.8 FL (ref 8.9–12.7)
POTASSIUM SERPL-SCNC: 4 MMOL/L (ref 3.5–5.3)
PROT SERPL-MCNC: 7.6 G/DL (ref 6.4–8.4)
RBC # BLD AUTO: 5.07 MILLION/UL (ref 3.88–5.62)
SODIUM SERPL-SCNC: 140 MMOL/L (ref 135–147)
TRIGL SERPL-MCNC: 155 MG/DL
WBC # BLD AUTO: 5.8 THOUSAND/UL (ref 4.31–10.16)

## 2024-01-26 PROCEDURE — 36415 COLL VENOUS BLD VENIPUNCTURE: CPT

## 2024-01-26 PROCEDURE — 85027 COMPLETE CBC AUTOMATED: CPT

## 2024-01-26 PROCEDURE — 83036 HEMOGLOBIN GLYCOSYLATED A1C: CPT

## 2024-01-26 PROCEDURE — 80061 LIPID PANEL: CPT

## 2024-01-26 PROCEDURE — 80053 COMPREHEN METABOLIC PANEL: CPT

## 2024-01-29 DIAGNOSIS — E11.40 TYPE 2 DIABETES MELLITUS WITH DIABETIC NEUROPATHY, WITHOUT LONG-TERM CURRENT USE OF INSULIN (HCC): ICD-10-CM

## 2024-01-30 RX ORDER — METFORMIN HYDROCHLORIDE 500 MG/1
2000 TABLET, EXTENDED RELEASE ORAL
Qty: 360 TABLET | Refills: 0 | Status: SHIPPED | OUTPATIENT
Start: 2024-01-30

## 2024-02-02 ENCOUNTER — OFFICE VISIT (OUTPATIENT)
Dept: GASTROENTEROLOGY | Facility: CLINIC | Age: 69
End: 2024-02-02
Payer: COMMERCIAL

## 2024-02-02 VITALS
DIASTOLIC BLOOD PRESSURE: 70 MMHG | BODY MASS INDEX: 35.78 KG/M2 | HEIGHT: 72 IN | TEMPERATURE: 98.2 F | WEIGHT: 264.2 LBS | HEART RATE: 100 BPM | SYSTOLIC BLOOD PRESSURE: 128 MMHG

## 2024-02-02 DIAGNOSIS — K76.0 FATTY LIVER: Primary | ICD-10-CM

## 2024-02-02 DIAGNOSIS — E66.01 SEVERE OBESITY (BMI 35.0-39.9) WITH COMORBIDITY (HCC): ICD-10-CM

## 2024-02-02 DIAGNOSIS — K74.02 ADVANCED HEPATIC FIBROSIS: ICD-10-CM

## 2024-02-02 DIAGNOSIS — R79.89 ABNORMAL LIVER FUNCTION TESTS: ICD-10-CM

## 2024-02-02 PROCEDURE — 99204 OFFICE O/P NEW MOD 45 MIN: CPT | Performed by: STUDENT IN AN ORGANIZED HEALTH CARE EDUCATION/TRAINING PROGRAM

## 2024-02-02 RX ORDER — ACETAMINOPHEN 500 MG
500 TABLET ORAL EVERY 6 HOURS PRN
COMMUNITY

## 2024-02-02 NOTE — PROGRESS NOTES
[facility] - Outpatient Consultation  Jacek Valles 68 y.o. male MRN: [id number]  Encounter: [id number]    PCP:  Sunni Burton DO, [phone]  Referring Provider:  No ref. provider found,     Patient: Jacek Valles, 1955  Reason for Referral: NAFLD    ASSESSMENT/PLAN:  68 y.o. male with history of DM, HTN, HLD, GERD with BE, BCC and class II obesity who presents for initial evaluation for NAFLD. He has no acute liver specific complaints or clinical evidence of hepatic decompensation.     He has had intermittently mildly elevated serum transaminases dating back to 2016 with recent improvement with ALT 43.  His liver synthetic function and platelets are otherwise normal.  His serologic workup was negative for HCV infection.  He had a recent ultrasound elastography which showed F3 fibrosis (LSM 10.11 kPA with IQR 10.2%) and S3 disease.    Suspect NAFLD given his metabolic risk factors but will complete a serologic workup to exclude other causes of liver disease.  He has no clinical evidence of cirrhosis but was found to have advanced fibrosis on US elastography. Discussed that this modality is limited in distinguishing between F3 and F4 disease and would recommend further work-up to complete staging. Will pursue MR elastography for now and discussed the possibility of liver biopsy if findings are discordant or the diagonsis remains unclear.    We discussed the natural history, prognosis, and complications of NAFLD, including progression to cirrhosis as well as risk for cardiovascular events.  We discussed that weight loss of at least 5 to 10% of his body weight and aggressive modification of his metabolic risk factors are molina in preventing progression of disease.  Will strongly consider a GLP1a which has been associated with improvement of steatohepatitis and regression of fibrosis.  He should also avoid excessive EtOH consumption and hepatotoxic medications.    He will return to clinic  in 3 months or sooner if needed.  Thank you for the opportunity to consult on this care.    - HAV IgG, HBsAg, HBcAb and HBsAb; vaccinate if non-immune  - Iron studies with ferritin  - A1AT levels and phenotype  - MRI abdomen with elastography    Esperanza Pang MD  Division of Gastroenterology and Hepatology  [facility]    ============================================================================  CC/HPI: 68 y.o. male with history of DM, HTN, HLD, GERD with BE, BCC and class II obesity who presents for initial evaluation for NAFLD.    He has had intermittently mildly elevated serum transaminases dating back to 2016 with recent improvement with ALT 43. Etiology was felt to be NAFLD given his metabolic risk factors and he had a recent US elastography in July 2023 which showed F3 fibrosis.     He reports occasional EtOH use but denies recreational drug use. Denies any OTC supplements or medications. He denies a family history of liver disease or liver cancer.     He reports stable weight between 260-270 lbs and DM has been managed with metformin and jardiance.     ROS: Complete review of systems otherwise negative.     PAST MEDICAL/SURGICAL HISTORY:  Past Medical History:   Diagnosis Date    Arthritis     Basal cell carcinoma 2005    Cancer (HCC) 2012    Chest tightness     Colon polyp     Diabetes (HCC)     Diabetes mellitus (HCC)     Diverticulosis of sigmoid colon     Esophageal reflux     Fatty liver     GERD (gastroesophageal reflux disease)     Hearing problem     High cholesterol     History of chest pain     Hyperlipemia     Hypertension     Lumbago     Malignant neoplasm of prostate (HCC)     Melanoma (HCC) 1999    Prostate CA (HCC)     Squamous cell skin cancer 08/27/2020    right dorsal forearm    Tinea pedis of both feet     Trochanteric bursitis         Past Surgical History:   Procedure Laterality Date    APPENDECTOMY      COLONOSCOPY  2016    HERNIA REPAIR      ? Umbilical    MOHS  SURGERY  2019    [place] ARTHRS KNE SURG W/MENISCECTOMY MED/LAT W/SHVG Left 03/15/2022    Procedure: ARTHROSCOPY KNEE,  partial medial menisectomy;  Surgeon: Eren Hernandez DO;  Location: [facility] MAIN OR;  Service: Orthopedics    [place] REPAIR FIRST ABDOMINAL WALL HERNIA N/A 2021    Procedure: INCISIONAL HERNIA REPAIR AT THE UMBILICUS;  Surgeon: Glen Patino MD;  [facility] MAIN OR;  Service: General    PROSTATECTOMY N/A     PROSTATECTOMY      SKIN BIOPSY         FAMILY/SOCIAL HISTORY:  Family History   Problem Relation Age of Onset    Arthritis Mother     Parkinsonism Mother     Heart disease Father     Arthritis Father     Coronary artery disease Father     Hypertension Father     Parkinsonism Father     Mental illness Neg Hx        Social History     Tobacco Use    Smoking status: Former     Current packs/day: 0.00     Average packs/day: 1 pack/day for 15.1 years (15.1 ttl pk-yrs)     Types: Cigarettes     Start date: 1997     Quit date: 2012     Years since quittin.6    Smokeless tobacco: Never   Vaping Use    Vaping status: Never Used   Substance Use Topics    Alcohol use: Yes     Alcohol/week: 6.0 standard drinks of alcohol     Types: 6 Cans of beer per week     Comment: Weekends    Drug use: No       MEDICATIONS:  Current Outpatient Medications on File Prior to Visit   Medication Sig Dispense Refill    ACCU-CHEK FASTCLIX LANCETS MISC by Does not apply route daily Dx: E11.40 100 each 1    acetaminophen (TYLENOL) 500 mg tablet Take 500 mg by mouth every 6 (six) hours as needed for mild pain      Empagliflozin (Jardiance) 25 MG TABS Take 1 tablet (25 mg total) by mouth every morning 90 tablet 0    fluticasone (FLONASE) 50 mcg/act nasal spray 1 spray into each nostril daily 48 g 1    glucose blood (ACCU-CHEK GUIDE) test strip 1 each by Other route daily DX: e11.40 100 each 1    lisinopril-hydrochlorothiazide (PRINZIDE,ZESTORETIC) 20-12.5 MG per tablet TAKE 1 TABLET DAILY 90 tablet 3     metFORMIN (GLUCOPHAGE-XR) 500 mg 24 hr tablet Take 4 tablets (2,000 mg total) by mouth daily with breakfast 360 tablet 0    omeprazole (PriLOSEC) 20 mg delayed release capsule Take 20 mg by mouth as needed      rosuvastatin (CRESTOR) 20 MG tablet TAKE 1 TABLET DAILY 90 tablet 3    tamsulosin (FLOMAX) 0.4 mg Take 1 capsule (0.4 mg total) by mouth daily with dinner 90 capsule 1    ibuprofen (MOTRIN) 600 mg tablet  (Patient not taking: Reported on 2/2/2024)      ketoconazole (NIZORAL) 2 % cream Apply topically daily To chest for seborrhea . (Patient not taking: Reported on 2/2/2024) 60 g 3     No current facility-administered medications on file prior to visit.       Allergies   Allergen Reactions    Penicillins Other (See Comments)      Ancef can tolerate       PHYSICAL EXAM:  /70 (BP Location: Left arm, Patient Position: Sitting, Cuff Size: Adult)   Pulse 100   Temp 98.2 °F (36.8 °C) (Tympanic)   Ht 6' (1.829 m)   Wt 120 kg (264 lb 3.2 oz)   BMI 35.83 kg/m²   GENERAL: NAD, AAO  HEENT: anicteric, OP clear, MMM  ABDOMEN: S/ND/NT, normoactive BS, no hepatomegaly, spleen not palpable  EXTREMITIES: no edema  SKIN: no rashes, no palmar erythema, no spider angiomata   NEURO: normal gait, no tremor, no asterixis     LABS/RADIOLOGY/ENDOSCOPY:  Lab Results   Component Value Date    WBC 5.80 01/26/2024    HGB 16.0 01/26/2024    HCT 48.3 01/26/2024     01/26/2024    GLUF 171 (H) 01/26/2024    BUN 19 01/26/2024    CREATININE 0.86 01/26/2024     12/05/2017    K 4.0 01/26/2024     01/26/2024    CO2 26 01/26/2024    PROT 7.0 12/05/2017    ALKPHOS 56 01/26/2024    ALT 43 01/26/2024    AST 32 01/26/2024    GLOB 2.5 01/11/2019    CALCIUM 9.7 01/26/2024    EGFR 89 01/26/2024    CHOL 193 03/24/2017    TRIG 155 (H) 01/26/2024    HDL 56 01/26/2024    INR 1.02 03/03/2022    PTT 29 03/03/2022     HCV Ab-    EGD (8/10/2022)  Erythematous mucosa in the antrum; performed cold forceps biopsy  2 cm hiatal  hernia  Irregular Z-line; performed cold forceps biopsy. With some small islands, extensions less than a cm  The remainder of a detailed exam otherwise unremarkable     COY (6/2022)  Rectal exam no masses  Small, internal hemorrhoids  Moderate diverticula in the sigmoid colon  6 mm polyp; completely removed en bloc by cold snare and retrieved specimen  12 mm semi-pedunculated polyp; completely removed en bloc by cold snare and retrieved specimen; placed 1 clip successfully (clip is MRI conditional)  The remainder of a detailed exam was within normal limits including the appendix opening and distal terminal ileum     Computed MELD 3.0 unavailable. One or more values for this score either were not found within the given timeframe or did not fit some other criterion.  Computed MELD-Na unavailable. One or more values for this score either were not found within the given timeframe or did not fit some other criterion.

## 2024-02-09 ENCOUNTER — APPOINTMENT (OUTPATIENT)
Dept: LAB | Facility: CLINIC | Age: 69
End: 2024-02-09
Payer: COMMERCIAL

## 2024-02-09 DIAGNOSIS — K76.0 FATTY LIVER: ICD-10-CM

## 2024-02-09 LAB
FERRITIN SERPL-MCNC: 136 NG/ML (ref 24–336)
HAV AB SER QL IA: NORMAL
HBV CORE AB SER QL: NORMAL
HBV SURFACE AB SER-ACNC: 4 MIU/ML
HBV SURFACE AG SER QL: NORMAL
IRON SATN MFR SERPL: 28 % (ref 15–50)
IRON SERPL-MCNC: 110 UG/DL (ref 50–212)
TIBC SERPL-MCNC: 396 UG/DL (ref 250–450)
UIBC SERPL-MCNC: 286 UG/DL (ref 155–355)

## 2024-02-09 PROCEDURE — 83540 ASSAY OF IRON: CPT

## 2024-02-09 PROCEDURE — 86708 HEPATITIS A ANTIBODY: CPT

## 2024-02-09 PROCEDURE — 36415 COLL VENOUS BLD VENIPUNCTURE: CPT

## 2024-02-09 PROCEDURE — 86706 HEP B SURFACE ANTIBODY: CPT

## 2024-02-09 PROCEDURE — 82103 ALPHA-1-ANTITRYPSIN TOTAL: CPT

## 2024-02-09 PROCEDURE — 86704 HEP B CORE ANTIBODY TOTAL: CPT

## 2024-02-09 PROCEDURE — 82728 ASSAY OF FERRITIN: CPT

## 2024-02-09 PROCEDURE — 83550 IRON BINDING TEST: CPT

## 2024-02-09 PROCEDURE — 82104 ALPHA-1-ANTITRYPSIN PHENO: CPT

## 2024-02-09 PROCEDURE — 87340 HEPATITIS B SURFACE AG IA: CPT

## 2024-02-10 LAB — A1AT SERPL-MCNC: 132 MG/DL (ref 101–187)

## 2024-02-15 LAB
A1AT PHENOTYP SERPL IFE: NORMAL
A1AT SERPL-MCNC: 130 MG/DL (ref 101–187)

## 2024-02-20 ENCOUNTER — TELEPHONE (OUTPATIENT)
Dept: GASTROENTEROLOGY | Facility: CLINIC | Age: 69
End: 2024-02-20

## 2024-02-20 NOTE — TELEPHONE ENCOUNTER
----- Message from Candis Lopez sent at 2/19/2024  1:45 PM EST -----  Called independent admin at [phone] spoke with rep Soledad who confirmed no auth is required for [id number] call ref # [id number]    ----- Message -----  From: Lalito Lloyd  Sent: 2/12/2024   2:53 PM EST  To: [facility] Prior Authorizations    Please initiate prior authorization for: MRI abd w wo contrast CPT code #33854                                                             MR elastography CPT code #40590                                                              [facility] NPI-[id number]                                                               [address] [place] [address]  ----- Message -----  From: Esperanza Pagn MD  Sent: 2/2/2024   3:13 PM EST  To: Gastroenterology [facility]! I ordered an MRI abdomen and elastography for this patient. Can we please schedule this at [place]?

## 2024-02-20 NOTE — ASSESSMENT & PLAN NOTE
Lab Results   Component Value Date    HGBA1C 7.8 (H) 01/26/2024     Not controlled  Would benefit from a GLP 1 agonist  Mounjaro ordered  Risk benefits medication discussed

## 2024-02-20 NOTE — TELEPHONE ENCOUNTER
Orders for MRI abdomen and elastography have been faxed to Jose Rafael who will then reach out to patient to schedule.

## 2024-02-21 ENCOUNTER — OFFICE VISIT (OUTPATIENT)
Dept: FAMILY MEDICINE CLINIC | Facility: CLINIC | Age: 69
End: 2024-02-21
Payer: COMMERCIAL

## 2024-02-21 VITALS
TEMPERATURE: 97.2 F | SYSTOLIC BLOOD PRESSURE: 118 MMHG | HEART RATE: 73 BPM | HEIGHT: 72 IN | BODY MASS INDEX: 34.81 KG/M2 | DIASTOLIC BLOOD PRESSURE: 64 MMHG | RESPIRATION RATE: 16 BRPM | WEIGHT: 257 LBS

## 2024-02-21 DIAGNOSIS — K76.0 FATTY LIVER: ICD-10-CM

## 2024-02-21 DIAGNOSIS — I10 ESSENTIAL HYPERTENSION: ICD-10-CM

## 2024-02-21 DIAGNOSIS — E11.40 TYPE 2 DIABETES MELLITUS WITH DIABETIC NEUROPATHY, WITHOUT LONG-TERM CURRENT USE OF INSULIN (HCC): Primary | ICD-10-CM

## 2024-02-21 DIAGNOSIS — Z23 ENCOUNTER FOR IMMUNIZATION: ICD-10-CM

## 2024-02-21 PROCEDURE — 90471 IMMUNIZATION ADMIN: CPT

## 2024-02-21 PROCEDURE — 90632 HEPA VACCINE ADULT IM: CPT

## 2024-02-21 PROCEDURE — 99214 OFFICE O/P EST MOD 30 MIN: CPT | Performed by: FAMILY MEDICINE

## 2024-02-21 RX ORDER — TIRZEPATIDE 2.5 MG/.5ML
2.5 INJECTION, SOLUTION SUBCUTANEOUS WEEKLY
Qty: 6 ML | Refills: 1 | Status: SHIPPED | OUTPATIENT
Start: 2024-02-21

## 2024-02-21 NOTE — ASSESSMENT & PLAN NOTE
Reviewed visit with hepatologist   Discussed the importance of further weight loss and improvement of his glucose control

## 2024-02-21 NOTE — PROGRESS NOTES
Name: Jacek Valles      : 1955      MRN: [id number]  Encounter Provider: Sunni Burton DO  Encounter Date: 2024   Encounter department: [facility]    Assessment & Plan   1. Type 2 diabetes mellitus with diabetic neuropathy, without long-term current use of insulin (HCC)  Assessment & Plan:    Lab Results   Component Value Date    HGBA1C 7.8 (H) 2024     Not controlled  Would benefit from a GLP 1 agonist  Mounjaro ordered  Risk benefits medication discussed    Orders:  -     Mounjaro 2.5 MG/0.5ML; Inject 0.5 mL (2.5 mg total) under the skin once a week  -     Hemoglobin A1C; Future; Expected date: 2024  -     Albumin / creatinine urine ratio; Future    2. Essential hypertension  Assessment & Plan:  Stable     Orders:  -     CBC; Future; Expected date: 2024  -     Comprehensive metabolic panel; Future; Expected date: 2024  -     Lipid Panel with Direct LDL reflex; Future; Expected date: 2024    3. Fatty liver  Assessment & Plan:  Reviewed visit with hepatologist   Discussed the importance of further weight loss and improvement of his glucose control    Orders:  -     CBC; Future; Expected date: 2024  -     Comprehensive metabolic panel; Future; Expected date: 2024    4. Encounter for immunization  -     HEPATITIS A VACCINE ADULT IM        Assessment & Plan  1. Fatty liver.  Dr. Pang had suggested she get on a newer diabetes medication that can help with the fatty liver. She will receive her hepatitis A vaccine today. She will need her second dose of hepatitis A after 2025.  He has already completed the hepatitis B vaccine series    2. Diabetes.  Her hemoglobin A1c is up to 7.8. Her triglycerides are high. I will start her on Mounjaro 2.5 mg once a week. he will pick a different spot in her stomach each week and rotate it around. he will watch for nausea and constipation.    3. Cold feet.  This could be from her blood sugar or her blood  pressure medication. She will continue lisinopril and hydrochlorothiazide.    4. Hyperlipidemia.  cholesterol is well controlled. She will continue rosuvastatin 20 mg.    5. Hypertension.  blood pressure is well controlled.    6. Health maintenance.  he will receive her hepatitis A vaccine today.    Follow-up  I will do blood work again in 3 months.     Orders Placed This Encounter   Procedures    HEPATITIS A VACCINE ADULT IM    CBC    Comprehensive metabolic panel    Hemoglobin A1C    Lipid Panel with Direct LDL reflex    Albumin / creatinine urine ratio       Subjective      History of Present Illness  The patient presents for evaluation of multiple medical concerns.    went to see Dr. Pang, who talked about an MRI and liver biopsy. She graded his liver as a 3 out of 5.  Dr. Pang had suggested hepatitis A and hepatitis B vaccines.  The hepatologist recommended that he get an MRI.  He has not scheduled that appointment yet.     feet have always been cold. At night, it takes her a while for hir feet to warm up. There are times when he reaches down and hir feet do not feel cold. he is trying to lose weight. he is on metformin and Jardiance 25.   he had hepatitis B vaccine many years ago.  Review of Systems      Objective     /64   Pulse 73   Temp (!) 97.2 °F (36.2 °C) (Tympanic)   Resp 16   Ht 6' (1.829 m)   Wt 117 kg (257 lb)   BMI 34.86 kg/m²     Physical Exam    Physical Exam  Vitals and nursing note reviewed.   Constitutional:       Appearance: He is well-developed.   HENT:      Head: Normocephalic and atraumatic.      Right Ear: Tympanic membrane and external ear normal.      Left Ear: Tympanic membrane and external ear normal.   Cardiovascular:      Rate and Rhythm: Normal rate and regular rhythm.      Pulses: no weak pulses.           Dorsalis pedis pulses are 2+ on the right side and 2+ on the left side.        Posterior tibial pulses are 2+ on the right side and 2+ on the left side.      Heart  sounds: Normal heart sounds. No murmur heard.  Pulmonary:      Effort: Pulmonary effort is normal. No respiratory distress.      Breath sounds: Normal breath sounds. No wheezing or rales.   Musculoskeletal:      Right lower leg: No edema.      Left lower leg: No edema.   Feet:      Right foot:      Skin integrity: No ulcer, skin breakdown, erythema, warmth, callus or dry skin.      Left foot:      Skin integrity: No ulcer, skin breakdown, erythema, warmth, callus or dry skin.         Patient's shoes and socks removed.    Right Foot/Ankle   Right Foot Inspection  Skin Exam: skin normal. Skin not intact, no dry skin, no warmth, no callus, no erythema, no maceration, no abnormal color, no pre-ulcer, no ulcer and no callus.     Toe Exam: ROM and strength within normal limits.     Sensory   Monofilament testing: intact    Vascular  Capillary refills: < 3 seconds  The right DP pulse is 2+. The right PT pulse is 2+.     Left Foot/Ankle  Left Foot Inspection  Skin Exam: skin normal. Skin not intact, no dry skin, no warmth, no erythema, no maceration, normal color, no pre-ulcer, no ulcer and no callus.     Toe Exam: ROM and strength within normal limits.     Sensory   Monofilament testing: intact    Vascular  Capillary refills: < 3 seconds  The left DP pulse is 2+. The left PT pulse is 2+.     Assign Risk Category  No deformity present  No loss of protective sensation  No weak pulses  Risk: 0

## 2024-03-25 DIAGNOSIS — Z85.46 HISTORY OF PROSTATE CANCER: ICD-10-CM

## 2024-03-26 RX ORDER — TAMSULOSIN HYDROCHLORIDE 0.4 MG/1
0.4 CAPSULE ORAL
Qty: 90 CAPSULE | Refills: 1 | Status: SHIPPED | OUTPATIENT
Start: 2024-03-26

## 2024-04-08 DIAGNOSIS — R07.89 CHEST TIGHTNESS: ICD-10-CM

## 2024-04-08 DIAGNOSIS — I10 ESSENTIAL HYPERTENSION: ICD-10-CM

## 2024-04-08 RX ORDER — LISINOPRIL AND HYDROCHLOROTHIAZIDE 20; 12.5 MG/1; MG/1
TABLET ORAL
Qty: 90 TABLET | Refills: 1 | Status: SHIPPED | OUTPATIENT
Start: 2024-04-08

## 2024-04-23 ENCOUNTER — TELEPHONE (OUTPATIENT)
Age: 69
End: 2024-04-23

## 2024-04-23 NOTE — TELEPHONE ENCOUNTER
Patients GI provider:  Dr. Pang    Number to return call: [phone]    Reason for call: Pt called to cancel his appt on 05/01/24 but he does not have an appt scheduled. He will call back to schedule an appt at a later date.     Scheduled procedure/appointment date if applicable: Apt/procedure N/A

## 2024-04-29 DIAGNOSIS — E11.40 TYPE 2 DIABETES MELLITUS WITH DIABETIC NEUROPATHY, WITHOUT LONG-TERM CURRENT USE OF INSULIN (HCC): ICD-10-CM

## 2024-05-06 DIAGNOSIS — E11.40 TYPE 2 DIABETES MELLITUS WITH DIABETIC NEUROPATHY, WITHOUT LONG-TERM CURRENT USE OF INSULIN (HCC): ICD-10-CM

## 2024-05-06 RX ORDER — METFORMIN HYDROCHLORIDE 500 MG/1
2000 TABLET, EXTENDED RELEASE ORAL
Qty: 360 TABLET | Refills: 0 | Status: SHIPPED | OUTPATIENT
Start: 2024-05-06

## 2024-05-07 ENCOUNTER — OFFICE VISIT (OUTPATIENT)
Dept: SURGERY | Facility: CLINIC | Age: 69
End: 2024-05-07
Payer: COMMERCIAL

## 2024-05-07 VITALS
WEIGHT: 240 LBS | BODY MASS INDEX: 33.6 KG/M2 | DIASTOLIC BLOOD PRESSURE: 61 MMHG | SYSTOLIC BLOOD PRESSURE: 109 MMHG | HEIGHT: 71 IN | HEART RATE: 89 BPM

## 2024-05-07 DIAGNOSIS — K21.9 GASTROESOPHAGEAL REFLUX DISEASE, UNSPECIFIED WHETHER ESOPHAGITIS PRESENT: Primary | ICD-10-CM

## 2024-05-07 DIAGNOSIS — K40.90 NON-RECURRENT UNILATERAL INGUINAL HERNIA WITHOUT OBSTRUCTION OR GANGRENE: Primary | ICD-10-CM

## 2024-05-07 PROCEDURE — 99214 OFFICE O/P EST MOD 30 MIN: CPT | Performed by: SURGERY

## 2024-05-07 RX ORDER — OMEPRAZOLE 20 MG/1
20 CAPSULE, DELAYED RELEASE ORAL AS NEEDED
Qty: 90 CAPSULE | Refills: 1 | Status: SHIPPED | OUTPATIENT
Start: 2024-05-07

## 2024-05-07 NOTE — PROGRESS NOTES
Assessment/Plan: Patient presents with a right inguinal hernia.  Is been present over the last 9 months.  Desires undergo repair.  He is daily discomfort at times.  Does not limit his activities.    Operative repair is recommended.  Risks and benefits explained.  Open versus robotic surgery discussed.    Patient was asked to discontinue Mounjaro for 7 days before surgery.  Discontinue Accupril and metformin the morning of surgery.    There are no diagnoses linked to this encounter.    Subjective:      Patient ID: Jacek Valles is a 68 y.o. male.    Patient presents for right inguinal hernia consult.  States he has had intermittent sharp pain RLQ for 8 to 9 months.  Denies bulge.  Does not limit his activities.        The following portions of the patient's history were reviewed and updated as appropriate:     He  has a past medical history of Arthritis, Basal cell carcinoma (2005), Cancer (HCC) (2012), Chest tightness, Colon polyp, Diabetes (HCC), Diabetes mellitus (HCC), Diverticulosis of sigmoid colon, Esophageal reflux, Fatty liver, GERD (gastroesophageal reflux disease), Hearing problem, High cholesterol, History of chest pain, Hyperlipemia, Hypertension, Lumbago, Malignant neoplasm of prostate (HCC), Melanoma (HCC) (1999), Prostate CA (HCC), Squamous cell skin cancer (08/27/2020), Tinea pedis of both feet, and Trochanteric bursitis.  He  has a past surgical history that includes Prostatectomy (N/A, 2011); Colonoscopy (2016); Prostatectomy; Appendectomy; Hernia repair; pr repair first abdominal wall hernia (N/A, 11/11/2021); pr arthrs kne surg w/meniscectomy med/lat w/shvg (Left, 03/15/2022); Skin biopsy (2019); and Mohs surgery (2019).  His family history includes Arthritis in his father and mother; Coronary artery disease in his father; Heart disease in his father; Hypertension in his father; Parkinsonism in his father and mother.  He  reports that he quit smoking about 11 years ago. His smoking use  included cigarettes. He started smoking about 26 years ago. He has a 15.1 pack-year smoking history. He has been exposed to tobacco smoke. He has never used smokeless tobacco. He reports current alcohol use of about 6.0 standard drinks of alcohol per week. He reports that he does not use drugs.  Current Outpatient Medications   Medication Sig Dispense Refill    ACCU-CHEK FASTCLIX LANCETS MISC by Does not apply route daily Dx: E11.40 100 each 1    acetaminophen (TYLENOL) 500 mg tablet Take 500 mg by mouth every 6 (six) hours as needed for mild pain      Empagliflozin (Jardiance) 25 MG TABS Take 1 tablet (25 mg total) by mouth every morning 90 tablet 0    fluticasone (FLONASE) 50 mcg/act nasal spray 1 spray into each nostril daily 48 g 1    glucose blood (ACCU-CHEK GUIDE) test strip 1 each by Other route daily DX: e11.40 100 each 1    lisinopril-hydrochlorothiazide (PRINZIDE,ZESTORETIC) 20-12.5 MG per tablet TAKE 1 TABLET DAILY 90 tablet 1    metFORMIN (GLUCOPHAGE-XR) 500 mg 24 hr tablet Take 4 tablets (2,000 mg total) by mouth daily with breakfast 360 tablet 0    Mounjaro 2.5 MG/0.5ML Inject 0.5 mL (2.5 mg total) under the skin once a week 6 mL 1    omeprazole (PriLOSEC) 20 mg delayed release capsule Take 20 mg by mouth as needed      rosuvastatin (CRESTOR) 20 MG tablet TAKE 1 TABLET DAILY 90 tablet 3    tamsulosin (FLOMAX) 0.4 mg Take 1 capsule (0.4 mg total) by mouth daily with dinner 90 capsule 1     No current facility-administered medications for this visit.     He is allergic to penicillins..    Review of Systems   Constitutional: Negative.  Negative for activity change.   HENT: Negative.     Eyes: Negative.    Respiratory: Negative.     Cardiovascular: Negative.    Gastrointestinal:  Positive for abdominal pain.   Endocrine: Negative.    Genitourinary: Negative.    Musculoskeletal: Negative.    Skin: Negative.    Allergic/Immunologic: Negative.    Neurological: Negative.    Psychiatric/Behavioral:  Negative  "for agitation, behavioral problems and confusion. The patient is not nervous/anxious.          Objective:      /61   Pulse 89   Ht 5' 11\" (1.803 m)   Wt 109 kg (240 lb)   BMI 33.47 kg/m²          Physical Exam  Constitutional:       Appearance: He is well-developed.   HENT:      Head: Atraumatic.   Eyes:      Extraocular Movements: Extraocular movements intact.   Neck:      Thyroid: No thyromegaly.      Trachea: No tracheal deviation.   Cardiovascular:      Rate and Rhythm: Regular rhythm.      Heart sounds: Normal heart sounds.   Pulmonary:      Effort: Pulmonary effort is normal.      Breath sounds: Normal breath sounds.   Abdominal:      Hernia: A hernia (Right inguinal hernia is present.  This is reducible.  No evidence for left inguinal herniation.) is present.   Musculoskeletal:      Right lower leg: No edema.      Left lower leg: No edema.   Skin:     General: Skin is warm and dry.   Neurological:      Mental Status: He is alert and oriented to person, place, and time.   Psychiatric:         Behavior: Behavior normal.         "

## 2024-05-17 ENCOUNTER — APPOINTMENT (OUTPATIENT)
Dept: LAB | Facility: CLINIC | Age: 69
End: 2024-05-17
Payer: COMMERCIAL

## 2024-05-17 DIAGNOSIS — E11.40 TYPE 2 DIABETES MELLITUS WITH DIABETIC NEUROPATHY, WITHOUT LONG-TERM CURRENT USE OF INSULIN (HCC): ICD-10-CM

## 2024-05-17 DIAGNOSIS — I10 ESSENTIAL HYPERTENSION: ICD-10-CM

## 2024-05-17 DIAGNOSIS — K76.0 FATTY LIVER: ICD-10-CM

## 2024-05-17 LAB
ALBUMIN SERPL BCP-MCNC: 4.6 G/DL (ref 3.5–5)
ALP SERPL-CCNC: 48 U/L (ref 34–104)
ALT SERPL W P-5'-P-CCNC: 29 U/L (ref 7–52)
ANION GAP SERPL CALCULATED.3IONS-SCNC: 9 MMOL/L (ref 4–13)
AST SERPL W P-5'-P-CCNC: 28 U/L (ref 13–39)
BILIRUB SERPL-MCNC: 0.84 MG/DL (ref 0.2–1)
BUN SERPL-MCNC: 14 MG/DL (ref 5–25)
CALCIUM SERPL-MCNC: 9.3 MG/DL (ref 8.4–10.2)
CHLORIDE SERPL-SCNC: 101 MMOL/L (ref 96–108)
CHOLEST SERPL-MCNC: 136 MG/DL
CO2 SERPL-SCNC: 26 MMOL/L (ref 21–32)
CREAT SERPL-MCNC: 0.97 MG/DL (ref 0.6–1.3)
CREAT UR-MCNC: 68.7 MG/DL
ERYTHROCYTE [DISTWIDTH] IN BLOOD BY AUTOMATED COUNT: 12.4 % (ref 11.6–15.1)
EST. AVERAGE GLUCOSE BLD GHB EST-MCNC: 151 MG/DL
GFR SERPL CREATININE-BSD FRML MDRD: 79 ML/MIN/1.73SQ M
GLUCOSE P FAST SERPL-MCNC: 126 MG/DL (ref 65–99)
HBA1C MFR BLD: 6.9 %
HCT VFR BLD AUTO: 47 % (ref 36.5–49.3)
HDLC SERPL-MCNC: 56 MG/DL
HGB BLD-MCNC: 15.6 G/DL (ref 12–17)
LDLC SERPL CALC-MCNC: 49 MG/DL (ref 0–100)
MCH RBC QN AUTO: 31.8 PG (ref 26.8–34.3)
MCHC RBC AUTO-ENTMCNC: 33.2 G/DL (ref 31.4–37.4)
MCV RBC AUTO: 96 FL (ref 82–98)
MICROALBUMIN UR-MCNC: 19.6 MG/L
MICROALBUMIN/CREAT 24H UR: 29 MG/G CREATININE (ref 0–30)
PLATELET # BLD AUTO: 184 THOUSANDS/UL (ref 149–390)
PMV BLD AUTO: 9.9 FL (ref 8.9–12.7)
POTASSIUM SERPL-SCNC: 4.2 MMOL/L (ref 3.5–5.3)
PROT SERPL-MCNC: 7.4 G/DL (ref 6.4–8.4)
RBC # BLD AUTO: 4.9 MILLION/UL (ref 3.88–5.62)
SODIUM SERPL-SCNC: 136 MMOL/L (ref 135–147)
TRIGL SERPL-MCNC: 154 MG/DL
WBC # BLD AUTO: 5.18 THOUSAND/UL (ref 4.31–10.16)

## 2024-05-17 PROCEDURE — 83036 HEMOGLOBIN GLYCOSYLATED A1C: CPT

## 2024-05-17 PROCEDURE — 80053 COMPREHEN METABOLIC PANEL: CPT

## 2024-05-17 PROCEDURE — 82043 UR ALBUMIN QUANTITATIVE: CPT

## 2024-05-17 PROCEDURE — 85027 COMPLETE CBC AUTOMATED: CPT

## 2024-05-17 PROCEDURE — 82570 ASSAY OF URINE CREATININE: CPT

## 2024-05-17 PROCEDURE — 36415 COLL VENOUS BLD VENIPUNCTURE: CPT

## 2024-05-17 PROCEDURE — 80061 LIPID PANEL: CPT

## 2024-05-22 ENCOUNTER — OFFICE VISIT (OUTPATIENT)
Dept: FAMILY MEDICINE CLINIC | Facility: CLINIC | Age: 69
End: 2024-05-22
Payer: COMMERCIAL

## 2024-05-22 VITALS
BODY MASS INDEX: 35.73 KG/M2 | RESPIRATION RATE: 16 BRPM | DIASTOLIC BLOOD PRESSURE: 62 MMHG | HEART RATE: 80 BPM | TEMPERATURE: 97.6 F | SYSTOLIC BLOOD PRESSURE: 128 MMHG | WEIGHT: 249.6 LBS | HEIGHT: 70 IN

## 2024-05-22 DIAGNOSIS — Z12.5 SCREENING FOR PROSTATE CANCER: ICD-10-CM

## 2024-05-22 DIAGNOSIS — Z00.00 ANNUAL PHYSICAL EXAM: Primary | ICD-10-CM

## 2024-05-22 DIAGNOSIS — E11.40 TYPE 2 DIABETES MELLITUS WITH DIABETIC NEUROPATHY, WITHOUT LONG-TERM CURRENT USE OF INSULIN (HCC): ICD-10-CM

## 2024-05-22 DIAGNOSIS — K76.0 FATTY LIVER: ICD-10-CM

## 2024-05-22 DIAGNOSIS — Z79.899 ENCOUNTER FOR LONG-TERM CURRENT USE OF MEDICATION: ICD-10-CM

## 2024-05-22 PROCEDURE — 99397 PER PM REEVAL EST PAT 65+ YR: CPT | Performed by: FAMILY MEDICINE

## 2024-05-22 RX ORDER — TIRZEPATIDE 2.5 MG/.5ML
2.5 INJECTION, SOLUTION SUBCUTANEOUS WEEKLY
Qty: 6 ML | Refills: 3 | Status: SHIPPED | OUTPATIENT
Start: 2024-05-22

## 2024-05-22 NOTE — ASSESSMENT & PLAN NOTE
Lab Results   Component Value Date    HGBA1C 6.9 (H) 05/17/2024   Improving control with mounjaro

## 2024-05-22 NOTE — PATIENT INSTRUCTIONS
Wellness Visit for Adults   AMBULATORY CARE:   A wellness visit  is when you see your healthcare provider to get screened for health problems. Your healthcare provider will also give you advice on how to stay healthy. Write down your questions so you remember to ask them. Ask your healthcare provider how often you should have a wellness visit.  What happens at a wellness visit:  Your healthcare provider will ask about your health, and your family history of health problems. This includes high blood pressure, heart disease, and cancer. He or she will ask if you have symptoms that concern you, if you smoke, and about your mood. You may also be asked about your intake of medicines, supplements, food, and alcohol. Any of the following may be done:  Your weight  will be checked. Your height may also be checked so your body mass index (BMI) can be calculated. Your BMI shows if you are at a healthy weight.    Your blood pressure  and heart rate will be checked. Your temperature may also be checked.    Blood and urine tests  may be done. Blood tests may be done to check your cholesterol levels. Abnormal cholesterol levels increase your risk for heart disease and stroke. You may also need a blood or urine test to check for diabetes if you are at increased risk. Urine tests may be done to look for signs of an infection or kidney disease.    A physical exam  includes checking your heartbeat and lungs with a stethoscope. Your healthcare provider may also check your skin to look for sun damage.    Screening tests  may be recommended. A screening test is done to check for diseases that may not cause symptoms. The screening tests you may need depend on your age, gender, family history, and lifestyle habits. For example, colorectal screening may be recommended if you are 50 years old or older.    Screening tests you need if you are a woman:   A Pap smear  is used to screen for cervical cancer. Pap smears are usually done every 3 to  5 years depending on your age. You may need them more often if you have had abnormal Pap smear test results in the past. Ask your healthcare provider how often you should have a Pap smear.    A mammogram  is an x-ray of your breasts to screen for breast cancer. Experts recommend mammograms every 2 years starting at age 50 years. You may need a mammogram at age 49 years or younger if you have an increased risk for breast cancer. Talk to your healthcare provider about when you should start having mammograms and how often you need them.    Vaccines you may need:   Get an influenza vaccine  every year. The influenza vaccine protects you from the flu. Several types of viruses cause the flu. The viruses change over time, so new vaccines are made each year.    Get a tetanus-diphtheria (Td) booster vaccine  every 10 years. This vaccine protects you against tetanus and diphtheria. Tetanus is a severe infection that may cause painful muscle spasms and lockjaw. Diphtheria is a severe bacterial infection that causes a thick covering in the back of your mouth and throat.    Get a human papillomavirus (HPV) vaccine  if you are female and aged 19 to 26 or male 19 to 21 and never received it. This vaccine protects you from HPV infection. HPV is the most common infection spread by sexual contact. HPV may also cause vaginal, penile, and anal cancers.    Get a pneumococcal vaccine  if you are aged 65 years or older. The pneumococcal vaccine is an injection given to protect you from pneumococcal disease. Pneumococcal disease is an infection caused by pneumococcal bacteria. The infection may cause pneumonia, meningitis, or an ear infection.    Get a shingles vaccine  if you are 60 or older, even if you have had shingles before. The shingles vaccine is an injection to protect you from the varicella-zoster virus. This is the same virus that causes chickenpox. Shingles is a painful rash that develops in people who had chickenpox or have  been exposed to the virus.    How to eat healthy:  My Plate is a model for planning healthy meals. It shows the types and amounts of foods that should go on your plate. Fruits and vegetables make up about half of your plate, and grains and protein make up the other half. A serving of dairy is included on the side of your plate. The amount of calories and serving sizes you need depends on your age, gender, weight, and height. Examples of healthy foods are listed below:  Eat a variety of vegetables  such as dark green, red, and orange vegetables. You can also include canned vegetables low in sodium (salt) and frozen vegetables without added butter or sauces.    Eat a variety of fresh fruits , canned fruit in 100% juice, frozen fruit, and dried fruit.    Include whole grains.  At least half of the grains you eat should be whole grains. Examples include whole-wheat bread, wheat pasta, brown rice, and whole-grain cereals such as oatmeal.    Eat a variety of protein foods such as seafood (fish and shellfish), lean meat, and poultry without skin (turkey and chicken). Examples of lean meats include pork leg, shoulder, or tenderloin, and beef round, sirloin, tenderloin, and extra lean ground beef. Other protein foods include eggs and egg substitutes, beans, peas, soy products, nuts, and seeds.    Choose low-fat dairy products such as skim or 1% milk or low-fat yogurt, cheese, and cottage cheese.    Limit unhealthy fats  such as butter, hard margarine, and shortening.       Exercise:  Exercise at least 30 minutes per day on most days of the week. Some examples of exercise include walking, biking, dancing, and swimming. You can also fit in more physical activity by taking the stairs instead of the elevator or parking farther away from stores. Include muscle strengthening activities 2 days each week. Regular exercise provides many health benefits. It helps you manage your weight, and decreases your risk for type 2 diabetes,  71 M with COPD, CAD s/p PCI x 2, TAVR, here with acute hypoxemic respiratory failure due to parainfluenza pna requiring intubation, now extubated with acute on chronic hypercapnic respiratory failure.    Feels he has some W[place]MAN but not at rest.  No chest pain with it, no wheezing.  Self resolves after a few minutes of rest.  Has not used NIPPV last few days, feels well overall, slowly improving.    #acute on chronic hypercapnic respiratory failure  # acute hypoxemic respiratory failure   # COPD  # parainfluenza infection  - parainfluenza likely causing hypoxemia, exacerbated chronic hypercapnic respiratory failure due to underlying OCPD  - can check VBG in AM off NIPPV  - wean NC o2 as tolerated, no need for steroids  - would start Symbicort, spiriva for COPD and stop standing nebulizers (can make nebulizer prn)  - had singulair in past, no need for it at this time heart disease, stroke, and high blood pressure. Exercise can also help improve your mood. Ask your healthcare provider about the best exercise plan for you.       General health and safety guidelines:   Do not smoke.  Nicotine and other chemicals in cigarettes and cigars can cause lung damage. Ask your healthcare provider for information if you currently smoke and need help to quit. E-cigarettes or smokeless tobacco still contain nicotine. Talk to your healthcare provider before you use these products.    Limit alcohol.  A drink of alcohol is 12 ounces of beer, 5 ounces of wine, or 1½ ounces of liquor.    Lose weight, if needed.  Being overweight increases your risk of certain health conditions. These include heart disease, high blood pressure, type 2 diabetes, and certain types of cancer.    Protect your skin.  Do not sunbathe or use tanning beds. Use sunscreen with a SPF 15 or higher. Apply sunscreen at least 15 minutes before you go outside. Reapply sunscreen every 2 hours. Wear protective clothing, hats, and sunglasses when you are outside.    Drive safely.  Always wear your seatbelt. Make sure everyone in your car wears a seatbelt. A seatbelt can save your life if you are in an accident. Do not use your cell phone when you are driving. This could distract you and cause an accident. Pull over if you need to make a call or send a text message.    Practice safe sex.  Use latex condoms if are sexually active and have more than one partner. Your healthcare provider may recommend screening tests for sexually transmitted infections (STIs).    Wear helmets, lifejackets, and protective gear.  Always wear a helmet when you ride a bike or motorcycle, go skiing, or play sports that could cause a head injury. Wear protective equipment when you play sports. Wear a lifejacket when you are on a boat or doing water sports.    © Copyright [employer] 2023 Information is for End User's use only and may not be sold, redistributed or  otherwise used for commercial purposes.  The above information is an  only. It is not intended as medical advice for individual conditions or treatments. Talk to your doctor, nurse or pharmacist before following any medical regimen to see if it is safe and effective for you.

## 2024-05-22 NOTE — PROGRESS NOTES
Adult Annual Physical  Name: Jacek Valles      : 1955      MRN: [id number]  Encounter Provider: Sunni Burton DO  Encounter Date: 2024   Encounter department: [facility]    Assessment & Plan   1. Annual physical exam  2. Type 2 diabetes mellitus with diabetic neuropathy, without long-term current use of insulin (HCC)  Assessment & Plan:    Lab Results   Component Value Date    HGBA1C 6.9 (H) 2024   Improving control with mounjaro  Orders:  -     Hemoglobin A1C; Future; Expected date: 2024  -     Lipid Panel with Direct LDL reflex; Future; Expected date: 2024  -     Mounjaro 2.5 MG/0.5ML; Inject 0.5 mL (2.5 mg total) under the skin once a week  3. Fatty liver  Assessment & Plan:  Has been working on weight loss and has lost 8 pounds  Orders:  -     CBC; Future; Expected date: 2024  -     Comprehensive metabolic panel; Future; Expected date: 2024  4. Screening for prostate cancer  -     PSA, Total Screen; Future  5. Encounter for long-term current use of medication  -     Vitamin B12; Future; Expected date: 2024    Immunizations and preventive care screenings were discussed with patient today. Appropriate education was printed on patient's after visit summary.        Counseling:  Dental Health: discussed importance of regular tooth brushing, flossing, and dental visits.  Exercise: the importance of regular exercise/physical activity was discussed. Recommend exercise 3-5 times per week for at least 30 minutes.          History of Present Illness     Adult Annual Physical:  Patient presents for annual physical. He is still feeling nauseated from the injection the first day for a couple of hours. .     Diet and Physical Activity:  - Diet/Nutrition: well balanced diet.  - Exercise: walking. yardwork    Depression Screening:  - PHQ-2 Score: 0    General Health:  - Sleep: sleeps well.  - Hearing: normal hearing bilateral ears.  - Vision: no vision  "problems.  - Dental: no dental visits for > 1 year.    Advanced Care Planning:  - Has an advanced directive?: yes      Review of Systems   Constitutional: Negative.    Respiratory: Negative.     Cardiovascular: Negative.          Objective     /62   Pulse 80   Temp 97.6 °F (36.4 °C) (Tympanic)   Resp 16   Ht 5' 10\" (1.778 m)   Wt 113 kg (249 lb 9.6 oz)   BMI 35.81 kg/m²     Physical Exam  Vitals reviewed.   Constitutional:       Appearance: He is well-developed.   HENT:      Head: Normocephalic and atraumatic.      Right Ear: External ear normal.      Left Ear: External ear normal.   Cardiovascular:      Rate and Rhythm: Normal rate and regular rhythm.      Pulses: no weak pulses.           Dorsalis pedis pulses are 2+ on the right side and 2+ on the left side.        Posterior tibial pulses are 2+ on the right side and 2+ on the left side.      Heart sounds: Normal heart sounds. No murmur heard.  Pulmonary:      Effort: Pulmonary effort is normal. No respiratory distress.      Breath sounds: Normal breath sounds. No wheezing.   Feet:      Right foot:      Skin integrity: No ulcer, skin breakdown, erythema, warmth, callus or dry skin.      Left foot:      Skin integrity: No ulcer, skin breakdown, erythema, warmth, callus or dry skin.   Skin:     General: Skin is warm and dry.   Neurological:      Mental Status: He is alert.      Patient's shoes and socks removed.    Right Foot/Ankle   Right Foot Inspection  Skin Exam: skin normal. Skin not intact, no dry skin, no warmth, no callus, no erythema, no maceration, no abnormal color, no pre-ulcer, no ulcer and no callus.     Toe Exam: ROM and strength within normal limits.     Sensory   Monofilament testing: intact    Vascular  Capillary refills: < 3 seconds  The right DP pulse is 2+. The right PT pulse is 2+.     Left Foot/Ankle  Left Foot Inspection  Skin Exam: skin normal. Skin not intact, no dry skin, no warmth, no erythema, no maceration, normal color, no " pre-ulcer, no ulcer and no callus.     Toe Exam: ROM and strength within normal limits.     Sensory   Monofilament testing: intact    Vascular  Capillary refills: < 3 seconds  The left DP pulse is 2+. The left PT pulse is 2+.     Assign Risk Category  No deformity present  No loss of protective sensation  No weak pulses  Risk: 0    Vision Screening    Right eye Left eye Both eyes   Without correction 20/20 20/20 20/20   With correction        Administrative Statements

## 2024-07-07 DIAGNOSIS — Z85.46 HISTORY OF PROSTATE CANCER: ICD-10-CM

## 2024-07-08 RX ORDER — TAMSULOSIN HYDROCHLORIDE 0.4 MG/1
0.4 CAPSULE ORAL
Qty: 100 CAPSULE | Refills: 1 | Status: SHIPPED | OUTPATIENT
Start: 2024-07-08

## 2024-08-03 DIAGNOSIS — E11.40 TYPE 2 DIABETES MELLITUS WITH DIABETIC NEUROPATHY, WITHOUT LONG-TERM CURRENT USE OF INSULIN (HCC): ICD-10-CM

## 2024-08-04 RX ORDER — METFORMIN HYDROCHLORIDE 500 MG/1
2000 TABLET, EXTENDED RELEASE ORAL
Qty: 360 TABLET | Refills: 1 | Status: SHIPPED | OUTPATIENT
Start: 2024-08-04

## 2024-08-09 ENCOUNTER — APPOINTMENT (OUTPATIENT)
Dept: LAB | Facility: CLINIC | Age: 69
End: 2024-08-09
Payer: COMMERCIAL

## 2024-08-09 DIAGNOSIS — Z12.5 SCREENING FOR PROSTATE CANCER: ICD-10-CM

## 2024-08-09 DIAGNOSIS — Z79.899 ENCOUNTER FOR LONG-TERM CURRENT USE OF MEDICATION: ICD-10-CM

## 2024-08-09 DIAGNOSIS — K76.0 FATTY LIVER: ICD-10-CM

## 2024-08-09 DIAGNOSIS — E11.40 TYPE 2 DIABETES MELLITUS WITH DIABETIC NEUROPATHY, WITHOUT LONG-TERM CURRENT USE OF INSULIN (HCC): ICD-10-CM

## 2024-08-09 LAB
ALBUMIN SERPL BCG-MCNC: 4.5 G/DL (ref 3.5–5)
ALP SERPL-CCNC: 48 U/L (ref 34–104)
ALT SERPL W P-5'-P-CCNC: 25 U/L (ref 7–52)
ANION GAP SERPL CALCULATED.3IONS-SCNC: 7 MMOL/L (ref 4–13)
AST SERPL W P-5'-P-CCNC: 23 U/L (ref 13–39)
BILIRUB SERPL-MCNC: 0.6 MG/DL (ref 0.2–1)
BUN SERPL-MCNC: 16 MG/DL (ref 5–25)
CALCIUM SERPL-MCNC: 9.5 MG/DL (ref 8.4–10.2)
CHLORIDE SERPL-SCNC: 103 MMOL/L (ref 96–108)
CHOLEST SERPL-MCNC: 142 MG/DL
CO2 SERPL-SCNC: 28 MMOL/L (ref 21–32)
CREAT SERPL-MCNC: 0.82 MG/DL (ref 0.6–1.3)
ERYTHROCYTE [DISTWIDTH] IN BLOOD BY AUTOMATED COUNT: 12.2 % (ref 11.6–15.1)
EST. AVERAGE GLUCOSE BLD GHB EST-MCNC: 137 MG/DL
GFR SERPL CREATININE-BSD FRML MDRD: 90 ML/MIN/1.73SQ M
GLUCOSE P FAST SERPL-MCNC: 137 MG/DL (ref 65–99)
HBA1C MFR BLD: 6.4 %
HCT VFR BLD AUTO: 47.3 % (ref 36.5–49.3)
HDLC SERPL-MCNC: 64 MG/DL
HGB BLD-MCNC: 15.8 G/DL (ref 12–17)
LDLC SERPL CALC-MCNC: 58 MG/DL (ref 0–100)
MCH RBC QN AUTO: 31.7 PG (ref 26.8–34.3)
MCHC RBC AUTO-ENTMCNC: 33.4 G/DL (ref 31.4–37.4)
MCV RBC AUTO: 95 FL (ref 82–98)
PLATELET # BLD AUTO: 181 THOUSANDS/UL (ref 149–390)
PMV BLD AUTO: 10.9 FL (ref 8.9–12.7)
POTASSIUM SERPL-SCNC: 4.2 MMOL/L (ref 3.5–5.3)
PROT SERPL-MCNC: 7 G/DL (ref 6.4–8.4)
PSA SERPL-MCNC: <0.008 NG/ML (ref 0–4)
RBC # BLD AUTO: 4.99 MILLION/UL (ref 3.88–5.62)
SODIUM SERPL-SCNC: 138 MMOL/L (ref 135–147)
TRIGL SERPL-MCNC: 98 MG/DL
VIT B12 SERPL-MCNC: 192 PG/ML (ref 180–914)
WBC # BLD AUTO: 5.51 THOUSAND/UL (ref 4.31–10.16)

## 2024-08-09 PROCEDURE — 80061 LIPID PANEL: CPT

## 2024-08-09 PROCEDURE — 82607 VITAMIN B-12: CPT

## 2024-08-09 PROCEDURE — 85027 COMPLETE CBC AUTOMATED: CPT

## 2024-08-09 PROCEDURE — 36415 COLL VENOUS BLD VENIPUNCTURE: CPT

## 2024-08-09 PROCEDURE — 83036 HEMOGLOBIN GLYCOSYLATED A1C: CPT

## 2024-08-09 PROCEDURE — 80053 COMPREHEN METABOLIC PANEL: CPT

## 2024-08-09 PROCEDURE — G0103 PSA SCREENING: HCPCS

## 2024-08-29 ENCOUNTER — OFFICE VISIT (OUTPATIENT)
Dept: FAMILY MEDICINE CLINIC | Facility: CLINIC | Age: 69
End: 2024-08-29
Payer: COMMERCIAL

## 2024-08-29 ENCOUNTER — APPOINTMENT (OUTPATIENT)
Dept: RADIOLOGY | Facility: CLINIC | Age: 69
End: 2024-08-29
Payer: COMMERCIAL

## 2024-08-29 VITALS
TEMPERATURE: 97.3 F | BODY MASS INDEX: 35.3 KG/M2 | HEIGHT: 70 IN | DIASTOLIC BLOOD PRESSURE: 72 MMHG | RESPIRATION RATE: 18 BRPM | SYSTOLIC BLOOD PRESSURE: 118 MMHG | HEART RATE: 72 BPM | WEIGHT: 246.6 LBS

## 2024-08-29 DIAGNOSIS — E53.8 VITAMIN B12 DEFICIENCY: ICD-10-CM

## 2024-08-29 DIAGNOSIS — Z23 ENCOUNTER FOR IMMUNIZATION: ICD-10-CM

## 2024-08-29 DIAGNOSIS — I10 ESSENTIAL HYPERTENSION: ICD-10-CM

## 2024-08-29 DIAGNOSIS — M79.642 LEFT HAND PAIN: ICD-10-CM

## 2024-08-29 DIAGNOSIS — E78.2 MIXED HYPERLIPIDEMIA: ICD-10-CM

## 2024-08-29 DIAGNOSIS — E11.40 TYPE 2 DIABETES MELLITUS WITH DIABETIC NEUROPATHY, WITHOUT LONG-TERM CURRENT USE OF INSULIN (HCC): Primary | ICD-10-CM

## 2024-08-29 LAB
LEFT EYE DIABETIC RETINOPATHY: ABNORMAL
LEFT EYE IMAGE QUALITY: ABNORMAL
LEFT EYE MACULAR EDEMA: ABNORMAL
LEFT EYE OTHER RETINOPATHY: ABNORMAL
RIGHT EYE DIABETIC RETINOPATHY: ABNORMAL
RIGHT EYE IMAGE QUALITY: ABNORMAL
RIGHT EYE MACULAR EDEMA: ABNORMAL
RIGHT EYE OTHER RETINOPATHY: ABNORMAL
SEVERITY (EYE EXAM): ABNORMAL

## 2024-08-29 PROCEDURE — 90632 HEPA VACCINE ADULT IM: CPT

## 2024-08-29 PROCEDURE — 90471 IMMUNIZATION ADMIN: CPT

## 2024-08-29 PROCEDURE — 73130 X-RAY EXAM OF HAND: CPT

## 2024-08-29 PROCEDURE — 92250 FUNDUS PHOTOGRAPHY W/I&R: CPT | Performed by: FAMILY MEDICINE

## 2024-08-29 PROCEDURE — 99214 OFFICE O/P EST MOD 30 MIN: CPT | Performed by: FAMILY MEDICINE

## 2024-08-29 RX ORDER — LANOLIN ALCOHOL/MO/W.PET/CERES
1000 CREAM (GRAM) TOPICAL DAILY
Start: 2024-08-29

## 2024-08-29 RX ORDER — LISINOPRIL 10 MG/1
10 TABLET ORAL DAILY
Qty: 100 TABLET | Refills: 1 | Status: SHIPPED | OUTPATIENT
Start: 2024-08-29

## 2024-08-29 NOTE — ASSESSMENT & PLAN NOTE
Stable   Continue rosuvastatin 20 mg a day   Orders:    Comprehensive metabolic panel; Future    Lipid Panel with Direct LDL reflex; Future

## 2024-08-29 NOTE — PROGRESS NOTES
"Ambulatory Visit  Name: Jacek Valles      : 1955      MRN: [id number]  Encounter Provider: Sunni Burton DO  Encounter Date: 2024   Encounter department: [facility]      Assessment & Plan  Type 2 diabetes mellitus with diabetic neuropathy, without long-term current use of insulin (HCC)  Diabetes is well-controlled  Lab Results   Component Value Date    HGBA1C 6.4 (H) 2024     Orders:  •  IRIS Diabetic eye exam  •  Hemoglobin A1C; Future  •  Albumin / creatinine urine ratio; Future    Encounter for immunization    Orders:  •  HEPATITIS A VACCINE ADULT IM      Mixed hyperlipidemia  Stable   Continue rosuvastatin 20 mg a day   Orders:  •  Comprehensive metabolic panel; Future  •  Lipid Panel with Direct LDL reflex; Future    Essential hypertension  Blood pressure well-controlled  Continue lisinopril 10 mg daily  Orders:  •  lisinopril (ZESTRIL) 10 mg tablet; Take 1 tablet (10 mg total) by mouth daily  •  CBC; Future    Left hand pain    Orders:  •  XR hand 3+ vw left; Future      Vitamin B12 deficiency      We discussed this is secondary to his metformin use  He agreed to start taking over-the-counter vitamin B12 daily  Orders:  •  vitamin B-12 (VITAMIN B-12) 1,000 mcg tablet; Take 1 tablet (1,000 mcg total) by mouth daily    Return in about 6 months (around 2025) for Next scheduled follow up.       History of Present Illness     Patient reports that he has been doing well on his diabetes medication.  He is happy to see his blood sugars much better controlled.    He is concerned about pain that is going on his left hand.  He fell a few weeks ago and the pain has not improved.  He has already scheduled an appointment with a hand surgeon but is not for another couple weeks      Review of Systems  Objective     /72   Pulse 72   Temp (!) 97.3 °F (36.3 °C) (Tympanic)   Resp 18   Ht 5' 10\" (1.778 m)   Wt 112 kg (246 lb 9.6 oz)   BMI 35.38 kg/m²     Physical Exam  Vitals " and nursing note reviewed.   Constitutional:       General: He is not in acute distress.     Appearance: He is well-developed.   HENT:      Right Ear: Tympanic membrane normal.      Left Ear: Tympanic membrane normal.   Cardiovascular:      Rate and Rhythm: Normal rate and regular rhythm.      Heart sounds: No murmur heard.  Pulmonary:      Effort: Pulmonary effort is normal. No respiratory distress.      Breath sounds: Normal breath sounds.   Musculoskeletal:      Comments: Tenderness left hand   Neurological:      Mental Status: He is alert.   Psychiatric:         Mood and Affect: Mood normal.         Sunni Burton, DO

## 2024-08-29 NOTE — ASSESSMENT & PLAN NOTE
Blood pressure well-controlled  Continue lisinopril 10 mg daily  Orders:    lisinopril (ZESTRIL) 10 mg tablet; Take 1 tablet (10 mg total) by mouth daily    CBC; Future

## 2024-08-29 NOTE — ASSESSMENT & PLAN NOTE
We discussed this is secondary to his metformin use  He agreed to start taking over-the-counter vitamin B12 daily  Orders:    vitamin B-12 (VITAMIN B-12) 1,000 mcg tablet; Take 1 tablet (1,000 mcg total) by mouth daily

## 2024-08-29 NOTE — ASSESSMENT & PLAN NOTE
Diabetes is well-controlled  Lab Results   Component Value Date    HGBA1C 6.4 (H) 08/09/2024     Orders:    IRIS Diabetic eye exam    Hemoglobin A1C; Future    Albumin / creatinine urine ratio; Future

## 2024-08-30 ENCOUNTER — TELEPHONE (OUTPATIENT)
Dept: FAMILY MEDICINE CLINIC | Facility: CLINIC | Age: 69
End: 2024-08-30

## 2024-08-30 NOTE — TELEPHONE ENCOUNTER
8/30/2024 7:51 AM Called Jacek regarding his abnormal IRIS exam  It shows dry AMD in both eyes.    He needs to follow up with his eye doctor for a full dilated eye exam to confirm what is going on.    [employer] message sent  Left message on his voicemail to call the office.   Sunni Burton,

## 2024-09-18 ENCOUNTER — OFFICE VISIT (OUTPATIENT)
Dept: OBGYN CLINIC | Facility: CLINIC | Age: 69
End: 2024-09-18
Payer: COMMERCIAL

## 2024-09-18 VITALS — BODY MASS INDEX: 35.22 KG/M2 | WEIGHT: 246 LBS | HEIGHT: 70 IN

## 2024-09-18 DIAGNOSIS — M25.642 JOINT STIFFNESS OF HAND, LEFT: ICD-10-CM

## 2024-09-18 DIAGNOSIS — M79.5 FOREIGN BODY (FB) IN SOFT TISSUE: Primary | ICD-10-CM

## 2024-09-18 DIAGNOSIS — M19.042 ARTHRITIS OF FINGER OF LEFT HAND: ICD-10-CM

## 2024-09-18 PROCEDURE — 99214 OFFICE O/P EST MOD 30 MIN: CPT | Performed by: SURGERY

## 2024-09-18 NOTE — PROGRESS NOTES
Paulo Guzman M.D.  Attending, Orthopaedic Surgery  Hand, Wrist, and Elbow Surgery  [facility]      ORTHOPAEDIC HAND, WRIST, AND ELBOW OFFICE  VISIT       ASSESSMENT/PLAN:      68 year old male here for his left hand stiffness secondary to arthritis, flexor tendonitis post fall. Recommend to start heat in the morning and evening for 20mins each to help with the arthritis. Start topical Voltaren gel 1% for the small joints of the hand. An US of the tip of the small finger to look for a foreign body.   We will follow up after US    The patient verbalized understanding of exam findings and treatment plan. We engaged in the shared decision-making process and treatment options were discussed at length with the patient. Surgical and conservative management discussed today along with risks and benefits.    Diagnoses and all orders for this visit:    Foreign body (FB) in soft tissue  -     US MSK limited; Future    Joint stiffness of hand, left  -     Ambulatory Referral to PT/OT Hand Therapy; Future    Arthritis of finger of left hand  -     Ambulatory Referral to PT/OT Hand Therapy; Future        Follow Up:  Return in about 6 weeks (around 10/30/2024) for left hand.    To Do Next Visit:  Re-evaluation of current issue      General Discussions:  Osteoarthritis:  The anatomy and physiology of osteoarthritis was discussed with the patient today in the office.  Deterioration of the articular cartilage eventually leads to altered mobility at the joint, resulting in joint subluxation, osteophyte formation, cystic changes, as well as subchondral sclerosis.  Eventually, pain, limited mobility, and compensatory hypermobility at surrounding joints may develop.  While normal activity and usage of the joint may provide a painful experience to the patient, this typically does not result in damage to the limb.  Treatment options include splints to decreased joint edema, pain, and inflammation.  Therapy  exercises to strengthen the surrounding musculature may relieve pain, but do not alter the overall continued development of osteoarthritis.  Oral medications, topical medications, corticosteroid injections may decrease pain and increase overall function.  Eventually, some patients may require surgical intervention.       ____________________________________________________________________________________________________________________________________________      CHIEF COMPLAINT:  Chief Complaint   Patient presents with    Left Hand - Pain     Patient had a fall in August and landed on his hand.        SUBJECTIVE:  Jacek Valles is a 68 y.o. year old RHD male who presents for initial evaluation for his left hand.  Patient reports that he had a fall approximately 1 month ago to the left hand on concrete.  He notes he continues to have pain on the ulnar aspect of the palm and does have pain at the pad of the small finger.  Initially patient used ice on the hand, but no other interventions.  He then saw his primary care on 8/29/2024 where left hand x-rays were obtained that will be reviewed today.      Pain/symptom timing:  Worse during the day when active  Pain/symptom context:  Worse with activites and work  Pain/symptom modifying factors:  Rest makes better, activities make worse  Pain/symptom associated signs/symptoms: none    Prior treatment   NSAIDsNo   Injections No   Bracing/Orthotics No    Physical Therapy No     I have personally reviewed all the relevant PMH, PSH, SH, FH, Medications and allergies      PAST MEDICAL HISTORY:  Past Medical History:   Diagnosis Date    Arthritis     Basal cell carcinoma 2005    Cancer (HCC) 2012    Chest tightness     Colon polyp     Diabetes (HCC)     Diabetes mellitus (HCC)     Diverticulosis of sigmoid colon     Esophageal reflux     Fatty liver     GERD (gastroesophageal reflux disease)     Hearing problem     High cholesterol     History of chest pain     Hyperlipemia      Hypertension     Lumbago     Malignant neoplasm of prostate (HCC)     Melanoma (HCC)     Prostate CA (HCC)     Squamous cell skin cancer 2020    right dorsal forearm    Tinea pedis of both feet     Trochanteric bursitis        PAST SURGICAL HISTORY:  Past Surgical History:   Procedure Laterality Date    APPENDECTOMY      COLONOSCOPY  2016    HERNIA REPAIR      ? Umbilical    MOHS SURGERY  2019    SC ARTHRS KNE SURG W/MENISCECTOMY MED/LAT W/SHVG Left 03/15/2022    Procedure: ARTHROSCOPY KNEE,  partial medial menisectomy;  Surgeon: Eren Hernandez DO;  Location: [facility] MAIN OR;  Service: Orthopedics    SC REPAIR FIRST ABDOMINAL WALL HERNIA N/A 2021    Procedure: INCISIONAL HERNIA REPAIR AT THE UMBILICUS;  Surgeon: Glen Patino MD;  Location: [facility] MAIN OR;  Service: General    PROSTATECTOMY N/A     PROSTATECTOMY      SKIN BIOPSY         FAMILY HISTORY:  Family History   Problem Relation Age of Onset    Arthritis Mother     Parkinsonism Mother     Heart disease Father     Arthritis Father     Coronary artery disease Father     Hypertension Father     Parkinsonism Father     Mental illness Neg Hx        SOCIAL HISTORY:  Social History     Tobacco Use    Smoking status: Former     Current packs/day: 0.00     Average packs/day: 1 pack/day for 15.1 years (15.1 ttl pk-yrs)     Types: Cigarettes     Start date: 1997     Quit date: 2012     Years since quittin.2     Passive exposure: Current    Smokeless tobacco: Never   Vaping Use    Vaping status: Never Used   Substance Use Topics    Alcohol use: Yes     Alcohol/week: 6.0 standard drinks of alcohol     Types: 6 Cans of beer per week     Comment: Weekends    Drug use: No       MEDICATIONS:    Current Outpatient Medications:     ACCU-CHEK FASTCLIX LANCETS MISC, by Does not apply route daily Dx: E11.40, Disp: 100 each, Rfl: 1    acetaminophen (TYLENOL) 500 mg tablet, Take 500 mg by mouth every 6 (six) hours as needed for mild pain, Disp:  , Rfl:     Empagliflozin (Jardiance) 25 MG TABS, Take 1 tablet (25 mg total) by mouth every morning, Disp: 90 tablet, Rfl: 1    fluticasone (FLONASE) 50 mcg/act nasal spray, 1 spray into each nostril daily, Disp: 48 g, Rfl: 1    glucose blood (ACCU-CHEK GUIDE) test strip, 1 each by Other route daily DX: e11.40, Disp: 100 each, Rfl: 1    lisinopril (ZESTRIL) 10 mg tablet, Take 1 tablet (10 mg total) by mouth daily, Disp: 100 tablet, Rfl: 1    metFORMIN (GLUCOPHAGE-XR) 500 mg 24 hr tablet, Take 4 tablets (2,000 mg total) by mouth daily with breakfast, Disp: 360 tablet, Rfl: 1    Mounjaro 2.5 MG/0.5ML, Inject 0.5 mL (2.5 mg total) under the skin once a week, Disp: 6 mL, Rfl: 3    omeprazole (PriLOSEC) 20 mg delayed release capsule, Take 1 capsule (20 mg total) by mouth as needed (heartburn), Disp: 90 capsule, Rfl: 1    rosuvastatin (CRESTOR) 20 MG tablet, TAKE 1 TABLET DAILY, Disp: 90 tablet, Rfl: 3    tamsulosin (FLOMAX) 0.4 mg, Take 1 capsule (0.4 mg total) by mouth daily with dinner, Disp: 100 capsule, Rfl: 1    vitamin B-12 (VITAMIN B-12) 1,000 mcg tablet, Take 1 tablet (1,000 mcg total) by mouth daily, Disp: , Rfl:     ALLERGIES:  Allergies   Allergen Reactions    Penicillins Other (See Comments)      Ancef can tolerate           REVIEW OF SYSTEMS:  Review of Systems   Constitutional:  Negative for chills, fever and unexpected weight change.   HENT:  Negative for hearing loss, nosebleeds and sore throat.    Eyes:  Negative for pain, redness and visual disturbance.   Respiratory:  Negative for cough, shortness of breath and wheezing.    Cardiovascular:  Negative for chest pain, palpitations and leg swelling.   Gastrointestinal:  Negative for abdominal pain, nausea and vomiting.   Endocrine: Negative for polydipsia and polyuria.   Genitourinary:  Negative for dysuria and hematuria.   Skin:  Negative for rash and wound.   Neurological:  Negative for dizziness, light-headedness and headaches.   Psychiatric/Behavioral:   Negative for decreased concentration, dysphoric mood and suicidal ideas. The patient is not nervous/anxious.        VITALS:  There were no vitals filed for this visit.    LABS:      _____________________________________________________  PHYSICAL EXAMINATION:  General: well developed and well nourished, alert, oriented times 3, and appears comfortable  Psychiatric: Normal  HEENT: Normocephalic, Atraumatic Trachea Midline, No torticollis  Pulmonary: No audible wheezing or respiratory distress   Abdomen/GI: Non tender, non distended   Cardiovascular: No pitting edema, 2+ radial pulse   Skin: No masses, erythema, lacerations, fluctation, ulcerations  Neurovascular: Sensation Intact to the Median, Ulnar, Radial Nerve, Motor Intact to the Median, Ulnar, Radial Nerve, and Pulses Intact  Musculoskeletal: Normal, except as noted in detailed exam and in HPI.      MUSCULOSKELETAL EXAMINATION:  Left hand:    No pain at the pisiform    No tenderness at the MP joint of the small finger  + tenderness in the PIP joint of the small finger  + Tenderness at the A1 pulley small finger  Dupuytren's cords along of ring finger and small with no contractures  Small healed scar noted over the pad  +2 DPC of all fingers  Opposition intact but difficult  Brisk capillary refill   No TTP hook of hamate or pisiform     ___________________________________________________  STUDIES REVIEWED:  I have personally reviewed and interpreted  AP lateral and oblique radiographs of xrays of the left hand 3 views reviewed from 8/29/2024   which demonstrate no acute fracture/dislocation, scattered degenerative changes       LABS REVIEWED:    HgA1c:   Lab Results   Component Value Date    HGBA1C 6.4 (H) 08/09/2024     BMP:   Lab Results   Component Value Date    GLUCOSE 137 (H) 12/05/2017    CALCIUM 9.5 08/09/2024     12/05/2017    K 4.2 08/09/2024    CO2 28 08/09/2024     08/09/2024    BUN 16 08/09/2024    CREATININE 0.82 08/09/2024              PROCEDURES PERFORMED:  Procedures  No Procedures performed today    _____________________________________________________      Scribe Attestation      I,:  Luisa Oneal am acting as a scribe while in the presence of the attending physician.:       I,:  Paulo Guzman MD personally performed the services described in this documentation    as scribed in my presence.:

## 2024-09-20 ENCOUNTER — EVALUATION (OUTPATIENT)
Dept: OCCUPATIONAL THERAPY | Facility: CLINIC | Age: 69
End: 2024-09-20
Payer: COMMERCIAL

## 2024-09-20 ENCOUNTER — HOSPITAL ENCOUNTER (OUTPATIENT)
Dept: RADIOLOGY | Facility: HOSPITAL | Age: 69
Discharge: HOME | End: 2024-09-20
Attending: SURGERY
Payer: COMMERCIAL

## 2024-09-20 DIAGNOSIS — M79.5 FOREIGN BODY (FB) IN SOFT TISSUE: ICD-10-CM

## 2024-09-20 DIAGNOSIS — M19.042 ARTHRITIS OF FINGER OF LEFT HAND: ICD-10-CM

## 2024-09-20 DIAGNOSIS — M19.042 ARTHRITIS OF LEFT HAND: ICD-10-CM

## 2024-09-20 DIAGNOSIS — M25.642 JOINT STIFFNESS OF HAND, LEFT: ICD-10-CM

## 2024-09-20 DIAGNOSIS — M25.642 STIFFNESS OF JOINT, HAND, LEFT: Primary | ICD-10-CM

## 2024-09-20 PROCEDURE — 97110 THERAPEUTIC EXERCISES: CPT | Performed by: OCCUPATIONAL THERAPIST

## 2024-09-20 PROCEDURE — 76882 US LMTD JT/FCL EVL NVASC XTR: CPT

## 2024-09-20 PROCEDURE — 97165 OT EVAL LOW COMPLEX 30 MIN: CPT | Performed by: OCCUPATIONAL THERAPIST

## 2024-09-20 NOTE — PROGRESS NOTES
OT Evaluation     Today's date: 2024  Patient name: Jacek Valles  : 1955  MRN: [id number]  Referring provider: Paulo Guzman MD  Dx:   Encounter Diagnosis     ICD-10-CM    1. Stiffness of joint, hand, left  M25.642       2. Arthritis of left hand  M19.042                      Assessment  Impairments: abnormal or restricted ROM, activity intolerance, impaired physical strength, lacks appropriate home exercise program, pain with function and activity limitations    Assessment details: Jacek is referred to therapy with a formal diagnosis of joint stiffness of hand, left, arthritis of finger of left hand, and suspected flexor tendonitis. Based on his presentation, it appears that the ring and small finger FDS is most affected. Flexion of all the digits is reduced, and painful. There is intrinsic tightness as well as capsular restriction affecting flexion.  strength is also reduced from the contralateral side. He will benefit from skilled occupational therapy at a frequency of one time per week to address these deficits and to improve functional use of the left extremity. See below for a detailed assessment.         Goals  STG: Patient will be compliant with home exercise program in 1 week.  STG: Pain will not exceed a 2/10  during appropriate activity and during therapy in 4 weeks.  STG: Range of motion of left hand will be improved to form a composite fist in 4 weeks.  STG: Strength will be improved to 75 pounds  strength in 4 weeks.     LTG: Strength will be improved to 85 pounds  strength in 6-8 weeks or discharge.   LTG: Performance in ADLs and IADLS will be improved to prior level of function with the affected extremity within 6 weeks or discharge.   LTG: FOTO score increase by 20 points within 6 weeks or discharge.                 Plan  Patient would benefit from: skilled OT, OT eval and home program  Planned modality interventions: thermotherapy: hydrocollator  packs    Planned therapy interventions: home exercise program, joint mobilization, manual therapy, therapeutic exercise, patient education, therapeutic activities, neuromuscular re-education, IASTM, strengthening and stretching    Frequency: 1x week  Plan of Care beginning date: 2024  Plan of Care expiration date: 2024  Treatment plan discussed with: patient  Plan details: Treatment to include modalities, manual therapy, PRE's, HEP, and orthotics as appropriate.       Subjective Evaluation    History of Present Illness  Mechanism of injury: Jacek reports that he fell 5 or 6 weeks ago, bracing himself with his left hand. He is experiencing reduced motion in the hand, pain, and functional impairment. He denies injury to the left hand previously, but states that he is affected by arthritis.   Patient Goals  Patient goals for therapy: decreased pain, increased motion, increased strength and independence with ADLs/IADLs  Patient goal: Be able to ride motorcycle  Pain  Current pain ratin  At worst pain ratin  Location: volar small and ring finger  Quality: dull ache  Exacerbated by: making a fist.    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     Neurological Testing     Additional Neurological Details  Denies numbness or tingling.     Active Range of Motion     Left Digits    Flexion   Index     MCP: 62    PIP: 80    DIP: 40  Middle     MCP: 70    PIP: 70    DIP: 40  Ring     MCP: 70    PIP: 72    DIP: 40  Little     MCP: 73    PIP: 65    DIP: 45  Extension   Little     MCP: -12    PIP: -18    DIP: 3    Additional Active Range of Motion Details  Capsular restriction in the DIPs.    Strength/Myotome Testing     Left Wrist/Hand      (2nd hand position)     Trial 1: 61.2    Comments: 5/10  pain      Thumb Strength  Key/Lateral Pinch     Trial 1: 18.5  Palmar/Three-Point Pinch     Trial 1: 13.5    Right Wrist/Hand      (2nd hand position)     Trial 1: 99.4    Thumb Strength   Key/Lateral Pinch     Trial 1: 23.4  Palmar/Three-Point Pinch     Trial 1: 12    Tests     Left Wrist/Hand   Positive intrinsic muscle tightness.     Additional Tests Details  Reduced FDS gliding in the ring and small fingers.       Flowsheet Rows      Flowsheet Row Most Recent Value   PT/OT G-Codes    Current Score 59   Projected Score 73               Precautions: Universal      Manuals 9/20            IASTM ring and small finger FDS in the hand  5'                                                   Neuro Re-Ed                                                                                                        Ther Ex             HEP TG, FDS gliding, intrinsic stretch, intrinsic roll, place and holds for composite fist            Intrinsic stretch             Composite stretch for fist             Towel scrunches             Progressive grasp             Hook fist pegs             Keypegs             Gripping with sponge wedges                                        Ther Activity                                       Gait Training                                       Modalities             P 5'

## 2024-09-23 DIAGNOSIS — E78.2 MIXED HYPERLIPIDEMIA: ICD-10-CM

## 2024-09-23 DIAGNOSIS — K76.0 FATTY LIVER: ICD-10-CM

## 2024-09-23 RX ORDER — ROSUVASTATIN CALCIUM 20 MG/1
20 TABLET, COATED ORAL DAILY
Qty: 100 TABLET | Refills: 3 | Status: SHIPPED | OUTPATIENT
Start: 2024-09-23

## 2024-09-27 ENCOUNTER — OFFICE VISIT (OUTPATIENT)
Dept: OCCUPATIONAL THERAPY | Facility: CLINIC | Age: 69
End: 2024-09-27
Payer: COMMERCIAL

## 2024-09-27 DIAGNOSIS — M25.642 STIFFNESS OF JOINT, HAND, LEFT: Primary | ICD-10-CM

## 2024-09-27 DIAGNOSIS — M19.042 ARTHRITIS OF LEFT HAND: ICD-10-CM

## 2024-09-27 DIAGNOSIS — M25.642 JOINT STIFFNESS OF HAND, LEFT: ICD-10-CM

## 2024-09-27 PROCEDURE — 97110 THERAPEUTIC EXERCISES: CPT | Performed by: OCCUPATIONAL THERAPIST

## 2024-09-27 NOTE — PROGRESS NOTES
"Daily Note     Today's date: 2024  Patient name: Jacek Valles  : 1955  MRN: [id number]  Referring provider: Paulo Guzman MD  Dx:   Encounter Diagnosis     ICD-10-CM    1. Stiffness of joint, hand, left  M25.642       2. Arthritis of left hand  M19.042       3. Joint stiffness of hand, left  M25.642                      Subjective: \"I notice improvement in just one week\"      Objective: See treatment diary below      Assessment: Tolerated treatment well. Patient would benefit from continued OT. Improved presentation and ability to form composite fist.       Plan: Continue per plan of care.      Precautions: Universal      Manuals            IASTM ring and small finger FDS in the hand  5' 5'                                                  Neuro Re-Ed                                                                                                        Ther Ex             HEP TG, FDS gliding, intrinsic stretch, intrinsic roll, place and holds for composite fist            Intrinsic stretch  3'           Composite stretch for fist  3'           Towel scrunches  5x           Progressive grasp             Hook fist pegs  1x           Keypegs  1x           Wall walking   TB 5x each way           Digiflex  Yellow isolated 10x           Coordination balls   2'           Ball in hand circles  NV                        Ther Activity             Pegs into board/out with gripper  NV           Power web gripping   NV                                                  Modalities             MHP 5' 5'                             "

## 2024-09-30 LAB
LEFT EYE DIABETIC RETINOPATHY: NORMAL
RIGHT EYE DIABETIC RETINOPATHY: NORMAL

## 2024-10-04 ENCOUNTER — OFFICE VISIT (OUTPATIENT)
Dept: OCCUPATIONAL THERAPY | Facility: CLINIC | Age: 69
End: 2024-10-04
Payer: COMMERCIAL

## 2024-10-04 DIAGNOSIS — M19.042 ARTHRITIS OF LEFT HAND: ICD-10-CM

## 2024-10-04 DIAGNOSIS — M25.642 JOINT STIFFNESS OF HAND, LEFT: ICD-10-CM

## 2024-10-04 DIAGNOSIS — M25.642 STIFFNESS OF JOINT, HAND, LEFT: Primary | ICD-10-CM

## 2024-10-04 DIAGNOSIS — M19.042 ARTHRITIS OF FINGER OF LEFT HAND: ICD-10-CM

## 2024-10-04 PROCEDURE — 97110 THERAPEUTIC EXERCISES: CPT | Performed by: OCCUPATIONAL THERAPIST

## 2024-10-04 PROCEDURE — 97530 THERAPEUTIC ACTIVITIES: CPT | Performed by: OCCUPATIONAL THERAPIST

## 2024-10-04 NOTE — PROGRESS NOTES
"Daily Note     Today's date: 10/4/2024  Patient name: Jacek Valles  : 1955  MRN: [id number]  Referring provider: Paulo Guzman MD  Dx:   Encounter Diagnosis     ICD-10-CM    1. Stiffness of joint, hand, left  M25.642       2. Arthritis of left hand  M19.042       3. Joint stiffness of hand, left  M25.642       4. Arthritis of finger of left hand  M19.042                        Subjective: \"It is great. It still does get stiff\"      Objective: See treatment diary below      Assessment: Tolerated treatment well. Patient would benefit from continued OT. Improved performance with all PREs today. Fist formation is full after heat and stretch.       Plan: Continue per plan of care.      Precautions: Universal      Manuals 9/20 9/27 10/4          IASTM ring and small finger FDS in the hand  5' 5' 5'                                                 Neuro Re-Ed                                                                                                        Ther Ex             HEP TG, FDS gliding, intrinsic stretch, intrinsic roll, place and holds for composite fist            Intrinsic stretch  3' 3'          Composite stretch for fist  3' 3'          Towel scrunches  5x           Progressive grasp             Hook fist pegs  1x           Keypegs  1x 1x, in with translation and out with SF and RF          Wall walking   TB 5x each way TB 5x each way          Digiflex  Yellow isolated 10x Red isolated 10x          Coordination balls   2'           Ball in hand circles  NV Green 5x each way          Extension web   Yellow 20x          Ther Activity             Pegs into board/out with gripper  NV In with SF/RF yellow, out with 25# gripper          Power web gripping   NV Red PW, center, 20x                                                 Modalities             MHP 5' 5' 5'                            "

## 2024-10-08 NOTE — PROGRESS NOTES
"Daily Note     Today's date: 10/9/2024  Patient name: Jacek Valles  : 1955  MRN: [id number]  Referring provider: Paulo Guzman MD  Dx:   Encounter Diagnosis     ICD-10-CM    1. Stiffness of joint, hand, left  M25.642       2. Arthritis of left hand  M19.042       3. Joint stiffness of hand, left  M25.642       4. Arthritis of finger of left hand  M19.042                          Subjective: \"I may have overdone it\" as he was cutting wood      Objective: See treatment diary below      Assessment: Mild residual tightness in the small finger, though improves with heat and stretch and able to make a full fist. Encouraged to continue with stretching the digits, applying heat, and massage.       Plan: Return as needed.      Precautions: Universal      Manuals 9/20 9/27 10/4 10/9                      IASTM ring and small finger FDS in the hand  5' 5' 5' 5'                                                Neuro Re-Ed                                                                                                        Ther Ex             HEP TG, FDS gliding, intrinsic stretch, intrinsic roll, place and holds for composite fist            Intrinsic stretch  3' 3' 3'         Composite stretch for fist  3' 3' 3'         Towel scrunches  5x           Progressive grasp             Hook fist pegs  1x           Keypegs  1x 1x, in with translation and out with SF and RF 1x, in with translation and out with SF and RF         Wall walking   TB 5x each way TB 5x each way Green ball 5x         Digiflex  Yellow isolated 10x Red isolated 10x Green isolated 10x         Coordination balls   2'           Ball in hand circles  NV Green 5x each way Green 5x         Extension web   Yellow 20x Orange 20x         Ther Activity             Pegs into board/out with gripper  NV In with SF/RF yellow, out with 25# gripper In with SF/RF green/red, out with 25# gripper         Power web gripping   NV Red PW, center, 20x Green PW, " center, 30x         Jar turning     Yellow 10x                                   Modalities             MHP 5' 5' 5' 5'

## 2024-10-09 ENCOUNTER — OFFICE VISIT (OUTPATIENT)
Dept: OCCUPATIONAL THERAPY | Facility: CLINIC | Age: 69
End: 2024-10-09
Payer: COMMERCIAL

## 2024-10-09 DIAGNOSIS — M25.642 STIFFNESS OF JOINT, HAND, LEFT: Primary | ICD-10-CM

## 2024-10-09 DIAGNOSIS — M19.042 ARTHRITIS OF LEFT HAND: ICD-10-CM

## 2024-10-09 DIAGNOSIS — M19.042 ARTHRITIS OF FINGER OF LEFT HAND: ICD-10-CM

## 2024-10-09 DIAGNOSIS — M25.642 JOINT STIFFNESS OF HAND, LEFT: ICD-10-CM

## 2024-10-09 PROCEDURE — 97110 THERAPEUTIC EXERCISES: CPT | Performed by: OCCUPATIONAL THERAPIST

## 2024-10-09 PROCEDURE — 97530 THERAPEUTIC ACTIVITIES: CPT | Performed by: OCCUPATIONAL THERAPIST

## 2024-10-30 ENCOUNTER — OFFICE VISIT (OUTPATIENT)
Dept: OBGYN CLINIC | Facility: CLINIC | Age: 69
End: 2024-10-30
Payer: COMMERCIAL

## 2024-10-30 VITALS
WEIGHT: 246 LBS | BODY MASS INDEX: 34.44 KG/M2 | HEIGHT: 71 IN | HEART RATE: 79 BPM | DIASTOLIC BLOOD PRESSURE: 65 MMHG | SYSTOLIC BLOOD PRESSURE: 108 MMHG

## 2024-10-30 DIAGNOSIS — M25.642 JOINT STIFFNESS OF HAND, LEFT: Primary | ICD-10-CM

## 2024-10-30 DIAGNOSIS — M65.352 TRIGGER LITTLE FINGER OF LEFT HAND: ICD-10-CM

## 2024-10-30 DIAGNOSIS — M19.042 ARTHRITIS OF FINGER OF LEFT HAND: ICD-10-CM

## 2024-10-30 PROCEDURE — 99213 OFFICE O/P EST LOW 20 MIN: CPT | Performed by: SURGERY

## 2024-10-30 PROCEDURE — 20550 NJX 1 TENDON SHEATH/LIGAMENT: CPT | Performed by: SURGERY

## 2024-10-30 RX ORDER — DEXAMETHASONE SODIUM PHOSPHATE 10 MG/ML
40 INJECTION, SOLUTION INTRAMUSCULAR; INTRAVENOUS
Status: COMPLETED | OUTPATIENT
Start: 2024-10-30 | End: 2024-10-30

## 2024-10-30 RX ORDER — LIDOCAINE HYDROCHLORIDE 10 MG/ML
1 INJECTION, SOLUTION INFILTRATION; PERINEURAL
Status: COMPLETED | OUTPATIENT
Start: 2024-10-30 | End: 2024-10-30

## 2024-10-30 RX ADMIN — DEXAMETHASONE SODIUM PHOSPHATE 40 MG: 10 INJECTION, SOLUTION INTRAMUSCULAR; INTRAVENOUS at 11:00

## 2024-10-30 RX ADMIN — LIDOCAINE HYDROCHLORIDE 1 ML: 10 INJECTION, SOLUTION INFILTRATION; PERINEURAL at 11:00

## 2024-10-30 NOTE — PROGRESS NOTES
ASSESSMENT/PLAN:      68 y.o. male with left hand stiffness secondary to arthritis and flexor tendonitis post fall (early trigger finger of the small finger) and mild left hand Dupuytren's disease    The etiology of Dupuytren's disease was discussed. US result was reviewed, no evidence of left small finger foreign body. It was discussed with Jacek that he has an early trigger finger of the left small finger. He will continue OT for motion as this has been beneficial for him. We discussed a left small trigger finger CSI may be performed. The decision was made to proceed with an initial left small trigger finger CSI. Left small trigger finger CSI was performed in the office without complication. Post injection protocol/expectations were reviewed. The CSI may be repeated in 6 weeks time if warranted. Follow up in 7 weeks for clinical re-evaluation.     The patient verbalized understanding of exam findings and treatment plan. We engaged in the shared decision-making process and treatment options were discussed at length with the patient. Surgical and conservative management discussed today along with risks and benefits.    Diagnoses and all orders for this visit:    Joint stiffness of hand, left  -     Ambulatory Referral to PT/OT Hand Therapy; Future    Arthritis of finger of left hand  -     Ambulatory Referral to PT/OT Hand Therapy; Future    Trigger little finger of left hand  -     Ambulatory Referral to PT/OT Hand Therapy; Future  -     Hand/upper extremity injection: L small A1      Follow Up:  Return in about 7 weeks (around 12/18/2024).      To Do Next Visit:  Re-evaluation of current issue    ____________________________________________________________________________________________________________________________________________      CHIEF COMPLAINT:  Chief Complaint   Patient presents with    Follow-up     Patient says he is doing better some stiffness at times but over all doing much better then last  appt        SUBJECTIVE:  Jacek Valles is a 68 y.o. year old RHD male who presents to the office for a follow up regarding his left hand. Overall Jacek is doing well. He feels his left hand symptoms have improved approx. 75 percent. He has been attending OT with improvement in his symptoms. He notes the only place he is now having pain is to his small finger A1 pulley. He also notes his small finger still struggles with regards to flexion.        I have personally reviewed all the relevant PMH, PSH, SH, FH, Medications and allergies.     PAST MEDICAL HISTORY:  Past Medical History:   Diagnosis Date    Arthritis     Basal cell carcinoma 2005    Cancer (HCC) 2012    Chest tightness     Colon polyp     Diabetes (HCC)     Diabetes mellitus (HCC)     Diverticulosis of sigmoid colon     Esophageal reflux     Fatty liver     GERD (gastroesophageal reflux disease)     Hearing problem     High cholesterol     History of chest pain     Hyperlipemia     Hypertension     Lumbago     Malignant neoplasm of prostate (HCC)     Melanoma (HCC) 1999    Prostate CA (HCC)     Squamous cell skin cancer 08/27/2020    right dorsal forearm    Tinea pedis of both feet     Trochanteric bursitis        PAST SURGICAL HISTORY:  Past Surgical History:   Procedure Laterality Date    APPENDECTOMY      COLONOSCOPY  2016    HERNIA REPAIR      ? Umbilical    MOHS SURGERY  2019    ND ARTHRS KNE SURG W/MENISCECTOMY MED/LAT W/SHVG Left 03/15/2022    Procedure: ARTHROSCOPY KNEE,  partial medial menisectomy;  Surgeon: Eren Hernandez DO;  Location: [facility]IN OR;  Service: Orthopedics    ND REPAIR FIRST ABDOMINAL WALL HERNIA N/A 11/11/2021    Procedure: INCISIONAL HERNIA REPAIR AT THE UMBILICUS;  Surgeon: Glen Patino MD;  Location: [facility] OR;  Service: General    PROSTATECTOMY N/A 2011    PROSTATECTOMY      SKIN BIOPSY  2019       FAMILY HISTORY:  Family History   Problem Relation Age of Onset    Arthritis Mother     Parkinsonism Mother     Heart  disease Father     Arthritis Father     Coronary artery disease Father     Hypertension Father     Parkinsonism Father     Mental illness Neg Hx        SOCIAL HISTORY:  Social History     Tobacco Use    Smoking status: Former     Current packs/day: 0.00     Average packs/day: 1 pack/day for 15.1 years (15.1 ttl pk-yrs)     Types: Cigarettes     Start date: 1997     Quit date: 2012     Years since quittin.3     Passive exposure: Current    Smokeless tobacco: Never   Vaping Use    Vaping status: Never Used   Substance Use Topics    Alcohol use: Yes     Alcohol/week: 6.0 standard drinks of alcohol     Types: 6 Cans of beer per week     Comment: Weekends    Drug use: No       MEDICATIONS:    Current Outpatient Medications:     ACCU-CHEK FASTCLIX LANCETS MISC, by Does not apply route daily Dx: E11.40, Disp: 100 each, Rfl: 1    acetaminophen (TYLENOL) 500 mg tablet, Take 500 mg by mouth every 6 (six) hours as needed for mild pain, Disp: , Rfl:     Empagliflozin (Jardiance) 25 MG TABS, Take 1 tablet (25 mg total) by mouth every morning, Disp: 90 tablet, Rfl: 1    fluticasone (FLONASE) 50 mcg/act nasal spray, 1 spray into each nostril daily, Disp: 48 g, Rfl: 1    glucose blood (ACCU-CHEK GUIDE) test strip, 1 each by Other route daily DX: e11.40, Disp: 100 each, Rfl: 1    lisinopril (ZESTRIL) 10 mg tablet, Take 1 tablet (10 mg total) by mouth daily, Disp: 100 tablet, Rfl: 1    metFORMIN (GLUCOPHAGE-XR) 500 mg 24 hr tablet, Take 4 tablets (2,000 mg total) by mouth daily with breakfast, Disp: 360 tablet, Rfl: 1    Mounjaro 2.5 MG/0.5ML, Inject 0.5 mL (2.5 mg total) under the skin once a week, Disp: 6 mL, Rfl: 3    omeprazole (PriLOSEC) 20 mg delayed release capsule, Take 1 capsule (20 mg total) by mouth as needed (heartburn), Disp: 90 capsule, Rfl: 1    rosuvastatin (CRESTOR) 20 MG tablet, Take 1 tablet (20 mg total) by mouth daily, Disp: 100 tablet, Rfl: 3    tamsulosin (FLOMAX) 0.4 mg, Take 1 capsule (0.4 mg  total) by mouth daily with dinner, Disp: 100 capsule, Rfl: 1    vitamin B-12 (VITAMIN B-12) 1,000 mcg tablet, Take 1 tablet (1,000 mcg total) by mouth daily, Disp: , Rfl:     ALLERGIES:  Allergies   Allergen Reactions    Penicillins Other (See Comments)      Ancef can tolerate       REVIEW OF SYSTEMS:  Review of Systems   Constitutional:  Negative for chills, fever and unexpected weight change.   HENT:  Negative for hearing loss, nosebleeds and sore throat.    Eyes:  Negative for pain, redness and visual disturbance.   Respiratory:  Negative for cough, shortness of breath and wheezing.    Cardiovascular:  Negative for chest pain, palpitations and leg swelling.   Gastrointestinal:  Negative for abdominal pain, nausea and vomiting.   Endocrine: Negative for polydipsia and polyuria.   Genitourinary:  Negative for difficulty urinating and hematuria.   Musculoskeletal:  Negative for arthralgias, joint swelling and myalgias.   Skin:  Negative for rash and wound.   Neurological:  Negative for dizziness, numbness and headaches.   Psychiatric/Behavioral:  Negative for decreased concentration, dysphoric mood and suicidal ideas. The patient is not nervous/anxious.        VITALS:  Vitals:    10/30/24 1049   BP: 108/65   Pulse: 79         _____________________________________________________  PHYSICAL EXAMINATION:  General: well developed and well nourished, alert, oriented times 3, and appears comfortable  Psychiatric: Normal  HEENT: Normocephalic, Atraumatic Trachea Midline, No torticollis  Pulmonary: No audible wheezing or respiratory distress   Cardiovascular: No pitting edema, 2+ radial pulse   Abdominal/GI: abdomen non tender, non distended   Skin: No masses, erythema, lacerations, fluctation, ulcerations  Neurovascular: Sensation Intact to the Median, Ulnar, Radial Nerve, Motor Intact to the Median, Ulnar, Radial Nerve, and Pulses Intact  Musculoskeletal: Normal, except as noted in detailed exam and in  HPI.      MUSCULOSKELETAL EXAMINATION:    Left hand:     No erythema, ecchymosis or edema  A1 pulley tenderness of the small finger   - catching or locking of the small finger   Full digital flexion with the exception of the small finger   Full digital extension     ___________________________________________________    STUDIES REVIEWED:  I have personally reviewed and interpreted  US of left small finger which does not demonstrate a foreign body.     LABS REVIEWED:    HgA1c:   Lab Results   Component Value Date    HGBA1C 6.4 (H) 08/09/2024     BMP:   Lab Results   Component Value Date    GLUCOSE 137 (H) 12/05/2017    CALCIUM 9.5 08/09/2024     12/05/2017    K 4.2 08/09/2024    CO2 28 08/09/2024     08/09/2024    BUN 16 08/09/2024    CREATININE 0.82 08/09/2024             PROCEDURES PERFORMED:  Hand/upper extremity injection: L small A1  Universal Protocol:  procedure performed by consultantConsent: Verbal consent obtained. Written consent not obtained.  Risks and benefits: risks, benefits and alternatives were discussed  Consent given by: patient  Patient understanding: patient states understanding of the procedure being performed  Site marked: the operative site was marked  Patient identity confirmed: verbally with patient  Supporting Documentation  Indications: pain   Procedure Details  Condition:trigger finger Location: small finger - L small A1   Needle size: 25 G  Ultrasound guidance: no  Medications administered: 1 mL lidocaine 1 %; 40 mg dexamethasone 100 mg/10 mL  Patient tolerance: patient tolerated the procedure well with no immediate complications  Dressing:  Sterile dressing applied            _____________________________________________________      Scribe Attestation      I,:  Catie Wetzel MA am acting as a scribe while in the presence of the attending physician.:       I,:  Paulo Guzman MD personally performed the services described in this documentation    as scribed in my  presence.:

## 2024-11-06 ENCOUNTER — OFFICE VISIT (OUTPATIENT)
Dept: DERMATOLOGY | Facility: CLINIC | Age: 69
End: 2024-11-06
Payer: COMMERCIAL

## 2024-11-06 VITALS — WEIGHT: 249 LBS | TEMPERATURE: 97.7 F | BODY MASS INDEX: 34.73 KG/M2

## 2024-11-06 DIAGNOSIS — L57.0 KERATOSIS, ACTINIC: ICD-10-CM

## 2024-11-06 DIAGNOSIS — Z12.83 SKIN EXAM, SCREENING FOR CANCER: Primary | ICD-10-CM

## 2024-11-06 DIAGNOSIS — L81.4 LENTIGINES: ICD-10-CM

## 2024-11-06 DIAGNOSIS — D22.9 MULTIPLE BENIGN MELANOCYTIC NEVI: ICD-10-CM

## 2024-11-06 DIAGNOSIS — Z86.007 HISTORY OF SQUAMOUS CELL CARCINOMA IN SITU: ICD-10-CM

## 2024-11-06 DIAGNOSIS — D18.01 CHERRY ANGIOMA: ICD-10-CM

## 2024-11-06 DIAGNOSIS — L82.1 SEBORRHEIC KERATOSIS: ICD-10-CM

## 2024-11-06 DIAGNOSIS — D48.9 NEOPLASM OF UNCERTAIN BEHAVIOR: ICD-10-CM

## 2024-11-06 PROCEDURE — 17003 DESTRUCT PREMALG LES 2-14: CPT | Performed by: STUDENT IN AN ORGANIZED HEALTH CARE EDUCATION/TRAINING PROGRAM

## 2024-11-06 PROCEDURE — 88305 TISSUE EXAM BY PATHOLOGIST: CPT | Performed by: STUDENT IN AN ORGANIZED HEALTH CARE EDUCATION/TRAINING PROGRAM

## 2024-11-06 PROCEDURE — 11102 TANGNTL BX SKIN SINGLE LES: CPT | Performed by: STUDENT IN AN ORGANIZED HEALTH CARE EDUCATION/TRAINING PROGRAM

## 2024-11-06 PROCEDURE — 17000 DESTRUCT PREMALG LESION: CPT | Performed by: STUDENT IN AN ORGANIZED HEALTH CARE EDUCATION/TRAINING PROGRAM

## 2024-11-06 PROCEDURE — 99214 OFFICE O/P EST MOD 30 MIN: CPT | Performed by: STUDENT IN AN ORGANIZED HEALTH CARE EDUCATION/TRAINING PROGRAM

## 2024-11-08 ENCOUNTER — EVALUATION (OUTPATIENT)
Dept: OCCUPATIONAL THERAPY | Facility: CLINIC | Age: 69
End: 2024-11-08
Payer: COMMERCIAL

## 2024-11-08 DIAGNOSIS — M25.642 JOINT STIFFNESS OF HAND, LEFT: ICD-10-CM

## 2024-11-08 DIAGNOSIS — M65.352 TRIGGER LITTLE FINGER OF LEFT HAND: ICD-10-CM

## 2024-11-08 DIAGNOSIS — M19.042 ARTHRITIS OF FINGER OF LEFT HAND: ICD-10-CM

## 2024-11-08 PROCEDURE — 97530 THERAPEUTIC ACTIVITIES: CPT | Performed by: OCCUPATIONAL THERAPIST

## 2024-11-08 PROCEDURE — 97110 THERAPEUTIC EXERCISES: CPT | Performed by: OCCUPATIONAL THERAPIST

## 2024-11-08 PROCEDURE — 88305 TISSUE EXAM BY PATHOLOGIST: CPT | Performed by: STUDENT IN AN ORGANIZED HEALTH CARE EDUCATION/TRAINING PROGRAM

## 2024-11-08 NOTE — PROGRESS NOTES
OT Re-Evaluation     Today's date: 2024  Patient name: Jacek Valles  : 1955  MRN: [id number]  Referring provider: Paulo Guzman MD  Dx:   Encounter Diagnosis     ICD-10-CM    1. Joint stiffness of hand, left  M25.642 Ambulatory Referral to PT/OT Hand Therapy      2. Arthritis of finger of left hand  M19.042 Ambulatory Referral to PT/OT Hand Therapy      3. Trigger little finger of left hand  M65.352 Ambulatory Referral to PT/OT Hand Therapy                       Assessment  Impairments: abnormal or restricted ROM, activity intolerance, impaired physical strength, pain with function and activity limitations    Assessment details: Jacek is referred to therapy with a formal diagnosis of joint stiffness of left hand, arthritis of finger of left hand, and suspected flexor tendonitis. He also received an injection for a small finger trigger finger at his last MD appointment. His active flexion has improved since starting therapy, and he can now form a full composite fist, though this is painful to produce.  and pinch strength have not improved, as there continues to be pain as a limiting factor.  FDS gliding is reduced in the ring and small. There is intrinsic tightness as well as capsular restriction affecting flexion. He will benefit from skilled occupational therapy at a frequency of one time per week to address these deficits and to improve functional use of the left extremity. See below for a detailed assessment.         Goals  STG: Patient will be compliant with home exercise program in 1 week.- MET  STG: Pain will not exceed a 2/10  during appropriate activity and during therapy in 4 weeks.- PARTIALLY MET  STG: Range of motion of left hand will be improved to form a composite fist in 4 weeks.- MET  STG: Strength will be improved to 75 pounds  strength in 4 weeks. - NOT MET    LTG: Strength will be improved to 85 pounds  strength in 6-8 weeks or discharge.   LTG: Performance  in ADLs and IADLS will be improved to prior level of function with the affected extremity within 6 weeks or discharge.   LTG: FOTO score increase by 20 points within 6 weeks or discharge.                 Plan  Patient would benefit from: skilled OT, OT eval and home program  Planned modality interventions: thermotherapy: hydrocollator packs    Planned therapy interventions: home exercise program, joint mobilization, manual therapy, therapeutic exercise, patient education, therapeutic activities, neuromuscular re-education, IASTM, strengthening and stretching    Frequency: 1x week  Plan of Care beginning date: 2024  Plan of Care expiration date: 2025  Treatment plan discussed with: patient  Plan details: Treatment to include modalities, manual therapy, PRE's, HEP, and orthotics as appropriate.       Subjective Evaluation    History of Present Illness  Mechanism of injury: Jacek reports that he fell, bracing himself with his left hand. He is experiencing reduced motion in the hand, pain, and functional impairment. He denies injury to the left hand previously, but states that he is affected by arthritis. At his last MD appointment, he received a cortisone injection to the small finger for a trigger finger. He is still experiencing pain in the hand when he makes an active fist, mostly in the ring finger.   Patient Goals  Patient goals for therapy: decreased pain, increased motion, increased strength and independence with ADLs/IADLs  Patient goal: Be able to ride motorcycle  Pain  Current pain ratin  At worst pain ratin  Location: volar small and ring finger  Quality: dull ache  Exacerbated by: making a fist.    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     Neurological Testing     Additional Neurological  Details  Denies numbness or tingling.     Active Range of Motion     Left Digits    Flexion   Ring     MCP: 75    PIP: 85    DIP: 42  Little     MCP: 85    PIP: 80    DIP: 40  Extension   Little     MCP: -2    PIP: -15    DIP: 0    Additional Active Range of Motion Details  Capsular restriction in the DIPs.    Strength/Myotome Testing     Left Wrist/Hand      (2nd hand position)     Trial 1: 57.5    Comments: 3/10 pain      Thumb Strength  Key/Lateral Pinch     Trial 1: 16.6  Palmar/Three-Point Pinch     Trial 1: 9    Right Wrist/Hand      (2nd hand position)     Trial 1: 99.4    Thumb Strength   Key/Lateral Pinch     Trial 1: 23.4  Palmar/Three-Point Pinch     Trial 1: 12    Tests     Left Wrist/Hand   Positive intrinsic muscle tightness.     Additional Tests Details  Reduced FDS gliding in the ring and small fingers. Slightly painful in the ring finger with FDS gliding.                     Precautions: Universal      Manuals 9/20 9/27 10/4 10/9 11/8                     IASTM ring and small finger FDS in the hand  5' 5' 5' 5' 5'                                               Neuro Re-Ed                                                                                                        Ther Ex             HEP TG, FDS gliding, intrinsic stretch, intrinsic roll, place and holds for composite fist            Intrinsic stretch  3' 3' 3' 3'        Composite stretch for fist  3' 3' 3' 3'        Towel scrunches  5x           Colored pegs     In and out with RF and SF        Hook fist pegs  1x           Keypegs  1x 1x, in with translation and out with SF and RF 1x, in with translation and out with SF and RF         Wall walking   TB 5x each way TB 5x each way Green ball 5x Green 5x        Digiflex  Yellow isolated 10x Red isolated 10x Green isolated 10x Green isolated 10x        Coordination balls   2'           Ball in hand circles  NV Green 5x each way Green 5x Green 5x        Extension web   Yellow 20x Orange  20x Green 20x        Progressive grasp     Pencils 2x        Ther Activity             Pegs into board/out with gripper  NV In with SF/RF yellow, out with 25# gripper In with SF/RF green/red, out with 25# gripper In with SF/RF green/red, out with 25# gripper        Power web gripping   NV Red PW, center, 20x Green PW, center, 30x Green PW, center, 30x        Jar turning     Yellow 10x                                   Modalities             MHP 5' 5' 5' 5' 5'

## 2024-11-10 NOTE — RESULT ENCOUNTER NOTE
Cassius: Please inform patient that biopsy shows a benign lesion called an AK, that has very low likelihood of becoming skin cancer. If it returns, he should let us know. Thanks!

## 2024-11-11 NOTE — RESULT ENCOUNTER NOTE
DERMATOPATHOLOGY RESULT NOTE    Results reviewed by ordering physician.  Called patient to personally discuss results. Discussed results with patient.       Instructions for Clinical Derm Team:   (remember to route Result Note to appropriate staff):    None    Result & Plan by Specimen:    Specimen A: benign  Plan: monitor and reassured, benign         Component   Case Report  Surgical Pathology Report                         Case: [id number]                                  Authorizing Provider:  Tana Johnson MD         Collected:           11/06/2024 0951              Ordering Location:     [facility]      Received:            11/06/2024 [address] [place] [address]                                                                      Pathologist:           Tana Johnson MD                                                          Specimen:    Skin, Other, A: scalp                                                                    Final Diagnosis  A. Skin, scalp, shave biopsy:    HYPERTROPHIC ACTINIC KERATOSIS with adnexal extension (see note).    Note: If the lesion were to progress or persist, additional sampling should be sought.    Electronically signed by Tana Johnson MD on 11/8/2024 at 11:13 AM  Additional Information   All reported additional testing was performed with appropriately reactive controls.  These tests were developed and their performance characteristics determined by [facility] or appropriate performing facility, though some tests may be performed on tissues which have not been validated for performance characteristics (such as staining performed on alcohol exposed cell blocks and decalcified tissues).  Results should be interpreted with caution and in the context of the patients' clinical condition. These tests may not be cleared or approved by the U.S. Food and Drug Administration, though the FDA has determined that such clearance or  "approval is not necessary. These tests are used for clinical purposes and they should not be regarded as investigational or for research. This laboratory has been approved by [facility], designated as a high-complexity laboratory and is qualified to perform these tests.  .  Gross Description   A. The specimen is received in formalin, labeled with the patient's name and hospital number, and is designated \" skin scalp\".  It consists of a 0.8 x 0.7 x 0.1 cm skin shave.  The epidermis displays a 0.5 x 0.4 x 0.1 cm tan-brown scaly papule at the skin margin.  The resection margin is inked green and the specimen is bisected.  Entirely submitted between sponges in cassette A1.    Note: The estimated total formalin fixation time based upon information provided by the submitting clinician and the standard processing schedule is under 72 hours.  Amado  Clinical Information   ATTENTION:  ISABEL    SPECIMEN A; Skin; Anatomic Location: scalp; Procedure/Protocol: Skin Specimen (submit in FORMALIN):Tangential Biopsy (includes shave, scoop, saucerization, curette) (CPT 28479; each additional tangential biopsy is CPT 69311)  68 y.o. year old  Male with a Morphological Description:0.8 cm x 0.7 cm scaly papule  Differential Diagnosis and/or Specific Clinical Question:ddx: scc vs hak    "

## 2024-11-15 ENCOUNTER — OFFICE VISIT (OUTPATIENT)
Dept: OCCUPATIONAL THERAPY | Facility: CLINIC | Age: 69
End: 2024-11-15
Payer: COMMERCIAL

## 2024-11-15 DIAGNOSIS — M25.642 JOINT STIFFNESS OF HAND, LEFT: Primary | ICD-10-CM

## 2024-11-15 DIAGNOSIS — M19.042 ARTHRITIS OF FINGER OF LEFT HAND: ICD-10-CM

## 2024-11-15 DIAGNOSIS — M65.352 TRIGGER LITTLE FINGER OF LEFT HAND: ICD-10-CM

## 2024-11-15 PROCEDURE — 97110 THERAPEUTIC EXERCISES: CPT | Performed by: OCCUPATIONAL THERAPIST

## 2024-11-15 PROCEDURE — 97530 THERAPEUTIC ACTIVITIES: CPT | Performed by: OCCUPATIONAL THERAPIST

## 2024-11-15 NOTE — PROGRESS NOTES
"Daily Note     Today's date: 11/15/2024  Patient name: Jacek Valles  : 1955  MRN: [id number]  Referring provider: Paulo Guzman MD  Dx:   Encounter Diagnosis     ICD-10-CM    1. Joint stiffness of hand, left  M25.642       2. Arthritis of finger of left hand  M19.042       3. Trigger little finger of left hand  M65.352                      Subjective: \"the shot helped the small finger\"      Objective: See treatment diary below.       Assessment: Tolerated treatment well. Patient would benefit from continued OT      Plan: Continue per plan of care.         Precautions: Universal      Manuals 9/20 9/27 10/4 10/9 11/8 11/15                    IASTM ring and small finger FDS in the hand  5' 5' 5' 5' 5' 5'                                              Neuro Re-Ed                                                                                                        Ther Ex             HEP TG, FDS gliding, intrinsic stretch, intrinsic roll, place and holds for composite fist            Intrinsic stretch  3' 3' 3' 3' 3       Composite stretch for fist  3' 3' 3' 3' 3       Towel scrunches  5x           Colored pegs     In and out with RF and SF        Hook fist pegs  1x           Keypegs  1x 1x, in with translation and out with SF and RF 1x, in with translation and out with SF and RF         Wall walking   TB 5x each way TB 5x each way Green ball 5x Green 5x Green 5x       Digiflex  Yellow isolated 10x Red isolated 10x Green isolated 10x Green isolated 10x Green isolated 10x       Coordination balls   2'           Ball in hand circles  NV Green 5x each way Green 5x Green 5x Green 5x       Extension web   Yellow 20x Orange 20x Green 20x Gren 20x       Progressive grasp     Pencils 2x        Ther Activity             Pegs into board/out with gripper  NV In with SF/RF yellow, out with 25# gripper In with SF/RF green/red, out with 25# gripper In with SF/RF green/red, out with 25# gripper In with SF/RF " greenout with 25# gripper       Power web gripping   NV Red PW, center, 20x Green PW, center, 30x Green PW, center, 30x Green PW, center, 30x       Jar turning     Yellow 10x  Yellow 10x                                 Modalities             MHP 5' 5' 5' 5' 5' 5'

## 2024-11-22 ENCOUNTER — OFFICE VISIT (OUTPATIENT)
Dept: OCCUPATIONAL THERAPY | Facility: CLINIC | Age: 69
End: 2024-11-22
Payer: COMMERCIAL

## 2024-11-22 DIAGNOSIS — M19.042 ARTHRITIS OF FINGER OF LEFT HAND: ICD-10-CM

## 2024-11-22 DIAGNOSIS — M25.642 JOINT STIFFNESS OF HAND, LEFT: Primary | ICD-10-CM

## 2024-11-22 DIAGNOSIS — M65.352 TRIGGER LITTLE FINGER OF LEFT HAND: ICD-10-CM

## 2024-11-22 PROCEDURE — 97110 THERAPEUTIC EXERCISES: CPT | Performed by: OCCUPATIONAL THERAPIST

## 2024-11-22 NOTE — PROGRESS NOTES
"Daily Note     Today's date: 2024  Patient name: Jacek Valles  : 1955  MRN: [id number]  Referring provider: Paulo Guzman MD  Dx:   Encounter Diagnosis     ICD-10-CM    1. Joint stiffness of hand, left  M25.642       2. Arthritis of finger of left hand  M19.042       3. Trigger little finger of left hand  M65.352                      Subjective: \"I think it is good\"      Objective: See treatment diary below.       Assessment: He is feeling improved symptoms in the hand and can self manage in the future. His fist formation is improved, and he is confident with his HEP to continue.       Plan: Return as needed.        Precautions: Universal      Manuals 9/20 9/27 10/4 10/9 11/8 11/15 11/22                   IASTM ring and small finger FDS in the hand  5' 5' 5' 5' 5' 5' 5'                                             Neuro Re-Ed                                                                                                        Ther Ex             HEP TG, FDS gliding, intrinsic stretch, intrinsic roll, place and holds for composite fist            Intrinsic stretch  3' 3' 3' 3' 3 3'      Composite stretch for fist  3' 3' 3' 3' 3 3'      Towel scrunches  5x           Colored pegs     In and out with RF and SF        Hook fist pegs  1x           Keypegs  1x 1x, in with translation and out with SF and RF 1x, in with translation and out with SF and RF         Wall walking   TB 5x each way TB 5x each way Green ball 5x Green 5x Green 5x       Digiflex  Yellow isolated 10x Red isolated 10x Green isolated 10x Green isolated 10x Green isolated 10x       Coordination balls   2'           Ball in hand circles  NV Green 5x each way Green 5x Green 5x Green 5x       Extension web   Yellow 20x Orange 20x Green 20x Gren 20x       Progressive grasp     Pencils 2x        Ther Activity             Pegs into board/out with gripper  NV In with SF/RF yellow, out with 25# gripper In with SF/RF green/red, out with " 25# gripper In with SF/RF green/red, out with 25# gripper In with SF/RF greenout with 25# gripper       Power web gripping   NV Red PW, center, 20x Green PW, center, 30x Green PW, center, 30x Green PW, center, 30x       Jar turning     Yellow 10x  Yellow 10x                                 Modalities             MHP 5' 5' 5' 5' 5' 5' 5'

## 2024-11-29 ENCOUNTER — APPOINTMENT (OUTPATIENT)
Dept: OCCUPATIONAL THERAPY | Facility: CLINIC | Age: 69
End: 2024-11-29
Payer: COMMERCIAL

## 2024-12-18 ENCOUNTER — OFFICE VISIT (OUTPATIENT)
Dept: OBGYN CLINIC | Facility: CLINIC | Age: 69
End: 2024-12-18
Payer: COMMERCIAL

## 2024-12-18 VITALS — WEIGHT: 249 LBS | BODY MASS INDEX: 34.86 KG/M2 | HEIGHT: 71 IN

## 2024-12-18 DIAGNOSIS — M19.042 ARTHRITIS OF FINGER OF LEFT HAND: ICD-10-CM

## 2024-12-18 DIAGNOSIS — M65.352 TRIGGER LITTLE FINGER OF LEFT HAND: Primary | ICD-10-CM

## 2024-12-18 DIAGNOSIS — M25.642 JOINT STIFFNESS OF HAND, LEFT: ICD-10-CM

## 2024-12-18 PROCEDURE — 99213 OFFICE O/P EST LOW 20 MIN: CPT | Performed by: SURGERY

## 2024-12-18 NOTE — PROGRESS NOTES
ASSESSMENT/PLAN:      69 yo male with left small finger suspected trigger finger/small finger tenosynovitis of the small finger at the A1 pulley, also with some degree of PIP arthritis     Patient had no significant improvement with initial trigger finger injection done at the last visit but continues to have significant A1 pulley tenderness and stiffness with digital flexion.  We discussed that I suspect, even if he does not have distinct triggering, he likely has tightness and synovitis in the area of the A1 and would benefit from a trigger finger release.  Repeat injection was deferred today due to limited improvement at last visit.  PIP joint pain is not severe at this time, will hold off injection for this.  Patient will return as needed if he wants to pursue trigger finger release in the new year    The patient verbalized understanding of exam findings and treatment plan. We engaged in the shared decision-making process and treatment options were discussed at length with the patient. Surgical and conservative management discussed today along with risks and benefits.    Diagnoses and all orders for this visit:    Trigger little finger of left hand    Joint stiffness of hand, left    Arthritis of finger of left hand        Follow Up:  Return if symptoms worsen or fail to improve.      To Do Next Visit:  Re-evaluation of current issue    ____________________________________________________________________________________________________________________________________________      CHIEF COMPLAINT:  Chief Complaint   Patient presents with    Follow-up     Patient is still having a lot of pain in his little finger        SUBJECTIVE:  Jacek ACOSTA Hai is a 68 y.o. year old RHD male who presents for follow up evaluation of the left small finger. He underwent a trigger finger injection at his last visit which made his finger numb but did not help the pain ultimately. He has done therapy which improved his motion in  his other fingers, but did not help the small finger. Pain is located mostly of the A1 pulley and only mildly over the PIP joint.         I have personally reviewed all the relevant PMH, PSH, SH, FH, Medications and allergies.     PAST MEDICAL HISTORY:  Past Medical History:   Diagnosis Date    Arthritis     Basal cell carcinoma 2005    Cancer (HCC) 2012    Chest tightness     Colon polyp     Diabetes (HCC)     Diabetes mellitus (HCC)     Diverticulosis of sigmoid colon     Esophageal reflux     Fatty liver     GERD (gastroesophageal reflux disease)     Hearing problem     High cholesterol     History of chest pain     Hyperlipemia     Hypertension     Lumbago     Malignant neoplasm of prostate (HCC)     Melanoma (HCC) 1999    Prostate CA (HCC)     Squamous cell skin cancer 08/27/2020    right dorsal forearm    Tinea pedis of both feet     Trochanteric bursitis        PAST SURGICAL HISTORY:  Past Surgical History:   Procedure Laterality Date    APPENDECTOMY      COLONOSCOPY  2016    HERNIA REPAIR      ? Umbilical    MOHS SURGERY  2019    NY ARTHRS KNE SURG W/MENISCECTOMY MED/LAT W/SHVG Left 03/15/2022    Procedure: ARTHROSCOPY KNEE,  partial medial menisectomy;  Surgeon: Eren Hernandez DO;  Location: [facility] MAIN OR;  Service: Orthopedics    NY REPAIR FIRST ABDOMINAL WALL HERNIA N/A 11/11/2021    Procedure: INCISIONAL HERNIA REPAIR AT THE UMBILICUS;  Surgeon: Glen Patino MD;  Location[facility];  Service: General    PROSTATECTOMY N/A 2011    PROSTATECTOMY      SKIN BIOPSY  2019       FAMILY HISTORY:  Family History   Problem Relation Age of Onset    Arthritis Mother     Parkinsonism Mother     Heart disease Father     Arthritis Father     Coronary artery disease Father     Hypertension Father     Parkinsonism Father     Mental illness Neg Hx        SOCIAL HISTORY:  Social History     Tobacco Use    Smoking status: Former     Current packs/day: 0.00     Average packs/day: 1 pack/day for 15.1 years (15.1 ttl  pk-yrs)     Types: Cigarettes     Start date: 1997     Quit date: 2012     Years since quittin.4     Passive exposure: Current    Smokeless tobacco: Never   Vaping Use    Vaping status: Never Used   Substance Use Topics    Alcohol use: Yes     Alcohol/week: 6.0 standard drinks of alcohol     Types: 6 Cans of beer per week     Comment: Weekends    Drug use: No       MEDICATIONS:    Current Outpatient Medications:     ACCU-CHEK FASTCLIX LANCETS MISC, by Does not apply route daily Dx: E11.40, Disp: 100 each, Rfl: 1    acetaminophen (TYLENOL) 500 mg tablet, Take 500 mg by mouth every 6 (six) hours as needed for mild pain, Disp: , Rfl:     Empagliflozin (Jardiance) 25 MG TABS, Take 1 tablet (25 mg total) by mouth every morning, Disp: 90 tablet, Rfl: 1    fluticasone (FLONASE) 50 mcg/act nasal spray, 1 spray into each nostril daily, Disp: 48 g, Rfl: 1    glucose blood (ACCU-CHEK GUIDE) test strip, 1 each by Other route daily DX: e11.40, Disp: 100 each, Rfl: 1    lisinopril (ZESTRIL) 10 mg tablet, Take 1 tablet (10 mg total) by mouth daily, Disp: 100 tablet, Rfl: 1    metFORMIN (GLUCOPHAGE-XR) 500 mg 24 hr tablet, Take 4 tablets (2,000 mg total) by mouth daily with breakfast, Disp: 360 tablet, Rfl: 1    Mounjaro 2.5 MG/0.5ML, Inject 0.5 mL (2.5 mg total) under the skin once a week, Disp: 6 mL, Rfl: 3    Multiple Vitamins-Minerals (PRESERVISION AREDS 2 PO), Take by mouth, Disp: , Rfl:     omeprazole (PriLOSEC) 20 mg delayed release capsule, Take 1 capsule (20 mg total) by mouth as needed (heartburn), Disp: 90 capsule, Rfl: 1    rosuvastatin (CRESTOR) 20 MG tablet, Take 1 tablet (20 mg total) by mouth daily, Disp: 100 tablet, Rfl: 3    tamsulosin (FLOMAX) 0.4 mg, Take 1 capsule (0.4 mg total) by mouth daily with dinner, Disp: 100 capsule, Rfl: 1    vitamin B-12 (VITAMIN B-12) 1,000 mcg tablet, Take 1 tablet (1,000 mcg total) by mouth daily, Disp: , Rfl:     ALLERGIES:  Allergies   Allergen Reactions     Penicillins Other (See Comments)      Ancef can tolerate       REVIEW OF SYSTEMS:  Review of Systems   Constitutional:  Negative for chills and fever.   HENT:  Negative for ear pain and sore throat.    Eyes:  Negative for pain and visual disturbance.   Respiratory:  Negative for cough and shortness of breath.    Cardiovascular:  Negative for chest pain and palpitations.   Gastrointestinal:  Negative for abdominal pain and vomiting.   Genitourinary:  Negative for dysuria and hematuria.   Musculoskeletal:  Positive for arthralgias. Negative for back pain.   Skin:  Negative for color change and rash.   Neurological:  Negative for seizures and syncope.   All other systems reviewed and are negative.      VITALS:  There were no vitals filed for this visit.    LABS:  HgA1c:   Lab Results   Component Value Date    HGBA1C 6.4 (H) 08/09/2024     BMP:   Lab Results   Component Value Date    GLUCOSE 137 (H) 12/05/2017    CALCIUM 9.5 08/09/2024     12/05/2017    K 4.2 08/09/2024    CO2 28 08/09/2024     08/09/2024    BUN 16 08/09/2024    CREATININE 0.82 08/09/2024       _____________________________________________________  PHYSICAL EXAMINATION:  General: well developed and well nourished, alert, oriented times 3, and appears comfortable  Psychiatric: Normal  HEENT: Normocephalic, Atraumatic Trachea Midline, No torticollis  Pulmonary: No audible wheezing or respiratory distress   Cardiovascular: No pitting edema, 2+ radial pulse   Skin: No masses, erythema, lacerations, fluctation, ulcerations  Neurovascular: Sensation Intact to the Median, Ulnar, Radial Nerve, Motor Intact to the Median, Ulnar, Radial Nerve, and Pulses Intact  Musculoskeletal: Normal, except as noted in detailed exam and in HPI.      MUSCULOSKELETAL EXAMINATION:  left small finger:  Negative palpable nodule over the A1 pulley.  Positive tenderness to palpation over A1 pulley. No clicking or locking however patient is unable to flex the finger all  the way down without pain     Mild tenderness over the PIP joint   ___________________________________________________  STUDIES REVIEWED:  No new studies to review         PROCEDURES PERFORMED:  Procedures  No Procedures performed today    _____________________________________________________

## 2025-01-07 ENCOUNTER — PREP FOR PROCEDURE (OUTPATIENT)
Dept: SURGERY | Facility: CLINIC | Age: 70
End: 2025-01-07

## 2025-01-07 ENCOUNTER — OFFICE VISIT (OUTPATIENT)
Dept: SURGERY | Facility: CLINIC | Age: 70
End: 2025-01-07
Payer: COMMERCIAL

## 2025-01-07 VITALS
SYSTOLIC BLOOD PRESSURE: 131 MMHG | OXYGEN SATURATION: 97 % | BODY MASS INDEX: 33.6 KG/M2 | DIASTOLIC BLOOD PRESSURE: 70 MMHG | HEART RATE: 85 BPM | HEIGHT: 71 IN | WEIGHT: 240 LBS

## 2025-01-07 DIAGNOSIS — E11.9 DIABETES (HCC): ICD-10-CM

## 2025-01-07 DIAGNOSIS — K40.90 RIGHT INGUINAL HERNIA: Primary | ICD-10-CM

## 2025-01-07 DIAGNOSIS — K40.90 NON-RECURRENT UNILATERAL INGUINAL HERNIA WITHOUT OBSTRUCTION OR GANGRENE: Primary | ICD-10-CM

## 2025-01-07 PROCEDURE — 99213 OFFICE O/P EST LOW 20 MIN: CPT | Performed by: SURGERY

## 2025-01-07 NOTE — PROGRESS NOTES
"Name: Jacek Valles      : 1955      MRN: [id number]  Encounter Provider: Glen Patino MD  Encounter Date: 2025   Encounter department: [facility]  :  Assessment & Plan  Non-recurrent unilateral inguinal hernia without obstruction or gangrene             History of Present Illness   HPI  Jacek Valles is a 69 y.o. male who presents for follow up on right inguinal hernia to discuss repair.  Right inguinal hernias noted on exam.  Operative repair is recommended.  Risks and benefits explained in detail.  Patient is agreeable.    Patient's chart is reviewed.  He was given guidelines on holding his lisinopril and metformin the morning of surgery.  Hold Mounjaro for a week before surgery.      Review of Systems   Constitutional: Negative.  Negative for activity change.   HENT: Negative.     Eyes: Negative.    Respiratory: Negative.     Cardiovascular: Negative.    Gastrointestinal:  Positive for abdominal pain.   Endocrine: Negative.    Genitourinary: Negative.    Musculoskeletal: Negative.    Skin: Negative.    Allergic/Immunologic: Negative.    Neurological: Negative.    Psychiatric/Behavioral:  Negative for agitation, behavioral problems and confusion. The patient is not nervous/anxious.           Objective   /70   Pulse 85   Ht 5' 11\" (1.803 m)   Wt 109 kg (240 lb)   SpO2 97%   BMI 33.47 kg/m²      Physical Exam  Vitals and nursing note reviewed.   Constitutional:       General: He is not in acute distress.     Appearance: He is well-developed.   HENT:      Head: Normocephalic and atraumatic.   Cardiovascular:      Rate and Rhythm: Normal rate and regular rhythm.      Heart sounds: No murmur heard.  Pulmonary:      Effort: Pulmonary effort is normal. No respiratory distress.      Breath sounds: Normal breath sounds.   Abdominal:      Palpations: Abdomen is soft.      Tenderness: There is no abdominal tenderness.      Hernia: A hernia is present. "   Musculoskeletal:         General: No swelling.      Cervical back: Neck supple.   Skin:     General: Skin is warm and dry.      Capillary Refill: Capillary refill takes less than 2 seconds.   Neurological:      Mental Status: He is alert.   Psychiatric:         Mood and Affect: Mood normal.

## 2025-01-07 NOTE — H&P (VIEW-ONLY)
"Name: Jacek Valles      : 1955      MRN: [id number]  Encounter Provider: Glen Patino MD  Encounter Date: 2025   Encounter department: [facility]  :  Assessment & Plan  Non-recurrent unilateral inguinal hernia without obstruction or gangrene             History of Present Illness   HPI  Jacek Valles is a 69 y.o. male who presents for follow up on right inguinal hernia to discuss repair.  Right inguinal hernias noted on exam.  Operative repair is recommended.  Risks and benefits explained in detail.  Patient is agreeable.    Patient's chart is reviewed.  He was given guidelines on holding his lisinopril and metformin the morning of surgery.  Hold Mounjaro for a week before surgery.      Review of Systems   Constitutional: Negative.  Negative for activity change.   HENT: Negative.     Eyes: Negative.    Respiratory: Negative.     Cardiovascular: Negative.    Gastrointestinal:  Positive for abdominal pain.   Endocrine: Negative.    Genitourinary: Negative.    Musculoskeletal: Negative.    Skin: Negative.    Allergic/Immunologic: Negative.    Neurological: Negative.    Psychiatric/Behavioral:  Negative for agitation, behavioral problems and confusion. The patient is not nervous/anxious.           Objective   /70   Pulse 85   Ht 5' 11\" (1.803 m)   Wt 109 kg (240 lb)   SpO2 97%   BMI 33.47 kg/m²      Physical Exam  Vitals and nursing note reviewed.   Constitutional:       General: He is not in acute distress.     Appearance: He is well-developed.   HENT:      Head: Normocephalic and atraumatic.   Cardiovascular:      Rate and Rhythm: Normal rate and regular rhythm.      Heart sounds: No murmur heard.  Pulmonary:      Effort: Pulmonary effort is normal. No respiratory distress.      Breath sounds: Normal breath sounds.   Abdominal:      Palpations: Abdomen is soft.      Tenderness: There is no abdominal tenderness.      Hernia: A hernia is present. "   Musculoskeletal:         General: No swelling.      Cervical back: Neck supple.   Skin:     General: Skin is warm and dry.      Capillary Refill: Capillary refill takes less than 2 seconds.   Neurological:      Mental Status: He is alert.   Psychiatric:         Mood and Affect: Mood normal.

## 2025-01-08 ENCOUNTER — APPOINTMENT (OUTPATIENT)
Dept: LAB | Facility: CLINIC | Age: 70
End: 2025-01-08
Payer: COMMERCIAL

## 2025-01-08 DIAGNOSIS — K40.90 RIGHT INGUINAL HERNIA: ICD-10-CM

## 2025-01-08 DIAGNOSIS — E11.9 DIABETES (HCC): ICD-10-CM

## 2025-01-08 LAB
ALBUMIN SERPL BCG-MCNC: 4.5 G/DL (ref 3.5–5)
ALP SERPL-CCNC: 57 U/L (ref 34–104)
ALT SERPL W P-5'-P-CCNC: 19 U/L (ref 7–52)
ANION GAP SERPL CALCULATED.3IONS-SCNC: 6 MMOL/L (ref 4–13)
AST SERPL W P-5'-P-CCNC: 18 U/L (ref 13–39)
BILIRUB SERPL-MCNC: 0.72 MG/DL (ref 0.2–1)
BUN SERPL-MCNC: 20 MG/DL (ref 5–25)
CALCIUM SERPL-MCNC: 9.4 MG/DL (ref 8.4–10.2)
CHLORIDE SERPL-SCNC: 103 MMOL/L (ref 96–108)
CO2 SERPL-SCNC: 30 MMOL/L (ref 21–32)
CREAT SERPL-MCNC: 0.89 MG/DL (ref 0.6–1.3)
ERYTHROCYTE [DISTWIDTH] IN BLOOD BY AUTOMATED COUNT: 12.2 % (ref 11.6–15.1)
EST. AVERAGE GLUCOSE BLD GHB EST-MCNC: 143 MG/DL
GFR SERPL CREATININE-BSD FRML MDRD: 87 ML/MIN/1.73SQ M
GLUCOSE P FAST SERPL-MCNC: 140 MG/DL (ref 65–99)
HBA1C MFR BLD: 6.6 %
HCT VFR BLD AUTO: 48.4 % (ref 36.5–49.3)
HGB BLD-MCNC: 15.9 G/DL (ref 12–17)
MCH RBC QN AUTO: 31.2 PG (ref 26.8–34.3)
MCHC RBC AUTO-ENTMCNC: 32.9 G/DL (ref 31.4–37.4)
MCV RBC AUTO: 95 FL (ref 82–98)
PLATELET # BLD AUTO: 204 THOUSANDS/UL (ref 149–390)
PMV BLD AUTO: 10.6 FL (ref 8.9–12.7)
POTASSIUM SERPL-SCNC: 4.2 MMOL/L (ref 3.5–5.3)
PROT SERPL-MCNC: 7.1 G/DL (ref 6.4–8.4)
RBC # BLD AUTO: 5.1 MILLION/UL (ref 3.88–5.62)
SODIUM SERPL-SCNC: 139 MMOL/L (ref 135–147)
WBC # BLD AUTO: 6.75 THOUSAND/UL (ref 4.31–10.16)

## 2025-01-08 PROCEDURE — 80053 COMPREHEN METABOLIC PANEL: CPT

## 2025-01-08 PROCEDURE — 85027 COMPLETE CBC AUTOMATED: CPT

## 2025-01-08 PROCEDURE — 36415 COLL VENOUS BLD VENIPUNCTURE: CPT

## 2025-01-08 PROCEDURE — 83036 HEMOGLOBIN GLYCOSYLATED A1C: CPT

## 2025-01-09 ENCOUNTER — ANESTHESIA EVENT (OUTPATIENT)
Dept: PERIOP | Facility: AMBULARY SURGERY CENTER | Age: 70
End: 2025-01-09
Payer: COMMERCIAL

## 2025-01-12 LAB
ATRIAL RATE: 85 BPM
P AXIS: 71 DEGREES
PR INTERVAL: 188 MS
QRS AXIS: -16 DEGREES
QRSD INTERVAL: 74 MS
QT INTERVAL: 366 MS
QTC INTERVAL: 436 MS
T WAVE AXIS: 18 DEGREES
VENTRICULAR RATE: 85 BPM

## 2025-01-12 PROCEDURE — 93010 ELECTROCARDIOGRAM REPORT: CPT | Performed by: INTERNAL MEDICINE

## 2025-01-14 RX ORDER — OXYCODONE AND ACETAMINOPHEN 5; 325 MG/1; MG/1
1 TABLET ORAL EVERY 4 HOURS PRN
Qty: 10 TABLET | Refills: 0 | Status: SHIPPED | OUTPATIENT
Start: 2025-01-14

## 2025-01-14 NOTE — DISCHARGE INSTR - AVS FIRST PAGE
Please call the office when you leave to schedule an appointment for 2 weeks.              Please call [phone]. [address], [address], [place], [address]. [address] [place].     Activity:    May lift 10 lb as many times as desired the 1st week,       20 lb in 2 weeks,       30 lb in 3 weeks.                Walking is encouraged  Normal daily activities including climbing steps are okay  Do not engage in strenuous activity ( sit-ups or crutches) or contact sports for 4-6 weeks post-operatively    Return to Work:   Okay to return to work when you feel well if you desire.        Diet:   You may return to your normal healthy diet.    Wound Care:  Your wound is closed with dissolvable stitches and glue.  It is okay to shower. Wash incision gently with soap and water and pat dry. Do not soak incisions in bath water or swim for two weeks. Do not apply any creams or ointments.    Pain Medication:   Please take as directed if needed. May use Advil or Motrin in addition.  Recall, the pain medicine and anesthesia is associated with constipation.    No driving while taking narcotic pain medications.      Other:  It is normal to developed a “healing ridge” / firm incision after surgery.  This is your body making scar tissue.  It is a good sign  Constipation is very common after general anesthesia.  Please use milk of magnesia as needed in order to help prevent constipation.  It is normal to get bruising after surgery.  If you have questions after discharge please call the office.    If you have increased pain, fever >101.5, increased drainage, redness or a bad smell at your surgery site, please call us immediately or come directly to the Emergency Room

## 2025-01-16 DIAGNOSIS — K21.9 GASTROESOPHAGEAL REFLUX DISEASE, UNSPECIFIED WHETHER ESOPHAGITIS PRESENT: ICD-10-CM

## 2025-01-16 DIAGNOSIS — J30.9 ALLERGIC RHINITIS, UNSPECIFIED SEASONALITY, UNSPECIFIED TRIGGER: ICD-10-CM

## 2025-01-16 DIAGNOSIS — E11.40 TYPE 2 DIABETES MELLITUS WITH DIABETIC NEUROPATHY, WITHOUT LONG-TERM CURRENT USE OF INSULIN (HCC): ICD-10-CM

## 2025-01-16 DIAGNOSIS — Z85.46 HISTORY OF PROSTATE CANCER: ICD-10-CM

## 2025-01-16 RX ORDER — TAMSULOSIN HYDROCHLORIDE 0.4 MG/1
CAPSULE ORAL
Qty: 90 CAPSULE | Refills: 1 | Status: SHIPPED | OUTPATIENT
Start: 2025-01-16

## 2025-01-16 RX ORDER — EMPAGLIFLOZIN 25 MG/1
25 TABLET, FILM COATED ORAL EVERY MORNING
Qty: 90 TABLET | Refills: 1 | Status: SHIPPED | OUTPATIENT
Start: 2025-01-16

## 2025-01-16 RX ORDER — METFORMIN HYDROCHLORIDE 500 MG/1
TABLET, EXTENDED RELEASE ORAL
Qty: 360 TABLET | Refills: 1 | Status: SHIPPED | OUTPATIENT
Start: 2025-01-16

## 2025-01-16 RX ORDER — FLUTICASONE PROPIONATE 50 MCG
1 SPRAY, SUSPENSION (ML) NASAL DAILY
Qty: 48 G | Refills: 1 | Status: SHIPPED | OUTPATIENT
Start: 2025-01-16

## 2025-01-16 NOTE — PRE-PROCEDURE INSTRUCTIONS
Pre-Surgery Instructions:   Medication Instructions    acetaminophen (TYLENOL) 500 mg tablet Uses PRN- OK to take day of surgery    Empagliflozin (Jardiance) 25 MG TABS Stop taking 4 days prior to surgery.per patient last dose 1/13/25    fluticasone (FLONASE) 50 mcg/act nasal spray Uses PRN- OK to take day of surgery    lisinopril (ZESTRIL) 10 mg tablet Hold day of surgery.    metFORMIN (GLUCOPHAGE-XR) 500 mg 24 hr tablet Hold day of surgery.    Mounjaro 2.5 MG/0.5ML Stop taking 7 days prior to surgery. Per patient last dose 1/10/25    Multiple Vitamins-Minerals (PRESERVISION AREDS 2 PO) Hold day of surgery.    omeprazole (PriLOSEC) 20 mg delayed release capsule Uses PRN- OK to take day of surgery         rosuvastatin (CRESTOR) 20 MG tablet Take day of surgery.    tamsulosin (FLOMAX) 0.4 mg Take night before surgery    vitamin B-12 (VITAMIN B-12) 1,000 mcg tablet Hold day of surgery.    Medication instructions for day surgery reviewed. Please use only a sip of water to take your instructed medications. Avoid all over the counter vitamins, supplements and NSAIDS for one week prior to surgery per anesthesia guidelines. Tylenol is ok to take as needed.     You will receive a call one business day prior to surgery with an arrival time and hospital directions. If your surgery is scheduled on a Monday, the hospital will be calling you on the Friday prior to your surgery. If you have not heard from anyone by 8pm, please call the hospital supervisor through the hospital  at [phone]. ([place] [phone] or [place] [phone]).    Do not eat or drink anything after midnight the night before your surgery, including candy, mints, lifesavers, or chewing gum. Do not drink alcohol 24hrs before your surgery. Try not to smoke at least 24hrs before your surgery.       Follow the pre surgery showering instructions as listed in the “My Surgical Experience Booklet” or otherwise provided by your surgeon's office. Do  not use a blade to shave the surgical area 1 week before surgery. It is okay to use a clean electric clippers up to 24 hours before surgery. Do not apply any lotions, creams, including makeup, cologne, deodorant, or perfumes after showering on the day of your surgery. Do not use dry shampoo, hair spray, hair gel, or any type of hair products.     No contact lenses, eye make-up, or artificial eyelashes. Remove nail polish, including gel polish, and any artificial, gel, or acrylic nails if possible. Remove all jewelry including rings and body piercing jewelry.     Wear causal clothing that is easy to take on and off. Consider your type of surgery.    Keep any valuables, jewelry, piercings at home. Please bring any specially ordered equipment (sling, braces) if indicated.    Arrange for a responsible person to drive you to and from the hospital on the day of your surgery. Please confirm the visitor policy for the day of your procedure when you receive your phone call with an arrival time.     Call the surgeon's office with any new illnesses, exposures, or additional questions prior to surgery.    Please reference your “My Surgical Experience Booklet” for additional information to prepare for your upcoming surgery.

## 2025-01-17 ENCOUNTER — HOSPITAL ENCOUNTER (OUTPATIENT)
Facility: AMBULARY SURGERY CENTER | Age: 70
Setting detail: OUTPATIENT SURGERY
Discharge: HOME | End: 2025-01-17
Attending: SURGERY | Admitting: SURGERY
Payer: COMMERCIAL

## 2025-01-17 ENCOUNTER — ANESTHESIA (OUTPATIENT)
Dept: PERIOP | Facility: AMBULARY SURGERY CENTER | Age: 70
End: 2025-01-17
Payer: COMMERCIAL

## 2025-01-17 VITALS
HEIGHT: 71 IN | DIASTOLIC BLOOD PRESSURE: 80 MMHG | SYSTOLIC BLOOD PRESSURE: 139 MMHG | HEART RATE: 90 BPM | WEIGHT: 245 LBS | RESPIRATION RATE: 18 BRPM | OXYGEN SATURATION: 94 % | BODY MASS INDEX: 34.3 KG/M2 | TEMPERATURE: 98 F

## 2025-01-17 DIAGNOSIS — K43.2 INCISIONAL HERNIA, WITHOUT OBSTRUCTION OR GANGRENE: Primary | ICD-10-CM

## 2025-01-17 LAB
GLUCOSE SERPL-MCNC: 125 MG/DL (ref 65–140)
GLUCOSE SERPL-MCNC: 136 MG/DL (ref 65–140)

## 2025-01-17 PROCEDURE — 82948 REAGENT STRIP/BLOOD GLUCOSE: CPT

## 2025-01-17 PROCEDURE — 49505 PRP I/HERN INIT REDUC >5 YR: CPT | Performed by: SURGERY

## 2025-01-17 PROCEDURE — C1781 MESH (IMPLANTABLE): HCPCS | Performed by: SURGERY

## 2025-01-17 DEVICE — MODIFIED ONFLEX MESH, 3.4" X 5.6" (8.6 CM X 14.2 CM), MEDIUM
Type: IMPLANTABLE DEVICE | Site: INGUINAL | Status: FUNCTIONAL
Brand: ONFLEX

## 2025-01-17 RX ORDER — ONDANSETRON 2 MG/ML
4 INJECTION INTRAMUSCULAR; INTRAVENOUS ONCE AS NEEDED
Status: DISCONTINUED | OUTPATIENT
Start: 2025-01-17 | End: 2025-01-17 | Stop reason: HOSPADM

## 2025-01-17 RX ORDER — FUROSEMIDE 10 MG/ML
INJECTION INTRAMUSCULAR; INTRAVENOUS AS NEEDED
Status: DISCONTINUED | OUTPATIENT
Start: 2025-01-17 | End: 2025-01-17

## 2025-01-17 RX ORDER — MAGNESIUM HYDROXIDE 1200 MG/15ML
LIQUID ORAL AS NEEDED
Status: DISCONTINUED | OUTPATIENT
Start: 2025-01-17 | End: 2025-01-17 | Stop reason: HOSPADM

## 2025-01-17 RX ORDER — FENTANYL CITRATE/PF 50 MCG/ML
25 SYRINGE (ML) INJECTION
Refills: 0 | Status: DISCONTINUED | OUTPATIENT
Start: 2025-01-17 | End: 2025-01-17 | Stop reason: HOSPADM

## 2025-01-17 RX ORDER — HYDROMORPHONE HCL/PF 1 MG/ML
SYRINGE (ML) INJECTION AS NEEDED
Status: DISCONTINUED | OUTPATIENT
Start: 2025-01-17 | End: 2025-01-17

## 2025-01-17 RX ORDER — BUPIVACAINE HYDROCHLORIDE 2.5 MG/ML
INJECTION, SOLUTION EPIDURAL; INFILTRATION; INTRACAUDAL AS NEEDED
Status: DISCONTINUED | OUTPATIENT
Start: 2025-01-17 | End: 2025-01-17 | Stop reason: HOSPADM

## 2025-01-17 RX ORDER — ONDANSETRON 2 MG/ML
INJECTION INTRAMUSCULAR; INTRAVENOUS AS NEEDED
Status: DISCONTINUED | OUTPATIENT
Start: 2025-01-17 | End: 2025-01-17

## 2025-01-17 RX ORDER — ONDANSETRON 2 MG/ML
4 INJECTION INTRAMUSCULAR; INTRAVENOUS EVERY 4 HOURS PRN
Status: DISCONTINUED | OUTPATIENT
Start: 2025-01-17 | End: 2025-01-17 | Stop reason: HOSPADM

## 2025-01-17 RX ORDER — OXYCODONE AND ACETAMINOPHEN 5; 325 MG/1; MG/1
1 TABLET ORAL EVERY 4 HOURS PRN
Refills: 0 | Status: DISCONTINUED | OUTPATIENT
Start: 2025-01-17 | End: 2025-01-17 | Stop reason: HOSPADM

## 2025-01-17 RX ORDER — FENTANYL CITRATE 50 UG/ML
INJECTION, SOLUTION INTRAMUSCULAR; INTRAVENOUS AS NEEDED
Status: DISCONTINUED | OUTPATIENT
Start: 2025-01-17 | End: 2025-01-17

## 2025-01-17 RX ORDER — HYDROMORPHONE HCL/PF 1 MG/ML
0.5 SYRINGE (ML) INJECTION
Refills: 0 | Status: DISCONTINUED | OUTPATIENT
Start: 2025-01-17 | End: 2025-01-17 | Stop reason: HOSPADM

## 2025-01-17 RX ORDER — SODIUM CHLORIDE, SODIUM LACTATE, POTASSIUM CHLORIDE, CALCIUM CHLORIDE 600; 310; 30; 20 MG/100ML; MG/100ML; MG/100ML; MG/100ML
INJECTION, SOLUTION INTRAVENOUS CONTINUOUS PRN
Status: DISCONTINUED | OUTPATIENT
Start: 2025-01-17 | End: 2025-01-17

## 2025-01-17 RX ORDER — DEXAMETHASONE SODIUM PHOSPHATE 10 MG/ML
INJECTION, SOLUTION INTRAMUSCULAR; INTRAVENOUS AS NEEDED
Status: DISCONTINUED | OUTPATIENT
Start: 2025-01-17 | End: 2025-01-17

## 2025-01-17 RX ORDER — LIDOCAINE HYDROCHLORIDE 20 MG/ML
INJECTION, SOLUTION EPIDURAL; INFILTRATION; INTRACAUDAL; PERINEURAL AS NEEDED
Status: DISCONTINUED | OUTPATIENT
Start: 2025-01-17 | End: 2025-01-17

## 2025-01-17 RX ORDER — CEFAZOLIN SODIUM 2 G/50ML
2000 SOLUTION INTRAVENOUS ONCE
Status: COMPLETED | OUTPATIENT
Start: 2025-01-17 | End: 2025-01-17

## 2025-01-17 RX ORDER — SODIUM CHLORIDE, SODIUM LACTATE, POTASSIUM CHLORIDE, CALCIUM CHLORIDE 600; 310; 30; 20 MG/100ML; MG/100ML; MG/100ML; MG/100ML
100 INJECTION, SOLUTION INTRAVENOUS CONTINUOUS
Status: DISCONTINUED | OUTPATIENT
Start: 2025-01-17 | End: 2025-01-17 | Stop reason: HOSPADM

## 2025-01-17 RX ORDER — ACETAMINOPHEN 325 MG/1
650 TABLET ORAL EVERY 4 HOURS PRN
Status: DISCONTINUED | OUTPATIENT
Start: 2025-01-17 | End: 2025-01-17 | Stop reason: HOSPADM

## 2025-01-17 RX ORDER — FENTANYL CITRATE/PF 50 MCG/ML
25 SYRINGE (ML) INJECTION
Status: DISCONTINUED | OUTPATIENT
Start: 2025-01-17 | End: 2025-01-17 | Stop reason: HOSPADM

## 2025-01-17 RX ORDER — PROPOFOL 10 MG/ML
INJECTION, EMULSION INTRAVENOUS AS NEEDED
Status: DISCONTINUED | OUTPATIENT
Start: 2025-01-17 | End: 2025-01-17

## 2025-01-17 RX ORDER — KETOROLAC TROMETHAMINE 30 MG/ML
INJECTION, SOLUTION INTRAMUSCULAR; INTRAVENOUS AS NEEDED
Status: DISCONTINUED | OUTPATIENT
Start: 2025-01-17 | End: 2025-01-17

## 2025-01-17 RX ORDER — HYDROMORPHONE HCL/PF 1 MG/ML
0.2 SYRINGE (ML) INJECTION
Status: DISCONTINUED | OUTPATIENT
Start: 2025-01-17 | End: 2025-01-17 | Stop reason: HOSPADM

## 2025-01-17 RX ADMIN — FENTANYL CITRATE 50 MCG: 50 INJECTION INTRAMUSCULAR; INTRAVENOUS at 12:46

## 2025-01-17 RX ADMIN — HYDROMORPHONE HYDROCHLORIDE 0.25 MG: 1 INJECTION, SOLUTION INTRAMUSCULAR; INTRAVENOUS; SUBCUTANEOUS at 14:10

## 2025-01-17 RX ADMIN — PROPOFOL 150 MG: 10 INJECTION, EMULSION INTRAVENOUS at 12:24

## 2025-01-17 RX ADMIN — SODIUM CHLORIDE, SODIUM LACTATE, POTASSIUM CHLORIDE, AND CALCIUM CHLORIDE: .6; .31; .03; .02 INJECTION, SOLUTION INTRAVENOUS at 12:19

## 2025-01-17 RX ADMIN — ONDANSETRON 4 MG: 2 INJECTION, SOLUTION INTRAMUSCULAR; INTRAVENOUS at 12:55

## 2025-01-17 RX ADMIN — KETOROLAC TROMETHAMINE 15 MG: 30 INJECTION, SOLUTION INTRAMUSCULAR; INTRAVENOUS at 13:34

## 2025-01-17 RX ADMIN — CEFAZOLIN SODIUM 2000 MG: 2 SOLUTION INTRAVENOUS at 12:28

## 2025-01-17 RX ADMIN — FENTANYL CITRATE 50 MCG: 50 INJECTION INTRAMUSCULAR; INTRAVENOUS at 12:25

## 2025-01-17 RX ADMIN — LIDOCAINE HYDROCHLORIDE 100 MG: 20 INJECTION, SOLUTION EPIDURAL; INFILTRATION; INTRACAUDAL at 12:24

## 2025-01-17 RX ADMIN — HYDROMORPHONE HYDROCHLORIDE 0.25 MG: 1 INJECTION, SOLUTION INTRAMUSCULAR; INTRAVENOUS; SUBCUTANEOUS at 13:33

## 2025-01-17 RX ADMIN — FUROSEMIDE 10 MG: 10 INJECTION, SOLUTION INTRAVENOUS at 14:10

## 2025-01-17 RX ADMIN — DEXAMETHASONE SODIUM PHOSPHATE 4 MG: 10 INJECTION INTRAMUSCULAR; INTRAVENOUS at 12:31

## 2025-01-17 RX ADMIN — SODIUM CHLORIDE, SODIUM LACTATE, POTASSIUM CHLORIDE, AND CALCIUM CHLORIDE: .6; .31; .03; .02 INJECTION, SOLUTION INTRAVENOUS at 13:34

## 2025-01-17 RX ADMIN — PROPOFOL 50 MG: 10 INJECTION, EMULSION INTRAVENOUS at 12:26

## 2025-01-17 NOTE — ANESTHESIA POSTPROCEDURE EVALUATION
Post-Op Assessment Note            No anethesia notable event occurred.            Last Filed PACU Vitals:  Vitals Value Taken Time   Temp 98.3 °F (36.8 °C) 01/17/25 1429   Pulse 88 01/17/25 1453   /87 01/17/25 1445   Resp 37 01/17/25 1453   SpO2 92 % 01/17/25 1452   Vitals shown include unfiled device data.    Modified Anum:     Vitals Value Taken Time   Activity 2 01/17/25 1445   Respiration 2 01/17/25 1445   Circulation 2 01/17/25 1445   Consciousness 1 01/17/25 1445   Oxygen Saturation 2 01/17/25 1445     Modified Anum Score: 9

## 2025-01-17 NOTE — OP NOTE
OPERATIVE REPORT  PATIENT NAME: Jacek Valles    :  1955  MRN: [id number]  Pt Location: [facility] OR ROOM 05    SURGERY DATE: 2025    Surgeons and Role:     * Glen Patino MD - Primary     * Grayson Scott MD - Assisting    Preop Diagnosis:  Right inguinal hernia [K40.90]    Post-Op Diagnosis Codes:     * Right inguinal hernia [K40.90]    Procedure(s):  Right - REPAIR HERNIA INGUINAL    Specimen(s):  * No specimens in log *    Estimated Blood Loss:   Minimal    Drains:  * No LDAs found *    Anesthesia Type:   General    Operative Indications:  Right inguinal hernia [K40.90]      Operative Findings:        Complications:   None    Procedure and Technique:  Jacek Valles is a 69 y.o. male was brought into the operative suite and identified visually and by arm band. The patient was placed in the supine position.  Careful attention was made towards padding and positioning of the extremities.  After sterile prep and drape, a timeout was completed. The prep was dry.  Antibiotics were provided. Athrombic pumps in place.    0.25% Marcaine with epinephrine was utilized throughout the procedure.  An incision was made  within the right  inguinal region.  The incision was carried down through the skin and subcutaneous tissue. The superficial epigastric vein was clamped, ligated and  divided. . The external oblique fibers were identified.  The external ring was identified.  The external oblique fibers were then split in the direction of their fibers.  Hemostats were placed on the cut edges.  A self-retaining retractor was then placed.    Exploration of the inguinal canal commenced.  The cremasteric fibers were then divided along the fiber direction of its fibers the cord structures.   The cord was encircled in a atraumatic Penrose drain and preserved during dissection.  Identification of a indirect  direct inguinal hernia was performed. High dissection was completed at the level of the internal ring.   Preperitoneal dissection commenced identifying and preserving the inferior epigastric vessels.    A preperitoneal mesh was then inserted.  The branch of the genitofemoral nerve was divided in order to help prevent postoperative neuralgia.  The inguinal floor was then repaired with interrupted figure-of-eight 0 Vicryl suture.  The indirect inguinal ring was repositioned more superiorly while repairing the inguinal transversalis muscular floor.  An onlay mesh was then secured to the tendon overlying the lateral pubic tubercle The external oblique fascia was closed with a running 3-0 PDS suture.  Additional 0.25% Marcaine with epinephrine was injected over the ilioinguinal and iliohypogastric nerves.    3-0 Vicryl subcutaneous suture was placed into the Kei's fascia .   4-0 Monocryl suture was used for the subcuticular layer. Dermabond was then applied.    The patient was then awakened from general anesthesia and transferred to the recovery room in stable condition. Sponge and instrument count correct ×2.     I was present for the entire procedure.    Patient Disposition:  PACU              SIGNATURE: Glen Patino MD  DATE: January 17, 2025  TIME: 2:27 PM

## 2025-01-17 NOTE — ANESTHESIA POSTPROCEDURE EVALUATION
Post-Op Assessment Note    CV Status:  Stable  Pain Score: 0    Pain management: adequate       Mental Status:  Alert and awake   Hydration Status:  Euvolemic   PONV Controlled:  Controlled   Airway Patency:  Patent     Post Op Vitals Reviewed: Yes    No anethesia notable event occurred.    Staff: CRNA           Last Filed PACU Vitals:  Vitals Value Taken Time   Temp 98.3    Pulse 90 01/17/25 1430   /87 01/17/25 1430   Resp 14    SpO2 95 % 01/17/25 1430   Vitals shown include unfiled device data.

## 2025-01-17 NOTE — ANESTHESIA PREPROCEDURE EVALUATION
Procedure:  REPAIR HERNIA INGUINAL (Right: Groin)    Relevant Problems   CARDIO   (+) Essential hypertension   (+) Mixed hyperlipidemia      ENDO   (+) Type 2 diabetes mellitus with diabetic neuropathy, without long-term current use of insulin (HCC)      GI/HEPATIC   (+) Esophageal reflux   (+) Fatty liver      MUSCULOSKELETAL   (+) Chronic bilateral low back pain without sciatica      NEURO/PSYCH   (+) Chronic bilateral low back pain without sciatica   (+) Type 2 diabetes mellitus with diabetic neuropathy, without long-term current use of insulin (HCC)        Physical Exam    Airway  Comment: Full beard  Mallampati score: II  TM Distance: >3 FB  Neck ROM: full     Dental        Cardiovascular      Pulmonary      Other Findings        Anesthesia Plan  ASA Score- 3     Anesthesia Type- general with ASA Monitors.         Additional Monitors:     Airway Plan: LMA.           Plan Factors-Exercise tolerance (METS): >4 METS.    Chart reviewed. EKG reviewed. Imaging results reviewed. Existing labs reviewed. Patient summary reviewed.    Patient is not a current smoker.              Induction- intravenous.    Postoperative Plan-         Informed Consent- Anesthetic plan and risks discussed with patient.  I personally reviewed this patient with the CRNA. Discussed and agreed on the Anesthesia Plan with the CRNA..      NPO Status:  Vitals Value Taken Time   Date of last liquid 01/16/25 01/17/25 1140   Time of last liquid 2100 01/17/25 1140   Date of last solid 01/16/25 01/17/25 1140   Time of last solid 2000 01/17/25 1140

## 2025-01-17 NOTE — INTERVAL H&P NOTE
H&P reviewed. After examining the patient I find no changes in the patients condition since the H&P had been written.    Vitals:    01/17/25 1136   BP: 148/86   Pulse: 88   Resp: 20   Temp: 99.2 °F (37.3 °C)   SpO2: 95%

## 2025-02-03 ENCOUNTER — OFFICE VISIT (OUTPATIENT)
Dept: SURGERY | Facility: CLINIC | Age: 70
End: 2025-02-03

## 2025-02-03 DIAGNOSIS — K43.2 INCISIONAL HERNIA, WITHOUT OBSTRUCTION OR GANGRENE: Primary | ICD-10-CM

## 2025-02-03 PROCEDURE — 99024 POSTOP FOLLOW-UP VISIT: CPT | Performed by: SURGERY

## 2025-02-03 NOTE — PROGRESS NOTES
Name: Jacek Valles      : 1955      MRN: [id number]  Encounter Provider: Glen Patino MD  Encounter Date: 2/3/2025   Encounter department: [facility]  :  Assessment & Plan  Incisional hernia, without obstruction or gangrene             History of Present Illness   HPI  Jacek Valles is a 69 y.o. male who presents post operatively.  Right inguinal hernia repair 2025.  Returns today for follow-up visit.  Overall feels well.  He offers no complaints.  Incisions healing well.      Review of Systems   Constitutional: Negative.  Negative for activity change.   HENT: Negative.     Eyes: Negative.    Respiratory: Negative.     Cardiovascular: Negative.    Gastrointestinal: Negative.    Endocrine: Negative.    Genitourinary: Negative.    Musculoskeletal: Negative.    Skin: Negative.    Allergic/Immunologic: Negative.    Neurological: Negative.    Psychiatric/Behavioral:  Negative for agitation, behavioral problems and confusion. The patient is not nervous/anxious.    All other systems reviewed and are negative.         Objective   There were no vitals taken for this visit.     Physical Exam  Skin:     Comments: Incision is clean and healing nicely

## 2025-03-05 ENCOUNTER — TELEPHONE (OUTPATIENT)
Dept: FAMILY MEDICINE CLINIC | Facility: CLINIC | Age: 70
End: 2025-03-05

## 2025-03-05 DIAGNOSIS — I10 ESSENTIAL HYPERTENSION: ICD-10-CM

## 2025-03-05 RX ORDER — LISINOPRIL 10 MG/1
10 TABLET ORAL DAILY
Qty: 100 TABLET | Refills: 1 | Status: SHIPPED | OUTPATIENT
Start: 2025-03-05

## 2025-03-05 NOTE — TELEPHONE ENCOUNTER
Patient needs a refill of Lisinopril. He would like a 90 day supply sent to [facility] delivery. New prescription insurance card is scanned in to chart.

## 2025-03-07 ENCOUNTER — TELEPHONE (OUTPATIENT)
Dept: GASTROENTEROLOGY | Facility: AMBULARY SURGERY CENTER | Age: 70
End: 2025-03-07

## 2025-03-07 ENCOUNTER — PREP FOR PROCEDURE (OUTPATIENT)
Dept: GASTROENTEROLOGY | Facility: AMBULARY SURGERY CENTER | Age: 70
End: 2025-03-07

## 2025-03-07 DIAGNOSIS — Z86.0100 HISTORY OF COLON POLYPS: Primary | ICD-10-CM

## 2025-03-07 DIAGNOSIS — K21.9 GASTROESOPHAGEAL REFLUX DISEASE, UNSPECIFIED WHETHER ESOPHAGITIS PRESENT: Primary | ICD-10-CM

## 2025-03-07 NOTE — TELEPHONE ENCOUNTER
Pt scheduled for Colon 5/8/2025.  Pt is overdue for EGD (due 2023).  Can this be added to the Colon?  Please advise.    Scheduled date of colonoscopy (as of today): 5/8/2025  Physician performing colonoscopy: Dr. Uribe  Location of colonoscopy: [facility]  Bowel prep reviewed with patient: Miralax/Dulcolax  Instructions reviewed with patient by: Carolann  Clearances: N/A

## 2025-03-07 NOTE — TELEPHONE ENCOUNTER
ERROR   LOV 10-18-23    Patient calling to find out if it would be okay to go back to putting on sneakers? She had x-ray today and wanted to get Dr. Maria Elena Burnham recommendations.     Please advise

## 2025-03-18 ENCOUNTER — APPOINTMENT (OUTPATIENT)
Dept: LAB | Facility: CLINIC | Age: 70
End: 2025-03-18
Payer: MEDICARE

## 2025-03-18 DIAGNOSIS — I10 ESSENTIAL HYPERTENSION: ICD-10-CM

## 2025-03-18 DIAGNOSIS — E78.2 MIXED HYPERLIPIDEMIA: ICD-10-CM

## 2025-03-18 DIAGNOSIS — E11.40 TYPE 2 DIABETES MELLITUS WITH DIABETIC NEUROPATHY, WITHOUT LONG-TERM CURRENT USE OF INSULIN (HCC): ICD-10-CM

## 2025-03-18 LAB
ALBUMIN SERPL BCG-MCNC: 4.7 G/DL (ref 3.5–5)
ALP SERPL-CCNC: 60 U/L (ref 34–104)
ALT SERPL W P-5'-P-CCNC: 23 U/L (ref 7–52)
ANION GAP SERPL CALCULATED.3IONS-SCNC: 8 MMOL/L (ref 4–13)
AST SERPL W P-5'-P-CCNC: 22 U/L (ref 13–39)
BILIRUB SERPL-MCNC: 0.9 MG/DL (ref 0.2–1)
BUN SERPL-MCNC: 15 MG/DL (ref 5–25)
CALCIUM SERPL-MCNC: 9.7 MG/DL (ref 8.4–10.2)
CHLORIDE SERPL-SCNC: 103 MMOL/L (ref 96–108)
CHOLEST SERPL-MCNC: 157 MG/DL (ref ?–200)
CO2 SERPL-SCNC: 26 MMOL/L (ref 21–32)
CREAT SERPL-MCNC: 0.77 MG/DL (ref 0.6–1.3)
CREAT UR-MCNC: 62.7 MG/DL
ERYTHROCYTE [DISTWIDTH] IN BLOOD BY AUTOMATED COUNT: 12.5 % (ref 11.6–15.1)
EST. AVERAGE GLUCOSE BLD GHB EST-MCNC: 134 MG/DL
GFR SERPL CREATININE-BSD FRML MDRD: 92 ML/MIN/1.73SQ M
GLUCOSE P FAST SERPL-MCNC: 127 MG/DL (ref 65–99)
HBA1C MFR BLD: 6.3 %
HCT VFR BLD AUTO: 46.4 % (ref 36.5–49.3)
HDLC SERPL-MCNC: 54 MG/DL
HGB BLD-MCNC: 15.7 G/DL (ref 12–17)
LDLC SERPL CALC-MCNC: 71 MG/DL (ref 0–100)
MCH RBC QN AUTO: 31.7 PG (ref 26.8–34.3)
MCHC RBC AUTO-ENTMCNC: 33.8 G/DL (ref 31.4–37.4)
MCV RBC AUTO: 94 FL (ref 82–98)
MICROALBUMIN UR-MCNC: 13 MG/L
MICROALBUMIN/CREAT 24H UR: 21 MG/G CREATININE (ref 0–30)
PLATELET # BLD AUTO: 166 THOUSANDS/UL (ref 149–390)
PMV BLD AUTO: 10.4 FL (ref 8.9–12.7)
POTASSIUM SERPL-SCNC: 4 MMOL/L (ref 3.5–5.3)
PROT SERPL-MCNC: 7.2 G/DL (ref 6.4–8.4)
RBC # BLD AUTO: 4.96 MILLION/UL (ref 3.88–5.62)
SODIUM SERPL-SCNC: 137 MMOL/L (ref 135–147)
TRIGL SERPL-MCNC: 159 MG/DL (ref ?–150)
WBC # BLD AUTO: 4.82 THOUSAND/UL (ref 4.31–10.16)

## 2025-03-18 PROCEDURE — 80053 COMPREHEN METABOLIC PANEL: CPT

## 2025-03-18 PROCEDURE — 85027 COMPLETE CBC AUTOMATED: CPT

## 2025-03-18 PROCEDURE — 80061 LIPID PANEL: CPT

## 2025-03-18 PROCEDURE — 82043 UR ALBUMIN QUANTITATIVE: CPT

## 2025-03-18 PROCEDURE — 36415 COLL VENOUS BLD VENIPUNCTURE: CPT

## 2025-03-18 PROCEDURE — 83036 HEMOGLOBIN GLYCOSYLATED A1C: CPT

## 2025-03-18 PROCEDURE — 82570 ASSAY OF URINE CREATININE: CPT

## 2025-03-27 ENCOUNTER — RA CDI HCC (OUTPATIENT)
Dept: OTHER | Facility: HOSPITAL | Age: 70
End: 2025-03-27

## 2025-04-01 ENCOUNTER — OFFICE VISIT (OUTPATIENT)
Dept: FAMILY MEDICINE CLINIC | Facility: CLINIC | Age: 70
End: 2025-04-01
Payer: MEDICARE

## 2025-04-01 VITALS
WEIGHT: 244.2 LBS | DIASTOLIC BLOOD PRESSURE: 74 MMHG | TEMPERATURE: 97.5 F | BODY MASS INDEX: 34.19 KG/M2 | SYSTOLIC BLOOD PRESSURE: 126 MMHG | HEART RATE: 80 BPM | HEIGHT: 71 IN | RESPIRATION RATE: 18 BRPM

## 2025-04-01 DIAGNOSIS — Z13.6 SCREENING FOR AAA (ABDOMINAL AORTIC ANEURYSM): ICD-10-CM

## 2025-04-01 DIAGNOSIS — E66.01 SEVERE OBESITY (BMI 35.0-39.9) WITH COMORBIDITY (HCC): ICD-10-CM

## 2025-04-01 DIAGNOSIS — I10 ESSENTIAL HYPERTENSION: ICD-10-CM

## 2025-04-01 DIAGNOSIS — K76.0 FATTY LIVER: ICD-10-CM

## 2025-04-01 DIAGNOSIS — Z85.46 HISTORY OF PROSTATE CANCER: ICD-10-CM

## 2025-04-01 DIAGNOSIS — E53.8 VITAMIN B12 DEFICIENCY: ICD-10-CM

## 2025-04-01 DIAGNOSIS — Z12.5 SCREENING PSA (PROSTATE SPECIFIC ANTIGEN): ICD-10-CM

## 2025-04-01 DIAGNOSIS — Z00.00 ENCOUNTER FOR INITIAL PREVENTIVE PHYSICAL EXAMINATION COVERED BY MEDICARE: Primary | ICD-10-CM

## 2025-04-01 DIAGNOSIS — Z87.891 PERSONAL HISTORY OF NICOTINE DEPENDENCE: ICD-10-CM

## 2025-04-01 DIAGNOSIS — E11.40 TYPE 2 DIABETES MELLITUS WITH DIABETIC NEUROPATHY, WITHOUT LONG-TERM CURRENT USE OF INSULIN (HCC): ICD-10-CM

## 2025-04-01 PROCEDURE — G2211 COMPLEX E/M VISIT ADD ON: HCPCS | Performed by: FAMILY MEDICINE

## 2025-04-01 PROCEDURE — G0402 INITIAL PREVENTIVE EXAM: HCPCS | Performed by: FAMILY MEDICINE

## 2025-04-01 PROCEDURE — 99214 OFFICE O/P EST MOD 30 MIN: CPT | Performed by: FAMILY MEDICINE

## 2025-04-01 NOTE — ASSESSMENT & PLAN NOTE
Blood pressure is controlled  Continue lisinopril 10 mg daily  Orders:  •  CBC; Future  •  Comprehensive metabolic panel; Future  •  Lipid Panel with Direct LDL reflex; Future

## 2025-04-01 NOTE — ASSESSMENT & PLAN NOTE
Well controlled  Continue mounjaro 2.5 mg weekly,  Metformin 2000 mg a day and jardiance 25 mg a day   Lab Results   Component Value Date    HGBA1C 6.3 (H) 03/18/2025       Orders:  •  Hemoglobin A1C; Future

## 2025-04-01 NOTE — PATIENT INSTRUCTIONS
Medicare Preventive Visit Patient Instructions  Thank you for completing your Welcome to Medicare Visit or Medicare Annual Wellness Visit today. Your next wellness visit will be due in one year (4/2/2026).  The screening/preventive services that you may require over the next 5-10 years are detailed below. Some tests may not apply to you based off risk factors and/or age. Screening tests ordered at today's visit but not completed yet may show as past due. Also, please note that scanned in results may not display below.  Preventive Screenings:  Service Recommendations Previous Testing/Comments   Colorectal Cancer Screening  Colonoscopy    Fecal Occult Blood Test (FOBT)/Fecal Immunochemical Test (FIT)  Fecal DNA/Cologuard Test  Flexible Sigmoidoscopy Age: 45-75 years old   Colonoscopy: every 10 years (May be performed more frequently if at higher risk)  OR  FOBT/FIT: every 1 year  OR  Cologuard: every 3 years  OR  Sigmoidoscopy: every 5 years  Screening may be recommended earlier than age 45 if at higher risk for colorectal cancer. Also, an individualized decision between you and your healthcare provider will decide whether screening between the ages of 76-85 would be appropriate. Colonoscopy: 06/14/2022  FOBT/FIT: Not on file  Cologuard: Not on file  Sigmoidoscopy: Not on file    Screening Current     Prostate Cancer Screening Individualized decision between patient and health care provider in men between ages of 55-69   Medicare will cover every 12 months beginning on the day after your 50th birthday PSA: <0.008 ng/mL     History Prostate Cancer     Hepatitis C Screening Once for adults born between 1945 and 1965  More frequently in patients at high risk for Hepatitis C Hep C Antibody: 06/19/2018    Screening Current   Diabetes Screening 1-2 times per year if you're at risk for diabetes or have pre-diabetes Fasting glucose: 127 mg/dL (3/18/2025)  A1C: 6.3 % (3/18/2025)  Screening Not Indicated  History Diabetes    Cholesterol Screening Once every 5 years if you don't have a lipid disorder. May order more often based on risk factors. Lipid panel: 03/18/2025  Screening Not Indicated  History Lipid Disorder      Other Preventive Screenings Covered by Medicare:  Abdominal Aortic Aneurysm (AAA) Screening: covered once if your at risk. You're considered to be at risk if you have a family history of AAA or a male between the age of 65-75 who smoking at least 100 cigarettes in your lifetime.  Lung Cancer Screening: covers low dose CT scan once per year if you meet all of the following conditions: (1) Age 55-77; (2) No signs or symptoms of lung cancer; (3) Current smoker or have quit smoking within the last 15 years; (4) You have a tobacco smoking history of at least 20 pack years (packs per day x number of years you smoked); (5) You get a written order from a healthcare provider.  Glaucoma Screening: covered annually if you're considered high risk: (1) You have diabetes OR (2) Family history of glaucoma OR (3)  aged 50 and older OR (4)  American aged 65 and older  Osteoporosis Screening: covered every 2 years if you meet one of the following conditions: (1) Have a vertebral abnormality; (2) On glucocorticoid therapy for more than 3 months; (3) Have primary hyperparathyroidism; (4) On osteoporosis medications and need to assess response to drug therapy.  HIV Screening: covered annually if you're between the age of 15-65. Also covered annually if you are younger than 15 and older than 65 with risk factors for HIV infection. For pregnant patients, it is covered up to 3 times per pregnancy.    Immunizations:  Immunization Recommendations   Influenza Vaccine Annual influenza vaccination during flu season is recommended for all persons aged >= 6 months who do not have contraindications   Pneumococcal Vaccine   * Pneumococcal conjugate vaccine = PCV13 (Prevnar 13), PCV15 (Vaxneuvance), PCV20 (Prevnar 20)  *  Pneumococcal polysaccharide vaccine = PPSV23 (Pneumovax) Adults 19-63 yo with certain risk factors or if 65+ yo  If never received any pneumonia vaccine: recommend Prevnar 20 (PCV20)  Give PCV20 if previously received 1 dose of PCV13 or PPSV23   Hepatitis B Vaccine 3 dose series if at intermediate or high risk (ex: diabetes, end stage renal disease, liver disease)   Respiratory syncytial virus (RSV) Vaccine - COVERED BY MEDICARE PART D  * RSVPreF3 (Arexvy) CDC recommends that adults 60 years of age and older may receive a single dose of RSV vaccine using shared clinical decision-making (SCDM)   Tetanus (Td) Vaccine - COST NOT COVERED BY MEDICARE PART B Following completion of primary series, a booster dose should be given every 10 years to maintain immunity against tetanus. Td may also be given as tetanus wound prophylaxis.   Tdap Vaccine - COST NOT COVERED BY MEDICARE PART B Recommended at least once for all adults. For pregnant patients, recommended with each pregnancy.   Shingles Vaccine (Shingrix) - COST NOT COVERED BY MEDICARE PART B  2 shot series recommended in those 19 years and older who have or will have weakened immune systems or those 50 years and older     Health Maintenance Due:      Topic Date Due   • Colorectal Cancer Screening  06/13/2025   • Hepatitis C Screening  Completed     Immunizations Due:      Topic Date Due   • Influenza Vaccine (1) 09/01/2024   • COVID-19 Vaccine (3 - 2024-25 season) 09/01/2024     Advance Directives   What are advance directives?  Advance directives are legal documents that state your wishes and plans for medical care. These plans are made ahead of time in case you lose your ability to make decisions for yourself. Advance directives can apply to any medical decision, such as the treatments you want, and if you want to donate organs.   What are the types of advance directives?  There are many types of advance directives, and each state has rules about how to use them. You  may choose a combination of any of the following:  Living will:  This is a written record of the treatment you want. You can also choose which treatments you do not want, which to limit, and which to stop at a certain time. This includes surgery, medicine, IV fluid, and tube feedings.   Durable power of  for healthcare (DPAHC):  This is a written record that states who you want to make healthcare choices for you when you are unable to make them for yourself. This person, called a proxy, is usually a family member or a friend. You may choose more than 1 proxy.  Do not resuscitate (DNR) order:  A DNR order is used in case your heart stops beating or you stop breathing. It is a request not to have certain forms of treatment, such as CPR. A DNR order may be included in other types of advance directives.  Medical directive:  This covers the care that you want if you are in a coma, near death, or unable to make decisions for yourself. You can list the treatments you want for each condition. Treatment may include pain medicine, surgery, blood transfusions, dialysis, IV or tube feedings, and a ventilator (breathing machine).  Values history:  This document has questions about your views, beliefs, and how you feel and think about life. This information can help others choose the care that you would choose.  Why are advance directives important?  An advance directive helps you control your care. Although spoken wishes may be used, it is better to have your wishes written down. Spoken wishes can be misunderstood, or not followed. Treatments may be given even if you do not want them. An advance directive may make it easier for your family to make difficult choices about your care.   Fall Prevention    Fall prevention  includes ways to make your home and other areas safer. It also includes ways you can move more carefully to prevent a fall. Health conditions that cause changes in your blood pressure, vision, or muscle  strength and coordination may increase your risk for falls. Medicines may also increase your risk for falls if they make you dizzy, weak, or sleepy.   Fall prevention tips:   Stand or sit up slowly.    Use assistive devices as directed.    Wear shoes that fit well and have soles that .    Wear a personal alarm.    Stay active.    Manage your medical conditions.    Home Safety Tips:  Add items to prevent falls in the bathroom.    Keep paths clear.    Install bright lights in your home.    Keep items you use often on shelves within reach.    Paint or place reflective tape on the edges of your stairs.    Weight Management   Why it is important to manage your weight:  Being overweight increases your risk of health conditions such as heart disease, high blood pressure, type 2 diabetes, and certain types of cancer. It can also increase your risk for osteoarthritis, sleep apnea, and other respiratory problems. Aim for a slow, steady weight loss. Even a small amount of weight loss can lower your risk of health problems.  How to lose weight safely:  A safe and healthy way to lose weight is to eat fewer calories and get regular exercise. You can lose up about 1 pound a week by decreasing the number of calories you eat by 500 calories each day.   Healthy meal plan for weight management:  A healthy meal plan includes a variety of foods, contains fewer calories, and helps you stay healthy. A healthy meal plan includes the following:  Eat whole-grain foods more often.  A healthy meal plan should contain fiber. Fiber is the part of grains, fruits, and vegetables that is not broken down by your body. Whole-grain foods are healthy and provide extra fiber in your diet. Some examples of whole-grain foods are whole-wheat breads and pastas, oatmeal, brown rice, and bulgur.  Eat a variety of vegetables every day.  Include dark, leafy greens such as spinach, kale, criselda greens, and mustard greens. Eat yellow and orange vegetables  such as carrots, sweet potatoes, and winter squash.   Eat a variety of fruits every day.  Choose fresh or canned fruit (canned in its own juice or light syrup) instead of juice. Fruit juice has very little or no fiber.  Eat low-fat dairy foods.  Drink fat-free (skim) milk or 1% milk. Eat fat-free yogurt and low-fat cottage cheese. Try low-fat cheeses such as mozzarella and other reduced-fat cheeses.  Choose meat and other protein foods that are low in fat.  Choose beans or other legumes such as split peas or lentils. Choose fish, skinless poultry (chicken or turkey), or lean cuts of red meat (beef or pork). Before you cook meat or poultry, cut off any visible fat.   Use less fat and oil.  Try baking foods instead of frying them. Add less fat, such as margarine, sour cream, regular salad dressing and mayonnaise to foods. Eat fewer high-fat foods. Some examples of high-fat foods include french fries, doughnuts, ice cream, and cakes.  Eat fewer sweets.  Limit foods and drinks that are high in sugar. This includes candy, cookies, regular soda, and sweetened drinks.  Exercise:  Exercise at least 30 minutes per day on most days of the week. Some examples of exercise include walking, biking, dancing, and swimming. You can also fit in more physical activity by taking the stairs instead of the elevator or parking farther away from stores. Ask your healthcare provider about the best exercise plan for you.      © Copyright [employer] 2018 Information is for End User's use only and may not be sold, redistributed or otherwise used for commercial purposes. All illustrations and images included in CareNotes® are the copyrighted property of [employer] or [employer]

## 2025-04-01 NOTE — PROGRESS NOTES
Name: Jacek Valles      : 1955      MRN: [id number]  Encounter Provider: Sunni Burton DO  Encounter Date: 2025   Encounter department: [facility]    Assessment & Plan  Encounter for initial preventive physical examination covered by Medicare         Severe obesity (BMI 35.0-39.9) with comorbidity (HCC)  Stable       Type 2 diabetes mellitus with diabetic neuropathy, without long-term current use of insulin (HCC)  Well controlled  Continue mounjaro 2.5 mg weekly,  Metformin 2000 mg a day and jardiance 25 mg a day   Lab Results   Component Value Date    HGBA1C 6.3 (H) 2025       Orders:  •  Hemoglobin A1C; Future    Essential hypertension  Blood pressure is controlled  Continue lisinopril 10 mg daily  Orders:  •  CBC; Future  •  Comprehensive metabolic panel; Future  •  Lipid Panel with Direct LDL reflex; Future    Screening PSA (prostate specific antigen)    Orders:  •  PSA, Total Screen; Future    History of prostate cancer    Orders:  •  PSA, Total Screen; Future    Vitamin B12 deficiency    Orders:  •  Vitamin B12; Future    Screening for AAA (abdominal aortic aneurysm)    Orders:  •   Abdominal Aorta Medicare Screening AAA; Future    Personal history of nicotine dependence    Orders:  •   Abdominal Aorta Medicare Screening AAA; Future    Fatty liver  Fib 4 score is 1.91          Preventive health issues were discussed with patient, and age appropriate screening tests were ordered as noted in patient's After Visit Summary. Personalized health advice and appropriate referrals for health education or preventive services given if needed, as noted in patient's After Visit Summary.    History of Present Illness     He is feeling well except he woke up with a sore throat 2 days ago.  He has pain by his jaw/glands.  Otherwise he has been doing well.  He is tolerating his medication well.       Patient Care Team:  Sunni Burton DO as PCP - General  DO Norris Villagomez,  MD    Review of Systems   HENT:  Positive for sore throat.    Respiratory:  Negative for cough.      Medical History Reviewed by provider this encounter:  Tobacco  Allergies  Meds  Problems  Med Hx  Surg Hx  Fam Hx       Annual Wellness Visit Questionnaire   Jacek is here for his Subsequent Wellness visit.     Health Risk Assessment:   Patient rates overall health as excellent. Patient feels that their physical health rating is same. Patient is very satisfied with their life. Eyesight was rated as same. Hearing was rated as same. Patient feels that their emotional and mental health rating is same. Patients states they are never, rarely angry. Patient states they are never, rarely unusually tired/fatigued. Pain experienced in the last 7 days has been none. Patient states that he has experienced no weight loss or gain in last 6 months.     Depression Screening:   PHQ-2 Score: 0      Fall Risk Screening:   In the past year, patient has experienced: history of falling in past year    Number of falls: 1  Injured during fall?: Yes    Feels unsteady when standing or walking?: No    Worried about falling?: Yes      Home Safety:  Patient does not have trouble with stairs inside or outside of their home. Patient has working smoke alarms and has working carbon monoxide detector. Home safety hazards include: none.     Nutrition:   Current diet is Regular.     Medications:   Patient is currently taking over-the-counter supplements. OTC medications include: see medication list. Patient is able to manage medications.     Activities of Daily Living (ADLs)/Instrumental Activities of Daily Living (IADLs):   Walk and transfer into and out of bed and chair?: Yes  Dress and groom yourself?: Yes    Bathe or shower yourself?: Yes    Feed yourself? Yes  Do your laundry/housekeeping?: Yes  Manage your money, pay your bills and track your expenses?: Yes  Make your own meals?: Yes    Do your own shopping?: Yes    Previous  Hospitalizations:   Any hospitalizations or ED visits within the last 12 months?: No      Advance Care Planning:   Living will: Yes    Durable POA for healthcare: Yes    Advanced directive: Yes    Advanced directive counseling given: Yes    End of Life Decisions reviewed with patient: Yes    Provider agrees with end of life decisions: Yes      Cognitive Screening:   Provider or family/friend/caregiver concerned regarding cognition?: No    PREVENTIVE SCREENINGS      Cardiovascular Screening:    General: Screening Not Indicated and History Lipid Disorder      Diabetes Screening:     General: Screening Not Indicated and History Diabetes      Colorectal Cancer Screening:     General: Screening Current      Prostate Cancer Screening:    General: History Prostate Cancer      Osteoporosis Screening:    General: Screening Not Indicated      Abdominal Aortic Aneurysm (AAA) Screening:    Risk factors include: age between 65-74 yo and tobacco use        Lung Cancer Screening:     General: Screening Not Indicated      Hepatitis C Screening:    General: Screening Current    Screening, Brief Intervention, and Referral to Treatment (SBIRT)     Screening  Typical number of drinks in a day: 1  Typical number of drinks in a week: 7  Interpretation: Low risk drinking behavior.    AUDIT-C Screenin) How often did you have a drink containing alcohol in the past year? never  2) How many drinks did you have on a typical day when you were drinking in the past year? 0  3) How often did you have 6 or more drinks on one occasion in the past year? never    AUDIT-C Score: 0  Interpretation: Score 0-3 (male): Negative screen for alcohol misuse    Single Item Drug Screening:  How often have you used an illegal drug (including marijuana) or a prescription medication for non-medical reasons in the past year? never    Single Item Drug Screen Score: 0  Interpretation: Negative screen for possible drug use disorder    Brief Intervention  Alcohol &  "drug use screenings were reviewed. No concerns regarding substance use disorder identified.     Social Drivers of Health     Food Insecurity: No Food Insecurity (4/1/2025)    Hunger Vital Sign    • Worried About Running Out of Food in the Last Year: Never true    • Ran Out of Food in the Last Year: Never true   Transportation Needs: No Transportation Needs (4/1/2025)    PRAPARE - Transportation    • Lack of Transportation (Medical): No    • Lack of Transportation (Non-Medical): No   Housing Stability: Low Risk  (4/1/2025)    Housing Stability Vital Sign    • Unable to Pay for Housing in the Last Year: No    • Number of Times Moved in the Last Year: 0    • Homeless in the Last Year: No   Utilities: Not At Risk (4/1/2025)    [facility] Utilities    • Threatened with loss of utilities: No     Vision Screening    Right eye Left eye Both eyes   Without correction 20/20 20/20 20/20   With correction          Objective   /74   Pulse 80   Temp 97.5 °F (36.4 °C)   Resp 18   Ht 5' 11\" (1.803 m)   Wt 111 kg (244 lb 3.2 oz)   BMI 34.06 kg/m²     Physical Exam  Vitals reviewed.   Constitutional:       Appearance: He is well-developed.   HENT:      Head: Normocephalic and atraumatic.      Right Ear: External ear normal.      Left Ear: External ear normal.   Cardiovascular:      Rate and Rhythm: Normal rate and regular rhythm.      Heart sounds: Normal heart sounds. No murmur heard.  Pulmonary:      Effort: Pulmonary effort is normal. No respiratory distress.      Breath sounds: Normal breath sounds. No wheezing.   Neurological:      Mental Status: He is alert.         "

## 2025-04-03 ENCOUNTER — TELEPHONE (OUTPATIENT)
Dept: FAMILY MEDICINE CLINIC | Facility: CLINIC | Age: 70
End: 2025-04-03

## 2025-04-03 DIAGNOSIS — K76.0 FATTY LIVER: ICD-10-CM

## 2025-04-03 DIAGNOSIS — E78.2 MIXED HYPERLIPIDEMIA: ICD-10-CM

## 2025-04-03 RX ORDER — ROSUVASTATIN CALCIUM 20 MG/1
20 TABLET, COATED ORAL DAILY
Qty: 100 TABLET | Refills: 3 | Status: SHIPPED | OUTPATIENT
Start: 2025-04-03

## 2025-04-03 NOTE — TELEPHONE ENCOUNTER
Patient would like a refill of rosuvastatin sent to [facility].

## 2025-04-24 ENCOUNTER — ANESTHESIA (OUTPATIENT)
Dept: ANESTHESIOLOGY | Facility: HOSPITAL | Age: 70
End: 2025-04-24

## 2025-04-24 ENCOUNTER — ANESTHESIA EVENT (OUTPATIENT)
Dept: ANESTHESIOLOGY | Facility: HOSPITAL | Age: 70
End: 2025-04-24

## 2025-04-26 DIAGNOSIS — E11.40 TYPE 2 DIABETES MELLITUS WITH DIABETIC NEUROPATHY, WITHOUT LONG-TERM CURRENT USE OF INSULIN (HCC): ICD-10-CM

## 2025-04-26 DIAGNOSIS — E53.8 VITAMIN B12 DEFICIENCY: ICD-10-CM

## 2025-04-26 DIAGNOSIS — Z85.46 HISTORY OF PROSTATE CANCER: ICD-10-CM

## 2025-04-28 DIAGNOSIS — E11.40 TYPE 2 DIABETES MELLITUS WITH DIABETIC NEUROPATHY, WITHOUT LONG-TERM CURRENT USE OF INSULIN (HCC): ICD-10-CM

## 2025-04-28 RX ORDER — METFORMIN HYDROCHLORIDE 500 MG/1
500 TABLET, EXTENDED RELEASE ORAL
Qty: 360 TABLET | Refills: 0 | Status: SHIPPED | OUTPATIENT
Start: 2025-04-28

## 2025-04-28 RX ORDER — TAMSULOSIN HYDROCHLORIDE 0.4 MG/1
0.4 CAPSULE ORAL
Qty: 90 CAPSULE | Refills: 0 | Status: SHIPPED | OUTPATIENT
Start: 2025-04-28

## 2025-04-28 RX ORDER — LANOLIN ALCOHOL/MO/W.PET/CERES
1000 CREAM (GRAM) TOPICAL DAILY
Qty: 100 TABLET | Refills: 3 | Status: SHIPPED | OUTPATIENT
Start: 2025-04-28

## 2025-05-08 ENCOUNTER — HOSPITAL ENCOUNTER (OUTPATIENT)
Dept: GASTROENTEROLOGY | Facility: AMBULARY SURGERY CENTER | Age: 70
Setting detail: OUTPATIENT SURGERY
End: 2025-05-08
Attending: INTERNAL MEDICINE
Payer: MEDICARE

## 2025-05-08 ENCOUNTER — ANESTHESIA EVENT (OUTPATIENT)
Dept: GASTROENTEROLOGY | Facility: AMBULARY SURGERY CENTER | Age: 70
End: 2025-05-08
Payer: MEDICARE

## 2025-05-08 VITALS
HEIGHT: 71 IN | RESPIRATION RATE: 18 BRPM | HEART RATE: 73 BPM | DIASTOLIC BLOOD PRESSURE: 62 MMHG | WEIGHT: 241 LBS | BODY MASS INDEX: 33.74 KG/M2 | TEMPERATURE: 97.3 F | SYSTOLIC BLOOD PRESSURE: 106 MMHG | OXYGEN SATURATION: 72 %

## 2025-05-08 DIAGNOSIS — K21.9 GASTROESOPHAGEAL REFLUX DISEASE, UNSPECIFIED WHETHER ESOPHAGITIS PRESENT: ICD-10-CM

## 2025-05-08 DIAGNOSIS — Z86.0100 HISTORY OF COLON POLYPS: ICD-10-CM

## 2025-05-08 LAB — GLUCOSE SERPL-MCNC: 93 MG/DL (ref 65–140)

## 2025-05-08 PROCEDURE — 82948 REAGENT STRIP/BLOOD GLUCOSE: CPT

## 2025-05-08 PROCEDURE — 45385 COLONOSCOPY W/LESION REMOVAL: CPT | Performed by: INTERNAL MEDICINE

## 2025-05-08 PROCEDURE — 43239 EGD BIOPSY SINGLE/MULTIPLE: CPT | Performed by: INTERNAL MEDICINE

## 2025-05-08 PROCEDURE — 88305 TISSUE EXAM BY PATHOLOGIST: CPT | Performed by: PATHOLOGY

## 2025-05-08 RX ORDER — LIDOCAINE HYDROCHLORIDE 10 MG/ML
INJECTION, SOLUTION EPIDURAL; INFILTRATION; INTRACAUDAL; PERINEURAL AS NEEDED
Status: DISCONTINUED | OUTPATIENT
Start: 2025-05-08 | End: 2025-05-08

## 2025-05-08 RX ORDER — SODIUM CHLORIDE, SODIUM LACTATE, POTASSIUM CHLORIDE, CALCIUM CHLORIDE 600; 310; 30; 20 MG/100ML; MG/100ML; MG/100ML; MG/100ML
INJECTION, SOLUTION INTRAVENOUS CONTINUOUS PRN
Status: DISCONTINUED | OUTPATIENT
Start: 2025-05-08 | End: 2025-05-08

## 2025-05-08 RX ORDER — PROPOFOL 10 MG/ML
INJECTION, EMULSION INTRAVENOUS AS NEEDED
Status: DISCONTINUED | OUTPATIENT
Start: 2025-05-08 | End: 2025-05-08

## 2025-05-08 RX ADMIN — LIDOCAINE HYDROCHLORIDE 50 MG: 10 INJECTION, SOLUTION EPIDURAL; INFILTRATION; INTRACAUDAL at 07:43

## 2025-05-08 RX ADMIN — PROPOFOL 50 MG: 10 INJECTION, EMULSION INTRAVENOUS at 08:02

## 2025-05-08 RX ADMIN — Medication 40 MG: at 08:01

## 2025-05-08 RX ADMIN — PROPOFOL 50 MG: 10 INJECTION, EMULSION INTRAVENOUS at 07:44

## 2025-05-08 RX ADMIN — PROPOFOL 50 MG: 10 INJECTION, EMULSION INTRAVENOUS at 07:47

## 2025-05-08 RX ADMIN — PROPOFOL 50 MG: 10 INJECTION, EMULSION INTRAVENOUS at 08:06

## 2025-05-08 RX ADMIN — PROPOFOL 150 MG: 10 INJECTION, EMULSION INTRAVENOUS at 07:43

## 2025-05-08 RX ADMIN — PROPOFOL 50 MG: 10 INJECTION, EMULSION INTRAVENOUS at 07:53

## 2025-05-08 RX ADMIN — PROPOFOL 50 MG: 10 INJECTION, EMULSION INTRAVENOUS at 07:50

## 2025-05-08 RX ADMIN — SODIUM CHLORIDE, SODIUM LACTATE, POTASSIUM CHLORIDE, AND CALCIUM CHLORIDE: .6; .31; .03; .02 INJECTION, SOLUTION INTRAVENOUS at 07:40

## 2025-05-08 RX ADMIN — PROPOFOL 50 MG: 10 INJECTION, EMULSION INTRAVENOUS at 08:09

## 2025-05-08 RX ADMIN — PROPOFOL 50 MG: 10 INJECTION, EMULSION INTRAVENOUS at 07:58

## 2025-05-08 RX ADMIN — PROPOFOL 50 MG: 10 INJECTION, EMULSION INTRAVENOUS at 07:56

## 2025-05-08 NOTE — H&P
History and Physical - [facility]  Jacek Valles 69 y.o. male MRN: [id number]    HPI: Jacek Valles is a 69 y.o. year old male who presents for evaluation of GERD and colon cancer screening.      Review of Systems    Historical Information   Past Medical History:   Diagnosis Date    Arthritis     Basal cell carcinoma 2005    Cancer (HCC) 2012    Chest tightness     Colon polyp     Diabetes (HCC)     Diabetes mellitus (HCC)     Diverticulosis of sigmoid colon     Esophageal reflux     Fatty liver     GERD (gastroesophageal reflux disease)     Hearing problem     High cholesterol     History of chest pain     Hyperlipemia     Hypertension     Lumbago     Malignant neoplasm of prostate (HCC)     Melanoma (HCC) 1999    Prostate CA (HCC)     Squamous cell skin cancer 08/27/2020    right dorsal forearm    Tinea pedis of both feet     Trochanteric bursitis      Past Surgical History:   Procedure Laterality Date    APPENDECTOMY      COLONOSCOPY  2016    HERNIA REPAIR      ? Umbilical    MOHS SURGERY  2019    SC ARTHRS KNE SURG W/MENISCECTOMY MED/LAT W/SHVG Left 03/15/2022    Procedure: ARTHROSCOPY KNEE,  partial medial menisectomy;  Surgeon: Eren Hernandez DO;  Location: [facility];  Service: Orthopedics    SC REPAIR FIRST ABDOMINAL WALL HERNIA N/A 11/11/2021    Procedure: INCISIONAL HERNIA REPAIR AT THE UMBILICUS;  Surgeon: Glen Patino MD;  Location: [facility];  Service: General    SC RPR 1ST INGUN HRNA AGE 5 YRS/> REDUCIBLE Right 1/17/2025    Procedure: REPAIR HERNIA INGUINAL;  Surgeon: Glen Patino MD;  Location: [facility];  Service: General    PROSTATECTOMY N/A 2011    PROSTATECTOMY      SKIN BIOPSY  2019     Social History   Social History     Substance and Sexual Activity   Alcohol Use Yes    Alcohol/week: 6.0 standard drinks of alcohol    Types: 6 Cans of beer per week    Comment: Weekends     Social History     Substance and Sexual Activity   Drug Use No     Social  "History     Tobacco Use   Smoking Status Former    Current packs/day: 0.00    Average packs/day: 1 pack/day for 15.1 years (15.1 ttl pk-yrs)    Types: Cigarettes    Start date: 1997    Quit date: 2012    Years since quittin.8    Passive exposure: Current   Smokeless Tobacco Never     Family History   Problem Relation Age of Onset    Arthritis Mother     Parkinsonism Mother     Heart disease Father     Arthritis Father     Coronary artery disease Father     Hypertension Father     Parkinsonism Father     Mental illness Neg Hx        Meds/Allergies     Not in a hospital admission.    Allergies   Allergen Reactions    Penicillins Other (See Comments)      Ancef can tolerate       Objective     /63   Pulse 74   Temp 97.8 °F (36.6 °C) (Temporal)   Resp 18   Ht 5' 11\" (1.803 m)   Wt 109 kg (241 lb)   SpO2 97%   BMI 33.61 kg/m²       PHYSICAL EXAM    Gen: NAD  CV: RRR  CHEST: Clear  ABD: soft, NT/ND  EXT: no edema  Neuro: AAO      ASSESSMENT/PLAN:  This is a 69 y.o. year old male here for evaluation of GERD and colon cancer screening.  Previous EGD was in  by Dr. Pereyra with the findings of Ash's esophagus.    PLAN:   Procedure: EGD and colonoscopy.      "

## 2025-05-08 NOTE — ANESTHESIA POSTPROCEDURE EVALUATION
Post-Op Assessment Note    CV Status:  Stable  Pain Score: 0    Pain management: adequate       Mental Status:  Alert and awake   Hydration Status:  Euvolemic and stable   PONV Controlled:  Controlled   Airway Patency:  Patent and adequate     Post Op Vitals Reviewed: Yes    No anethesia notable event occurred.    Staff: CRNA       Last Filed PACU Vitals:  Vitals Value Taken Time   Temp 97.3 °F (36.3 °C) 05/08/25 0819   Pulse 74 05/08/25 0819   /59 05/08/25 0819   Resp 16 05/08/25 0819   SpO2 96 % 05/08/25 0819

## 2025-05-08 NOTE — ANESTHESIA PREPROCEDURE EVALUATION
Procedure:  COLONOSCOPY  EGD    Relevant Problems   CARDIO   (+) Essential hypertension   (+) Mixed hyperlipidemia      ENDO   (+) Type 2 diabetes mellitus with diabetic neuropathy, without long-term current use of insulin (HCC)      GI/HEPATIC   (+) Esophageal reflux   (+) Fatty liver      MUSCULOSKELETAL   (+) Chronic bilateral low back pain without sciatica      NEURO/PSYCH   (+) Chronic bilateral low back pain without sciatica   (+) Type 2 diabetes mellitus with diabetic neuropathy, without long-term current use of insulin (HCC)        Physical Exam    Airway    Mallampati score: II  TM Distance: >3 FB  Neck ROM: full     Dental   No notable dental hx     Cardiovascular      Pulmonary      Other Findings        Anesthesia Plan  ASA Score- 2     Anesthesia Type- IV sedation with anesthesia with ASA Monitors.         Additional Monitors:     Airway Plan:            Plan Factors-Exercise tolerance (METS): >4 METS.    Chart reviewed.    Patient summary reviewed.    Patient is not a current smoker.              Induction- intravenous.    Postoperative Plan-         Informed Consent- Anesthetic plan and risks discussed with patient.  I personally reviewed this patient with the CRNA. Discussed and agreed on the Anesthesia Plan with the CRNA..      NPO Status:  Vitals Value Taken Time   Date of last liquid 05/08/25 05/08/25 0653   Time of last liquid 0130 05/08/25 0653   Date of last solid 05/06/25 05/08/25 0653   Time of last solid 1900 05/08/25 0653

## 2025-05-12 PROCEDURE — 88305 TISSUE EXAM BY PATHOLOGIST: CPT | Performed by: PATHOLOGY

## 2025-05-14 ENCOUNTER — RESULTS FOLLOW-UP (OUTPATIENT)
Age: 70
End: 2025-05-14

## 2025-05-20 DIAGNOSIS — E11.40 TYPE 2 DIABETES MELLITUS WITH DIABETIC NEUROPATHY, WITHOUT LONG-TERM CURRENT USE OF INSULIN (HCC): ICD-10-CM

## 2025-05-20 RX ORDER — METFORMIN HYDROCHLORIDE 500 MG/1
2000 TABLET, EXTENDED RELEASE ORAL
Qty: 360 TABLET | Refills: 1 | Status: SHIPPED | OUTPATIENT
Start: 2025-05-20

## 2025-05-25 DIAGNOSIS — E11.40 TYPE 2 DIABETES MELLITUS WITH DIABETIC NEUROPATHY, WITHOUT LONG-TERM CURRENT USE OF INSULIN (HCC): ICD-10-CM

## 2025-05-25 DIAGNOSIS — I10 ESSENTIAL HYPERTENSION: ICD-10-CM

## 2025-05-25 DIAGNOSIS — Z85.46 HISTORY OF PROSTATE CANCER: ICD-10-CM

## 2025-05-26 RX ORDER — LISINOPRIL 10 MG/1
10 TABLET ORAL DAILY
Qty: 90 TABLET | Refills: 1 | Status: SHIPPED | OUTPATIENT
Start: 2025-05-26

## 2025-05-26 RX ORDER — EMPAGLIFLOZIN 25 MG/1
25 TABLET, FILM COATED ORAL EVERY MORNING
Qty: 90 TABLET | Refills: 1 | Status: SHIPPED | OUTPATIENT
Start: 2025-05-26

## 2025-05-26 RX ORDER — TAMSULOSIN HYDROCHLORIDE 0.4 MG/1
CAPSULE ORAL
Qty: 90 CAPSULE | Refills: 1 | Status: SHIPPED | OUTPATIENT
Start: 2025-05-26

## 2025-06-04 DIAGNOSIS — J30.9 ALLERGIC RHINITIS, UNSPECIFIED SEASONALITY, UNSPECIFIED TRIGGER: ICD-10-CM

## 2025-06-05 RX ORDER — FLUTICASONE PROPIONATE 50 MCG
1 SPRAY, SUSPENSION (ML) NASAL DAILY
Qty: 48 G | Refills: 1 | Status: SHIPPED | OUTPATIENT
Start: 2025-06-05

## (undated) DEVICE — VIOLET BRAIDED (POLYGLACTIN 910), SYNTHETIC ABSORBABLE SUTURE: Brand: COATED VICRYL

## (undated) DEVICE — SUT VICRYL 2-0 SH 27 IN UNDYED J417H

## (undated) DEVICE — DRAPE EQUIPMENT RF WAND

## (undated) DEVICE — ABDOMINAL PAD: Brand: DERMACEA

## (undated) DEVICE — LIGHT GLOVE GREEN

## (undated) DEVICE — ADHESIVE SKIN HIGH VISCOSITY EXOFIN 1ML

## (undated) DEVICE — GLOVE SRG BIOGEL ECLIPSE 7

## (undated) DEVICE — SUT VICRYL 3-0 SH 27 IN J416H

## (undated) DEVICE — VIAL DECANTER

## (undated) DEVICE — TUBING ARTHROSCOPIC WAVE  MAIN PUMP

## (undated) DEVICE — CHLORAPREP HI-LITE 26ML ORANGE

## (undated) DEVICE — PAD GROUNDING ADULT

## (undated) DEVICE — 3M™ STERI-DRAPE™ U-DRAPE 1015: Brand: STERI-DRAPE™

## (undated) DEVICE — 2000CC GUARDIAN II: Brand: GUARDIAN

## (undated) DEVICE — SUT VICRYL 1 CT-1 27 IN J261H

## (undated) DEVICE — VAPR PREMIERE50 ELECTRODE WITH HAND CONTROLS 3.0MM, 50 DEGREES SUCTION WITH INTEGRATED HANDPIECE: Brand: VAPR

## (undated) DEVICE — OCCLUSIVE GAUZE STRIP,3% BISMUTH TRIBROMOPHENATE IN PETROLATUM BLEND: Brand: XEROFORM

## (undated) DEVICE — FABRIC REINFORCED, SURGICAL GOWN, XL: Brand: CONVERTORS

## (undated) DEVICE — SUT MONOCRYL 4-0 PS-2 18 IN Y496G

## (undated) DEVICE — TOWEL SET X-RAY

## (undated) DEVICE — INTENDED FOR TISSUE SEPARATION, AND OTHER PROCEDURES THAT REQUIRE A SHARP SURGICAL BLADE TO PUNCTURE OR CUT.: Brand: BARD-PARKER SAFETY BLADES SIZE 15, STERILE

## (undated) DEVICE — PREP SURGICAL PURPREP 26ML

## (undated) DEVICE — GLOVE SRG BIOGEL 8

## (undated) DEVICE — ELECTRODE BLADE MOD E-Z CLEAN  2.75IN 7CM -0012AM

## (undated) DEVICE — SUT MONOCRYL 4-0 PS-2 27 IN Y426H

## (undated) DEVICE — GLOVE SRG BIOGEL 8.5

## (undated) DEVICE — GLOVE INDICATOR PI UNDERGLOVE SZ 8.5 BLUE

## (undated) DEVICE — ASTOUND SURGICAL GOWN, XXX LARGE, X-LONG: Brand: CONVERTORS

## (undated) DEVICE — EXOFIN PRECISION PEN HIGH VISCOSITY TOPICAL SKIN ADHESIVE: Brand: EXOFIN PRECISION PEN, 1G

## (undated) DEVICE — BLADE SHAVER TORPEDO 4MM 13CM  COOLCUT

## (undated) DEVICE — BETHLEHEM UNIVERSAL MINOR GEN: Brand: CARDINAL HEALTH

## (undated) DEVICE — NEEDLE 20 G X 1 1/2

## (undated) DEVICE — INTENDED FOR TISSUE SEPARATION, AND OTHER PROCEDURES THAT REQUIRE A SHARP SURGICAL BLADE TO PUNCTURE OR CUT.: Brand: BARD-PARKER SAFETY BLADES SIZE 11, STERILE

## (undated) DEVICE — UNDYED BRAIDED (POLYGLACTIN 910), SYNTHETIC ABSORBABLE SUTURE: Brand: COATED VICRYL

## (undated) DEVICE — ANTIBACTERIAL UNDYED BRAIDED (POLYGLACTIN 910), SYNTHETIC ABSORBABLE SUTURE: Brand: COATED VICRYL

## (undated) DEVICE — ANTIBACTERIAL UNDYED BRAIDED (POLYGLACTIN 910), SYNTHETIC ABSORBABLE SURGICAL SUTURE: Brand: COATED VICRYL

## (undated) DEVICE — ACE WRAP 6 IN STERILE

## (undated) DEVICE — PACK ARTHROSCOPY

## (undated) DEVICE — PENCIL ELECTROSURG E-Z CLEAN -0035H

## (undated) DEVICE — DECANTER: Brand: UNBRANDED

## (undated) DEVICE — SUT PDS II 3-0 SH 27 IN Z316H

## (undated) DEVICE — SUT VICRYL 2-0 REEL 54 IN J286G

## (undated) DEVICE — GLOVE INDICATOR PI UNDERGLOVE SZ 7.5 BLUE

## (undated) DEVICE — BLADE SHAVER EXCALIBUR 4MM 13CM COOLCUT

## (undated) DEVICE — BANDAGE, ESMARK LF STR 6"X9' (20/CS): Brand: CYPRESS

## (undated) DEVICE — TIBURON EXTREMITY SHEET: Brand: CONVERTORS

## (undated) DEVICE — SPONGE GAUZE 4 X 9

## (undated) DEVICE — TIBURON SPLIT SHEET: Brand: CONVERTORS

## (undated) DEVICE — TOWEL SURG XR DETECT GREEN STRL RFD

## (undated) DEVICE — SUT VICRYL PLUS 2-0 54IN VCP286G